# Patient Record
Sex: FEMALE | Race: WHITE | NOT HISPANIC OR LATINO | Employment: OTHER | URBAN - METROPOLITAN AREA
[De-identification: names, ages, dates, MRNs, and addresses within clinical notes are randomized per-mention and may not be internally consistent; named-entity substitution may affect disease eponyms.]

---

## 2018-06-19 LAB
BACTERIA UR QL AUTO: ABNORMAL
BILIRUB UR QL STRIP: NEGATIVE
CLARITY UR: ABNORMAL
COLOR UR: ABNORMAL
GLUCOSE UR STRIP-MCNC: NEGATIVE MG/DL
HGB UR QL STRIP.AUTO: ABNORMAL
KETONES UR STRIP-MCNC: ABNORMAL MG/DL
LEUKOCYTE ESTERASE UR QL STRIP: ABNORMAL
MUCUS THREADS (HISTORICAL): ABNORMAL /HPF
NITRITE UR QL STRIP: NEGATIVE
NON-SQ EPI CELLS URNS QL MICRO: ABNORMAL /HPF
OTHER CELLS (HISTORICAL): ABNORMAL
PH UR STRIP.AUTO: 7 [PH] (ref 4.5–8)
PROT UR STRIP-MCNC: ABNORMAL MG/DL
RBC #/AREA URNS AUTO: > 100 /HPF
SP GR UR STRIP.AUTO: 1.01 (ref 1–1.03)
UROBILINOGEN UR QL STRIP.AUTO: 1 EU/DL (ref 0.2–8)
WBC #/AREA URNS AUTO: ABNORMAL /HPF

## 2018-06-26 ENCOUNTER — LAB REQUISITION (OUTPATIENT)
Dept: LAB | Facility: HOSPITAL | Age: 83
End: 2018-06-26
Payer: MEDICARE

## 2018-06-26 DIAGNOSIS — N39.0 URINARY TRACT INFECTION: ICD-10-CM

## 2018-06-26 DIAGNOSIS — R31.9 HEMATURIA: ICD-10-CM

## 2018-06-26 LAB
BACTERIA UR QL AUTO: ABNORMAL /HPF
BILIRUB UR QL STRIP: ABNORMAL
CLARITY UR: ABNORMAL
COLOR UR: ABNORMAL
GLUCOSE UR STRIP-MCNC: NEGATIVE MG/DL
HGB UR QL STRIP.AUTO: ABNORMAL
KETONES UR STRIP-MCNC: NEGATIVE MG/DL
LEUKOCYTE ESTERASE UR QL STRIP: NEGATIVE
NITRITE UR QL STRIP: NEGATIVE
NON-SQ EPI CELLS URNS QL MICRO: ABNORMAL /HPF
PH UR STRIP.AUTO: 7 [PH] (ref 5–8)
PROT UR STRIP-MCNC: ABNORMAL MG/DL
RBC #/AREA URNS AUTO: ABNORMAL /HPF
SP GR UR STRIP.AUTO: 1.01 (ref 1–1.03)
UROBILINOGEN UR QL STRIP.AUTO: 0.2 E.U./DL
WBC #/AREA URNS AUTO: ABNORMAL /HPF

## 2018-06-26 PROCEDURE — 81001 URINALYSIS AUTO W/SCOPE: CPT | Performed by: FAMILY MEDICINE

## 2018-06-26 PROCEDURE — 87086 URINE CULTURE/COLONY COUNT: CPT | Performed by: FAMILY MEDICINE

## 2018-06-26 PROCEDURE — 81003 URINALYSIS AUTO W/O SCOPE: CPT | Performed by: FAMILY MEDICINE

## 2018-06-27 LAB — BACTERIA UR CULT: NORMAL

## 2018-07-02 ENCOUNTER — HOSPITAL ENCOUNTER (INPATIENT)
Facility: HOSPITAL | Age: 83
LOS: 4 days | Discharge: HOME | DRG: 983 | End: 2018-07-06
Attending: INTERNAL MEDICINE | Admitting: INTERNAL MEDICINE
Payer: MEDICARE

## 2018-07-02 ENCOUNTER — APPOINTMENT (EMERGENCY)
Dept: CT IMAGING | Facility: HOSPITAL | Age: 83
DRG: 983 | End: 2018-07-02
Payer: MEDICARE

## 2018-07-02 DIAGNOSIS — R31.9 HEMATURIA: ICD-10-CM

## 2018-07-02 DIAGNOSIS — E87.6 HYPOKALEMIA: Primary | ICD-10-CM

## 2018-07-02 DIAGNOSIS — R00.1 BRADYCARDIA: ICD-10-CM

## 2018-07-02 DIAGNOSIS — E87.1 HYPONATREMIA: ICD-10-CM

## 2018-07-02 PROBLEM — R42 DIZZINESS: Status: ACTIVE | Noted: 2018-07-02

## 2018-07-02 PROBLEM — E11.9 DIABETES MELLITUS TYPE 2, NONINSULIN DEPENDENT (HCC): Status: ACTIVE | Noted: 2017-12-12

## 2018-07-02 PROBLEM — N63.20 LEFT BREAST MASS: Status: ACTIVE | Noted: 2017-12-10

## 2018-07-02 PROBLEM — R31.0 GROSS HEMATURIA: Status: ACTIVE | Noted: 2018-07-02

## 2018-07-02 PROBLEM — F32.A DEPRESSION: Status: ACTIVE | Noted: 2017-02-06

## 2018-07-02 LAB
ALBUMIN SERPL BCP-MCNC: 4.2 G/DL (ref 3.5–5.7)
ALP SERPL-CCNC: 65 U/L (ref 55–165)
ALT SERPL W P-5'-P-CCNC: 16 U/L (ref 7–52)
ANION GAP SERPL CALCULATED.3IONS-SCNC: 5 MMOL/L (ref 4–13)
ANION GAP SERPL CALCULATED.3IONS-SCNC: 9 MMOL/L (ref 4–13)
APTT PPP: 22 SECONDS (ref 24–36)
AST SERPL W P-5'-P-CCNC: 21 U/L (ref 13–39)
BACTERIA UR QL AUTO: ABNORMAL /HPF
BACTERIA UR QL AUTO: ABNORMAL /HPF
BASOPHILS # BLD AUTO: 0 THOUSANDS/ΜL (ref 0–0.1)
BASOPHILS NFR BLD AUTO: 0 % (ref 0–2)
BILIRUB SERPL-MCNC: 0.6 MG/DL (ref 0.2–1)
BILIRUB UR QL STRIP: NEGATIVE
BILIRUB UR QL STRIP: NEGATIVE
BUN SERPL-MCNC: 10 MG/DL (ref 7–25)
BUN SERPL-MCNC: 13 MG/DL (ref 7–25)
CALCIUM SERPL-MCNC: 10 MG/DL (ref 8.6–10.5)
CALCIUM SERPL-MCNC: 11.1 MG/DL (ref 8.6–10.5)
CHLORIDE SERPL-SCNC: 80 MMOL/L (ref 98–107)
CHLORIDE SERPL-SCNC: 86 MMOL/L (ref 98–107)
CLARITY UR: ABNORMAL
CLARITY UR: ABNORMAL
CO2 SERPL-SCNC: 28 MMOL/L (ref 21–31)
CO2 SERPL-SCNC: 29 MMOL/L (ref 21–31)
COLOR UR: ABNORMAL
COLOR UR: ABNORMAL
CREAT SERPL-MCNC: 0.62 MG/DL (ref 0.6–1.2)
CREAT SERPL-MCNC: 0.71 MG/DL (ref 0.6–1.2)
EOSINOPHIL # BLD AUTO: 0 THOUSAND/ΜL (ref 0–0.61)
EOSINOPHIL NFR BLD AUTO: 0 % (ref 0–5)
ERYTHROCYTE [DISTWIDTH] IN BLOOD BY AUTOMATED COUNT: 12.7 % (ref 11.5–14.5)
GFR SERPL CREATININE-BSD FRML MDRD: 81 ML/MIN/1.73SQ M
GFR SERPL CREATININE-BSD FRML MDRD: 85 ML/MIN/1.73SQ M
GLUCOSE SERPL-MCNC: 136 MG/DL (ref 65–140)
GLUCOSE SERPL-MCNC: 137 MG/DL (ref 65–99)
GLUCOSE SERPL-MCNC: 195 MG/DL (ref 65–99)
GLUCOSE UR STRIP-MCNC: NEGATIVE MG/DL
GLUCOSE UR STRIP-MCNC: NEGATIVE MG/DL
HCT VFR BLD AUTO: 34.1 % (ref 34.8–46.1)
HGB BLD-MCNC: 12.1 G/DL (ref 12–16)
HGB UR QL STRIP.AUTO: ABNORMAL
HGB UR QL STRIP.AUTO: ABNORMAL
KETONES UR STRIP-MCNC: NEGATIVE MG/DL
KETONES UR STRIP-MCNC: NEGATIVE MG/DL
LEUKOCYTE ESTERASE UR QL STRIP: NEGATIVE
LEUKOCYTE ESTERASE UR QL STRIP: NEGATIVE
LYMPHOCYTES # BLD AUTO: 0.8 THOUSANDS/ΜL (ref 0.6–4.47)
LYMPHOCYTES NFR BLD AUTO: 8 % (ref 21–51)
MCH RBC QN AUTO: 30.6 PG (ref 26–34)
MCHC RBC AUTO-ENTMCNC: 35.3 G/DL (ref 31–37)
MCV RBC AUTO: 87 FL (ref 81–99)
MONOCYTES # BLD AUTO: 0.7 THOUSAND/ΜL (ref 0.17–1.22)
MONOCYTES NFR BLD AUTO: 8 % (ref 2–12)
MUCOUS THREADS UR QL AUTO: ABNORMAL
NEUTROPHILS # BLD AUTO: 8.2 THOUSANDS/ΜL (ref 1.4–6.5)
NEUTS SEG NFR BLD AUTO: 84 % (ref 42–75)
NITRITE UR QL STRIP: NEGATIVE
NITRITE UR QL STRIP: POSITIVE
NON-SQ EPI CELLS URNS QL MICRO: ABNORMAL /HPF
NON-SQ EPI CELLS URNS QL MICRO: ABNORMAL /HPF
NRBC BLD AUTO-RTO: 0 /100 WBCS
PH UR STRIP.AUTO: 6.5 [PH] (ref 5–8)
PH UR STRIP.AUTO: 6.5 [PH] (ref 5–8)
PLATELET # BLD AUTO: 292 THOUSANDS/UL (ref 149–390)
PMV BLD AUTO: 6.8 FL (ref 8.6–11.7)
POTASSIUM SERPL-SCNC: 2.6 MMOL/L (ref 3.5–5.5)
POTASSIUM SERPL-SCNC: 3.7 MMOL/L (ref 3.5–5.5)
PROT SERPL-MCNC: 7 G/DL (ref 6.4–8.9)
PROT UR STRIP-MCNC: ABNORMAL MG/DL
PROT UR STRIP-MCNC: ABNORMAL MG/DL
RBC # BLD AUTO: 3.94 MILLION/UL (ref 3.9–5.2)
RBC #/AREA URNS AUTO: ABNORMAL /HPF
RBC #/AREA URNS AUTO: ABNORMAL /HPF
SODIUM SERPL-SCNC: 118 MMOL/L (ref 134–143)
SODIUM SERPL-SCNC: 119 MMOL/L (ref 134–143)
SP GR UR STRIP.AUTO: 1.01 (ref 1–1.03)
SP GR UR STRIP.AUTO: 1.01 (ref 1–1.03)
TROPONIN I SERPL-MCNC: <0.03 NG/ML
UROBILINOGEN UR QL STRIP.AUTO: 0.2 E.U./DL
UROBILINOGEN UR QL STRIP.AUTO: 1 E.U./DL
WBC # BLD AUTO: 9.7 THOUSAND/UL (ref 4.8–10.8)
WBC #/AREA URNS AUTO: ABNORMAL /HPF
WBC #/AREA URNS AUTO: ABNORMAL /HPF

## 2018-07-02 PROCEDURE — 70450 CT HEAD/BRAIN W/O DYE: CPT

## 2018-07-02 PROCEDURE — 93005 ELECTROCARDIOGRAM TRACING: CPT

## 2018-07-02 PROCEDURE — 80053 COMPREHEN METABOLIC PANEL: CPT | Performed by: INTERNAL MEDICINE

## 2018-07-02 PROCEDURE — 82948 REAGENT STRIP/BLOOD GLUCOSE: CPT

## 2018-07-02 PROCEDURE — 80048 BASIC METABOLIC PNL TOTAL CA: CPT | Performed by: INTERNAL MEDICINE

## 2018-07-02 PROCEDURE — 96365 THER/PROPH/DIAG IV INF INIT: CPT

## 2018-07-02 PROCEDURE — 74176 CT ABD & PELVIS W/O CONTRAST: CPT

## 2018-07-02 PROCEDURE — 99223 1ST HOSP IP/OBS HIGH 75: CPT | Performed by: INTERNAL MEDICINE

## 2018-07-02 PROCEDURE — 85730 THROMBOPLASTIN TIME PARTIAL: CPT | Performed by: INTERNAL MEDICINE

## 2018-07-02 PROCEDURE — 81001 URINALYSIS AUTO W/SCOPE: CPT | Performed by: INTERNAL MEDICINE

## 2018-07-02 PROCEDURE — 99285 EMERGENCY DEPT VISIT HI MDM: CPT

## 2018-07-02 PROCEDURE — 84484 ASSAY OF TROPONIN QUANT: CPT | Performed by: INTERNAL MEDICINE

## 2018-07-02 PROCEDURE — 96366 THER/PROPH/DIAG IV INF ADDON: CPT

## 2018-07-02 PROCEDURE — 36415 COLL VENOUS BLD VENIPUNCTURE: CPT | Performed by: INTERNAL MEDICINE

## 2018-07-02 PROCEDURE — 81001 URINALYSIS AUTO W/SCOPE: CPT | Performed by: UROLOGY

## 2018-07-02 PROCEDURE — 81003 URINALYSIS AUTO W/O SCOPE: CPT | Performed by: INTERNAL MEDICINE

## 2018-07-02 PROCEDURE — 85025 COMPLETE CBC W/AUTO DIFF WBC: CPT | Performed by: INTERNAL MEDICINE

## 2018-07-02 RX ORDER — TRAMADOL HYDROCHLORIDE 50 MG/1
50 TABLET ORAL EVERY 6 HOURS PRN
Status: DISCONTINUED | OUTPATIENT
Start: 2018-07-02 | End: 2018-07-06 | Stop reason: HOSPADM

## 2018-07-02 RX ORDER — POTASSIUM CHLORIDE 20 MEQ/1
40 TABLET, EXTENDED RELEASE ORAL ONCE
Status: COMPLETED | OUTPATIENT
Start: 2018-07-02 | End: 2018-07-02

## 2018-07-02 RX ORDER — ACETAMINOPHEN 325 MG/1
650 TABLET ORAL EVERY 6 HOURS PRN
Status: DISCONTINUED | OUTPATIENT
Start: 2018-07-02 | End: 2018-07-06 | Stop reason: HOSPADM

## 2018-07-02 RX ORDER — SODIUM CHLORIDE 9 MG/ML
75 INJECTION, SOLUTION INTRAVENOUS ONCE
Status: COMPLETED | OUTPATIENT
Start: 2018-07-02 | End: 2018-07-03

## 2018-07-02 RX ORDER — ANASTROZOLE 1 MG/1
1 TABLET ORAL
COMMUNITY
Start: 2018-04-09

## 2018-07-02 RX ORDER — POTASSIUM CHLORIDE 14.9 MG/ML
40 INJECTION INTRAVENOUS
Status: COMPLETED | OUTPATIENT
Start: 2018-07-02 | End: 2018-07-02

## 2018-07-02 RX ORDER — TRAMADOL HYDROCHLORIDE 50 MG/1
50 TABLET ORAL EVERY 6 HOURS
COMMUNITY
Start: 2018-02-15 | End: 2019-02-15

## 2018-07-02 RX ORDER — SODIUM CHLORIDE 9 MG/ML
250 INJECTION, SOLUTION INTRAVENOUS CONTINUOUS
Status: DISCONTINUED | OUTPATIENT
Start: 2018-07-02 | End: 2018-07-02

## 2018-07-02 RX ORDER — ATORVASTATIN CALCIUM 40 MG/1
1 TABLET, FILM COATED ORAL DAILY
COMMUNITY
Start: 2018-01-26 | End: 2019-04-02 | Stop reason: SDUPTHER

## 2018-07-02 RX ORDER — PANTOPRAZOLE SODIUM 20 MG/1
20 TABLET, DELAYED RELEASE ORAL
Status: DISCONTINUED | OUTPATIENT
Start: 2018-07-03 | End: 2018-07-06 | Stop reason: HOSPADM

## 2018-07-02 RX ORDER — ATORVASTATIN CALCIUM 40 MG/1
40 TABLET, FILM COATED ORAL DAILY
Status: DISCONTINUED | OUTPATIENT
Start: 2018-07-03 | End: 2018-07-06 | Stop reason: HOSPADM

## 2018-07-02 RX ORDER — TRIAMTERENE AND HYDROCHLOROTHIAZIDE 37.5; 25 MG/1; MG/1
1 TABLET ORAL DAILY
COMMUNITY
Start: 2018-04-16 | End: 2018-07-06 | Stop reason: HOSPADM

## 2018-07-02 RX ORDER — OMEPRAZOLE 20 MG/1
1 CAPSULE, DELAYED RELEASE ORAL DAILY
COMMUNITY
Start: 2018-01-26 | End: 2019-04-02 | Stop reason: CLARIF

## 2018-07-02 RX ORDER — ANASTROZOLE 1 MG/1
1 TABLET ORAL DAILY
Status: DISCONTINUED | OUTPATIENT
Start: 2018-07-03 | End: 2018-07-06 | Stop reason: HOSPADM

## 2018-07-02 RX ADMIN — POTASSIUM CHLORIDE 20 MEQ: 200 INJECTION, SOLUTION INTRAVENOUS at 20:33

## 2018-07-02 RX ADMIN — POTASSIUM CHLORIDE 40 MEQ: 200 INJECTION, SOLUTION INTRAVENOUS at 16:03

## 2018-07-02 RX ADMIN — POTASSIUM CHLORIDE 40 MEQ: 1500 TABLET, EXTENDED RELEASE ORAL at 15:57

## 2018-07-02 RX ADMIN — SODIUM CHLORIDE 75 ML/HR: 9 INJECTION, SOLUTION INTRAVENOUS at 20:55

## 2018-07-02 RX ADMIN — SODIUM CHLORIDE 250 ML/HR: 0.9 INJECTION, SOLUTION INTRAVENOUS at 16:02

## 2018-07-02 NOTE — ASSESSMENT & PLAN NOTE
-unclear etiology  -UA appreciated  -urology consult  -h/h stable  -ct abd/pelvis with cysts noted on kidney

## 2018-07-02 NOTE — NURSING NOTE
icu admit to bed #1 dx sepsis  Pt awake, alert, and oriented x3  Placed in bed and on icu monitors  Oriented to room and unit  callbell at side  Family present  sr up

## 2018-07-02 NOTE — ED PROVIDER NOTES
History  Chief Complaint   Patient presents with    Dizziness    Loss of Appetite    Blood in Urine       [de-identified]years old female was past medical history of diabetes, hypertension, and coronary artery disease was brought to the emergency room was called by her daughter was intermittent weakness and generalized fatigue started 1 month ago associated with hematuria  She denies any chest pain shortness of breath nausea vomiting fever or chills  History provided by:  Patient   used: No    Dizziness   Quality:  Lightheadedness  Severity:  Moderate  Onset quality:  Gradual  Duration:  1 month  Relieved by:  Nothing  Worsened by:  Nothing  Associated symptoms: weakness    Associated symptoms: no chest pain, no diarrhea, no headaches, no nausea, no shortness of breath and no vomiting    Blood in Urine   This is a recurrent problem  The current episode started more than 1 month ago  The problem has been gradually worsening since onset  She is experiencing no pain  Obstructive symptoms do not include dribbling or straining  Pertinent negatives include no abdominal pain, chills, dysuria, fever, nausea or vomiting  Fatigue   Associated symptoms: dizziness    Associated symptoms: no abdominal pain, no chest pain, no cough, no diarrhea, no dysuria, no fever, no headaches, no myalgias, no nausea, no shortness of breath and no vomiting        Prior to Admission Medications   Prescriptions Last Dose Informant Patient Reported? Taking?    DOCOSAHEXAENOIC ACID PO   Yes Yes   Sig: Take 2 capsules by mouth daily   anastrozole (ARIMIDEX) 1 mg tablet 2018  Yes Yes   Sig: Take 1 mg by mouth daily   aspirin (ASPIRIN LOW DOSE) 81 MG tablet 2018  Yes Yes   Sig: Take 81 mg by mouth daily at bedtime   atorvastatin (LIPITOR) 40 mg tablet 2018  Yes Yes   Sig: Take 1 tablet by mouth daily   glucose blood (FREESTYLE LITE) test strip   Yes Yes   Si strip daily   metFORMIN (GLUCOPHAGE) 500 mg tablet 7/2/2018  Yes Yes   Sig: Take 500 mg by mouth daily   omeprazole (PriLOSEC) 20 mg delayed release capsule 7/2/2018  Yes Yes   Sig: Take 1 capsule by mouth daily   sertraline (ZOLOFT) 50 mg tablet 7/2/2018  Yes Yes   Sig: Take 1 tablet by mouth daily   traMADol (ULTRAM) 50 mg tablet 7/2/2018  Yes Yes   Sig: Take 50 mg by mouth every 6 (six) hours   triamterene-hydrochlorothiazide (MAXZIDE-25) 37 5-25 mg per tablet 7/2/2018  Yes Yes   Sig: Take 1 tablet by mouth daily      Facility-Administered Medications: None       Past Medical History:   Diagnosis Date    Cardiac disease     Diabetes mellitus ([facility] [address] )     Hyperlipidemia     Hypertension        Past Surgical History:   Procedure Laterality Date    APPENDECTOMY      BREAST LUMPECTOMY Bilateral     HYSTERECTOMY         History reviewed  No pertinent family history  I have reviewed and agree with the history as documented  Social History   Substance Use Topics    Smoking status: Former Smoker    Smokeless tobacco: Never Used    Alcohol use No        Review of Systems   Constitutional: Positive for activity change, appetite change and fatigue  Negative for chills and fever  HENT: Negative  Negative for rhinorrhea and sore throat  Eyes: Negative  Negative for visual disturbance  Respiratory: Negative  Negative for cough and shortness of breath  Cardiovascular: Negative  Negative for chest pain and leg swelling  Gastrointestinal: Negative for abdominal pain, diarrhea, nausea and vomiting  Genitourinary: Positive for hematuria  Negative for dysuria  Musculoskeletal: Negative for back pain and myalgias  Skin: Negative for rash  Neurological: Positive for dizziness, weakness and light-headedness  Negative for headaches  Unsteady gait weakness   Psychiatric/Behavioral: Positive for confusion  Physical Exam  Physical Exam   Constitutional: She is oriented to person, place, and time  She appears well-developed and well-nourished  HENT:   Nose: Nose normal    Mouth/Throat: Oropharynx is clear and moist  No oropharyngeal exudate  Eyes: Conjunctivae and EOM are normal  Pupils are equal, round, and reactive to light  No scleral icterus  Neck: Normal range of motion  Neck supple  No JVD present  No tracheal deviation present  Cardiovascular: Normal rate, regular rhythm and normal heart sounds  No murmur heard  Pulmonary/Chest: Effort normal and breath sounds normal  No respiratory distress  She has no wheezes  She has no rales  Abdominal: Soft  Bowel sounds are normal  There is no tenderness  There is no guarding  Musculoskeletal: Normal range of motion  She exhibits no edema or tenderness  Neurological: She is alert and oriented to person, place, and time  No cranial nerve deficit or sensory deficit  She exhibits normal muscle tone  5/5 motor, nl sens   Skin: Skin is warm and dry  Psychiatric: She has a normal mood and affect  Her behavior is normal    Nursing note and vitals reviewed        Vital Signs  ED Triage Vitals [07/02/18 1356]   Temperature Pulse Respirations Blood Pressure SpO2   97 8 °F (36 6 °C) 64 16 137/63 96 %      Temp Source Heart Rate Source Patient Position - Orthostatic VS BP Location FiO2 (%)   Temporal Monitor Sitting Left arm --      Pain Score       --           Vitals:    07/02/18 1356 07/02/18 1600 07/02/18 1630   BP: 137/63 139/65 145/67   Pulse: 64 73 62   Patient Position - Orthostatic VS: Sitting         Visual Acuity  Visual Acuity      Most Recent Value   L Pupil Size (mm)  3   R Pupil Size (mm)  3          ED Medications  Medications   potassium chloride 20 mEq IVPB (premix) (40 mEq Intravenous New Bag 7/2/18 1603)   sodium chloride 0 9 % infusion (250 mL/hr Intravenous New Bag 7/2/18 1602)   potassium chloride (K-DUR,KLOR-CON) CR tablet 40 mEq (40 mEq Oral Given 7/2/18 1557)       Diagnostic Studies  Results Reviewed     Procedure Component Value Units Date/Time    BMP Q8 hours X 3 (Hyponatremia monitoring) [[id number]]     Lab Status:  No result Specimen:  Blood     Troponin I [[id number]]  (Normal) Collected:  07/02/18 1446    Lab Status:  Final result Specimen:  Blood from Arm, Left Updated:  07/02/18 1527     Troponin I <0 03 ng/mL     Comprehensive metabolic panel [[id number]]  (Abnormal) Collected:  07/02/18 1446    Lab Status:  Final result Specimen:  Blood from Arm, Left Updated:  07/02/18 1526     Sodium 118 (L) mmol/L      Potassium 2 6 (LL) mmol/L      Chloride 80 (L) mmol/L      CO2 29 mmol/L      Anion Gap 9 mmol/L      BUN 13 mg/dL      Creatinine 0 71 mg/dL      Glucose 195 (H) mg/dL      Calcium 11 1 (H) mg/dL      AST 21 U/L      ALT 16 U/L      Alkaline Phosphatase 65 U/L      Total Protein 7 0 g/dL      Albumin 4 2 g/dL      Total Bilirubin 0 60 mg/dL      eGFR 81 ml/min/1 73sq m     Narrative:         National Kidney Disease Education Program recommendations are as follows:  GFR calculation is accurate only with a steady state creatinine  Chronic Kidney disease less than 60 ml/min/1 73 sq  meters  Kidney failure less than 15 ml/min/1 73 sq  meters      APTT [[id number]]  (Abnormal) Collected:  07/02/18 1446    Lab Status:  Final result Specimen:  Blood from Arm, Left Updated:  07/02/18 1513     PTT 22 (L) seconds     Urine Microscopic [[id number]]  (Abnormal) Collected:  07/02/18 1443    Lab Status:  Final result Specimen:  Urine from Urine, Clean Catch Updated:  07/02/18 1506     RBC, UA Innumerable (A) /hpf      WBC, UA 2-4 (A) /hpf      Epithelial Cells Occasional /hpf      Bacteria, UA Occasional /hpf     CBC and differential [[id number]]  (Abnormal) Collected:  07/02/18 1446    Lab Status:  Final result Specimen:  Blood from Arm, Left Updated:  07/02/18 1503     WBC 9 70 Thousand/uL      RBC 3 94 Million/uL      Hemoglobin 12 1 g/dL      Hematocrit 34 1 (L) %      MCV 87 fL      MCH 30 6 pg      MCHC 35 3 g/dL      RDW 12 7 %      MPV 6 8 (L) fL      Platelets 968 Thousands/uL nRBC 0 /100 WBCs      Neutrophils Relative 84 (H) %      Lymphocytes Relative 8 (L) %      Monocytes Relative 8 %      Eosinophils Relative 0 %      Basophils Relative 0 %      Neutrophils Absolute 8 20 (H) Thousands/µL      Lymphocytes Absolute 0 80 Thousands/µL      Monocytes Absolute 0 70 Thousand/µL      Eosinophils Absolute 0 00 Thousand/µL      Basophils Absolute 0 00 Thousands/µL     UA w Reflex to Microscopic w Reflex to Culture [[id number]]  (Abnormal) Collected:  07/02/18 1443    Lab Status:  Final result Specimen:  Urine from Urine, Clean Catch Updated:  07/02/18 1500     Color, UA Red (A)     Clarity, UA Cloudy (A)     Specific Gravity, UA 1 015     pH, UA 6 5     Leukocytes, UA Negative     Nitrite, UA Negative     Protein, UA 2+ (A) mg/dl      Glucose, UA Negative mg/dl      Ketones, UA Negative mg/dl      Urobilinogen, UA 0 2 E U /dl      Bilirubin, UA Negative     Blood, UA 3+ (A)            CT head without contrast   Final Result by Stormy Duncan (07/02 1623)   No acute intracranial abnormality  Signed by Stormy Duncan MD      CT abdomen pelvis wo contrast   Final Result by Naeem Vidal (07/02 1622)   No urinary tract calculi  There are cystic areas in both kidneys  Largest in lower pole of the right kidney measures 7 0 cm  It may have a   punctate calcification in the wall  Recommend follow-up ultrasound in 6   months  Mild to moderate left-sided colonic diverticulosis without diverticulitis  Signed by LELO Amador  CT head without contrast   Final Result by Stormy Duncan (07/02 1623)   No acute intracranial abnormality  Signed by Stormy Duncan MD      CT abdomen pelvis wo contrast   Final Result by Naeem Vidal (07/02 1622)   No urinary tract calculi  There are cystic areas in both kidneys  Largest in lower pole of the right kidney measures 7 0 cm  It may have a   punctate calcification in the wall    Recommend follow-up ultrasound in 6   months  Mild to moderate left-sided colonic diverticulosis without diverticulitis  Signed by LELO Esparza  Procedures  Procedures       Phone Contacts  ED Phone Contact    ED Course                               MDM  Number of Diagnoses or Management Options  Diagnosis management comments: Patient presents to the emergency room was generalized weakness nausea vomiting diarrhea abdominal discomfort intermittent confusion and unsteady gait, patient is hemodynamically stable and her EKG showed the normal sinus rhythm and right bundle branch block  Potassium 2 3 sodium 118 patient was placed on normal saline 250 cc/hour she was given 40 mEq of potassium intravenously and 40 mEq orally we will admit her to monitored bed will discuss with the hospitalist     Agnes Render Time    Disposition  Final diagnoses:   Hypokalemia   Hyponatremia   Hematuria     Time reflects when diagnosis was documented in both MDM as applicable and the Disposition within this note     Time User Action Codes Description Comment    7/2/2018  4:46 PM Layo Romo Add [E87 6] Hypokalemia     7/2/2018  4:49 PM Layo Demetrius Add [E87 1] Hyponatremia     7/2/2018  4:50 PM Layo Romo Add [R31 9] Hematuria       ED Disposition     ED Disposition Condition Comment    Admit  Case was discussed with DR Petr Mcbride and the patient's admission status was agreed to be Admission Status: inpatient status to the service of Dr Petr Mcbride   Follow-up Information    None         Patient's Medications   Discharge Prescriptions    No medications on file     No discharge procedures on file      ED Provider  Electronically Signed by           Marissa Otoole MD  07/02/18 9555       Marissa Otoole MD  07/02/18 1136

## 2018-07-02 NOTE — ED NOTES
Cardiac monitor in place B/P, Pulse ox  All alarms on  EKG completed       Yvonna Needs  07/02/18 2138

## 2018-07-02 NOTE — H&P
H&P- Cecily Joe 1935, [de-identified] y o  female MRN: [id number]    Unit/Bed#: ICU 01 Encounter: [id number]    Primary Care Provider: Leigha Powers DO   Date and time admitted to hospital: 7/2/2018  2:12 PM        * Hyponatremia   Assessment & Plan    -possibly due to dehydration  -cont IVF  -send urine lytes  -nephrology consult        Hypokalemia   Assessment & Plan    -replaced in ER  -monitor and add Potassium as needed  -check Mg level  -tele monitor        Gross hematuria   Assessment & Plan    -unclear etiology  -UA appreciated  -urology consult  -h/h stable  -ct abd/pelvis with cysts noted on kidney        Dizziness   Assessment & Plan    -unclear etiology, possibly related to dehydration vs medication side effect  -ct head negative for acute findings  -PT eval        Diabetes mellitus type 2, noninsulin dependent (HCC)   Assessment & Plan    -metformin held  -diabetic diet  -FS ac/hs  -insulin SS            Depression   Assessment & Plan    -stable  -cont zoloft        Essential hypertension   Assessment & Plan    -maxzide held  -monitor BP and treat accordingly        Left breast mass   Assessment & Plan    -s/p lumpectomy  -cont anastrazole        Hyperlipidemia   Assessment & Plan    -cont statin            VTE Prophylaxis: none, hematuria  / sequential compression device   Code Status: full  POLST: There is no POLST form on file for this patient (pre-hospital)  Discussion with family: yes    Anticipated Length of Stay:  Patient will be admitted on an Inpatient basis with an anticipated length of stay of  Greater than 2 midnights  Justification for Hospital Stay: hyponatremia    Total Time for Visit, including Counseling / Coordination of Care: 1 hour  Greater than 50% of this total time spent on direct patient counseling and coordination of care  Chief Complaint:   Dizziness    History of Present Illness:    Ceicly Joe is a [de-identified] y o  female who presents with dizziness    Pt has been being treated for uti/hematuria with PO abx by her PCP for the last 2 weeks with no significant improvement  She has decreased appetite and weakness/fatigue  Over last 2 days she has been dizzy and light headed  No headache or change in vision  +intermittent diarrhea  decreased PO intake  Review of Systems:    Review of Systems   Constitutional: Positive for appetite change and fatigue  Respiratory: Negative for cough and shortness of breath  Cardiovascular: Negative for chest pain and palpitations  Gastrointestinal: Positive for diarrhea and nausea  Negative for blood in stool and vomiting  Genitourinary: Positive for frequency and hematuria  Negative for difficulty urinating and urgency  Neurological: Positive for dizziness, weakness and light-headedness  Psychiatric/Behavioral: Positive for confusion  All other systems reviewed and are negative  Past Medical and Surgical History:     Past Medical History:   Diagnosis Date    Cardiac disease     Diabetes mellitus ([facility] [address] )     Hyperlipidemia     Hypertension        Past Surgical History:   Procedure Laterality Date    APPENDECTOMY      BREAST LUMPECTOMY Bilateral     HYSTERECTOMY         Meds/Allergies:    Prior to Admission medications    Medication Sig Start Date End Date Taking?  Authorizing Provider   anastrozole (ARIMIDEX) 1 mg tablet Take 1 mg by mouth daily 4/9/18 4/9/19 Yes Historical Provider, MD   aspirin (ASPIRIN LOW DOSE) 81 MG tablet Take 81 mg by mouth daily at bedtime 6/6/12  Yes Historical Provider, MD   atorvastatin (LIPITOR) 40 mg tablet Take 1 tablet by mouth daily 1/26/18  Yes Historical Provider, MD   DOCOSAHEXAENOIC ACID PO Take 2 capsules by mouth daily 6/24/14  Yes Historical Provider, MD   glucose blood (FREESTYLE LITE) test strip 1 strip daily 2/28/18  Yes Historical Provider, MD   metFORMIN (GLUCOPHAGE) 500 mg tablet Take 500 mg by mouth daily 4/26/18  Yes Historical Provider, MD   omeprazole ([address]) 20 mg delayed release capsule Take 1 capsule by mouth daily 1/26/18  Yes Historical Provider, MD   sertraline (ZOLOFT) 50 mg tablet Take 1 tablet by mouth daily 1/26/18  Yes Historical Provider, MD   traMADol (ULTRAM) 50 mg tablet Take 50 mg by mouth every 6 (six) hours 2/15/18 2/15/19 Yes Historical Provider, MD   triamterene-hydrochlorothiazide (MAXZIDE-25) 37 5-25 mg per tablet Take 1 tablet by mouth daily 4/16/18  Yes Historical Provider, MD     I have reviewed home medications with patient personally  Allergies: Allergies   Allergen Reactions    Celecoxib Swelling     celebrex- swelling of throat    Loratadine Swelling     Jaw swelled,couldn't swallow; face swelling    Aspirin GI Intolerance     Other reaction(s): nausea; okay with Ecotrin    Lisinopril Swelling     denies       Social History:     Marital Status:    Occupation: none  Patient Pre-hospital Living Situation: lives with son  Patient Pre-hospital Level of Mobility: no assisted device  Patient Pre-hospital Diet Restrictions: none  Substance Use History:   History   Alcohol Use No     History   Smoking Status    Former Smoker   Smokeless Tobacco    Never Used     History   Drug Use No       Family History:    History reviewed  No pertinent family history  Physical Exam:     Vitals:   Blood Pressure: 147/68 (07/02/18 1800)  Pulse: 68 (07/02/18 1841)  Temperature: 99 1 °F (37 3 °C) (07/02/18 1841)  Temp Source: Tympanic (07/02/18 1841)  Respirations: 21 (07/02/18 1841)  Height: 5' 3" (160 cm) (07/02/18 1816)  Weight - Scale: 84 4 kg (186 lb 1 6 oz) (07/02/18 1816)  SpO2: 95 % (07/02/18 1841)    Physical Exam   Constitutional: She appears well-developed and well-nourished  HENT:   Head: Normocephalic and atraumatic  Eyes: Conjunctivae and EOM are normal    Neck: Normal range of motion  Neck supple  Cardiovascular: Normal rate and regular rhythm  Pulmonary/Chest: Effort normal and breath sounds normal    Abdominal: Soft   She exhibits no distension  There is no tenderness  Musculoskeletal: Normal range of motion    -trace edema   Neurological: She is alert  Follows simple commands   Skin: Skin is warm and dry  Psychiatric: She has a normal mood and affect  Additional Data:     Lab Results: I have personally reviewed pertinent reports  Results from last 7 days  Lab Units 07/02/18  1446   WBC Thousand/uL 9 70   HEMOGLOBIN g/dL 12 1   HEMATOCRIT % 34 1*   PLATELETS Thousands/uL 292   NEUTROS PCT % 84*   LYMPHS PCT % 8*   MONOS PCT % 8   EOS PCT % 0       Results from last 7 days  Lab Units 07/02/18  1446   SODIUM mmol/L 118*   POTASSIUM mmol/L 2 6*   CHLORIDE mmol/L 80*   CO2 mmol/L 29   BUN mg/dL 13   CREATININE mg/dL 0 71   CALCIUM mg/dL 11 1*   TOTAL PROTEIN g/dL 7 0   BILIRUBIN TOTAL mg/dL 0 60   ALK PHOS U/L 65   ALT U/L 16   AST U/L 21   GLUCOSE RANDOM mg/dL 195*                   Imaging: I have personally reviewed pertinent reports  CT head without contrast   Final Result by Zainab Coughlin (07/02 1623)   No acute intracranial abnormality  Signed by Zainab Coughlin MD      CT abdomen pelvis wo contrast   Final Result by Daya Delarosa (07/02 1622)   No urinary tract calculi  There are cystic areas in both kidneys  Largest in lower pole of the right kidney measures 7 0 cm  It may have a   punctate calcification in the wall  Recommend follow-up ultrasound in 6   months  Mild to moderate left-sided colonic diverticulosis without diverticulitis  Signed by LELO Mcgarry           EKG, Pathology, and Other Studies Reviewed on Admission:   · EKG: not reviewed    Allscripts / Epic Records Reviewed: Yes     ** Please Note: This note has been constructed using a voice recognition system   **

## 2018-07-02 NOTE — ASSESSMENT & PLAN NOTE
-unclear etiology, possibly related to dehydration vs medication side effect  -ct head negative for acute findings  -PT eval

## 2018-07-03 LAB
ANION GAP SERPL CALCULATED.3IONS-SCNC: 5 MMOL/L (ref 4–13)
ATRIAL RATE: 65 BPM
BASOPHILS # BLD AUTO: 0 THOUSANDS/ΜL (ref 0–0.1)
BASOPHILS NFR BLD AUTO: 1 % (ref 0–2)
BUN SERPL-MCNC: 9 MG/DL (ref 7–25)
CALCIUM SERPL-MCNC: 10 MG/DL (ref 8.6–10.5)
CHLORIDE SERPL-SCNC: 88 MMOL/L (ref 98–107)
CO2 SERPL-SCNC: 27 MMOL/L (ref 21–31)
CREAT SERPL-MCNC: 0.59 MG/DL (ref 0.6–1.2)
CREAT UR-MCNC: 32.5 MG/DL
EOSINOPHIL # BLD AUTO: 0.1 THOUSAND/ΜL (ref 0–0.61)
EOSINOPHIL NFR BLD AUTO: 1 % (ref 0–5)
ERYTHROCYTE [DISTWIDTH] IN BLOOD BY AUTOMATED COUNT: 12.6 % (ref 11.5–14.5)
GFR SERPL CREATININE-BSD FRML MDRD: 87 ML/MIN/1.73SQ M
GLUCOSE SERPL-MCNC: 107 MG/DL (ref 65–140)
GLUCOSE SERPL-MCNC: 110 MG/DL (ref 65–140)
GLUCOSE SERPL-MCNC: 110 MG/DL (ref 65–99)
GLUCOSE SERPL-MCNC: 112 MG/DL (ref 65–140)
GLUCOSE SERPL-MCNC: 135 MG/DL (ref 65–140)
GLUCOSE SERPL-MCNC: 159 MG/DL (ref 65–140)
HCT VFR BLD AUTO: 30.6 % (ref 34.8–46.1)
HGB BLD-MCNC: 11 G/DL (ref 12–16)
IRON SATN MFR SERPL: 15 %
IRON SERPL-MCNC: 38 UG/DL (ref 50–170)
LYMPHOCYTES # BLD AUTO: 0.9 THOUSANDS/ΜL (ref 0.6–4.47)
LYMPHOCYTES NFR BLD AUTO: 11 % (ref 21–51)
MAGNESIUM SERPL-MCNC: 1.4 MG/DL (ref 1.9–2.7)
MCH RBC QN AUTO: 31 PG (ref 26–34)
MCHC RBC AUTO-ENTMCNC: 36 G/DL (ref 31–37)
MCV RBC AUTO: 86 FL (ref 81–99)
MICROALBUMIN UR-MCNC: 193 MG/L (ref 0–20)
MICROALBUMIN/CREAT 24H UR: 594 MG/G CREATININE (ref 0–30)
MONOCYTES # BLD AUTO: 0.7 THOUSAND/ΜL (ref 0.17–1.22)
MONOCYTES NFR BLD AUTO: 9 % (ref 2–12)
NEUTROPHILS # BLD AUTO: 5.8 THOUSANDS/ΜL (ref 1.4–6.5)
NEUTS SEG NFR BLD AUTO: 78 % (ref 42–75)
NRBC BLD AUTO-RTO: 0 /100 WBCS
OSMOLALITY UR: 178 MMOL/KG
P AXIS: 60 DEGREES
PLATELET # BLD AUTO: 176 THOUSANDS/UL (ref 149–390)
PMV BLD AUTO: 7.4 FL (ref 8.6–11.7)
POTASSIUM SERPL-SCNC: 3.3 MMOL/L (ref 3.5–5.5)
PR INTERVAL: 174 MS
PROT UR-MCNC: 148 MG/DL
PROT/CREAT UR: 4.55 MG/G{CREAT} (ref 0–0.1)
QRS AXIS: 65 DEGREES
QRSD INTERVAL: 160 MS
QT INTERVAL: 438 MS
QTC INTERVAL: 455 MS
RBC # BLD AUTO: 3.56 MILLION/UL (ref 3.9–5.2)
SODIUM 24H UR-SCNC: 21 MOL/L
SODIUM SERPL-SCNC: 120 MMOL/L (ref 134–143)
T WAVE AXIS: 51 DEGREES
TIBC SERPL-MCNC: 251 UG/DL (ref 250–450)
TSH SERPL DL<=0.05 MIU/L-ACNC: 1.16 UIU/ML (ref 0.45–5.33)
UUN 24H UR-MCNC: 219 MG/DL
VENTRICULAR RATE: 65 BPM
WBC # BLD AUTO: 7.5 THOUSAND/UL (ref 4.8–10.8)

## 2018-07-03 PROCEDURE — 97162 PT EVAL MOD COMPLEX 30 MIN: CPT

## 2018-07-03 PROCEDURE — 83935 ASSAY OF URINE OSMOLALITY: CPT | Performed by: INTERNAL MEDICINE

## 2018-07-03 PROCEDURE — G8979 MOBILITY GOAL STATUS: HCPCS

## 2018-07-03 PROCEDURE — 82948 REAGENT STRIP/BLOOD GLUCOSE: CPT

## 2018-07-03 PROCEDURE — 85025 COMPLETE CBC W/AUTO DIFF WBC: CPT | Performed by: INTERNAL MEDICINE

## 2018-07-03 PROCEDURE — 80048 BASIC METABOLIC PNL TOTAL CA: CPT | Performed by: INTERNAL MEDICINE

## 2018-07-03 PROCEDURE — 82043 UR ALBUMIN QUANTITATIVE: CPT | Performed by: INTERNAL MEDICINE

## 2018-07-03 PROCEDURE — 82570 ASSAY OF URINE CREATININE: CPT | Performed by: INTERNAL MEDICINE

## 2018-07-03 PROCEDURE — 84540 ASSAY OF URINE/UREA-N: CPT | Performed by: INTERNAL MEDICINE

## 2018-07-03 PROCEDURE — 84300 ASSAY OF URINE SODIUM: CPT | Performed by: INTERNAL MEDICINE

## 2018-07-03 PROCEDURE — 83550 IRON BINDING TEST: CPT | Performed by: INTERNAL MEDICINE

## 2018-07-03 PROCEDURE — 99233 SBSQ HOSP IP/OBS HIGH 50: CPT | Performed by: INTERNAL MEDICINE

## 2018-07-03 PROCEDURE — 84443 ASSAY THYROID STIM HORMONE: CPT | Performed by: INTERNAL MEDICINE

## 2018-07-03 PROCEDURE — 83540 ASSAY OF IRON: CPT | Performed by: INTERNAL MEDICINE

## 2018-07-03 PROCEDURE — G8978 MOBILITY CURRENT STATUS: HCPCS

## 2018-07-03 PROCEDURE — 84156 ASSAY OF PROTEIN URINE: CPT | Performed by: INTERNAL MEDICINE

## 2018-07-03 PROCEDURE — 83735 ASSAY OF MAGNESIUM: CPT | Performed by: INTERNAL MEDICINE

## 2018-07-03 RX ORDER — POTASSIUM CHLORIDE 14.9 MG/ML
20 INJECTION INTRAVENOUS
Status: COMPLETED | OUTPATIENT
Start: 2018-07-03 | End: 2018-07-03

## 2018-07-03 RX ORDER — SODIUM CHLORIDE 9 MG/ML
75 INJECTION, SOLUTION INTRAVENOUS ONCE
Status: COMPLETED | OUTPATIENT
Start: 2018-07-03 | End: 2018-07-03

## 2018-07-03 RX ORDER — POTASSIUM CHLORIDE 20 MEQ/1
40 TABLET, EXTENDED RELEASE ORAL ONCE
Status: COMPLETED | OUTPATIENT
Start: 2018-07-03 | End: 2018-07-03

## 2018-07-03 RX ORDER — MAGNESIUM SULFATE HEPTAHYDRATE 40 MG/ML
2 INJECTION, SOLUTION INTRAVENOUS ONCE
Status: COMPLETED | OUTPATIENT
Start: 2018-07-03 | End: 2018-07-03

## 2018-07-03 RX ADMIN — SERTRALINE HYDROCHLORIDE 50 MG: 50 TABLET ORAL at 08:04

## 2018-07-03 RX ADMIN — ANASTROZOLE 1 MG: 1 TABLET, COATED ORAL at 08:04

## 2018-07-03 RX ADMIN — PANTOPRAZOLE SODIUM 20 MG: 20 TABLET, DELAYED RELEASE ORAL at 05:31

## 2018-07-03 RX ADMIN — MAGNESIUM SULFATE HEPTAHYDRATE 2 G: 40 INJECTION, SOLUTION INTRAVENOUS at 12:38

## 2018-07-03 RX ADMIN — POTASSIUM CHLORIDE 20 MEQ: 200 INJECTION, SOLUTION INTRAVENOUS at 08:26

## 2018-07-03 RX ADMIN — POTASSIUM CHLORIDE 20 MEQ: 200 INJECTION, SOLUTION INTRAVENOUS at 10:30

## 2018-07-03 RX ADMIN — POTASSIUM CHLORIDE 40 MEQ: 1500 TABLET, EXTENDED RELEASE ORAL at 08:25

## 2018-07-03 RX ADMIN — ATORVASTATIN CALCIUM 40 MG: 40 TABLET, FILM COATED ORAL at 08:03

## 2018-07-03 RX ADMIN — SODIUM CHLORIDE 75 ML/HR: 9 INJECTION, SOLUTION INTRAVENOUS at 06:52

## 2018-07-03 NOTE — NURSING NOTE
Camacho irrigated with 60cc urine appearance  after irrigation light peach colored will continue to monitor

## 2018-07-03 NOTE — NURSING NOTE
Hospitalist aware of bradycardia with rate of 37 with blood pressure 121 over 57  Patient asymptotic during episode  No new orders, will continue to monitor

## 2018-07-03 NOTE — CONSULTS
Consultation - Urology   Yanira Bennett [de-identified] y o  female MRN: [id number]  Unit/Bed#: ICU 01 Encounter: [id number]      Assessment/Plan      Assessment:  Gross hematuria  Possible urinary tract infection  Urinary retention  Chronic urinary incontinence  Plan:  Continue Camacho catheter  Irrigate prn  Await urine culture results  History of Present Illness   Attending: Cherise Preston MD  Reason for Consult / Principal Problem:  Gross hematuria  Urinary retention  HPI: Yanira Bennett is a [de-identified]y o  year old female who presents with chronic urinary incontinence  She was found to have gross hematuria  Camacho catheter was placed yesterday for approximately 700 cc  The urine has since been kathie color with blood tinged  She was treated for urinary tract infection with oral antibiotics as an outpatient about 2 weeks ago  She denies any recent change in urinary symptoms  She denies recent dysuria or hematuria  CT scan shows bilateral renal cysts with no urinary tract calculi  There may be a punctate calcification in the wall of the lower pole right renal cyst and follow-up ultrasound is recommended in 6 months      Consults    Review of Systems    Historical Information   Past Medical History:   Diagnosis Date    Cardiac disease     Diabetes mellitus ([address] )     Hyperlipidemia     Hypertension      Past Surgical History:   Procedure Laterality Date    APPENDECTOMY      BREAST LUMPECTOMY Bilateral     HYSTERECTOMY       Social History   History   Alcohol Use No     History   Drug Use No     History   Smoking Status    Former Smoker   Smokeless Tobacco    Never Used     Family History: non-contributory    Meds/Allergies   all current active meds have been reviewed  Allergies   Allergen Reactions    Celecoxib Swelling     celebrex- swelling of throat    Loratadine Swelling     Jaw swelled,couldn't swallow; face swelling    Aspirin GI Intolerance     Other reaction(s): nausea; okay with Ecotrin    Lisinopril Swelling     denies       Objective   Vitals: Blood pressure 135/63, pulse (!) 54, temperature 98 °F (36 7 °C), temperature source Tympanic, resp  rate 16, height 5' 3" (1 6 m), weight 85 3 kg (188 lb), SpO2 90 %, not currently breastfeeding  I/O last 24 hours: In: 1722 9 [I V :1522 9; IV Piggyback:200]  Out: 850 [Urine:850]    Invasive Devices     Peripheral Intravenous Line            Peripheral IV 07/02/18 Left Antecubital less than 1 day          Drain            Urethral Catheter 16 Fr  less than 1 day                Physical Exam abdomen is benign  Urine is kathie with some blood tinge  Lab Results:   CBC:   Lab Results   Component Value Date    WBC 7 50 07/03/2018    HGB 11 0 (L) 07/03/2018    HCT 30 6 (L) 07/03/2018    MCV 86 07/03/2018     07/03/2018    MCH 31 0 07/03/2018    MCHC 36 0 07/03/2018    RDW 12 6 07/03/2018    MPV 7 4 (L) 07/03/2018    NRBC 0 07/03/2018     Imaging Studies: I have personally reviewed pertinent reports  EKG, Pathology, and Other Studies: I have personally reviewed pertinent reports  VTE Prophylaxis: Sequential compression device (Venodyne)     Code Status: Level 1 - Full Code  Advance Directive and Living Will:      Power of :    POLST:      Counseling / Coordination of Care  Total floor / unit time spent today 30 minutes  Greater than 50% of total time was spent with the patient and / or family counseling and / or coordination of care   A description of the counseling / coordination of care:  Discussed with the hospitalist

## 2018-07-03 NOTE — SOCIAL WORK
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

## 2018-07-03 NOTE — PROGRESS NOTES
[facility] Internal Medicine Progress Note  Patient: Remington Hanley [de-identified] y o  female   MRN: [id number]  PCP: Kwasi Haider DO  Unit/Bed#: ICU 01 Encounter: [id number]  Date Of Visit: 07/03/18    Assessment:    Principal Problem:    Hyponatremia  Active Problems:    Diabetes mellitus type 2, noninsulin dependent ([address] )    Essential hypertension    Hyperlipidemia    Left breast mass    Depression    Hypokalemia    Gross hematuria    Dizziness      Plan:  #1hyponatremia  Acute on chronic hyponatremia  Improving, currently asymptomatic  Comprehensive medication review performed  2   Anemia  Patient is currently asymptomatic  Reports minimal hematuria  We will continue to monitor for now no need for transfusion  Urology is following for hematuria evaluation  3 DM Type II:    Begin no concentrated carbohydrate diet  Cover with Aspart SSI as needed   Will order HgbA1C to assess status of recent glycemic control  Well initiate home medications once med rec is completed and confirmed by pharmacy  #4History of HTN:   We will continue patient home medications  Although initially his blood pressure was higher in emergency department, it later stabilized  Well also initiate IV hydralazine and IV metoprolol for SBP greater than 160 mmHg  We will advise patient to follow-up with next PCP in regards to further better management of  ongoing HTN  Patient does report some noncompliance was dietary regimen, extensive consultation was given about appropriate last child nutritional modifications, including stress reduction to achieve optimal blood pressure control  5 depression  Mood and affect appears to be appropriate  We will continue current regimen  6   Deconditioning  PT/OT eval and treatment reviewed discussed with family  7   Hypokalemia  Replace by mouth and IV        VTE Pharmacologic Prophylaxis:   Pharmacologic: Heparin  Mechanical VTE Prophylaxis in Place: Yes    Patient Centered Rounds: I have performed bedside rounds with nursing staff today  Discussions with Specialists or Other Care Team Provider: yes    Education and Discussions with Family / Patient: no    Time Spent for Care: 45 minutes  More than 50% of total time spent on counseling and coordination of care as described above  Current Length of Stay: 1 day(s)    Current Patient Status: Inpatient   Certification Statement: The patient will continue to require additional inpatient hospital stay due to ongoing hematuria    Discharge Plan / Estimated Discharge Date: to be determined    Code Status: Level 1 - Full Code      Subjective:   No complaints    Objective:     Vitals:   Temp (24hrs), Av 5 °F (36 9 °C), Min:98 °F (36 7 °C), Max:99 1 °F (37 3 °C)    HR:  [37-73] 58  Resp:  [11-25] 18  BP: (119-157)/(57-71) 132/61  SpO2:  [89 %-97 %] 92 %  Body mass index is 33 3 kg/m²  Input and Output Summary (last 24 hours): Intake/Output Summary (Last 24 hours) at 18 1517  Last data filed at 18 1342   Gross per 24 hour   Intake           2627 5 ml   Output             1590 ml   Net           1037 5 ml       Physical Exam:     Physical Exam  GENERAL APPEARANCE: WD/WN in NAD pleasant  SKIN: no rash  HEENT: NC/AT, PERRLA (B), moist MM, no epistaxis  NECK: Supple, no JVD    LUNGS: CTA (B) mildly prolonged expiratory phase,   no use of accessory muscles    HEART:          S1S 2, RRR  , PMI is not displaced  ABDOMEN: Soft, nontender, nondistended, +BS  Rectal exam:  EXTREMITIES: no edema   PERIPHERAL VASCULAR: palpable pulses   NEURO:  AAO x 3, CN 2-12: non focal  MUSCLE STRENGHT: 5/5 (B), SENSATION: nonfocal  DTR: ++, CEREBELLAR: non focal  Additional Data:     Labs:      Results from last 7 days  Lab Units 18  0514   WBC Thousand/uL 7 50   HEMOGLOBIN g/dL 11 0*   HEMATOCRIT % 30 6*   PLATELETS Thousands/uL 176   NEUTROS PCT % 78*   LYMPHS PCT % 11*   MONOS PCT % 9   EOS PCT % 1       Results from last 7 days  Lab Units 07/03/18  0514  07/02/18  1446   SODIUM mmol/L 120*  < > 118*   POTASSIUM mmol/L 3 3*  < > 2 6*   CHLORIDE mmol/L 88*  < > 80*   CO2 mmol/L 27  < > 29   BUN mg/dL 9  < > 13   CREATININE mg/dL 0 59*  < > 0 71   CALCIUM mg/dL 10 0  < > 11 1*   TOTAL PROTEIN g/dL  --   --  7 0   BILIRUBIN TOTAL mg/dL  --   --  0 60   ALK PHOS U/L  --   --  65   ALT U/L  --   --  16   AST U/L  --   --  21   GLUCOSE RANDOM mg/dL 110*  < > 195*   < > = values in this interval not displayed  * I Have Reviewed All Lab Data Listed Above  * Additional Pertinent Lab Tests Reviewed: [address] Admission Reviewed    Imaging:    Imaging Reports Reviewed Today Include: yes  Imaging Personally Reviewed by Myself Includes:  yes    Recent Cultures (last 7 days):           Last 24 Hours Medication List:     Current Facility-Administered Medications:  acetaminophen 650 mg Oral Q6H PRN Nathanael Benavidez MD   anastrozole 1 mg Oral Daily Nathanael Benavidez MD   atorvastatin 40 mg Oral Daily Nathanael Benavidez MD   insulin lispro 1-5 Units Subcutaneous TID Dinorah Campuzano MD   pantoprazole 20 mg Oral Early Morning Nathanael Benavidez MD   sertraline 50 mg Oral Daily Nathanael Benavidez MD   traMADol 50 mg Oral Q6H PRN Nathanael Benavidez MD        Today, Patient Was Seen By: Nicole Swain MD    ** Please Note: This note has been constructed using a voice recognition system   **

## 2018-07-03 NOTE — SOCIAL WORK
CM met with pt at bedside  Pt lives with son and reports she is independent with ADL's  Pt reports she continues to drive and does not need assisstance with ADL's at this time  Pt denies the need for any HHC or other services at this time  Pt reports her son will transport her home upon discharge  CM to follow  CM reviewed d/c planning process including the following: identifying help at home, patient preference for d/c planning needs, availability of treatment team to discuss questions or concerns patient and/or family may have regarding understanding medications and recognizing signs and symptoms once discharged  CM also encouraged patient to follow up with all recommended appointments after discharge  Patient advised of importance for patient and family to participate in managing patients medical well being

## 2018-07-03 NOTE — PHYSICAL THERAPY NOTE
Physical Therapy Evaluation     Patient's Name: Miladys Maurer    Admitting Diagnosis  Hypokalemia [E87 6]  Hyponatremia [E87 1]  Lightheaded [R42]  Hematuria [R31 9]    Problem List  Patient Active Problem List   Diagnosis    Diabetes mellitus type 2, noninsulin dependent ([facility] [address] )    Essential hypertension    Hyperlipidemia    Left breast mass    Depression    Hyponatremia    Hypokalemia    Gross hematuria    Dizziness       Past Medical History  Past Medical History:   Diagnosis Date    Cardiac disease     Diabetes mellitus ([facility] [address] )     Hyperlipidemia     Hypertension        Past Surgical History  Past Surgical History:   Procedure Laterality Date    APPENDECTOMY      BREAST LUMPECTOMY Bilateral     HYSTERECTOMY        07/03/18 1114   Note Type   Note type Eval/Treat   Pain Assessment   Pain Assessment No/denies pain   Pain Score No Pain   Home Living   Type of [address] [place] [address] Two level;Stairs to enter with rails  ([address])   Bathroom Shower/Tub Walk-in shower   Bathroom Toilet Standard   Bathroom Accessibility Accessible   Home Equipment Wheelchair-manual;Cane   Prior Function   Level of [place] Independent with ADLs and functional mobility   Lives With Son   [place] Sanchez Help From Friend(s)  (if needed)   ADL Assistance Independent   IADLs Independent   Falls in the last 6 months 1 to 4   Vocational Retired   Restrictions/Precautions   [facility] Bearing Precautions Per Order No   Cognition   Overall Cognitive Status WFL   Arousal/Participation Alert   Orientation Level Oriented X4   Memory Within functional limits   Following Commands Follows multistep commands without difficulty   RUE Assessment   RUE Assessment WFL   LUE Assessment   LUE Assessment WFL   RLE Assessment   RLE Assessment WFL   LLE Assessment   LLE Assessment WFL   Bed Mobility   Supine to Sit 4  Minimal assistance   Additional items Assist x 1   Transfers   Sit to Stand 4  Minimal assistance   Additional items Assist x 1 Stand to Sit 4  Minimal assistance   Additional items Assist x 1   Stand pivot 4  Minimal assistance   Additional items Assist x 1   Ambulation/Elevation   Gait pattern Forward Flexion; Wide RADHA   Gait Assistance 4  Minimal assist   Additional items Assist x 1   Assistive Device Rolling walker   Distance 5 feet   Stair Management Assistance Not tested   Balance   Static Sitting Normal   Dynamic Sitting Good   Static Standing Good   Dynamic Standing Fair +   Ambulatory Fair +   Endurance Deficit   Endurance Deficit Yes   Endurance Deficit Description increase fatigue upon session conclusion   Activity Tolerance   Activity Tolerance Patient limited by fatigue   Assessment   Prognosis Excellent   Problem List Decreased strength;Decreased endurance; Impaired balance;Decreased mobility   Assessment Pt is [de-identified] y o  female seen for PT evaluation s/p admit to [employer] [facility] on 7/2/2018 w/ Hyponatremia  PT consulted to assess pt's functional mobility and d/c needs  Order placed for PT eval and tx, w/ up as tolerated order  Comorbidities affecting pt's physical performance at time of assessment include:dizziness upon admisssion, DM,HTN, CA, cardiac disease  pt was requiring A for mobility and lives w/ son in two level house  Personal factors affecting pt at time of IE include: stairs to enter home  Please find objective findings from PT assessment regarding body systems outlined above with impairments and limitations including weakness, impaired balance, decreased endurance, gait deviations, decreased activity tolerance and decreased functional mobility tolerance  The following objective measures performed on IE also reveal limitations: Barthel Index: 70/100  Pt's clinical presentation is currently evolving seen in pt's presentation of dorothea in [facility], complex PMH, and current HPI   Pt to benefit from continued PT tx to address deficits as defined above and maximize level of functional independent mobility and consistency  From PT/mobility standpoint, recommendation at time of d/c would be Home PT vs  STR pending progress in order to facilitate return to PLOF  Goals   Patient Goals return home    STG Expiration Date 07/08/18   Short Term Goal #1 Pt will: Perform bed mobility tasks to Supervision to prepare for transfers  Perform transfers to Supervision to decrease risk for falls and improve activity tolerance  Perform ambulation with RW for 100 feet, supervision of 1 To increase Indep in prior living environment  Increase patient safety awareness 100% of tx time with occasional cues to maintain progress following education  LTG Expiration Date 07/13/18   Long Term Goal #1 Pt will: Perform bed mobility tasks to modified I to resume functional level prior to admission  Perform transfers to modified I to decrease risk for falls and improve activity tolerance  Perform ambulation without AD for 150 feet, stand by assist of 1  To increase Indep in prior living environment  Increase compliance of functional training maintain 100% carryover and fading cues throughout session without LOB  Plan   Treatment/Interventions ADL retraining;Functional transfer training;LE strengthening/ROM; Therapeutic exercise; Endurance training;Bed mobility;Gait training;Spoke to nursing   PT Frequency 5x/wk   Recommendation   Recommendation (short term skilled PT vs  home PT)   PT - OK to Discharge No   Barthel Index   Feeding 10   Bathing 5   Grooming Score 5   Dressing Score 5   Bladder Score 10   Bowels Score 10   Toilet Use Score 5   Transfers (Bed/Chair) Score 10   Mobility (Level Surface) Score 10   Stairs Score 0   Barthel Index Score 70     Humberto Guzman, PT

## 2018-07-03 NOTE — NURSING NOTE
Patient out of bed to chair  Noted urine appearance red, irrigated 60cc per order, after irrigation urine appearance light peach colored

## 2018-07-03 NOTE — CONSULTS
Consultation - Nephrology   Nieves Nixon [de-identified] y o  female MRN: [id number]  Unit/Bed#: ICU 01 Encounter: [id number]      A/P:  1  This patient was on Hyponatremia triamterene hydrochlorothiazide  Thiazide diuretics impair free water clearance  She also was on an SSRI which can cause an SIADH type picture by affecting osmole receptor function  She was eating little and drinking water  This suggests decreased solute intake which I will check with no albumin  Will check urine electrolytes and osmolality  , and ultrasound of the kidneys was unremarkable  She should not have thiazide diuretics on discharge  Would continue IV fluids as she appeared volume depleted with an elevated urine specific gravity  2   Gross hematuria  Dr Dale Bullock has seen the patient and she probably will need a cystoscopy  3  Type 2 diabetes mellitus:  Continue current treatment for hypokalemia and hypomagnesemia both need to be corrected to improve her electrolytes status for hyperlipidemia continue statin therapy   5  Dizziness: due to volume depletion:         Thank you for allowing us to participate in the care of your patient  Please feel free to contact us regarding the care of this patient, or any other questions/concerns that may be applicable  Patient Active Problem List   Diagnosis    Diabetes mellitus type 2, noninsulin dependent ([facility] [address] )    Essential hypertension    Hyperlipidemia    Left breast mass    Depression    Hyponatremia    Hypokalemia    Gross hematuria    Dizziness       History of Present Illness   Physician Requesting Consult: Vazquez Zelaya MD  Reason for Consult / Principal Problem: hyponatremia and hypokalemia  Hx and PE limited by:   HPI: Nieves Nixon is a [de-identified]y o  year old female who presents with increasing dizziness and weakness over the past month    She noted that she was having gross hematuria which was totally painless last month and her daughter's brothers the emergency room she says the last several days she has been feeling weak and lightheaded  She says she has a decreased appetite however she was drinking water  She continues to take the medications as before  She denied any other symptoms other than gross hematuria  On presentation she was found to have a serum sodium of 118 and a low potassium  History obtained from chart review and the patient    Review of Systems - Negative except as mentioned above in HPI, more specifics as mentioned below  Review of Systems - General ROS: positive for  - fatigue  Ophthalmic ROS: positive for - decreased vision  ENT ROS: negative  Hematological and Lymphatic ROS: negative  Endocrine ROS: negative  Respiratory ROS: no cough, shortness of breath, or wheezing  Cardiovascular ROS: no chest pain or dyspnea on exertion  Gastrointestinal ROS: no abdominal pain, change in bowel habits, or black or bloody stools  Genito-Urinary ROS: positive for - hematuria  Musculoskeletal ROS: negative  Neurological ROS: no TIA or stroke symptoms  Dermatological ROS: negative    Historical Information   Past Medical History:   Diagnosis Date    Cardiac disease     Diabetes mellitus ([facility] [address] )     Hyperlipidemia     Hypertension      Past Surgical History:   Procedure Laterality Date    APPENDECTOMY      BREAST LUMPECTOMY Bilateral     HYSTERECTOMY       Social History   History   Alcohol Use No     History   Drug Use No     History   Smoking Status    Former Smoker   Smokeless Tobacco    Never Used     History reviewed  No pertinent family history  Father  of Alzheimer's and mother  of heart disease    Meds/Allergies   all current active meds have been reviewed, current meds: Current Facility-Administered Medications   Medication Dose Route Frequency    acetaminophen (TYLENOL) tablet 650 mg  650 mg Oral Q6H PRN    anastrozole (ARIMIDEX) tablet 1 mg  1 mg Oral Daily    atorvastatin (LIPITOR) tablet 40 mg  40 mg Oral Daily    insulin lispro (HumaLOG) 100 units/mL subcutaneous injection 1-5 Units  1-5 Units Subcutaneous TID AC    pantoprazole (PROTONIX) EC tablet 20 mg  20 mg Oral Early Morning    potassium chloride 20 mEq IVPB (premix)  20 mEq Intravenous Q2H    sertraline (ZOLOFT) tablet 50 mg  50 mg Oral Daily    traMADol (ULTRAM) tablet 50 mg  50 mg Oral Q6H PRN    and PTA meds:  Prescriptions Prior to Admission   Medication    anastrozole (ARIMIDEX) 1 mg tablet    aspirin (ASPIRIN LOW DOSE) 81 MG tablet    atorvastatin (LIPITOR) 40 mg tablet    DOCOSAHEXAENOIC ACID PO    glucose blood (FREESTYLE LITE) test strip    metFORMIN (GLUCOPHAGE) 500 mg tablet    omeprazole (PriLOSEC) 20 mg delayed release capsule    sertraline (ZOLOFT) 50 mg tablet    traMADol (ULTRAM) 50 mg tablet    triamterene-hydrochlorothiazide (MAXZIDE-25) 37 5-25 mg per tablet         Allergies   Allergen Reactions    Celecoxib Swelling     celebrex- swelling of throat    Loratadine Swelling     Jaw swelled,couldn't swallow; face swelling    Aspirin GI Intolerance     Other reaction(s): nausea; okay with Ecotrin    Lisinopril Swelling     denies       Objective     Intake/Output Summary (Last 24 hours) at 07/03/18 0948  Last data filed at 07/03/18 8413   Gross per 24 hour   Intake          1942 92 ml   Output              850 ml   Net          1092 92 ml       Invasive Devices:   Urethral Catheter 16 Fr  (Active)   Output (mL) 150 mL 7/3/2018  6:00 AM       Physical Exam      I/O last 3 completed shifts:   In: 1722 9 [I V :1522 9; IV Piggyback:200]  Out: 850 [Urine:850]    Vitals:    07/03/18 0900   BP: 137/63   Pulse: (!) 53   Resp: 17   Temp:    SpO2: 92%       Gen: in NAD, Alert/Awake  HEENT: no sclerous icterus, MMM, neck supple  CV: +S1/S2, RRR  Lungs: CTA bilaterally  Abd: +BS, soft NT/ND  Ext: all four extremities are warm  Skin: no rashes noted  Neuro: CN II-XII intact Camacho draining blood tinged pink urine    Current Weight: Weight - Scale: 85 3 kg (188 lb)  First Weight: Weight - Scale: 81 6 kg (180 lb)    Lab Results:  I have personally reviewed pertinent labs      CBC: Lab Results   Component Value Date    WBC 7 50 07/03/2018    HGB 11 0 (L) 07/03/2018    HCT 30 6 (L) 07/03/2018    MCV 86 07/03/2018     07/03/2018    MCH 31 0 07/03/2018    MCHC 36 0 07/03/2018    RDW 12 6 07/03/2018    MPV 7 4 (L) 07/03/2018    NRBC 0 07/03/2018     CMP: Lab Results   Component Value Date     (L) 07/03/2018    K 3 3 (L) 07/03/2018    CL 88 (L) 07/03/2018    CO2 27 07/03/2018    ANIONGAP 5 07/03/2018    BUN 9 07/03/2018    CREATININE 0 59 (L) 07/03/2018    GLUCOSE 110 (H) 07/03/2018    CALCIUM 10 0 07/03/2018    AST 21 07/02/2018    ALT 16 07/02/2018    ALKPHOS 65 07/02/2018    PROT 7 0 07/02/2018    BILITOT 0 60 07/02/2018    EGFR 87 07/03/2018     Phosphorus: No results found for: PHOS  Magnesium:   Lab Results   Component Value Date    MG 1 4 (L) 07/03/2018     Urinalysis: Lab Results   Component Value Date    COLORU Red (A) 07/02/2018    CLARITYU Turbid (A) 07/02/2018    SPECGRAV 1 015 07/02/2018    PHUR 6 5 07/02/2018    LEUKOCYTESUR Negative 07/02/2018    NITRITE Positive (A) 07/02/2018    PROTEINUA 2+ (A) 07/02/2018    GLUCOSEU Negative 07/02/2018    KETONESU Negative 07/02/2018    BILIRUBINUR Negative 07/02/2018    BLOODU 3+ (A) 07/02/2018     Ionized Calcium: No results found for: CAION  Coagulation: No results found for: PT, INR, APTT  Troponin:   Lab Results   Component Value Date    TROPONINI <0 03 07/02/2018     ABG: No results found for: PHART, NZQ5WDP, PO2ART, KAJ7MQY, V7JKSXFG, BEART, SOURCE      Results from last 7 days  Lab Units 07/03/18  0514 07/02/18  2208 07/02/18  1446   SODIUM mmol/L 120* 119* 118*   POTASSIUM mmol/L 3 3* 3 7 2 6*   CHLORIDE mmol/L 88* 86* 80*   CO2 mmol/L 27 28 29   BUN mg/dL 9 10 13   CREATININE mg/dL 0 59* 0 62 0 71   CALCIUM mg/dL 10 0 10 0 11 1*   TOTAL PROTEIN g/dL  --   --  7 0   BILIRUBIN TOTAL mg/dL  --   --  0 60   ALK PHOS U/L --   --  65   ALT U/L  --   --  16   AST U/L  --   --  21   GLUCOSE RANDOM mg/dL 110* 137* 195*       Radiology review:  Procedure: Ct Abdomen Pelvis Wo Contrast    Result Date: 7/2/2018  Narrative: INDICATION:  Hematuria  ORDERING PROVIDER:  Gerard Cobos  TECHNIQUE:  CT of the abdomen and pelvis was performed without intravenous contrast   RADIATION AMOUNT:  598 80 mGy-cm  COMPARISON:  None Available  FINDINGS: Abdomen: The lung bases are clear  The liver, spleen, pancreas, adrenal glands, and kidneys are suboptimally evaluated on these unenhanced images, but demonstrate no acute pathology  There is a cystic area posteriorly in the left kidney  There is rotational anomaly of the left kidney  On the right, there is a cortical calcification adjacent to a lower pole cystic area  Cystic area in the lower pole measures 7 0 cm  There are no urinary tract calculi  There is no free air or lymph node enlargement  Abdominal aorta is not aneurysmal  There is mild atheromatous plaque in the distal descending thoracic aorta  There is significant vascular calcification in the splenic artery  There is moderate infrarenal arterial vascular plaque Pelvis: There is no bowel wall thickening or obstruction  There is no free fluid  Lymph nodes are not enlarged  Urinary bladder is unremarkable  There is mild to moderate left-sided colonic diverticulosis without diverticulitis  There has been a hysterectomy  Skeleton:  There are no acute fractures  No suspicious bony lesions  There is multilevel moderate degenerative changes in the lumbar spine  There is mild rotoscoliosis  Impression: No urinary tract calculi  There are cystic areas in both kidneys  Largest in lower pole of the right kidney measures 7 0 cm  It may have a punctate calcification in the wall  Recommend follow-up ultrasound in 6 months  Mild to moderate left-sided colonic diverticulosis without diverticulitis   Signed by Tiffanie Ritter M D     Procedure: Brianl Galdamez Head Without Contrast    Result Date: 7/2/2018  Narrative: INDICATION:  Ataxia  Slowly progressive  ORDERING PROVIDER:  Jimmey Bumpers  TECHNIQUE:  CT of the brain was performed without contrast   Automated mA/kV exposure control was utilized and patient examination was performed in strict accordance with principles of ALARA  RADIATION AMOUNT:  913 6 mGy-cm  COMPARISON:  None Available  FINDINGS: There is no acute intracranial hemorrhage, midline shift, mass effect, or extra-axial fluid collection  Gray-white differentiation is preserved  Brain volume and ventricular system are within normal limits for age  Mild patchy areas of low-attenuation are seen in the subcortical and deep periventricular white matter suggesting areas of chronic microvascular ischemia  The skull base and calvarium are intact  The visualized paranasal sinuses are unremarkable  The visualized orbits, globes, and mastoid air cells are unremarkable  Impression: No acute intracranial abnormality  Signed by Carrie Stevens MD        EKG, Pathology, and Other Studies: reviewed      Counseling / Coordination of Care  Total floor / unit time spent today 35 minutes  Greater than 50% of total time was spent with the patient and / or family counseling and / or coordination of care   A description of the counseling / coordination of care: follows    Destiny Suh MD

## 2018-07-03 NOTE — NURSING NOTE
Dr Sonnie Gottron aware that urine intermittently clears with irrigation  No new orders, continue with irrigations as needed

## 2018-07-03 NOTE — CASE MANAGEMENT
Initial Clinical Review    Admission: Date/Time/Statement: 7/2/18 @ 1740 INPATIENT    Orders Placed This Encounter   Procedures    Inpatient Admission     Standing Status:   Standing     Number of Occurrences:   1     Order Specific Question:   Admitting Physician     Answer:   Reny Wood [[id number]]     Order Specific Question:   Level of Care     Answer:   Level 1 Stepdown [13]     Order Specific Question:   Estimated length of stay     Answer:   More than 2 Midnights     Order Specific Question:   Certification     Answer:   I certify that inpatient services are medically necessary for this patient for a duration of greater than two midnights  See H&P and MD Progress Notes for additional information about the patient's course of treatment  ED: Date/Time/Mode of Arrival:   ED Arrival Information     Expected Arrival Acuity Means of Arrival Escorted By Service Admission Type    - 7/2/2018 13:48 Urgent Walk-In Self General Medicine Urgent    Arrival Complaint    lightheaded          Chief Complaint:   Chief Complaint   Patient presents with    Dizziness    Loss of Appetite    Blood in Urine       History of Illness:     Yaneli Schaefer is a [de-identified] y o  female who presents with dizziness  Pt has been being treated for uti/hematuria with PO abx by her PCP for the last 2 weeks with no significant improvement  She has decreased appetite and weakness/fatigue  Over last 2 days she has been dizzy and light headed  No headache or change in vision  +intermittent diarrhea  decreased PO intake            ED Vital Signs:   ED Triage Vitals   Temperature Pulse Respirations Blood Pressure SpO2   07/02/18 1356 07/02/18 1356 07/02/18 1356 07/02/18 1356 07/02/18 1356   97 8 °F (36 6 °C) 64 16 137/63 96 %      Temp Source Heart Rate Source Patient Position - Orthostatic VS BP Location FiO2 (%)   07/02/18 1356 07/02/18 1356 07/02/18 1356 07/02/18 1356 --   Temporal Monitor Sitting Left arm       Pain Score       07/02/18 1800       No Pain        Wt Readings from Last 1 Encounters:   07/03/18 85 3 kg (188 lb)       Vital Signs (abnormal):     Date/Time  Temp  Pulse  Resp  BP  MAP (mmHg)  SpO2  O2 Device    07/03/18 1100  98 6 °F (37 °C)   53  14  127/61  88  94 %  None (Room air)    07/03/18 1034  --   37  18  121/57  82  91 %  --    07/03/18 1000  --   53  20  129/60  87  94 %  --    07/03/18 0900  --   53  17  137/63  90  92 %  --    07/03/18 0700  --   54  16  135/63  90  90 %  None (Room air)    07/03/18 0600  98 °F (36 7 °C)   54  14  143/65  93  90 %  None (Room air)      Abnormal Labs/Diagnostic Test Results:       EKG: NSR, RBBB    07/02/18 1446     Sodium 134 - 143 mmol/L 118     Potassium 3 5 - 5 5 mmol/L 2 6     Chloride 98 - 107 mmol/L 80     CO2 21 - 31 mmol/L 29    Anion Gap 4 - 13 mmol/L 9    BUN 7 - 25 mg/dL 13    Creatinine 0 60 - 1 20 mg/dL 0 71    Glucose 65 - 99 mg/dL 195     Calcium 8 6 - 10 5 mg/dL 11 1          7/02/18 2207     Color, UA Yellow, Straw Red     Clarity, UA Hazy, Clear Turbid     Specific Gravity, UA >1 005-<1 030 1 015    pH, UA 5 0-<8 0 6 5    Leukocytes, UA Negative Negative    Nitrite, UA Negative Positive     Protein, UA Negative, Interference- unable to analyze mg/dl 2+     Glucose, UA Negative mg/dl Negative    Ketones, UA Negative mg/dl Negative    Urobilinogen, UA 0 2, 1 0 E U /dl E U /dl 1 0    Bilirubin, UA Negative Negative    Blood, UA Negative 3+     Resulting Agency   [facility]       07/02/18 1443    RBC, UA None Seen, 0-5 /hpf Innumerable     WBC, UA None Seen, 0-5, 5-55, 5-65 /hpf 2-4     Epithelial Cells None Seen, Occasional /hpf Occasional    Bacteria, UA None Seen, Occasional /hpf Occasional        ED Treatment:   Medication Administration from 07/02/2018 1348 to 07/02/2018 1837       Date/Time Order Dose Route Action     07/02/2018 1603 potassium chloride 20 mEq IVPB (premix) 40 mEq Intravenous New Bag     07/02/2018 1557 potassium chloride (K-DUR,KLOR-CON) CR tablet 40 mEq 40 mEq Oral Given     07/02/2018 1602 sodium chloride 0 9 % infusion 250 mL/hr Intravenous New Bag        Past Medical/Surgical History: Active Ambulatory Problems     Diagnosis Date Noted    No Active Ambulatory Problems     Resolved Ambulatory Problems     Diagnosis Date Noted    No Resolved Ambulatory Problems     Past Medical History:   Diagnosis Date    Cardiac disease     Diabetes mellitus ([facility] [address]     Hyperlipidemia     Hypertension        Admitting Diagnosis: Hypokalemia [E87 6]  Hyponatremia [E87 1]  Lightheaded [R42]  Hematuria [R31 9]    Age/Sex: [de-identified] y o  female    Assessment/Plan:     Hyponatremia   Assessment & Plan     -possibly due to dehydration  -cont IVF  -send urine lytes  -nephrology consult          Hypokalemia   Assessment & Plan     -replaced in ER  -monitor and add Potassium as needed  -check Mg level  -tele monitor          Gross hematuria   Assessment & Plan     -unclear etiology  -UA appreciated  -urology consult  -h/h stable  -ct abd/pelvis with cysts noted on kidney          Dizziness   Assessment & Plan     -unclear etiology, possibly related to dehydration vs medication side effect  -ct head negative for acute findings  -PT eval          Diabetes mellitus type 2, noninsulin dependent (HCC)   Assessment & Plan     -metformin held  -diabetic diet  -FS ac/hs  -insulin SS                Depression   Assessment & Plan     -stable  -cont zoloft          Essential hypertension   Assessment & Plan     -maxzide held  -monitor BP and treat accordingly          Left breast mass   Assessment & Plan     -s/p lumpectomy  -cont anastrazole          Hyperlipidemia   Assessment & Plan     -cont statin                VTE Prophylaxis: none, hematuria  / sequential compression device      Anticipated Length of Stay:  Patient will be admitted on an Inpatient basis with an anticipated length of stay of  Greater than 2 midnights     Justification for Hospital Stay: hyponatremia      Admission Orders:    Urology and Nephrology consults  Telemetry monitoring  Up with assistance/sequential compression device  PT eval and treat  Urinary catheter      Scheduled Meds:   Current Facility-Administered Medications:  acetaminophen 650 mg Oral Q6H PRN Tess Mirza MD    anastrozole 1 mg Oral Daily Tess Mirza MD    atorvastatin 40 mg Oral Daily Tess Mirza MD    insulin lispro 1-5 Units Subcutaneous TID Jyoti Parker MD    magnesium sulfate 2 g Intravenous Once Leif Fam MD    pantoprazole 20 mg Oral Early Morning Tess Mirza MD    potassium chloride 20 mEq Intravenous Q2H Marciano Dominguez MD Last Rate: 20 mEq (07/03/18 1030)   sertraline 50 mg Oral Daily Tess Mirza MD    traMADol 50 mg Oral Q6H PRN Tess Mirza MD      Continuous Infusions:    PRN Meds:   acetaminophen    traMADol

## 2018-07-03 NOTE — PLAN OF CARE
Problem: PHYSICAL THERAPY ADULT  Goal: Performs mobility at highest level of function for planned discharge setting  See evaluation for individualized goals  Treatment/Interventions: ADL retraining, Functional transfer training, LE strengthening/ROM, Therapeutic exercise, Endurance training, Bed mobility, Gait training, Spoke to nursing  Chantelle Lal, PT            See flowsheet documentation for full assessment, interventions and recommendations  Outcome: Progressing  Prognosis: Excellent  Problem List: Decreased strength, Decreased endurance, Impaired balance, Decreased mobility  Assessment: Pt is [de-identified] y o  female seen for PT evaluation [facility]/ Hypon[facility]  PT consulted to assess pt's functional mobility and d/c needs  Order placed for PT eval and tx, w/ up as tolerated order  Comorbidities affecting pt's physical performance at time of assessment include:dizziness upon admisssion, DM,HTN, CA, cardiac disease  pt was requiring A for mobility and lives w/ son in two level house  Personal factors affecting pt at time of IE include: stairs to enter home  Please find objective findings from PT assessment regarding body systems outlined above with impairments and limitations including weakness, impaired balance, decreased endurance, gait deviations, decreased activity tolerance and decreased functional mobility tolerance  The following objective measures performed on IE also reveal limitations: Barthel Index: 70/100  Pt's clinical presentation is currently evolving seen in pt's presentation of delcine in funciton, complex PMH, and current HPI  Pt to benefit from continued PT tx to address deficits as defined above and maximize level of functional independent mobility and consistency  From PT/mobility standpoint, recommendation at time of d/c would be Home PT vs  STR pending progress in order to facilitate return to PLOF          Recommendation:  (short term skilled PT vs  home PT)     PT - OK to Discharge: No    See flowsheet documentation for full assessment

## 2018-07-03 NOTE — PHYSICIAN ADVISOR
Current patient class: Inpatient  The patient is currently on Hospital Day: 2 at [address]      The patient was admitted to the hospital at [phone] on 7/2/18 for the following diagnosis:  Hypokalemia [E87 6]  Hyponatremia [E87 1]  Lightheaded [R42]  Hematuria [R31 9]       There is documentation in the medical record of an expected length of stay of at least 2 midnights  The patient is therefore expected to satisfy the 2 midnight benchmark and given the 2 midnight presumption is appropriate for INPATIENT ADMISSION  Given this expectation of a satisfying stay, CMS instructs us that the patient is most often appropriate for inpatient admission under part A provided medical necessity is documented in the chart  After review of the relevant documentation, labs, vital signs and test results, the patient is appropriate for INPATIENT ADMISSION  Admission to the hospital as an inpatient is a complex decision making process which requires the practitioner to consider the patients presenting complaint, history and physical examination and all relevant testing  With this in mind, in this case, the patient was deemed appropriate for INPATIENT ADMISSION  After review of the documentation and testing available at the time of the admission I concur with this clinical determination of medical necessity  Rationale is as follows: The patient is a [de-identified] yrs old Female who presented to the ED at 7/2/2018  2:12 PM with a chief complaint of Dizziness; Loss of Appetite; and Blood in Urine  The patient is a [de-identified] y/o female who was noted to have moderate to severe hyponatremia and hypokalemia  She has urology and nephrology consult  She is on IVF and electrolyte repletion  Both levels are improving but still too low for discharge  She has expected LOS >2 midnights for continued treatment and close monitoring  Inpatient LOC is appropriate      The patients vitals on arrival were ED Triage Vitals   Temperature Pulse Respirations Blood Pressure SpO2   07/02/18 1356 07/02/18 1356 07/02/18 1356 07/02/18 1356 07/02/18 1356   97 8 °F (36 6 °C) 64 16 137/63 96 %      Temp Source Heart Rate Source Patient Position - Orthostatic VS BP Location FiO2 (%)   07/02/18 1356 07/02/18 1356 07/02/18 1356 07/02/18 1356 --   Temporal Monitor Sitting Left arm       Pain Score       07/02/18 1800       No Pain           Past Medical History:   Diagnosis Date    Cardiac disease     Diabetes mellitus ([facility] [address] )     Hyperlipidemia     Hypertension      Past Surgical History:   Procedure Laterality Date    APPENDECTOMY      BREAST LUMPECTOMY Bilateral     HYSTERECTOMY             Consults have been placed to:   IP CONSULT TO NEPHROLOGY  IP CONSULT TO UROLOGY    Vitals:    07/03/18 1210 07/03/18 1300 07/03/18 1404 07/03/18 1500   BP: 120/57 130/60 119/59 132/61   Pulse: (!) 54 (!) 54 58 58   Resp: (!) 25 19 20 18   Temp:       TempSrc:       SpO2:  93% 90% 92%   Weight:       Height:           Most recent labs:    Recent Labs      07/02/18   1446   07/03/18   0514   WBC  9 70   --   7 50   HGB  12 1   --   11 0*   HCT  34 1*   --   30 6*   PLT  292   --   176   K  2 6*   < >  3 3*   NA  118*   < >  120*   CALCIUM  11 1*   < >  10 0   BUN  13   < >  9   CREATININE  0 71   < >  0 59*   TROPONINI  <0 03   --    --    AST  21   --    --    ALT  16   --    --    ALKPHOS  65   --    --    BILITOT  0 60   --    --     < > = values in this interval not displayed  Scheduled Meds:  Current Facility-Administered Medications:  acetaminophen 650 mg Oral Q6H PRN Hussain Main MD   anastrozole 1 mg Oral Daily Hussain Main MD   atorvastatin 40 mg Oral Daily Hussain Main MD   insulin lispro 1-5 Units Subcutaneous TID Claudean Grant, MD   pantoprazole 20 mg Oral Early Morning Hussain Main MD   sertraline 50 mg Oral Daily Hussain Main MD   traMADol 50 mg Oral Q6H PRN Hussain Main MD     Continuous Infusions:   PRN Meds:   acetaminophen    traMADol    Surgical procedures (if appropriate):

## 2018-07-03 NOTE — NURSING NOTE
Urine appearance is red, 60cc bladder irrigation performed per MD order urine appearance after irrigation is pink  Will continue irrigation as needed

## 2018-07-04 ENCOUNTER — ANESTHESIA EVENT (INPATIENT)
Dept: PERIOP | Facility: HOSPITAL | Age: 83
DRG: 983 | End: 2018-07-04
Payer: MEDICARE

## 2018-07-04 PROBLEM — R00.1 BRADYCARDIA: Status: ACTIVE | Noted: 2018-07-04

## 2018-07-04 LAB
ANION GAP SERPL CALCULATED.3IONS-SCNC: 7 MMOL/L (ref 4–13)
BUN SERPL-MCNC: 11 MG/DL (ref 7–25)
CALCIUM SERPL-MCNC: 10.7 MG/DL (ref 8.6–10.5)
CHLORIDE SERPL-SCNC: 99 MMOL/L (ref 98–107)
CO2 SERPL-SCNC: 28 MMOL/L (ref 21–31)
CREAT SERPL-MCNC: 0.68 MG/DL (ref 0.6–1.2)
GFR SERPL CREATININE-BSD FRML MDRD: 83 ML/MIN/1.73SQ M
GLUCOSE SERPL-MCNC: 134 MG/DL (ref 65–140)
GLUCOSE SERPL-MCNC: 139 MG/DL (ref 65–99)
GLUCOSE SERPL-MCNC: 144 MG/DL (ref 65–140)
GLUCOSE SERPL-MCNC: 195 MG/DL (ref 65–140)
GLUCOSE SERPL-MCNC: 207 MG/DL (ref 65–140)
MAGNESIUM SERPL-MCNC: 2.2 MG/DL (ref 1.9–2.7)
POTASSIUM SERPL-SCNC: 3.7 MMOL/L (ref 3.5–5.5)
SODIUM SERPL-SCNC: 134 MMOL/L (ref 134–143)

## 2018-07-04 PROCEDURE — 99233 SBSQ HOSP IP/OBS HIGH 50: CPT | Performed by: INTERNAL MEDICINE

## 2018-07-04 PROCEDURE — 80048 BASIC METABOLIC PNL TOTAL CA: CPT | Performed by: INTERNAL MEDICINE

## 2018-07-04 PROCEDURE — 0T9B70Z DRAINAGE OF BLADDER WITH DRAINAGE DEVICE, VIA NATURAL OR ARTIFICIAL OPENING: ICD-10-PCS | Performed by: INTERNAL MEDICINE

## 2018-07-04 PROCEDURE — 83735 ASSAY OF MAGNESIUM: CPT | Performed by: INTERNAL MEDICINE

## 2018-07-04 PROCEDURE — 99222 1ST HOSP IP/OBS MODERATE 55: CPT | Performed by: INTERNAL MEDICINE

## 2018-07-04 PROCEDURE — 82948 REAGENT STRIP/BLOOD GLUCOSE: CPT

## 2018-07-04 RX ADMIN — ATORVASTATIN CALCIUM 40 MG: 40 TABLET, FILM COATED ORAL at 08:16

## 2018-07-04 RX ADMIN — SERTRALINE HYDROCHLORIDE 50 MG: 50 TABLET ORAL at 08:16

## 2018-07-04 RX ADMIN — PANTOPRAZOLE SODIUM 20 MG: 20 TABLET, DELAYED RELEASE ORAL at 06:03

## 2018-07-04 RX ADMIN — INSULIN LISPRO 1 UNITS: 100 INJECTION, SOLUTION INTRAVENOUS; SUBCUTANEOUS at 17:09

## 2018-07-04 RX ADMIN — ANASTROZOLE 1 MG: 1 TABLET, COATED ORAL at 08:16

## 2018-07-04 NOTE — NURSING NOTE
At 1109 patient became bradycardic in the 's hospitalitis aware  Discharge changed for tomorrow 7/5/2018 and cardiologist consult placed, Dr Guillermo Dueñas present on unit and aware of consult

## 2018-07-04 NOTE — PROGRESS NOTES
Progress Note - Nephrology   Ariana Yung [de-identified] y o  female MRN: [id number]  Unit/Bed#: ICU 01 Encounter: [id number]    A/P:  1  Hyponatremia: resolved with IVF and cessation of thiazide diuretic  2  Hematuria: Stable  3 Bradycardia with pauses: will have a PPMO  4  Diabetes mellitus : continue coverage  5  Hyperlipidemia: continue statin use  6  Hypercalcemia: will recheck        Follow up reason for today's visit: Hyponatremia    Hyponatremia    Patient Active Problem List   Diagnosis    Diabetes mellitus type 2, noninsulin dependent ([address] )    Essential hypertension    Hyperlipidemia    Left breast mass    Depression    Hyponatremia    Hypokalemia    Gross hematuria    Dizziness    Bradycardia         Subjective:   No headaches dizziness chest pain shortness of breath abdominal pain nausea vomiting diarrhea she is feeling much better she will have a pacemaker tomorrow  Due to a very low heart rate    Objective:     Vitals: Blood pressure 125/58, pulse 60, temperature (!) 96 9 °F (36 1 °C), resp  rate 16, height 5' 3" (1 6 m), weight 82 1 kg (181 lb 1 6 oz), SpO2 94 %, not currently breastfeeding  ,Body mass index is 32 08 kg/m²  Weight (last 2 days)     Date/Time   Weight    07/04/18 0800  82 1 (181 1)    07/03/18 0841  85 3 (188)    07/03/18 0600  85 3 (188)    07/02/18 1816  84 4 (186 1)    07/02/18 1356  81 6 (180)                Intake/Output Summary (Last 24 hours) at 07/04/18 1347  Last data filed at 07/04/18 1251   Gross per 24 hour   Intake          1133 75 ml   Output             3615 ml   Net         -2481 25 ml     I/O last 3 completed shifts: In: 3671 3 [P O :750; I V :2470 4; IV Piggyback:450 8]  Out: 3430 [Urine:3430]         Physical Exam: /58   Pulse 60   Temp (!) 96 9 °F (36 1 °C)   Resp 16   Ht 5' 3" (1 6 m)   Wt 82 1 kg (181 lb 1 6 oz)   SpO2 94%   Breastfeeding?  No   BMI 32 08 kg/m²     General Appearance:    Alert, cooperative, no distress, appears stated age Head:    Normocephalic, without obvious abnormality, atraumatic   Eyes:    Conjunctiva/corneas clear   Ears:    Normal external ears   Nose:   Nares normal, septum midline, mucosa normal, no drainage    or sinus tenderness   Throat:   Lips, mucosa, and tongue normal; teeth and gums normal   Neck:   Supple, symmetrical, trachea midline, no adenopathy;        thyroid:  No enlargement/tenderness/nodules; no carotid    bruit or JVD   Back:     Symmetric, no curvature, ROM normal, no CVA tenderness   Lungs:     Clear to auscultation bilaterally, respirations unlabored   Chest wall:    No tenderness or deformity   Heart:    Regular rate and rhythm, S1 and S2 normal, no murmur, rub   or gallop   Abdomen:     Soft, non-tender, bowel sounds active   Extremities:   Extremities normal, atraumatic, no cyanosis or edema   Skin:   Skin color, texture, turgor normal, no rashes or lesions   Lymph nodes:   Cervical normal   Neurologic:   CNII-XII intact            Lab, Imaging and other studies: I have personally reviewed pertinent labs  CBC: No results found for: WBC, HGB, HCT, MCV, PLT, ADJUSTEDWBC, MCH, MCHC, RDW, MPV, NRBC  CMP: Lab Results   Component Value Date     07/04/2018    K 3 7 07/04/2018    CL 99 07/04/2018    CO2 28 07/04/2018    ANIONGAP 7 07/04/2018    BUN 11 07/04/2018    CREATININE 0 68 07/04/2018    GLUCOSE 139 (H) 07/04/2018    CALCIUM 10 7 (H) 07/04/2018    EGFR 83 07/04/2018           Results from last 7 days  Lab Units 07/04/18  0511 07/03/18  0514 07/02/18  2208 07/02/18  1446   SODIUM mmol/L 134 120* 119* 118*   POTASSIUM mmol/L 3 7 3 3* 3 7 2 6*   CHLORIDE mmol/L 99 88* 86* 80*   CO2 mmol/L 28 27 28 29   BUN mg/dL 11 9 10 13   CREATININE mg/dL 0 68 0 59* 0 62 0 71   CALCIUM mg/dL 10 7* 10 0 10 0 11 1*   TOTAL PROTEIN g/dL  --   --   --  7 0   BILIRUBIN TOTAL mg/dL  --   --   --  0 60   ALK PHOS U/L  --   --   --  65   ALT U/L  --   --   --  16   AST U/L  --   --   --  21   GLUCOSE RANDOM mg/dL 139* 110* 137* 195*         Phosphorus: No results found for: PHOS  Magnesium:   Lab Results   Component Value Date    MG 2 2 07/04/2018     Urinalysis: No results found for: Starlette Lowing, SPECGRAV, PHUR, LEUKOCYTESUR, NITRITE, PROTEINUA, GLUCOSEU, KETONESU, BILIRUBINUR, BLOODU  Ionized Calcium: No results found for: CAION  Coagulation: No results found for: PT, INR, APTT  Troponin: No results found for: TROPONINI  ABG: No results found for: PHART, LSB0MQI, PO2ART, OBK3XMX, E1EZEQFL, BEART, SOURCE  Radiology review:     IMAGING  Procedure: Ct Abdomen Pelvis Wo Contrast    Result Date: 7/2/2018  Narrative: INDICATION:  Hematuria  ORDERING PROVIDER:  Dana Styles  TECHNIQUE:  CT of the abdomen and pelvis was performed without intravenous contrast   RADIATION AMOUNT:  598 80 mGy-cm  COMPARISON:  None Available  FINDINGS: Abdomen: The lung bases are clear  The liver, spleen, pancreas, adrenal glands, and kidneys are suboptimally evaluated on these unenhanced images, but demonstrate no acute pathology  There is a cystic area posteriorly in the left kidney  There is rotational anomaly of the left kidney  On the right, there is a cortical calcification adjacent to a lower pole cystic area  Cystic area in the lower pole measures 7 0 cm  There are no urinary tract calculi  There is no free air or lymph node enlargement  Abdominal aorta is not aneurysmal  There is mild atheromatous plaque in the distal descending thoracic aorta  There is significant vascular calcification in the splenic artery  There is moderate infrarenal arterial vascular plaque Pelvis: There is no bowel wall thickening or obstruction  There is no free fluid  Lymph nodes are not enlarged  Urinary bladder is unremarkable  There is mild to moderate left-sided colonic diverticulosis without diverticulitis  There has been a hysterectomy  Skeleton:  There are no acute fractures  No suspicious bony lesions    There is multilevel moderate degenerative changes in the lumbar spine  There is mild rotoscoliosis  Impression: No urinary tract calculi  There are cystic areas in both kidneys  Largest in lower pole of the right kidney measures 7 0 cm  It may have a punctate calcification in the wall  Recommend follow-up ultrasound in 6 months  Mild to moderate left-sided colonic diverticulosis without diverticulitis  Signed by LELO Chao  Procedure: Ct Head Without Contrast    Result Date: 7/2/2018  Narrative: INDICATION:  Ataxia  Slowly progressive  ORDERING PROVIDER:  Gavin Pandya  TECHNIQUE:  CT of the brain was performed without contrast   Automated mA/kV exposure control was utilized and patient examination was performed in strict accordance with principles of ALARA  RADIATION AMOUNT:  913 6 mGy-cm  COMPARISON:  None Available  FINDINGS: There is no acute intracranial hemorrhage, midline shift, mass effect, or extra-axial fluid collection  Gray-white differentiation is preserved  Brain volume and ventricular system are within normal limits for age  Mild patchy areas of low-attenuation are seen in the subcortical and deep periventricular white matter suggesting areas of chronic microvascular ischemia  The skull base and calvarium are intact  The visualized paranasal sinuses are unremarkable  The visualized orbits, globes, and mastoid air cells are unremarkable  Impression: No acute intracranial abnormality    Signed by Kinga Caputo MD      Current Facility-Administered Medications   Medication Dose Route Frequency    acetaminophen (TYLENOL) tablet 650 mg  650 mg Oral Q6H PRN    anastrozole (ARIMIDEX) tablet 1 mg  1 mg Oral Daily    atorvastatin (LIPITOR) tablet 40 mg  40 mg Oral Daily    insulin lispro (HumaLOG) 100 units/mL subcutaneous injection 1-5 Units  1-5 Units Subcutaneous TID AC    pantoprazole (PROTONIX) EC tablet 20 mg  20 mg Oral Early Morning    sertraline (ZOLOFT) tablet 50 mg  50 mg Oral Daily    traMADol (ULTRAM) tablet 50 mg  50 mg Oral Q6H PRN     Medications Discontinued During This Encounter   Medication Reason    sodium chloride 0 9 % infusion        Amarilys Wills MD

## 2018-07-04 NOTE — ASSESSMENT & PLAN NOTE
She has significant pauses and bradycardia without clear provocation  Multiple times this morning her heart rate fell into the 20s  She relates to a spell of unexplained syncope in March which may have been related  On this basis I have recommended that we proceed with a pacemaker implantation  I have also spoken to her daughter Norm Angst about this  All are considering and leaning towards proceeding  The procedure and its risks were explained at length to both as well as the alternatives

## 2018-07-04 NOTE — NURSING NOTE
Patient became bradycardic with rate 21, patient easily aroused, patient stated, "she nodded off " Patient denies dizziness and chest pain  Heart rate return to 60's  Blood pressure stable 113 over 57   Hospitalitis aware no new orders, will continue to monitor

## 2018-07-04 NOTE — PROGRESS NOTES
[address] [facility] Internal Medicine Progress Note  Patient: Ariana Yung [de-identified] y o  female   MRN: [id number]  PCP: Blanche Heredia DO  Unit/Bed#: ICU 01 Encounter: [id number]  Date Of Visit: 07/04/18    Assessment:    Principal Problem:    Hyponatremia  Active Problems:    Diabetes mellitus type 2, noninsulin dependent ([address] )    Essential hypertension    Hyperlipidemia    Left breast mass    Depression    Hypokalemia    Gross hematuria    Dizziness      Plan:  #1hyponatremia  Currently resolved  Acute on chronic hyponatremia  Improving, currently asymptomatic  Comprehensive medication review performed  2   Anemia  Patient is currently asymptomatic  Reports minimal hematuria  We will continue to monitor for now no need for transfusion  Urology is following for hematuria evaluation  3 DM Type II:    Begin no concentrated carbohydrate diet  Cover with Aspart SSI as needed   Will order HgbA1C to assess status of recent glycemic control  Well initiate home medications once med rec is completed and confirmed by pharmacy  #4History of HTN:   We will continue patient home medications  Although initially his blood pressure was higher in emergency department, it later stabilized  Well also initiate IV hydralazine and IV metoprolol for SBP greater than 160 mmHg  We will advise patient to follow-up with next PCP in regards to further better management of  ongoing HTN  Patient does report some noncompliance was dietary regimen, extensive consultation was given about appropriate last child nutritional modifications, including stress reduction to achieve optimal blood pressure control  5 depression  Mood and affect appears to be appropriate  We will continue current regimen  6   Deconditioning  PT/OT eval and treatment reviewed discussed with family  7   Hypokalemia  Replace by mouth and IV      #8 bradycardia  Asymptomatic bradycardia  We will obtain cardiology eval prior to discharge    VTE Pharmacologic Prophylaxis:   Pharmacologic: Heparin  Mechanical VTE Prophylaxis in Place: Yes    Patient Centered Rounds: I have performed bedside rounds with nursing staff today  Discussions with Specialists or Other Care Team Provider: yes    Education and Discussions with Family / Patient: no    Time Spent for Care: 45 minutes  More than 50% of total time spent on counseling and coordination of care as described above  Current Length of Stay: 2 day(s)    Current Patient Status: Inpatient   Certification Statement: The patient will continue to require additional inpatient hospital stay due to ongoing hematuria    Discharge Plan / Estimated Discharge Date: to be determined    Code Status: Level 1 - Full Code      Subjective:   No complaints    Objective:     Vitals:   Temp (24hrs), Av 7 °F (36 5 °C), Min:97 4 °F (36 3 °C), Max:98 1 °F (36 7 °C)    HR:  [54-87] 87  Resp:  [13-25] 13  BP: (114-157)/(56-75) 125/75  SpO2:  [90 %-94 %] 93 %  Body mass index is 32 08 kg/m²  Input and Output Summary (last 24 hours): Intake/Output Summary (Last 24 hours) at 18 1115  Last data filed at 18 1923   Gross per 24 hour   Intake          1358 33 ml   Output             3755 ml   Net         -2396 67 ml       Physical Exam:     Physical Exam  GENERAL APPEARANCE: WD/WN in NAD pleasant  SKIN: no rash  HEENT: NC/AT, PERRLA (B), moist MM, no epistaxis  NECK: Supple, no JVD    LUNGS: CTA (B) mildly prolonged expiratory phase,   no use of accessory muscles    HEART:          S1S 2, RRR  , PMI is not displaced  ABDOMEN: Soft, nontender, nondistended, +BS  Rectal exam:  EXTREMITIES: no edema   PERIPHERAL VASCULAR: palpable pulses   NEURO:  AAO x 3, CN 2-12: non focal  MUSCLE STRENGHT: 5/5 (B), SENSATION: nonfocal  DTR: ++, CEREBELLAR: non focal  Additional Data:     Labs:      Results from last 7 days  Lab Units 18  0514   WBC Thousand/uL 7 50   HEMOGLOBIN g/dL 11 0*   HEMATOCRIT % 30 6*   PLATELETS Thousands/uL 176   NEUTROS PCT % 78*   LYMPHS PCT % 11*   MONOS PCT % 9   EOS PCT % 1       Results from last 7 days  Lab Units 07/04/18  0511  07/02/18  1446   SODIUM mmol/L 134  < > 118*   POTASSIUM mmol/L 3 7  < > 2 6*   CHLORIDE mmol/L 99  < > 80*   CO2 mmol/L 28  < > 29   BUN mg/dL 11  < > 13   CREATININE mg/dL 0 68  < > 0 71   CALCIUM mg/dL 10 7*  < > 11 1*   TOTAL PROTEIN g/dL  --   --  7 0   BILIRUBIN TOTAL mg/dL  --   --  0 60   ALK PHOS U/L  --   --  65   ALT U/L  --   --  16   AST U/L  --   --  21   GLUCOSE RANDOM mg/dL 139*  < > 195*   < > = values in this interval not displayed  * I Have Reviewed All Lab Data Listed Above  * Additional Pertinent Lab Tests Reviewed: [address] [facility] Admission Reviewed    Imaging:    Imaging Reports Reviewed Today Include: yes  Imaging Personally Reviewed by Myself Includes:  yes    Recent Cultures (last 7 days):           Last 24 Hours Medication List:     Current Facility-Administered Medications:  acetaminophen 650 mg Oral Q6H PRN Daina Tomlin MD   anastrozole 1 mg Oral Daily Daina Tomlin MD   atorvastatin 40 mg Oral Daily Daina Tomlin MD   insulin lispro 1-5 Units Subcutaneous TID Deni Weber MD   pantoprazole 20 mg Oral Early Morning Daina Tomlin MD   sertraline 50 mg Oral Daily Daina Tomlin MD   traMADol 50 mg Oral Q6H PRN Daina Tomlin MD        Today, Patient Was Seen By: Rosi Rodriguez MD    ** Please Note: This note has been constructed using a voice recognition system   **

## 2018-07-04 NOTE — ASSESSMENT & PLAN NOTE
Prior right breast lumpectomy with chemo and radiation  By her description left breast mass was removed and no radiation or chemotherapy was required  She is taking hormone treatment  She sees Dr Joel Glover at [facility] THE [facility]

## 2018-07-04 NOTE — DISCHARGE INSTRUCTIONS
Acute Hematuria   WHAT YOU SHOULD KNOW:   Hematuria is blood in your urine from an injury or medical condition  Acute means the problem starts suddenly, worsens quickly, and lasts a short time  Your urine may be bright red to dark brown  Some common causes of hematuria are bladder infection, kidney stone, trauma to the kidneys or bladder, and some medications  Sometimes blood clots can block the urethra so that you cannot empty your bladder  AFTER YOU LEAVE:   Medicines:  Ask about these or other medicines you may need to treat the cause of your acute hematuria:  · Antibiotics: This medicine is given to fight or prevent an infection caused by bacteria  Always take your antibiotics exactly as ordered by your healthcare provider  Do not stop taking your medicine unless directed by your healthcare provider  Never save antibiotics or take leftover antibiotics that were given to you for another illness  · Take your medicine as directed  Call your healthcare provider if you think your medicine is not helping or if you have side effects  Tell him if you are allergic to any medicine  Keep a list of the medicines, vitamins, and herbs you take  Include the amounts, and when and why you take them  Bring the list or the pill bottles to follow-up visits  Carry your medicine list with you in case of an emergency  Increase the amount of fluid you drink:  Drink clear fluids to help flush the blood from your urinary tract  Follow instructions about how much fluid to drink  Follow up with your healthcare provider as directed: Your healthcare provider will tell you how often to come in for follow-up visits  He may refer you to a specialist, such as a urologist or nephrologist  The specialists may do tests or procedures to find more serious problems with your urinary system  Write down your questions so you remember to ask them during your visits     Contact your healthcare provider if:   · You have a fever that gets worse or does not go away with treatment  · You cannot keep liquids or medicines down  · Your urine gets darker, even after you drink extra liquids  · You have questions or concerns about your condition, treatment, or care  Seek care immediately or call 911 if:   · You have blood in your urine after a new injury, such as a fall  · You are urinating very small amounts or not at all  · You feel like you cannot empty your bladder  · You have severe back or side pain that does not go away with treatment  © 2014 [address] is for End User's use only and may not be sold, redistributed or otherwise used for commercial purposes  All illustrations and images included in CareNotes® are the copyrighted property of A D A M , Inc  or Robert Warner  The above information is an  only  It is not intended as medical advice for individual conditions or treatments  Talk to your doctor, nurse or pharmacist before following any medical regimen to see if it is safe and effective for you  Chronic Hypertension, Ambulatory Care   GENERAL INFORMATION:   Chronic hypertension  is a long-term condition in which your blood pressure (BP) is higher than normal  Your BP is the force of your blood moving against the walls of your arteries  Hypertension is a BP of 140/90 or higher  Common symptoms include the following:   · Headache     · Blurred vision    · Chest pain     · Dizziness or weakness     · Trouble breathing     · Nosebleeds  Seek immediate care for the following symptoms:   · Severe headache or vision loss    · Weakness in an arm or leg    · Confusion or difficulty speaking    · Discomfort in your chest that feels like squeezing, pressure, fullness, or pain    · Suddenly feeling lightheaded or trouble breathing    · Pain or discomfort in your back, neck, jaw, stomach, or arm  Treatment for chronic hypertension  may include medicine to lower your BP   You may also need to make lifestyle changes  Take your medicine exactly as directed  Manage chronic hypertension:   · Take your BP at home  Sit and rest for 5 minutes before you take your BP  Extend your arm and support it on a flat surface  Your arm should be at the same level as your heart  Follow the directions that came with your BP monitor  If possible, take at least 2 BP readings each time  Take your BP at least twice a day at the same times each day, such as morning and evening  Keep a log of your BP readings and bring it to your follow-up visits  · Eat less sodium (salt)  Do not add sodium to your food  Limit foods that are high in sodium, such as canned foods, potato chips, and cold cuts  Your healthcare provider may suggest that you follow the [address] [place] [address] Street  The plan is low in sodium, unhealthy fats, and total fat  It is high in potassium, calcium, and fiber  · Exercise regularly  Exercise at least 30 minutes per day, on most days of the week  This will help decrease your BP  Ask your healthcare provider about the best exercise plan for you  · Limit alcohol  Women should limit alcohol to 1 drink a day  Men should limit alcohol to 2 drinks a day  A drink of alcohol is 12 ounces of beer, 5 ounces of wine, or 1½ ounces of liquor  · Do not smoke  If you smoke, it is never too late to quit  Smoking can increase your BP  Smoking also worsens other health conditions you may have that can increase your risk for hypertension  Ask your healthcare provider for information if you need help quitting  Follow up with your healthcare provider as directed: You will need to return to have your BP checked and to have other lab tests done  Write down your questions so you remember to ask them during your visits  CARE AGREEMENT:   You have the right to help plan your care  Learn about your health condition and how it may be treated   Discuss treatment options with your caregivers to decide what care you want to receive  You always have the right to refuse treatment  The above information is an  only  It is not intended as medical advice for individual conditions or treatments  Talk to your doctor, nurse or pharmacist before following any medical regimen to see if it is safe and effective for you  © 2014 [address] is for End User's use only and may not be sold, redistributed or otherwise used for commercial purposes  All illustrations and images included in CareNotes® are the copyrighted property of [employer]  or Robert Warner  Hyponatremia   WHAT YOU NEED TO KNOW:   Hyponatremia occurs when the amount of sodium (salt) in your blood is lower than normal  Sodium is an electrolyte (mineral) that helps your muscles, heart, and digestive system work properly  It helps control blood pressure and fluid balance  DISCHARGE INSTRUCTIONS:   Follow up with your healthcare provider as directed: You may need to return for more tests  Write down your questions so you remember to ask them during your visits  Nutrition:  You may need to increase your intake of sodium  Foods that are high in sodium include milk, packaged snacks such as pretzels, or processed meats (schultz, sausage, and ham)  Ask your dietitian to help you create a meal plan that is right for you  Liquids: Follow your healthcare provider's advice if you need to limit the amount of liquid you drink  Ask how much liquid to drink each day and which liquids are best for you  You may be asked to drink liquids that have water, sugar, and salt, such as juices, milk, or sports drinks  These liquids help your body hold in fluid and prevent dehydration  Contact your healthcare provider if:   · You have muscle cramps or twitching  · You feel very weak or tired  · You have nausea or are vomiting  · You have questions or concerns about your condition or care    Seek care immediately or call 911 if: · You have a seizure  · You have an irregular heartbeat  · You have trouble breathing  · You cannot move your arms and legs  · You are confused or cannot think clearly  © 2017 [address] Information is for End User's use only and may not be sold, redistributed or otherwise used for commercial purposes  All illustrations and images included in CareNotes® are the copyrighted property of A D A M , Inc  or Robert Warner  The above information is an  only  It is not intended as medical advice for individual conditions or treatments  Talk to your doctor, nurse or pharmacist before following any medical regimen to see if it is safe and effective for you  Hypokalemia   WHAT YOU NEED TO KNOW:   Hypokalemia is a low level of potassium in your blood  Potassium helps control how your muscles, heart, and digestive system work  Hypokalemia occurs when your body loses too much potassium or does not absorb enough from food  DISCHARGE INSTRUCTIONS:   Seek care immediately if:   · You cannot move your arm or leg  · You have a fast or irregular heartbeat  · You are too tired or weak to stand up  Contact your healthcare provider if:   · You are vomiting, or you have diarrhea  · You have numbness or tingling in your arms or legs  · Your symptoms do not go away or they get worse  · You have questions or concerns about your condition or care  Medicines:   · Potassium  will be given to bring your potassium levels back to normal     · Take your medicine as directed  Contact your healthcare provider if you think your medicine is not helping or if you have side effects  Tell him of her if you are allergic to any medicine  Keep a list of the medicines, vitamins, and herbs you take  Include the amounts, and when and why you take them  Bring the list or the pill bottles to follow-up visits  Carry your medicine list with you in case of an emergency    Eat foods that are high in potassium:  Foods that are high in potassium include bananas, oranges, tomatoes, potatoes, and avocado  Beckham beans, turkey, salmon, lean beef, yogurt, and milk are also high in potassium  Ask your healthcare provider or dietitian for more information about foods that are high in potassium  Follow up with your healthcare provider as directed:  Write down your questions so you remember to ask them during your visits  © 2017 [address] [employer] Information is for End User's use only and may not be sold, redistributed or otherwise used for commercial purposes  All illustrations and images included in CareNotes® are the copyrighted property of A D A M , Inc  or Robert Warner  The above information is an  only  It is not intended as medical advice for individual conditions or treatments  Talk to your doctor, nurse or pharmacist before following any medical regimen to see if it is safe and effective for you

## 2018-07-04 NOTE — CONSULTS
Consult- Yaneli [place] 1935, [de-identified] y o  female MRN: [id number]    Unit/Bed#: ICU 01 Encounter: [id number]    Primary Care Provider: Fadia Hood DO   Date and time admitted to hospital: 7/2/2018  2:12 PM      Inpatient consult to Cardiology  Consult performed by: Jackelin Yu ordered by: Marko Nam          Bradycardia   Assessment & Plan    She has significant pauses and bradycardia without clear provocation  Multiple times this morning her heart rate fell into the 20s  She relates to a spell of unexplained syncope in March which may have been related  On this basis I have recommended that we proceed with a pacemaker implantation  I have also spoken to her daughter Gavino Angst about this  All are considering and leaning towards proceeding  The procedure and its risks were explained at length to both as well as the alternatives  Left breast mass   Assessment & Plan    Prior right breast lumpectomy with chemo and radiation  By her description left breast mass was removed and no radiation or chemotherapy was required  She is taking hormone treatment  She sees Dr Baldev Lopez at [address]  Essential hypertension   Assessment & Plan    Currently adequately controlled  Not on sinus node or AV node suppressing agents  HPI: Yaneli Schaefer is a [de-identified]y o  year old female who presented with hematuria  This has cleared up since she has been here  They were at least it has improved  There was also transient hyponatremia  This has also resolved  Over the past 24 hours numerous episodes Of profound bradycardia have been present with heart rates in the 20s  This has occurred while she is awake and seated  She was not walking and did not have symptoms  It is notable that in March she had a syncopal episode in her kitchen bruising her face  No particular etiology was elucidated  She is reasonably active and volunteers  As well she drives    There is no chest or chest pressure  she describes having echocardiogram in March [facility] at which time she was told her heart was strong  I cannot track down this report  EKG:  Sinus Rhythm with a right bundle branch block pattern  MOST  RECENT CARDIAC IMAGING:   Echo as described above  Review of Systems: a 12 point review of systems was conducted and is negative except for as mentioned in the HPI or as below  Historical Information   Past Medical History:   Diagnosis Date    Cardiac disease     Diabetes mellitus ([address] )     Hyperlipidemia     Hypertension      Past Surgical History:   Procedure Laterality Date    APPENDECTOMY      BREAST LUMPECTOMY Bilateral     HYSTERECTOMY       History   Alcohol Use No     History   Drug Use No     History   Smoking Status    Former Smoker   Smokeless Tobacco    Never Used       Family History:  No longer relevant    Meds/Allergies   all current active meds have been reviewed  Prescriptions Prior to Admission   Medication    anastrozole (ARIMIDEX) 1 mg tablet    aspirin (ASPIRIN LOW DOSE) 81 MG tablet    atorvastatin (LIPITOR) 40 mg tablet    DOCOSAHEXAENOIC ACID PO    glucose blood (FREESTYLE LITE) test strip    metFORMIN (GLUCOPHAGE) 500 mg tablet    omeprazole (PriLOSEC) 20 mg delayed release capsule    sertraline (ZOLOFT) 50 mg tablet    traMADol (ULTRAM) 50 mg tablet    triamterene-hydrochlorothiazide (MAXZIDE-25) 37 5-25 mg per tablet       Allergies   Allergen Reactions    Celecoxib Swelling     celebrex- swelling of throat    Loratadine Swelling     Jaw swelled,couldn't swallow; face swelling    Aspirin GI Intolerance     Other reaction(s): nausea; okay with Ecotrin    Lisinopril Swelling     denies       Objective   Vitals: Blood pressure 125/75, pulse 87, temperature 97 5 °F (36 4 °C), temperature source Tympanic, resp   rate 13, height 5' 3" (1 6 m), weight 82 1 kg (181 lb 1 6 oz), SpO2 93 %, not currently breastfeeding , Body mass index is 32 08 kg/m² , Orthostatic Blood Pressures      Most Recent Value   Blood Pressure  125/75 filed at 07/04/2018 0905   Patient Position - Orthostatic VS  Lying filed at 07/04/2018 8878          Systolic (86YMK), CHN:905 , Min:114 , XTY:272     Diastolic (33FDX), EYS:23, Min:56, Max:75              Physical Exam:    General:  Normal appearance in no distress  Eyes:  Anicteric  Oral mucosa:  Moist   Neck:  No JV D  Carotid upstrokes are brisk without bruits  No masses  Chest:  Clear to auscultation and percussion  Cardiac:  Normal PMI  Normal S1 and S2  No murmur gallop or rub  Abdomen:  Soft and nontender  No palpable organomegaly or aortic enlargement  Extremities:  No peripheral edema  Musculoskeletal:  Symmetric  Able to walk  Vascular:  Femoral pulses are brisk without bruits  Popliteal and pedal pulses are intact bilaterally  Neuro:  Grossly symmetric  Psych:  Alert and oriented x3  Lab Results:     Troponins:   Results from last 7 days  Lab Units 07/02/18  1446   TROPONIN I ng/mL <0 03     BNP:       CBC :   Results from last 7 days  Lab Units 07/03/18  0514 07/02/18  1446   WBC Thousand/uL 7 50 9 70   HEMOGLOBIN g/dL 11 0* 12 1   HEMATOCRIT % 30 6* 34 1*   MCV fL 86 87   PLATELETS Thousands/uL 176 292     TSH:     CMP:   Results from last 7 days  Lab Units 07/04/18  0511 07/03/18  0514  07/02/18  1446   SODIUM mmol/L 134 120*  < > 118*   POTASSIUM mmol/L 3 7 3 3*  < > 2 6*   CHLORIDE mmol/L 99 88*  < > 80*   CO2 mmol/L 28 27  < > 29   BUN mg/dL 11 9  < > 13   CREATININE mg/dL 0 68 0 59*  < > 0 71   GLUCOSE RANDOM mg/dL 139* 110*  < > 195*   AST U/L  --   --   --  21   ALT U/L  --   --   --  16   TOTAL PROTEIN g/dL  --   --   --  7 0   BILIRUBIN TOTAL mg/dL  --   --   --  0 60   EGFR ml/min/1 73sq m 83 87  < > 81   < > = values in this interval not displayed    Lipid Profile:     Coags:

## 2018-07-04 NOTE — PLAN OF CARE
Absence or prevention of progression during hospitalization Adequate for Discharge    Patient remains free of symptoms of infection, white blood cells count within normal range vitals stable  Absence of fever/infection during neutropenic period Adequate for Discharge    Patient had no fever last temp 97 5  Verbalizes/displays adequate comfort level or baseline comfort level Adequate for Discharge    Patient denies pain  Patient will remain free of falls Adequate for Discharge    Patient remained free for falls  Gait steady with rolling walker  Skin integrity is maintained or improved Adequate for Discharge    No signs of skin breakdown

## 2018-07-04 NOTE — NURSING NOTE
Patient voided 150ml clear yellow urine bladder scanned for 328ml post void, Dr London Citizen notified order obtain to reinsert catheter for retention

## 2018-07-04 NOTE — PROGRESS NOTES
Progress Note - Priya Mario [de-identified] y o  female MRN: [id number]    Unit/Bed#: ICU 01 Encounter: [id number]      Assessment:  Camacho catheter was removed today and she is voiding without difficulty  Urine remains clear yellow  Plan:  Check bladder scan for postvoid residual    Subjective: The patient is voiding well without difficulty and with clear urine  Objective:     Vitals: Blood pressure 144/66, pulse 60, temperature 99 1 °F (37 3 °C), resp  rate 18, height 5' 3" (1 6 m), weight 82 1 kg (181 lb 1 6 oz), SpO2 93 %, not currently breastfeeding  ,Body mass index is 32 08 kg/m²  Intake/Output Summary (Last 24 hours) at 07/04/18 1722  Last data filed at 07/04/18 1721   Gross per 24 hour   Intake             1245 ml   Output             3665 ml   Net            -2420 ml       Physical Exam:Abdomen: normal findings: soft, non-tender  benign abdomen is soft and nontender  Urine is clear yellow    Invasive Devices     Peripheral Intravenous Line            Peripheral IV 07/02/18 Left Antecubital 2 days                Lab, Imaging and other studies: I have personally reviewed pertinent reports      Urine culture is negative  VTE Pharmacologic Prophylaxis: Sequential compression device (Venodyne)   VTE Mechanical Prophylaxis: sequential compression device

## 2018-07-05 ENCOUNTER — ANESTHESIA (INPATIENT)
Dept: PERIOP | Facility: HOSPITAL | Age: 83
DRG: 983 | End: 2018-07-05
Payer: MEDICARE

## 2018-07-05 ENCOUNTER — APPOINTMENT (INPATIENT)
Dept: RADIOLOGY | Facility: HOSPITAL | Age: 83
DRG: 983 | End: 2018-07-05
Payer: MEDICARE

## 2018-07-05 PROBLEM — I49.5 SICK SINUS SYNDROME (HCC): Chronic | Status: ACTIVE | Noted: 2018-07-05

## 2018-07-05 LAB
ALBUMIN SERPL BCP-MCNC: 3.4 G/DL (ref 3.5–5.7)
ALP SERPL-CCNC: 56 U/L (ref 55–165)
ALT SERPL W P-5'-P-CCNC: 12 U/L (ref 7–52)
ANION GAP SERPL CALCULATED.3IONS-SCNC: 6 MMOL/L (ref 4–13)
AST SERPL W P-5'-P-CCNC: 14 U/L (ref 13–39)
ATRIAL RATE: 60 BPM
ATRIAL RATE: 60 BPM
BASOPHILS # BLD AUTO: 0 THOUSANDS/ΜL (ref 0–0.1)
BASOPHILS NFR BLD AUTO: 1 % (ref 0–2)
BILIRUB SERPL-MCNC: 0.4 MG/DL (ref 0.2–1)
BUN SERPL-MCNC: 14 MG/DL (ref 7–25)
CALCIUM SERPL-MCNC: 10.4 MG/DL (ref 8.6–10.5)
CHLORIDE SERPL-SCNC: 100 MMOL/L (ref 98–107)
CO2 SERPL-SCNC: 30 MMOL/L (ref 21–31)
CREAT SERPL-MCNC: 0.64 MG/DL (ref 0.6–1.2)
EOSINOPHIL # BLD AUTO: 0.1 THOUSAND/ΜL (ref 0–0.61)
EOSINOPHIL NFR BLD AUTO: 2 % (ref 0–5)
ERYTHROCYTE [DISTWIDTH] IN BLOOD BY AUTOMATED COUNT: 13.5 % (ref 11.5–14.5)
GFR SERPL CREATININE-BSD FRML MDRD: 85 ML/MIN/1.73SQ M
GLUCOSE SERPL-MCNC: 112 MG/DL (ref 65–140)
GLUCOSE SERPL-MCNC: 121 MG/DL (ref 65–140)
GLUCOSE SERPL-MCNC: 125 MG/DL (ref 65–99)
GLUCOSE SERPL-MCNC: 131 MG/DL (ref 65–140)
GLUCOSE SERPL-MCNC: 156 MG/DL (ref 65–140)
HCT VFR BLD AUTO: 34.2 % (ref 34.8–46.1)
HGB BLD-MCNC: 11.8 G/DL (ref 12–16)
INR PPP: 0.97 (ref 0.9–1.5)
LYMPHOCYTES # BLD AUTO: 1.2 THOUSANDS/ΜL (ref 0.6–4.47)
LYMPHOCYTES NFR BLD AUTO: 17 % (ref 21–51)
MAGNESIUM SERPL-MCNC: 2 MG/DL (ref 1.9–2.7)
MCH RBC QN AUTO: 30.6 PG (ref 26–34)
MCHC RBC AUTO-ENTMCNC: 34.4 G/DL (ref 31–37)
MCV RBC AUTO: 89 FL (ref 81–99)
MONOCYTES # BLD AUTO: 0.7 THOUSAND/ΜL (ref 0.17–1.22)
MONOCYTES NFR BLD AUTO: 9 % (ref 2–12)
NEUTROPHILS # BLD AUTO: 5.3 THOUSANDS/ΜL (ref 1.4–6.5)
NEUTS SEG NFR BLD AUTO: 72 % (ref 42–75)
NRBC BLD AUTO-RTO: 0 /100 WBCS
P AXIS: 63 DEGREES
P AXIS: 75 DEGREES
PHOSPHATE SERPL-MCNC: 2.6 MG/DL (ref 3–5.5)
PLATELET # BLD AUTO: 241 THOUSANDS/UL (ref 149–390)
PMV BLD AUTO: 6.8 FL (ref 8.6–11.7)
POTASSIUM SERPL-SCNC: 3.3 MMOL/L (ref 3.5–5.5)
PR INTERVAL: 190 MS
PR INTERVAL: 192 MS
PROT SERPL-MCNC: 6 G/DL (ref 6.4–8.9)
PROTHROMBIN TIME: 11.3 SECONDS (ref 10.1–12.9)
QRS AXIS: -1 DEGREES
QRS AXIS: 0 DEGREES
QRSD INTERVAL: 136 MS
QRSD INTERVAL: 136 MS
QT INTERVAL: 414 MS
QT INTERVAL: 416 MS
QTC INTERVAL: 414 MS
QTC INTERVAL: 416 MS
RBC # BLD AUTO: 3.84 MILLION/UL (ref 3.9–5.2)
SODIUM SERPL-SCNC: 136 MMOL/L (ref 134–143)
T WAVE AXIS: 29 DEGREES
T WAVE AXIS: 32 DEGREES
VENTRICULAR RATE: 60 BPM
VENTRICULAR RATE: 60 BPM
WBC # BLD AUTO: 7.4 THOUSAND/UL (ref 4.8–10.8)

## 2018-07-05 PROCEDURE — 82948 REAGENT STRIP/BLOOD GLUCOSE: CPT

## 2018-07-05 PROCEDURE — C1898 LEAD, PMKR, OTHER THAN TRANS: HCPCS | Performed by: INTERNAL MEDICINE

## 2018-07-05 PROCEDURE — 80053 COMPREHEN METABOLIC PANEL: CPT | Performed by: PHYSICIAN ASSISTANT

## 2018-07-05 PROCEDURE — 76000 FLUOROSCOPY <1 HR PHYS/QHP: CPT

## 2018-07-05 PROCEDURE — 85025 COMPLETE CBC W/AUTO DIFF WBC: CPT | Performed by: PHYSICIAN ASSISTANT

## 2018-07-05 PROCEDURE — 84100 ASSAY OF PHOSPHORUS: CPT | Performed by: PHYSICIAN ASSISTANT

## 2018-07-05 PROCEDURE — 85610 PROTHROMBIN TIME: CPT | Performed by: PHYSICIAN ASSISTANT

## 2018-07-05 PROCEDURE — 83735 ASSAY OF MAGNESIUM: CPT | Performed by: PHYSICIAN ASSISTANT

## 2018-07-05 PROCEDURE — 02H63KZ INSERTION OF DEFIBRILLATOR LEAD INTO RIGHT ATRIUM, PERCUTANEOUS APPROACH: ICD-10-PCS | Performed by: INTERNAL MEDICINE

## 2018-07-05 PROCEDURE — C1785 PMKR, DUAL, RATE-RESP: HCPCS | Performed by: INTERNAL MEDICINE

## 2018-07-05 PROCEDURE — 97530 THERAPEUTIC ACTIVITIES: CPT

## 2018-07-05 PROCEDURE — 71045 X-RAY EXAM CHEST 1 VIEW: CPT

## 2018-07-05 PROCEDURE — 93005 ELECTROCARDIOGRAM TRACING: CPT

## 2018-07-05 PROCEDURE — 02HK3KZ INSERTION OF DEFIBRILLATOR LEAD INTO RIGHT VENTRICLE, PERCUTANEOUS APPROACH: ICD-10-PCS | Performed by: INTERNAL MEDICINE

## 2018-07-05 PROCEDURE — 99233 SBSQ HOSP IP/OBS HIGH 50: CPT | Performed by: INTERNAL MEDICINE

## 2018-07-05 PROCEDURE — 33208 INSRT HEART PM ATRIAL & VENT: CPT | Performed by: INTERNAL MEDICINE

## 2018-07-05 PROCEDURE — 0JH606Z INSERTION OF PACEMAKER, DUAL CHAMBER INTO CHEST SUBCUTANEOUS TISSUE AND FASCIA, OPEN APPROACH: ICD-10-PCS | Performed by: INTERNAL MEDICINE

## 2018-07-05 DEVICE — IMPLANTABLE DEVICE: Type: IMPLANTABLE DEVICE | Site: CHEST | Status: FUNCTIONAL

## 2018-07-05 DEVICE — IMPLANTABLE DEVICE: Type: IMPLANTABLE DEVICE | Site: SUBCLAVIAN | Status: FUNCTIONAL

## 2018-07-05 DEVICE — LEAD PACER TENDRIL STS 52CM 7FR BIPOLAR ACT FIX RTRAC CLIP: Type: IMPLANTABLE DEVICE | Site: SUBCLAVIAN | Status: FUNCTIONAL

## 2018-07-05 RX ORDER — SODIUM CHLORIDE, SODIUM LACTATE, POTASSIUM CHLORIDE, CALCIUM CHLORIDE 600; 310; 30; 20 MG/100ML; MG/100ML; MG/100ML; MG/100ML
100 INJECTION, SOLUTION INTRAVENOUS CONTINUOUS
Status: DISCONTINUED | OUTPATIENT
Start: 2018-07-05 | End: 2018-07-06 | Stop reason: HOSPADM

## 2018-07-05 RX ORDER — LIDOCAINE HYDROCHLORIDE 10 MG/ML
INJECTION, SOLUTION INFILTRATION; PERINEURAL AS NEEDED
Status: DISCONTINUED | OUTPATIENT
Start: 2018-07-05 | End: 2018-07-05 | Stop reason: HOSPADM

## 2018-07-05 RX ORDER — SODIUM CHLORIDE, SODIUM LACTATE, POTASSIUM CHLORIDE, CALCIUM CHLORIDE 600; 310; 30; 20 MG/100ML; MG/100ML; MG/100ML; MG/100ML
INJECTION, SOLUTION INTRAVENOUS CONTINUOUS PRN
Status: DISCONTINUED | OUTPATIENT
Start: 2018-07-05 | End: 2018-07-05 | Stop reason: SURG

## 2018-07-05 RX ORDER — PROPOFOL 10 MG/ML
INJECTION, EMULSION INTRAVENOUS AS NEEDED
Status: DISCONTINUED | OUTPATIENT
Start: 2018-07-05 | End: 2018-07-05 | Stop reason: SURG

## 2018-07-05 RX ORDER — FENTANYL CITRATE 50 UG/ML
INJECTION, SOLUTION INTRAMUSCULAR; INTRAVENOUS AS NEEDED
Status: DISCONTINUED | OUTPATIENT
Start: 2018-07-05 | End: 2018-07-05 | Stop reason: SURG

## 2018-07-05 RX ORDER — CIPROFLOXACIN 2 MG/ML
400 INJECTION, SOLUTION INTRAVENOUS EVERY 12 HOURS
Status: DISCONTINUED | OUTPATIENT
Start: 2018-07-05 | End: 2018-07-06 | Stop reason: HOSPADM

## 2018-07-05 RX ORDER — SODIUM CHLORIDE 9 MG/ML
INJECTION, SOLUTION INTRAVENOUS AS NEEDED
Status: DISCONTINUED | OUTPATIENT
Start: 2018-07-05 | End: 2018-07-05 | Stop reason: HOSPADM

## 2018-07-05 RX ORDER — HYDROMORPHONE HCL 110MG/55ML
0.2 PATIENT CONTROLLED ANALGESIA SYRINGE INTRAVENOUS
Status: DISCONTINUED | OUTPATIENT
Start: 2018-07-05 | End: 2018-07-05 | Stop reason: HOSPADM

## 2018-07-05 RX ADMIN — CEFAZOLIN SODIUM 1000 MG: 1 SOLUTION INTRAVENOUS at 16:47

## 2018-07-05 RX ADMIN — CEFAZOLIN SODIUM 1000 MG: 1 SOLUTION INTRAVENOUS at 08:30

## 2018-07-05 RX ADMIN — FENTANYL CITRATE 50 MCG: 50 INJECTION INTRAMUSCULAR; INTRAVENOUS at 08:39

## 2018-07-05 RX ADMIN — SODIUM CHLORIDE, SODIUM LACTATE, POTASSIUM CHLORIDE, AND CALCIUM CHLORIDE: .6; .31; .03; .02 INJECTION, SOLUTION INTRAVENOUS at 08:08

## 2018-07-05 RX ADMIN — CIPROFLOXACIN 400 MG: 2 INJECTION, SOLUTION INTRAVENOUS at 17:39

## 2018-07-05 RX ADMIN — PROPOFOL 40 MG: 10 INJECTION, EMULSION INTRAVENOUS at 08:40

## 2018-07-05 RX ADMIN — SERTRALINE HYDROCHLORIDE 50 MG: 50 TABLET ORAL at 13:13

## 2018-07-05 RX ADMIN — LIDOCAINE HYDROCHLORIDE 100 MG: 20 INJECTION, SOLUTION INTRAVENOUS at 08:40

## 2018-07-05 RX ADMIN — CEFAZOLIN SODIUM 1000 MG: 1 SOLUTION INTRAVENOUS at 08:38

## 2018-07-05 RX ADMIN — SODIUM CHLORIDE, POTASSIUM CHLORIDE, SODIUM LACTATE AND CALCIUM CHLORIDE 100 ML/HR: 600; 310; 30; 20 INJECTION, SOLUTION INTRAVENOUS at 11:58

## 2018-07-05 RX ADMIN — FENTANYL CITRATE 25 MCG: 50 INJECTION INTRAMUSCULAR; INTRAVENOUS at 09:00

## 2018-07-05 RX ADMIN — ANASTROZOLE 1 MG: 1 TABLET, COATED ORAL at 13:10

## 2018-07-05 RX ADMIN — PROPOFOL 20 MG: 10 INJECTION, EMULSION INTRAVENOUS at 08:55

## 2018-07-05 NOTE — PHYSICAL THERAPY NOTE
PHYSICAL THERAPY NOTE  Patient Name: Cindi Cordova  ROBDQ'Z Date: 7/5/2018     Admitting Diagnosis  Hypokalemia [E87 6]  Hyponatremia [E87 1]  Lightheaded [R42]  Hematuria [R31 9]    Problem List  Patient Active Problem List   Diagnosis    Diabetes mellitus type 2, noninsulin dependent ([facility] )    Essential hypertension    Hyperlipidemia    Left breast mass    Depression    Hyponatremia    Hypokalemia    Gross hematuria    Dizziness    Bradycardia    Sick sinus syndrome [facility])       Past Medical History  Past Medical History:   Diagnosis Date    Cardiac disease     Diabetes mellitus ([place] [address] )     Hyperlipidemia     Hypertension        Past Surgical History  Past Surgical History:   Procedure Laterality Date    APPENDECTOMY      BREAST LUMPECTOMY Bilateral     HYSTERECTOMY          07/05/18 1338   Pain Assessment   Pain Assessment No/denies pain   Pain Score No Pain   Restrictions/Precautions   Other Precautions Cardiac/sternal   Cognition   Overall Cognitive Status WFL   Arousal/Participation Alert   Attention Within functional limits   Orientation Level Oriented X4   Memory Within functional limits   Following Commands Follows multistep commands without difficulty   Subjective   Subjective "Im feeling ok"   Bed Mobility   Rolling R 4  Minimal assistance   Additional items Assist x 1   Endurance Deficit   Endurance Deficit Yes   Endurance Deficit Description s/p pacer implantation    Assessment   Prognosis Excellent   Problem List Decreased strength;Decreased endurance; Impaired balance;Decreased mobility   Goals   STG Expiration Date 07/08/18   LTG Expiration Date 07/13/18   Plan   Treatment/Interventions ADL retraining;Functional transfer training;LE strengthening/ROM; Therapeutic exercise; Endurance training;Bed mobility;Gait training   Progress Slow progress, medical status limitations   PT Frequency 5x/wk Recommendation   Recommendation (STR vs home PT)   PT - OK to Discharge No     Jaylene Koyanagi, PT   Assessment: Mami's treatment session was limited to bed mobility tasks today secondary to bedrest order s/p pacer  She actively participated in tasks without increase pain  Following d/c of cardiac precautions,sShe will continue to benefit from skilled inpatient interventions to assess her ability to safely perform WB tasks, increase strength, increase endurance, address balance deficits, and progressively mobilize toward PLOF    Jaylene Koyanagi, PT

## 2018-07-05 NOTE — PLAN OF CARE
Problem: PHYSICAL THERAPY ADULT  Goal: Performs mobility at highest level of function for planned discharge setting  See evaluation for individualized goals  Treatment/Interventions: ADL retraining, Functional transfer training, LE strengthening/ROM, Therapeutic exercise, Endurance training, Bed mobility, Gait training, Spoke to nursing  [place] Abt, PT            See flowsheet documentation for full assessment, interventions and recommendations  Outcome: Progressing  Prognosis: Excellent  Problem List: Decreased strength, Decreased endurance, Impaired balance, Decreased mobility  Assessment: Pt is [de-identified] y o  female seen for PT evaluation s/p admit to [facility] on 7/2/2018 w/ Hyponatremia  PT consulted to assess pt's functional mobility and d/c needs  Order placed for PT eval and tx, w/ up as tolerated order  Comorbidities affecting pt's physical performance at time of assessment include:dizziness upon admisssion, DM,HTN, CA, cardiac disease  pt was requiring A for mobility and lives w/ son in two level house  Personal factors affecting pt at time of IE include: stairs to enter home  Please find objective findings from PT assessment regarding body systems outlined above with impairments and limitations including weakness, impaired balance, decreased endurance, gait deviations, decreased activity tolerance and decreased functional mobility tolerance  The following objective measures performed on IE also reveal limitations: Barthel Index: 70/100  Pt's clinical presentation is currently evolving seen in pt's presentation of delcine in funciton, complex PMH, and current HPI  Pt to benefit from continued PT tx to address deficits as defined above and maximize level of functional independent mobility and consistency  From PT/mobility standpoint, recommendation at time of d/c would be Home PT vs  STR pending progress in order to facilitate return to PLOF          Recommendation:  (STR vs home PT)     PT - OK to Discharge: No    See flowsheet documentation for full assessment

## 2018-07-05 NOTE — ANESTHESIA PREPROCEDURE EVALUATION
Review of Systems/Medical History  Patient summary reviewed  Chart reviewed  No history of anesthetic complications     Cardiovascular  Exercise tolerance (METS): <4,  Hyperlipidemia, Hypertension controlled, Dysrhythmias ,   Comment: Pauses to heart rate in the 30's currently resolved ,  Pulmonary       GI/Hepatic         Comment: Hematuria, improved hyponatremia, improved hypokalemia     Endo/Other  Diabetes well 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Oral agent,      GYN    Breast cancer axillary node dissection, right lumpectomy and left lumpectomy  Comment: Right axillary dsisection     Hematology  Anemia acute blood loss anemia,     Musculoskeletal       Neurology      Comment: Recent syncope in March Psychology   Depression ,              Physical Exam    Airway    Mallampati score: III  TM Distance: <3 FB  Neck ROM: full     Dental   lower dentures and upper dentures,     Cardiovascular  Rhythm: regular, Rate: normal,     Pulmonary  Breath sounds clear to auscultation,     Other Findings        Anesthesia Plan  ASA Score- 3 Emergent    Anesthesia Type- IV sedation with anesthesia with ASA Monitors  Additional Monitors:   Airway Plan:         Plan Factors-    Induction- intravenous  Postoperative Plan-     Informed Consent- Anesthetic plan and risks discussed with patient

## 2018-07-05 NOTE — PLAN OF CARE
DISCHARGE PLANNING     Discharge to home or other facility with appropriate resources Progressing        INFECTION - ADULT     Absence or prevention of progression during hospitalization Progressing     Absence of fever/infection during neutropenic period Progressing        Knowledge Deficit     Patient/family/caregiver demonstrates understanding of disease process, treatment plan, medications, and discharge instructions Progressing        Nutrition/Hydration-ADULT     Nutrient/Hydration intake appropriate for improving, restoring or maintaining nutritional needs Progressing        PAIN - ADULT     Verbalizes/displays adequate comfort level or baseline comfort level Progressing    Pt denies any pain at this time    Potential for Falls     Patient will remain free of falls Progressing    Pt is on bedrest until tonight at 10pm    Prexisting or High Potential for Compromised Skin Integrity     Skin integrity is maintained or improved Progressing        SAFETY ADULT     Maintain or return to baseline ADL function Progressing     Maintain or return mobility status to optimal level Progressing     Patient will remain free of falls Progressing

## 2018-07-05 NOTE — ANESTHESIA POSTPROCEDURE EVALUATION
Post-Op Assessment Note      CV Status:  Stable    Mental Status:  Alert    Hydration Status:  Stable    PONV Controlled:  None    Airway Patency:  Patent    Post Op Vitals Reviewed: Yes          Staff: Anesthesiologist           BP (P) 148/67 (07/05/18 0940)    Temp (P) 98 4 °F (36 9 °C) (07/05/18 0940)    Pulse  60   Resp   14   SpO2 (P) 96 % (07/05/18 0940)

## 2018-07-05 NOTE — OP NOTE
OPERATIVE REPORT  PATIENT NAME: Yaneli Schaefer    :  1935  MRN: [id number]  Pt Location: [address] [place] [address] OR ROOM 02    SURGERY DATE: 2018    Surgeon(s) and Role:     * Raymond Cha MD - Primary    Preop Diagnosis:  * No pre-op diagnosis entered *    Post-Op Diagnosis Codes:     * Sick sinus syndrome [I49 5]    Procedure(s) (LRB):  INSERTION PACEMAKER (Left)    Specimen(s):  * No specimens in log *    Estimated Blood Loss:   Minimal    Drains:  Urethral Catheter Straight-tip (Active)   Amt returned on insertion(mL) 250 mL 2018  6:29 PM   Site Assessment Clean;Skin intact 2018  1:01 AM   Collection Container Standard drainage bag 2018  1:01 AM   Securement Method Securing device (Describe) 2018  1:01 AM   Output (mL) 900 mL 2018  5:01 AM   Number of days: 1       Anesthesia Type:   * No anesthesia type entered *    Operative Indications:  * No pre-op diagnosis entered *  Symptomatic Sick sinus syndrome    Operative Findings:  Same    Complications:   None    Procedure and Technique:    Indication:  Symptomatic sick sinus syndrome  Site:  Left subclavian  Device:  Brand:St  [employer]'s  Model #:  Assurity MRI [id number]   Serial #:  [id number]    Atrial lead:  Brand:St  Jhon[employer]'s  Model #:  2088 TC/46 cm active fixation  Serial #:  [id number]  Capture threshold:  0 5 Volts  P wave:  2 0 mVolts  Impedence:  410 Ohms  Fluoroscopic position:Right Atrial Appendage    Ventricular lead:  Brand:Medtronic  Model #:   2088 TC 52 cm active fixation  Serial #:  [id number]  Capture threshold:0 5Volts  R wave:9 5mVolts  Impedence:1125 Ohms  Fluoroscopic position:RV apex  Anesthesia:  Local with sedation  Estimated blood loss:  Less than 10 cc  Complications:  None    Procedure: The patient was prepped and draped in the usual sterile manner after consent was obtained and the patient was appropriately identified  1/2 inch incision was made from the midclavicular line to the deltopectoral groove    Cutdown was made with sharp and blunt dissection to the prepectoral fascia  A pocket was formed bluntly and a 4 x 4 was placed into the pocket for hemostasis  The subclavian vein was entered on a single pass and a guidewire was placed through the needle  This was confirmed to the superior vena cava by fluoroscopy  A second stick and wire were then placed  Sheath was placed over the first wire and the wire was removed  The ventricular lead was passed through the sheath which was peeled away  Using fluoroscopic guidance, the lead was manipulated into the right ventricular apex and when appropriate position was found lead was secured to the tissue entry site with 0 silk around the stay collar  10 volts did not cause diaphragmatic stimulation  Sheath was placed over the second wire and the wire was removed  The atrial lead was passed through the sheath which was peeled away  Using fluoroscopic guidance, the lead was manipulated into the right atrium and when appropriate position was found lead was secured to the tissue entry site with 0 silk around the stay collar  10 volts did not cause diaphragmatic stimulation  The 4 x 4 was removed from the pocket and the pocket was copiously irrigated with sterile saline  There was good hemostasis  The device was brought to the table attached to the lead and secured to the underlying fascia with 0 silk through the header block  2-0 Vicryl was used to close the pocket deeply in an interrupted manner  Two 0 Vicryl was used for running closure of the subcutaneous fashion fat  4-O Vicryl was then used for interrupted subcuticular closure of the skin  Dermabond was placed on the wound and the the patient was transferred to the recovery room in good condition             I was present for the entire procedure    Patient Disposition:  PACU     SIGNATURE: Shahida Wiggins MD  DATE: July 5, 2018  TIME: 9:48 AM

## 2018-07-05 NOTE — NURSING NOTE
Pt received from pacu at 1115 s/p pacer    Report from the rn    Pt oriented to the unit and the callbell    All questions answered and the pt verbalized understanding, left arm in sling and the pt is on bedrest until 2200  Left ant chest wall site cdi and the pt denies any pain    Paced rhythm on the monitor     callbell in reach of the pt

## 2018-07-05 NOTE — PROGRESS NOTES
[facility] Internal Medicine Progress Note  Patient: Laura Kidd [de-identified] y o  female   MRN: [id number]  PCP: Radha Chow DO  Unit/Bed#: -01 Encounter: [id number]  Date Of Visit: 07/05/18    Assessment:    Principal Problem:    Sick sinus syndrome ([facility] )  Active Problems:    Diabetes mellitus type 2, noninsulin dependent ([facility] )    Essential hypertension    Hyperlipidemia    Left breast mass    Depression    Hyponatremia    Hypokalemia    Gross hematuria    Dizziness    Bradycardia      Plan:    #1hyponatremia  Currently resolved  Acute on chronic hyponatremia  Improving, currently asymptomatic  Comprehensive medication review performed  Tachybradycardia syndrome  Status post pacemaker insertion  We will monitor overnight  If clinical course allows point to discharge and it        2  Anemia  Patient is currently asymptomatic  Reports minimal hematuria  We will continue to monitor for now no need for transfusion  Urology is following for hematuria evaluation  3 DM Type II:    Begin no concentrated carbohydrate diet  Cover with Aspart SSI as needed   Will order HgbA1C to assess status of recent glycemic control  Well initiate home medications once med rec is completed and confirmed by pharmacy  #4History of HTN:   We will continue patient home medications  Although initially his blood pressure was higher in emergency department, it later stabilized  Well also initiate IV hydralazine and IV metoprolol for SBP greater than 160 mmHg  We will advise patient to follow-up with next PCP in regards to further better management of  ongoing HTN  Patient does report some noncompliance was dietary regimen, extensive consultation was given about appropriate last child nutritional modifications, including stress reduction to achieve optimal blood pressure control  5 depression  Mood and affect appears to be appropriate  We will continue current regimen        6   Deconditioning  PT/OT eval and treatment reviewed discussed with family  7   Hypokalemia  Replace by mouth and IV  #8 bradycardia  Asymptomatic bradycardia  We will obtain cardiology eval prior to discharge    VTE Pharmacologic Prophylaxis:   Pharmacologic: Heparin  Mechanical VTE Prophylaxis in Place: Yes    Patient Centered Rounds: I have performed bedside rounds with nursing staff today  Discussions with Specialists or Other Care Team Provider: yes    Education and Discussions with Family / Patient: no    Time Spent for Care: 45 minutes  More than 50% of total time spent on counseling and coordination of care as described above  Current Length of Stay: 3 day(s)    Current Patient Status: Inpatient   Certification Statement: The patient will continue to require additional inpatient hospital stay due to ongoing hematuria    Discharge Plan / Estimated Discharge Date: to be determined    Code Status: Level 1 - Full Code      Subjective:   No complaints    Objective:     Vitals:   Temp (24hrs), Av 6 °F (36 4 °C), Min:97 °F (36 1 °C), Max:98 4 °F (36 9 °C)    HR:  [53-78] 65  Resp:  [13-26] 18  BP: (122-155)/(56-70) 128/62  SpO2:  [90 %-96 %] 90 %  Body mass index is 31 74 kg/m²  Input and Output Summary (last 24 hours): Intake/Output Summary (Last 24 hours) at 18 1621  Last data filed at 18 1239   Gross per 24 hour   Intake             1160 ml   Output             2200 ml   Net            -1040 ml       Physical Exam:     Physical Exam  GENERAL APPEARANCE: WD/WN in NAD pleasant  SKIN: no rash, pacemaker insertion site clean bandage  Without any discharge  HEENT: NC/AT, PERRLA (B), moist MM, no epistaxis  NECK: Supple, no JVD    LUNGS: CTA (B) mildly prolonged expiratory phase,   no use of accessory muscles    HEART:          S1S 2, RRR  , PMI is not displaced  ABDOMEN: Soft, nontender, nondistended, +BS  Rectal exam:  EXTREMITIES: no edema   PERIPHERAL VASCULAR: palpable pulses   NEURO:  AAO x 3, CN 2-12: non focal  MUSCLE STRENGHT: 5/5 (B), SENSATION: nonfocal  DTR: ++, CEREBELLAR: non focal  Additional Data:     Labs:      Results from last 7 days  Lab Units 07/05/18  0538   WBC Thousand/uL 7 40   HEMOGLOBIN g/dL 11 8*   HEMATOCRIT % 34 2*   PLATELETS Thousands/uL 241   NEUTROS PCT % 72   LYMPHS PCT % 17*   MONOS PCT % 9   EOS PCT % 2       Results from last 7 days  Lab Units 07/05/18  0538   SODIUM mmol/L 136   POTASSIUM mmol/L 3 3*   CHLORIDE mmol/L 100   CO2 mmol/L 30   BUN mg/dL 14   CREATININE mg/dL 0 64   CALCIUM mg/dL 10 4   TOTAL PROTEIN g/dL 6 0*   BILIRUBIN TOTAL mg/dL 0 40   ALK PHOS U/L 56   ALT U/L 12   AST U/L 14   GLUCOSE RANDOM mg/dL 125*       Results from last 7 days  Lab Units 07/05/18  0538   INR  0 97       * I Have Reviewed All Lab Data Listed Above  * Additional Pertinent Lab Tests Reviewed: [facility] Admission Reviewed    Imaging:    Imaging Reports Reviewed Today Include: yes  Imaging Personally Reviewed by Myself Includes:  yes    Recent Cultures (last 7 days):           Last 24 Hours Medication List:     Current Facility-Administered Medications:  acetaminophen 650 mg Oral Q6H PRN Dionte Montesinos MD    anastrozole 1 mg Oral Daily Dionte Montesinos MD    atorvastatin 40 mg Oral Daily Dionte Montesinos MD    cefazolin 1,000 mg Intravenous Once Andrade Bolus, MD    insulin lispro 1-5 Units Subcutaneous TID Alka Bateman MD    lactated ringers 100 mL/hr Intravenous Continuous Hans Aguayo MD Last Rate: 100 mL/hr (07/05/18 1158)   pantoprazole 20 mg Oral Early Morning Dionte Montesinos MD    sertraline 50 mg Oral Daily Dionte Montesinos MD    traMADol 50 mg Oral Q6H PRN Dionte Montesinos MD         Today, Patient Was Seen By: Christiana Westbrook MD    ** Please Note: This note has been constructed using a voice recognition system   **

## 2018-07-05 NOTE — PROGRESS NOTES
Progress Note - Ann Ba [de-identified] y o  female MRN: [id number]    Unit/Bed#: -01 Encounter: [id number]      Assessment:  Urinary retention    Plan:  Continue Camacho catheter    Subjective: Tolerating Camacho catheter well  Camacho replaced yesterday for elevated postvoid residual    Objective:     Vitals: Blood pressure 128/62, pulse 65, temperature 98 °F (36 7 °C), temperature source Tympanic, resp  rate 18, height 5' 3" (1 6 m), weight 81 3 kg (179 lb 3 oz), SpO2 90 %, not currently breastfeeding  ,Body mass index is 31 74 kg/m²  Intake/Output Summary (Last 24 hours) at 07/05/18 1835  Last data filed at 07/05/18 1239   Gross per 24 hour   Intake             1040 ml   Output             1800 ml   Net             -760 ml     Camacho catheter intact draining clear yellow urine      Invasive Devices     Peripheral Intravenous Line            Peripheral IV 07/05/18 Left Arm less than 1 day          Drain            Urethral Catheter Straight-tip less than 1 day                Lab, Imaging and other studies: I have personally reviewed pertinent reports      VTE Pharmacologic Prophylaxis: Sequential compression device (Venodyne)   VTE Mechanical Prophylaxis: sequential compression device

## 2018-07-06 VITALS
RESPIRATION RATE: 18 BRPM | DIASTOLIC BLOOD PRESSURE: 62 MMHG | BODY MASS INDEX: 32.3 KG/M2 | HEART RATE: 67 BPM | HEIGHT: 63 IN | TEMPERATURE: 98.1 F | OXYGEN SATURATION: 96 % | WEIGHT: 182.31 LBS | SYSTOLIC BLOOD PRESSURE: 122 MMHG

## 2018-07-06 LAB
ATRIAL RATE: 60 BPM
GLUCOSE SERPL-MCNC: 134 MG/DL (ref 65–140)
GLUCOSE SERPL-MCNC: 152 MG/DL (ref 65–140)
GLUCOSE SERPL-MCNC: 196 MG/DL (ref 65–140)
P AXIS: -13 DEGREES
PR INTERVAL: 184 MS
QRS AXIS: 46 DEGREES
QRSD INTERVAL: 144 MS
QT INTERVAL: 412 MS
QTC INTERVAL: 412 MS
T WAVE AXIS: 13 DEGREES
VENTRICULAR RATE: 60 BPM

## 2018-07-06 PROCEDURE — 82948 REAGENT STRIP/BLOOD GLUCOSE: CPT

## 2018-07-06 PROCEDURE — 97116 GAIT TRAINING THERAPY: CPT

## 2018-07-06 PROCEDURE — 99024 POSTOP FOLLOW-UP VISIT: CPT | Performed by: INTERNAL MEDICINE

## 2018-07-06 PROCEDURE — 99239 HOSP IP/OBS DSCHRG MGMT >30: CPT | Performed by: INTERNAL MEDICINE

## 2018-07-06 RX ADMIN — CIPROFLOXACIN 400 MG: 2 INJECTION, SOLUTION INTRAVENOUS at 05:28

## 2018-07-06 RX ADMIN — SERTRALINE HYDROCHLORIDE 50 MG: 50 TABLET ORAL at 08:11

## 2018-07-06 RX ADMIN — ANASTROZOLE 1 MG: 1 TABLET, COATED ORAL at 08:11

## 2018-07-06 RX ADMIN — ATORVASTATIN CALCIUM 40 MG: 40 TABLET, FILM COATED ORAL at 08:11

## 2018-07-06 RX ADMIN — PANTOPRAZOLE SODIUM 20 MG: 20 TABLET, DELAYED RELEASE ORAL at 05:29

## 2018-07-06 RX ADMIN — INSULIN LISPRO 1 UNITS: 100 INJECTION, SOLUTION INTRAVENOUS; SUBCUTANEOUS at 11:38

## 2018-07-06 NOTE — NURSING NOTE
Patient discharged to home  IV removed with catheter tip intact, DSD and pressure applied  All belongings returned to patient upon discharge  Patient and family verbalized understanding of discharge instructions

## 2018-07-06 NOTE — PHYSICAL THERAPY NOTE
07/06/18 1116   Pain Assessment   Pain Assessment 0-10   Pain Score No Pain   Restrictions/Precautions   Weight Bearing Precautions Per Order No   Cognition   Overall Cognitive Status WFL   Arousal/Participation Alert   Attention Within functional limits   Orientation Level Oriented X4   Memory Within functional limits   Following Commands Follows multistep commands without difficulty   Subjective   Subjective "I am feeling good"   Transfers   Sit to Stand 5  Supervision   Additional items Assist x 1   Stand to Sit 5  Supervision   Additional items Assist x 1   Ambulation/Elevation   Gait pattern Forward Flexion; Wide RADHA; Short stride   Gait Assistance 5  Supervision   Additional items Assist x 1   Assistive Device None   Distance 120 feet   Balance   Static Sitting Normal   Dynamic Sitting Good   Static Standing Good   Dynamic Standing Fair +   Ambulatory Fair +   Endurance Deficit   Endurance Deficit Yes   Endurance Deficit Description Fatigue post bedrest   Activity Tolerance   Activity Tolerance Patient limited by fatigue   Assessment   Prognosis Excellent   Problem List Decreased strength;Decreased range of motion;Decreased endurance;Decreased mobility   Assessment Pt seated at EOB upon PT treatment and agreeable to ambulate  Pt ambulates with a wide RADHA and short stride length  Verbal cues given for proper step length and foot clearence      Goals   Patient Goals Return home   Plan   Treatment/Interventions Functional transfer training;Gait training   Progress Progressing toward goals   PT Frequency 5x/wk   Recommendation   Recommendation Home PT     Shashi Burks PTA

## 2018-07-06 NOTE — PROGRESS NOTES
Patient voided 350ml clear yellow urine post keenan removal  Bladder scanned for a result of >261 ml  Dr Mariano Turner made aware  Continue to monitor until next void, then call Dr Mariano Turner with results

## 2018-07-06 NOTE — PLAN OF CARE
Problem: DISCHARGE PLANNING - CARE MANAGEMENT  Goal: Discharge to post-acute care or home with appropriate resources  INTERVENTIONS:  - Conduct assessment to determine patient/family and health care team treatment goals, and need for post-acute services based on payer coverage, community resources, and patient preferences, and barriers to discharge  - Address psychosocial, clinical, and financial barriers to discharge as identified in assessment in conjunction with the patient/family and health care team  - patient will return home with family upon discharge  She declined the need for any discharge services     - Arrange appropriate level of post-acute services according to patient's   needs and preference and payer coverage in collaboration with the physician and health care team  - Communicate with and update the patient/family, physician, and health care team regarding progress on the discharge plan  - Arrange appropriate transportation to post-acute venues  Outcome: Completed Date Met: 07/06/18

## 2018-07-06 NOTE — PLAN OF CARE
DISCHARGE PLANNING     Discharge to home or other facility with appropriate resources Completed        INFECTION - ADULT     Absence or prevention of progression during hospitalization Completed     Absence of fever/infection during neutropenic period Completed        Knowledge Deficit     Patient/family/caregiver demonstrates understanding of disease process, treatment plan, medications, and discharge instructions Completed        Nutrition/Hydration-ADULT     Nutrient/Hydration intake appropriate for improving, restoring or maintaining nutritional needs Completed        PAIN - ADULT     Verbalizes/displays adequate comfort level or baseline comfort level Completed        Potential for Falls     Patient will remain free of falls Completed        Prexisting or High Potential for Compromised Skin Integrity     Skin integrity is maintained or improved Completed        SAFETY ADULT     Maintain or return to baseline ADL function Completed     Maintain or return mobility status to optimal level Completed     Patient will remain free of falls Completed

## 2018-07-06 NOTE — SOCIAL WORK
Patient for discharge home today  CM offerred HHC x 3 during ptatients admisison  She declined each time  Pt continues to drive and does not meet homebound criteria needed for HHC  Patient keenan removed and was able to void  She does not need HHC for same at this time  Pt will return home with family, her grandson will transport

## 2018-07-06 NOTE — ASSESSMENT & PLAN NOTE
Resolved  Status post pacemaker  Pacemaker looks to be working properly with frequent atrial pacing  Wound looks great  F/u arranged

## 2018-07-06 NOTE — PROGRESS NOTES
Progress Note - Nephrology   Yanira Bennett [de-identified] y o  female MRN: [id number]  Unit/Bed#: -01 Encounter: [id number]    A/P:  1  Hyponatremia: resolved with IVF and cessation of thiazide diuretic  2  Hematuria: Stable  3 Bradycardia with pauses: will have a PPMI  7/6 Had a PPMI yesterday and is doing well  Will sign off as her electrolyte issues have resolved  4  Diabetes mellitus : continue coverage  5  Hyperlipidemia: continue statin use  6  Hypercalcemia: will recheck        Follow up reason for today's visit: Hyponatremia    Sick sinus syndrome [facility])    Patient Active Problem List   Diagnosis    Diabetes mellitus type 2, noninsulin dependent ([facility] [address] )    Essential hypertension    Hyperlipidemia    Left breast mass    Depression    Hyponatremia    Hypokalemia    Gross hematuria    Dizziness    Bradycardia    Sick sinus syndrome (HCC)         Subjective:   No headaches dizziness chest pain shortness of breath abdominal pain nausea vomiting diarrhea she is feeling much better she will have a pacemaker tomorrow  Due to a very low heart rate    Objective:     Vitals: Blood pressure 112/58, pulse 60, temperature (!) 97 1 °F (36 2 °C), temperature source Tympanic, resp  rate 18, height 5' 3" (1 6 m), weight 82 7 kg (182 lb 5 oz), SpO2 92 %, not currently breastfeeding  ,Body mass index is 32 3 kg/m²  Weight (last 2 days)     Date/Time   Weight    07/06/18 0552  82 7 (182 31)    07/05/18 0600  81 3 (179 19)    07/04/18 0800  82 1 (181 1)                Intake/Output Summary (Last 24 hours) at 07/06/18 0928  Last data filed at 07/06/18 0547   Gross per 24 hour   Intake          1960 01 ml   Output             2800 ml   Net          -839 99 ml     I/O last 3 completed shifts: In: 1960 [P O :240;  I V :1461 7; IV Piggyback:258 3]  Out: 4000 [Urine:4000]         Physical Exam: /58 (BP Location: Left arm)   Pulse 60   Temp (!) 97 1 °F (36 2 °C) (Tympanic)   Resp 18   Ht 5' 3" (1 6 m)   Wt 82 7 kg (182 lb 5 oz)   SpO2 92%   Breastfeeding? No   BMI 32 30 kg/m²     General Appearance:    Alert, cooperative, no distress, appears stated age   Head:    Normocephalic, without obvious abnormality, atraumatic   Eyes:    Conjunctiva/corneas clear   Ears:    Normal external ears   Nose:   Nares normal, septum midline, mucosa normal, no drainage    or sinus tenderness   Throat:   Lips, mucosa, and tongue normal; teeth and gums normal   Neck:   Supple, symmetrical, trachea midline, no adenopathy;        thyroid:  No enlargement/tenderness/nodules; no carotid    bruit or JVD   Back:     Symmetric, no curvature, ROM normal, no CVA tenderness   Lungs:     Clear to auscultation bilaterally, respirations unlabored   Chest wall:    No tenderness or deformity   Heart:    Regular rate and rhythm, S1 and S2 normal, no murmur, rub   or gallop   Abdomen:     Soft, non-tender, bowel sounds active   Extremities:   Extremities normal, atraumatic, no cyanosis or edema   Skin:   Skin color, texture, turgor normal, no rashes or lesions   Lymph nodes:   Cervical normal   Neurologic:   CNII-XII intact            Lab, Imaging and other studies: I have personally reviewed pertinent labs  CBC: No results found for: WBC, HGB, HCT, MCV, PLT, ADJUSTEDWBC, MCH, MCHC, RDW, MPV, NRBC  CMP: No results found for: NA, K, CL, CO2, ANIONGAP, BUN, CREATININE, GLUCOSE, CALCIUM, AST, ALT, ALKPHOS, PROT, ALBUMIN, BILITOT, EGFR        Results from last 7 days  Lab Units 07/05/18  0538 07/04/18  0511 07/03/18  0514  07/02/18  1446   SODIUM mmol/L 136 134 120*  < > 118*   POTASSIUM mmol/L 3 3* 3 7 3 3*  < > 2 6*   CHLORIDE mmol/L 100 99 88*  < > 80*   CO2 mmol/L 30 28 27  < > 29   BUN mg/dL 14 11 9  < > 13   CREATININE mg/dL 0 64 0 68 0 59*  < > 0 71   CALCIUM mg/dL 10 4 10 7* 10 0  < > 11 1*   TOTAL PROTEIN g/dL 6 0*  --   --   --  7 0   BILIRUBIN TOTAL mg/dL 0 40  --   --   --  0 60   ALK PHOS U/L 56  --   --   --  65   ALT U/L 12  --   --   --  16   AST U/L 14  --   --   --  21   GLUCOSE RANDOM mg/dL 125* 139* 110*  < > 195*   < > = values in this interval not displayed  Phosphorus: No results found for: PHOS  Magnesium:   No results found for: MG  Urinalysis: No results found for: Pat Ly, SPECGRAV, PHUR, LEUKOCYTESUR, NITRITE, PROTEINUA, GLUCOSEU, KETONESU, BILIRUBINUR, BLOODU  Ionized Calcium: No results found for: CAION  Coagulation: No results found for: PT, INR, APTT  Troponin: No results found for: TROPONINI  ABG: No results found for: PHART, VMB2KGL, PO2ART, SBI3XVN, H7MDYEPX, BEART, SOURCE  Radiology review:     IMAGING  Procedure: Ct Abdomen Pelvis Wo Contrast    Result Date: 7/2/2018  Narrative: INDICATION:  Hematuria  ORDERING PROVIDER:  Ruthann Noel  TECHNIQUE:  CT of the abdomen and pelvis was performed without intravenous contrast   RADIATION AMOUNT:  598 80 mGy-cm  COMPARISON:  None Available  FINDINGS: Abdomen: The lung bases are clear  The liver, spleen, pancreas, adrenal glands, and kidneys are suboptimally evaluated on these unenhanced images, but demonstrate no acute pathology  There is a cystic area posteriorly in the left kidney  There is rotational anomaly of the left kidney  On the right, there is a cortical calcification adjacent to a lower pole cystic area  Cystic area in the lower pole measures 7 0 cm  There are no urinary tract calculi  There is no free air or lymph node enlargement  Abdominal aorta is not aneurysmal  There is mild atheromatous plaque in the distal descending thoracic aorta  There is significant vascular calcification in the splenic artery  There is moderate infrarenal arterial vascular plaque Pelvis: There is no bowel wall thickening or obstruction  There is no free fluid  Lymph nodes are not enlarged  Urinary bladder is unremarkable  There is mild to moderate left-sided colonic diverticulosis without diverticulitis  There has been a hysterectomy  Skeleton:  There are no acute fractures    No suspicious bony lesions  There is multilevel moderate degenerative changes in the lumbar spine  There is mild rotoscoliosis  Impression: No urinary tract calculi  There are cystic areas in both kidneys  Largest in lower pole of the right kidney measures 7 0 cm  It may have a punctate calcification in the wall  Recommend follow-up ultrasound in 6 months  Mild to moderate left-sided colonic diverticulosis without diverticulitis  Signed by LELO Esparza  Procedure: Ct Head Without Contrast    Result Date: 7/2/2018  Narrative: INDICATION:  Ataxia  Slowly progressive  ORDERING PROVIDER:  Jenny Del Cid  TECHNIQUE:  CT of the brain was performed without contrast   Automated mA/kV exposure control was utilized and patient examination was performed in strict accordance with principles of ALARA  RADIATION AMOUNT:  913 6 mGy-cm  COMPARISON:  None Available  FINDINGS: There is no acute intracranial hemorrhage, midline shift, mass effect, or extra-axial fluid collection  Gray-white differentiation is preserved  Brain volume and ventricular system are within normal limits for age  Mild patchy areas of low-attenuation are seen in the subcortical and deep periventricular white matter suggesting areas of chronic microvascular ischemia  The skull base and calvarium are intact  The visualized paranasal sinuses are unremarkable  The visualized orbits, globes, and mastoid air cells are unremarkable  Impression: No acute intracranial abnormality    Signed by Noris Wolff MD      Current Facility-Administered Medications   Medication Dose Route Frequency    acetaminophen (TYLENOL) tablet 650 mg  650 mg Oral Q6H PRN    anastrozole (ARIMIDEX) tablet 1 mg  1 mg Oral Daily    atorvastatin (LIPITOR) tablet 40 mg  40 mg Oral Daily    ciprofloxacin (CIPRO) IVPB (premix) 400 mg  400 mg Intravenous Q12H    insulin lispro (HumaLOG) 100 units/mL subcutaneous injection 1-5 Units  1-5 Units Subcutaneous TID AC    lactated ringers infusion  100 mL/hr Intravenous Continuous    pantoprazole (PROTONIX) EC tablet 20 mg  20 mg Oral Early Morning    sertraline (ZOLOFT) tablet 50 mg  50 mg Oral Daily    traMADol (ULTRAM) tablet 50 mg  50 mg Oral Q6H PRN     Medications Discontinued During This Encounter   Medication Reason    sodium chloride 0 9 % infusion     ceFAZolin (ANCEF) IVPB (premix) 4,091 mg Duplicate order    lidocaine (XYLOCAINE) 1 % injection Patient Discharge    sodium chloride 0 9 % infusion Patient Discharge    HYDROmorphone (DILAUDID) injection 0 2 mg Patient Transfer       Wanda Kwan MD

## 2018-07-06 NOTE — PROGRESS NOTES
Patient to be discharged to home today  RN spoke with Dr Carlos Lopez  Camacho catheter removed at 1015, perform voiding trial and bladder scans  Hospitalist aware  Will continue to monitor

## 2018-07-06 NOTE — PROGRESS NOTES
Progress Note - Nieves Nixon 1935, [de-identified] y o  female MRN: [id number]    Unit/Bed#: -[place] Encounter: [id number]    Primary Care Provider: John January DO   Date and time admitted to hospital: 7/2/2018  2:12 PM        Essential hypertension   Assessment & Plan    Currently adequately controlled  * Sick sinus syndrome [facility])   Assessment & Plan    Resolved  Status post pacemaker  Pacemaker looks to be working properly with frequent atrial pacing  Wound looks great  F/u arranged  Subjective:   Patient seen and examined  No significant events overnight  Telemetry/ECG/Cardiac testing:  Sinus rhythm alternating with atrial paced rhythm  Intact AV conduction  Vitals: Blood pressure 112/58, pulse 60, temperature (!) 97 1 °F (36 2 °C), temperature source Tympanic, resp  rate 18, height 5' 3" (1 6 m), weight 82 7 kg (182 lb 5 oz), SpO2 92 %, not currently breastfeeding , Orthostatic Blood Pressures      Most Recent Value   Blood Pressure  112/58 filed at 07/06/2018 0720   Patient Position - Orthostatic VS  Lying filed at 07/06/2018 0720      , Weight (last 2 days)     Date/Time   Weight    07/06/18 0552  82 7 (182 31)    07/05/18 0600  81 3 (179 19)    07/04/18 0800  82 1 (181 1)              Physical Exam:    Pacer wound left subclavian region looks great  General:  Normal appearance in no distress  Eyes:  Anicteric  Oral mucosa:  Moist   Neck:  No JV D  Carotid upstrokes are brisk without bruits  No masses  Chest:  Clear to auscultation and percussion  Cardiac:  Normal PMI  Normal S1 and S2  No murmur gallop or rub  Abdomen:  Soft and nontender  No palpable organomegaly or aortic enlargement  Extremities:  No peripheral edema  Neuro:  Grossly symmetric  Psych:  Alert and oriented x3        Medications:      Current Facility-Administered Medications:     acetaminophen (TYLENOL) tablet 650 mg, 650 mg, Oral, Q6H PRN, Vazquez Zelaya MD    anastrozole (ARIMIDEX) tablet 1 mg, 1 mg, Oral, Daily, Nathanael Benavidez MD, 1 mg at 07/06/18 0811    atorvastatin (LIPITOR) tablet 40 mg, 40 mg, Oral, Daily, Nathanael Benavidez MD, 40 mg at 07/06/18 0811    ciprofloxacin (CIPRO) IVPB (premix) 400 mg, 400 mg, Intravenous, Q12H, Nicole Swain MD, Last Rate: 200 mL/hr at 07/06/18 0528, 400 mg at 07/06/18 0528    insulin lispro (HumaLOG) 100 units/mL subcutaneous injection 1-5 Units, 1-5 Units, Subcutaneous, TID AC, Nathanael Benavidez MD, 1 Units at 07/04/18 1709    lactated ringers infusion, 100 mL/hr, Intravenous, Continuous, Bernardo Jenkins MD, Stopped at 07/05/18 1835    pantoprazole (PROTONIX) EC tablet 20 mg, 20 mg, Oral, Early Morning, Nathanael Benavidez MD, 20 mg at 07/06/18 0529    sertraline (ZOLOFT) tablet 50 mg, 50 mg, Oral, Daily, Nathanael Benavidez MD, 50 mg at 07/06/18 0811    traMADol (ULTRAM) tablet 50 mg, 50 mg, Oral, Q6H PRN, Nathanael Benavidez MD     Labs & Results:    Troponins:   Results from last 7 days  Lab Units 07/02/18  1446   TROPONIN I ng/mL <0 03       CBC with diff:   Results from last 7 days  Lab Units 07/05/18  0538 07/03/18  0514   WBC Thousand/uL 7 40 7 50   HEMOGLOBIN g/dL 11 8* 11 0*   HEMATOCRIT % 34 2* 30 6*   MCV fL 89 86   PLATELETS Thousands/uL 241 176     TSH:     CMP:   Results from last 7 days  Lab Units 07/05/18  0538 07/04/18  0511  07/02/18  1446   SODIUM mmol/L 136 134  < > 118*   POTASSIUM mmol/L 3 3* 3 7  < > 2 6*   CHLORIDE mmol/L 100 99  < > 80*   CO2 mmol/L 30 28  < > 29   BUN mg/dL 14 11  < > 13   CREATININE mg/dL 0 64 0 68  < > 0 71   GLUCOSE RANDOM mg/dL 125* 139*  < > 195*   AST U/L 14  --   --  21   ALT U/L 12  --   --  16   TOTAL PROTEIN g/dL 6 0*  --   --  7 0   BILIRUBIN TOTAL mg/dL 0 40  --   --  0 60   EGFR ml/min/1 73sq m 85 83  < > 81   < > = values in this interval not displayed    Lipid Profile:     Coags:   Results from last 7 days  Lab Units 07/05/18  0538   INR  0 97

## 2018-07-06 NOTE — PLAN OF CARE
Problem: PHYSICAL THERAPY ADULT  Goal: Performs mobility at highest level of function for planned discharge setting  See evaluation for individualized goals  Treatment/Interventions: ADL retraining, Functional transfer training, LE strengthening/ROM, Therapeutic exercise, Endurance training, Bed mobility, Gait training, Spoke to nursing  Jeremie Souza PT            See flowsheet documentation for full assessment, interventions and recommendations  Outcome: Progressing  Prognosis: Excellent  Problem List: Decreased strength, Decreased range of motion, Decreased endurance, Decreased mobility  Assessment: Pt seated at EOB upon PT treatment and agreeable to ambulate  Pt ambulates with a wide RADHA and short stride length  Verbal cues given for proper step length and foot clearence  Recommendation: Home PT     PT - OK to Discharge: No    See flowsheet documentation for full assessment     Alyce Carpenter, PTA

## 2018-07-12 ENCOUNTER — OFFICE VISIT (OUTPATIENT)
Dept: CARDIOLOGY CLINIC | Facility: CLINIC | Age: 83
End: 2018-07-12

## 2018-07-12 DIAGNOSIS — I49.5 SICK SINUS SYNDROME (HCC): Chronic | ICD-10-CM

## 2018-07-12 DIAGNOSIS — R00.1 BRADYCARDIA: ICD-10-CM

## 2018-07-12 PROBLEM — Z95.0 CARDIAC PACEMAKER IN SITU: Status: ACTIVE | Noted: 2018-07-12

## 2018-07-12 PROCEDURE — 99024 POSTOP FOLLOW-UP VISIT: CPT | Performed by: INTERNAL MEDICINE

## 2018-07-12 NOTE — PROGRESS NOTES
The patient was seen in the office today for a post PPM Implant wound check appointmnet  She is doing well without any complaints  She is normotensive normocardic and afebrile  The site is healing well without erythema, edema or ecchymosis   There is no tenderness or discharge  She is advised to call if any S/S of infection  She is to follow as scheduled

## 2018-08-02 NOTE — DISCHARGE SUMMARY
Discharge Summary - Nani Marlow [de-identified] y o  female MRN: [id number]    Unit/Bed#: -01 Encounter: [id number]    Admission Date:   Admission Orders     Ordered        07/02/18 1742  Inpatient Admission  Once               Admitting Diagnosis: Hypokalemia [E87 6]  Hyponatremia [E87 1]  Lightheaded [R42]  Hematuria [R31 9]        Procedures Performed: No orders of the defined types were placed in this encounter  Summary of Hospital Course:    Nani Marlow is a [de-identified] y o  female who presents with dizziness  Pt has been being treated for uti/hematuria with PO abx by her PCP for the last 2 weeks with no significant improvement  She has decreased appetite and weakness/fatigue  Over last 2 days she has been dizzy and light headed  No headache or change in vision  +intermittent diarrhea  decreased PO intake  She was subsequently admitted due to hyponatremia dehydration  Hyponatremia and dehydration quickly corrected with normal saline and subsequently encouraged adequate by mouth intake  Due to dehydration patient did have kidney injury which has improved throughout hospital stay  She had brief of which was thought to be due to Camacho catheter which was initially placed due to urinary retention  During her brief hospitalization  Patient was found to have heart rates as high as 130s and severe bradycardia of 30s  Cardiology was consulted patient was 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 sinus syndrome and successful pacemaker was inserted by cardiology  Due to her urinary retention and acute kidney injury Patient was evaluated and seen by urology and nephrology  Prior to discharge probably catheter has been removed and patient was able to void without any issues  Currently she denies any changes to urinary or bowel habits and able to tolerate by mouth    I recommended patient follow-up with PCP and urology within 1-2 weeks time to return to ED if chest pain terms of breast fever chills or any other concerns  Patient expressed her understanding discharge vital signs stable discharge exam is as following:    GENERAL APPEARANCE: WD/WN in NAD pleasant  SKIN: no rash  HEENT: NC/AT, PERRLA (B), moist MM, no epistaxis  NECK: Supple, no JVD    LUNGS: CTA (B) mildly prolonged expiratory phase,   no use of accessory muscles  HEART:          S1S 2, RRR  , PMI is not displaced  ABDOMEN: Soft, nontender, nondistended, +BS  Rectal exam:  EXTREMITIES: no edema   PERIPHERAL VASCULAR: palpable pulses   NEURO:  AAO x 3, CN 2-12: non focal  MUSCLE STRENGHT: 5/5 (B), SENSATION: nonfocal  DTR: ++, CEREBELLAR: non focal    GENERAL APPEARANCE: WD/WN in NAD pleasant  SKIN: no rash  HEENT: NC/AT, PERRLA (B), moist MM, no epistaxis  NECK: Supple, no JVD    LUNGS: CTA (B) mildly prolonged expiratory phase,   no use of accessory muscles  HEART:          S1S 2, RRR  , PMI is not displaced  ABDOMEN: Soft, nontender, nondistended, +BS  Rectal exam:  EXTREMITIES: no edema   PERIPHERAL VASCULAR: palpable pulses   NEURO:  AAO x 3, CN 2-12: non focal  MUSCLE STRENGHT: 5/5 (B), SENSATION: nonfocal  DTR: ++, CEREBELLAR: non focal    Significant Findings, Care, Treatment and Services Provided: as per description    Complications: no known complications    Discharge Diagnosis:   Principal Problem:    Hyponatremia    Sick sinus syndrome  Active Problems:    Diabetes mellitus type 2, noninsulin dependent ([address] )    Essential hypertension    Hyperlipidemia    Left breast mass    Depression    Hypokalemia    Gross hematuria    Dizziness      Resolved Problems  Date Reviewed: 7/4/2018    None          Condition at Discharge: good         Discharge instructions/Information to patient and family:   See after visit summary for information provided to patient and family  Provisions for Follow-Up Care:  See after visit summary for information related to follow-up care and any pertinent home health orders        PCP: Tiarra Claudio DO [place]    Disposition: Home    Planned Readmission: No    Discharge Statement   I spent 45 minutes discharging the patient  This time was spent on the day of discharge  I had direct contact with the patient on the day of discharge  Additional documentation is required if more than 30 minutes were spent on discharge  Discharge Medications:  See after visit summary for reconciled discharge medications provided to patient and family

## 2018-08-13 ENCOUNTER — IN-CLINIC DEVICE VISIT (OUTPATIENT)
Dept: CARDIOLOGY CLINIC | Facility: CLINIC | Age: 83
End: 2018-08-13
Payer: MEDICARE

## 2018-08-13 DIAGNOSIS — I49.5 SICK SINUS SYNDROME (HCC): Primary | ICD-10-CM

## 2018-08-13 DIAGNOSIS — Z95.0 PRESENCE OF PERMANENT CARDIAC PACEMAKER: ICD-10-CM

## 2018-08-13 PROCEDURE — 93288 INTERROG EVL PM/LDLS PM IP: CPT | Performed by: INTERNAL MEDICINE

## 2018-08-13 NOTE — PROGRESS NOTES
device check      Current Outpatient Prescriptions:     anastrozole (ARIMIDEX) 1 mg tablet, Take 1 mg by mouth daily, Disp: , Rfl:     aspirin (ASPIRIN LOW DOSE) 81 MG tablet, Take 81 mg by mouth daily at bedtime, Disp: , Rfl:     atorvastatin (LIPITOR) 40 mg tablet, Take 1 tablet by mouth daily, Disp: , Rfl:     DOCOSAHEXAENOIC ACID PO, Take 2 capsules by mouth daily, Disp: , Rfl:     glucose blood (FREESTYLE LITE) test strip, 1 strip daily, Disp: , Rfl:     metFORMIN (GLUCOPHAGE) 500 mg tablet, Take 500 mg by mouth daily, Disp: , Rfl:     omeprazole (PriLOSEC) 20 mg delayed release capsule, Take 1 capsule by mouth daily, Disp: , Rfl:     sertraline (ZOLOFT) 50 mg tablet, Take 1 tablet by mouth daily, Disp: , Rfl:     traMADol (ULTRAM) 50 mg tablet, Take 50 mg by mouth every 6 (six) hours, Disp: , Rfl:

## 2018-08-20 ENCOUNTER — OFFICE VISIT (OUTPATIENT)
Dept: CARDIOLOGY CLINIC | Facility: CLINIC | Age: 83
End: 2018-08-20
Payer: MEDICARE

## 2018-08-20 VITALS
HEART RATE: 62 BPM | HEIGHT: 63 IN | SYSTOLIC BLOOD PRESSURE: 130 MMHG | DIASTOLIC BLOOD PRESSURE: 68 MMHG | WEIGHT: 192 LBS | BODY MASS INDEX: 34.02 KG/M2

## 2018-08-20 DIAGNOSIS — E78.2 MIXED HYPERLIPIDEMIA: Primary | ICD-10-CM

## 2018-08-20 PROCEDURE — 99213 OFFICE O/P EST LOW 20 MIN: CPT | Performed by: INTERNAL MEDICINE

## 2018-08-20 RX ORDER — TRIAMTERENE AND HYDROCHLOROTHIAZIDE 37.5; 25 MG/1; MG/1
TABLET ORAL
COMMUNITY
Start: 2018-07-26 | End: 2019-04-02 | Stop reason: SDUPTHER

## 2018-08-20 NOTE — PROGRESS NOTES
Patient ID: Yojana Newell is a [de-identified] y o  female  Plan:      Sick sinus syndrome ([address] )  S/p pacer  Working well  Hyperlipidemia  ON statin tx  Follow up Plan:  One year EKG and follow-up visit  Interval device checks  HPI:   The patient is seen in follow-up today regarding profound bradycardia  In July I placed a pacemaker dual-chamber [employer]  She has not had any chest pain, syncope, or near-syncope since then  Ambulation is slow  Other cardiac testing:  By report she had normal left ventricular function by echo earlier this year at [facility]  Past Surgical History:   Procedure Laterality Date    APPENDECTOMY      BREAST LUMPECTOMY Bilateral     CARDIAC PACEMAKER PLACEMENT Left 7/5/2018    [facility]    HYSTERECTOMY       CMP:   Lab Results   Component Value Date     07/05/2018    K 3 3 (L) 07/05/2018     07/05/2018    CO2 30 07/05/2018    BUN 14 07/05/2018    CREATININE 0 64 07/05/2018    GLUCOSE 125 (H) 07/05/2018    EGFR 85 07/05/2018       Lipid Profile: No results found for: CHOL, TRIG, HDL, LDL      Review of Systems   10  point ROS  was otherwise non pertinent or negative except as per HPI or as below  Gait:  Slow and unsteady  Objective:     /68   Pulse 62   Ht 5' 3" (1 6 m)   Wt 87 1 kg (192 lb)   BMI 34 01 kg/m²     PHYSICAL EXAM:    General:  Normal appearance in no distress  Eyes:  Anicteric  Oral mucosa:  Moist   Neck:  No JVD  Carotid upstrokes are brisk without bruits  No masses  Chest:  Clear to auscultation and percussion  Well-healed pacemaker in the left subclavian region  Cardiac:  Normal PMI  Normal S1 and S2  No murmur gallop or rub  Abdomen:  Soft and nontender  No palpable organomegaly or aortic enlargement  Extremities:  No peripheral edema  Musculoskeletal:  Symmetric  Vascular:  Femoral pulses are brisk without bruits  Popliteal pulses are intact bilaterally  Pedal pulses are intact    Neuro:  Grossly symmetric  Psych:  Alert and oriented x3  Current Outpatient Prescriptions:     anastrozole (ARIMIDEX) 1 mg tablet, Take 1 mg by mouth daily, Disp: , Rfl:     aspirin (ASPIRIN LOW DOSE) 81 MG tablet, Take 81 mg by mouth daily at bedtime, Disp: , Rfl:     atorvastatin (LIPITOR) 40 mg tablet, Take 1 tablet by mouth daily, Disp: , Rfl:     DOCOSAHEXAENOIC ACID PO, Take 2 capsules by mouth daily, Disp: , Rfl:     glucose blood (FREESTYLE LITE) test strip, 1 strip daily, Disp: , Rfl:     metFORMIN (GLUCOPHAGE) 500 mg tablet, Take 500 mg by mouth daily, Disp: , Rfl:     omeprazole (PriLOSEC) 20 mg delayed release capsule, Take 1 capsule by mouth daily, Disp: , Rfl:     sertraline (ZOLOFT) 50 mg tablet, TAKE ONE TABLET BY MOUTH ONE TIME DAILY , Disp: , Rfl:     traMADol (ULTRAM) 50 mg tablet, Take 50 mg by mouth every 6 (six) hours, Disp: , Rfl:     triamterene-hydrochlorothiazide (MAXZIDE-25) 37 5-25 mg per tablet, TAKE ONE TABLET BY MOUTH ONE TIME DAILY , Disp: , Rfl:   Allergies   Allergen Reactions    Celecoxib Swelling     celebrex- swelling of throat    Loratadine Swelling     Jaw swelled,couldn't swallow; face swelling    Aspirin GI Intolerance     Other reaction(s): nausea; okay with Ecotrin    Lisinopril Swelling     denies     Past Medical History:   Diagnosis Date    Cardiac disease     Diabetes mellitus ([facility] [address] )     History of echocardiogram 02/14/2018    EF 0 55 (55%), trace mitral regurgitation      Hyperlipidemia     Hypertension            History   Smoking Status    Former Smoker   Smokeless Tobacco    Never Used

## 2018-09-18 ENCOUNTER — APPOINTMENT (OUTPATIENT)
Dept: LAB | Facility: CLINIC | Age: 83
End: 2018-09-18
Payer: MEDICARE

## 2018-09-18 ENCOUNTER — TRANSCRIBE ORDERS (OUTPATIENT)
Dept: ADMINISTRATIVE | Facility: HOSPITAL | Age: 83
End: 2018-09-18

## 2018-09-18 DIAGNOSIS — H35.30 SENILE MACULAR RETINAL DEGENERATION: Primary | ICD-10-CM

## 2018-09-18 DIAGNOSIS — H35.30 SENILE MACULAR RETINAL DEGENERATION: ICD-10-CM

## 2018-09-18 DIAGNOSIS — M15.9 GENERALIZED OSTEOARTHROSIS, INVOLVING MULTIPLE SITES: ICD-10-CM

## 2018-09-18 DIAGNOSIS — Z95.0 CARDIAC PACEMAKER IN SITU: ICD-10-CM

## 2018-09-18 DIAGNOSIS — E11.9 DIABETES MELLITUS WITHOUT COMPLICATION (HCC): ICD-10-CM

## 2018-09-18 LAB
ALBUMIN SERPL BCP-MCNC: 4.1 G/DL (ref 3.5–5)
ALP SERPL-CCNC: 81 U/L (ref 46–116)
ALT SERPL W P-5'-P-CCNC: 29 U/L (ref 12–78)
ANION GAP SERPL CALCULATED.3IONS-SCNC: 9 MMOL/L (ref 4–13)
AST SERPL W P-5'-P-CCNC: 31 U/L (ref 5–45)
BASOPHILS # BLD AUTO: 0.01 THOUSANDS/ΜL (ref 0–0.1)
BASOPHILS NFR BLD AUTO: 0 % (ref 0–1)
BILIRUB SERPL-MCNC: 0.58 MG/DL (ref 0.2–1)
BUN SERPL-MCNC: 10 MG/DL (ref 5–25)
CALCIUM ALBUM COR SERPL-MCNC: 10.9 MG/DL (ref 8.3–10.1)
CALCIUM SERPL-MCNC: 11 MG/DL (ref 8.3–10.1)
CHLORIDE SERPL-SCNC: 98 MMOL/L (ref 100–108)
CHOLEST SERPL-MCNC: 156 MG/DL (ref 50–200)
CO2 SERPL-SCNC: 31 MMOL/L (ref 21–32)
CREAT SERPL-MCNC: 0.82 MG/DL (ref 0.6–1.3)
EOSINOPHIL # BLD AUTO: 0.1 THOUSAND/ΜL (ref 0–0.61)
EOSINOPHIL NFR BLD AUTO: 2 % (ref 0–6)
ERYTHROCYTE [DISTWIDTH] IN BLOOD BY AUTOMATED COUNT: 12.5 % (ref 11.6–15.1)
EST. AVERAGE GLUCOSE BLD GHB EST-MCNC: 151 MG/DL
GFR SERPL CREATININE-BSD FRML MDRD: 68 ML/MIN/1.73SQ M
GLUCOSE P FAST SERPL-MCNC: 113 MG/DL (ref 65–99)
HBA1C MFR BLD: 6.9 % (ref 4.2–6.3)
HCT VFR BLD AUTO: 41.3 % (ref 34.8–46.1)
HDLC SERPL-MCNC: 51 MG/DL (ref 40–60)
HGB BLD-MCNC: 13.4 G/DL (ref 11.5–15.4)
IMM GRANULOCYTES # BLD AUTO: 0.02 THOUSAND/UL (ref 0–0.2)
IMM GRANULOCYTES NFR BLD AUTO: 0 % (ref 0–2)
LDLC SERPL CALC-MCNC: 71 MG/DL (ref 0–100)
LYMPHOCYTES # BLD AUTO: 0.85 THOUSANDS/ΜL (ref 0.6–4.47)
LYMPHOCYTES NFR BLD AUTO: 15 % (ref 14–44)
MCH RBC QN AUTO: 29.8 PG (ref 26.8–34.3)
MCHC RBC AUTO-ENTMCNC: 32.4 G/DL (ref 31.4–37.4)
MCV RBC AUTO: 92 FL (ref 82–98)
MONOCYTES # BLD AUTO: 0.47 THOUSAND/ΜL (ref 0.17–1.22)
MONOCYTES NFR BLD AUTO: 8 % (ref 4–12)
NEUTROPHILS # BLD AUTO: 4.17 THOUSANDS/ΜL (ref 1.85–7.62)
NEUTS SEG NFR BLD AUTO: 75 % (ref 43–75)
NONHDLC SERPL-MCNC: 105 MG/DL
NRBC BLD AUTO-RTO: 0 /100 WBCS
PLATELET # BLD AUTO: 264 THOUSANDS/UL (ref 149–390)
PMV BLD AUTO: 9.3 FL (ref 8.9–12.7)
POTASSIUM SERPL-SCNC: 3.2 MMOL/L (ref 3.5–5.3)
PROT SERPL-MCNC: 7.8 G/DL (ref 6.4–8.2)
RBC # BLD AUTO: 4.5 MILLION/UL (ref 3.81–5.12)
SODIUM SERPL-SCNC: 138 MMOL/L (ref 136–145)
TRIGL SERPL-MCNC: 168 MG/DL
TSH SERPL DL<=0.05 MIU/L-ACNC: 1.64 UIU/ML (ref 0.36–3.74)
WBC # BLD AUTO: 5.62 THOUSAND/UL (ref 4.31–10.16)

## 2018-09-18 PROCEDURE — 36415 COLL VENOUS BLD VENIPUNCTURE: CPT

## 2018-09-18 PROCEDURE — 80061 LIPID PANEL: CPT

## 2018-09-18 PROCEDURE — 84443 ASSAY THYROID STIM HORMONE: CPT

## 2018-09-18 PROCEDURE — 85025 COMPLETE CBC W/AUTO DIFF WBC: CPT

## 2018-09-18 PROCEDURE — 83036 HEMOGLOBIN GLYCOSYLATED A1C: CPT

## 2018-09-18 PROCEDURE — 80053 COMPREHEN METABOLIC PANEL: CPT

## 2018-11-20 ENCOUNTER — REMOTE DEVICE CLINIC VISIT (OUTPATIENT)
Dept: CARDIOLOGY CLINIC | Facility: CLINIC | Age: 83
End: 2018-11-20
Payer: MEDICARE

## 2018-11-20 DIAGNOSIS — Z45.010 ENCOUNTER FOR CHECKING AND TESTING OF CARDIAC PACEMAKER PULSE GENERATOR (BATTERY): ICD-10-CM

## 2018-11-20 DIAGNOSIS — I49.5 SICK SINUS SYNDROME (HCC): Primary | ICD-10-CM

## 2018-11-20 PROCEDURE — 93296 REM INTERROG EVL PM/IDS: CPT | Performed by: INTERNAL MEDICINE

## 2018-11-20 PROCEDURE — 93294 REM INTERROG EVL PM/LDLS PM: CPT | Performed by: INTERNAL MEDICINE

## 2018-11-20 NOTE — PROGRESS NOTES
merlin remote pacer 1 HVR, PMT has occurred  Normal battery function      Current Outpatient Prescriptions:     anastrozole (ARIMIDEX) 1 mg tablet, Take 1 mg by mouth daily, Disp: , Rfl:     aspirin (ASPIRIN LOW DOSE) 81 MG tablet, Take 81 mg by mouth daily at bedtime, Disp: , Rfl:     atorvastatin (LIPITOR) 40 mg tablet, Take 1 tablet by mouth daily, Disp: , Rfl:     DOCOSAHEXAENOIC ACID PO, Take 2 capsules by mouth daily, Disp: , Rfl:     glucose blood (FREESTYLE LITE) test strip, 1 strip daily, Disp: , Rfl:     metFORMIN (GLUCOPHAGE) 500 mg tablet, Take 500 mg by mouth daily, Disp: , Rfl:     omeprazole (PriLOSEC) 20 mg delayed release capsule, Take 1 capsule by mouth daily, Disp: , Rfl:     sertraline (ZOLOFT) 50 mg tablet, TAKE ONE TABLET BY MOUTH ONE TIME DAILY , Disp: , Rfl:     traMADol (ULTRAM) 50 mg tablet, Take 50 mg by mouth every 6 (six) hours, Disp: , Rfl:     triamterene-hydrochlorothiazide (MAXZIDE-25) 37 5-25 mg per tablet, TAKE ONE TABLET BY MOUTH ONE TIME DAILY , Disp: , Rfl:

## 2018-12-10 ENCOUNTER — OFFICE VISIT (OUTPATIENT)
Dept: FAMILY MEDICINE CLINIC | Facility: CLINIC | Age: 83
End: 2018-12-10
Payer: MEDICARE

## 2018-12-10 VITALS
DIASTOLIC BLOOD PRESSURE: 72 MMHG | HEART RATE: 72 BPM | SYSTOLIC BLOOD PRESSURE: 130 MMHG | WEIGHT: 174 LBS | HEIGHT: 63 IN | TEMPERATURE: 97.5 F | BODY MASS INDEX: 30.83 KG/M2

## 2018-12-10 DIAGNOSIS — E11.65 UNCONTROLLED TYPE 2 DIABETES MELLITUS WITH HYPERGLYCEMIA (HCC): Primary | ICD-10-CM

## 2018-12-10 DIAGNOSIS — E78.2 MIXED HYPERLIPIDEMIA: ICD-10-CM

## 2018-12-10 DIAGNOSIS — I10 ESSENTIAL HYPERTENSION: ICD-10-CM

## 2018-12-10 DIAGNOSIS — K21.9 GASTROESOPHAGEAL REFLUX DISEASE WITHOUT ESOPHAGITIS: ICD-10-CM

## 2018-12-10 PROBLEM — Z90.710 S/P HYSTERECTOMY: Status: ACTIVE | Noted: 2018-09-26

## 2018-12-10 PROBLEM — C50.911 BREAST CARCINOMA, FEMALE, RIGHT (HCC): Status: ACTIVE | Noted: 2018-09-26

## 2018-12-10 PROBLEM — Z85.3 PERSONAL HISTORY OF BREAST CANCER: Status: ACTIVE | Noted: 2018-01-22

## 2018-12-10 PROBLEM — R93.1: Status: ACTIVE | Noted: 2018-09-26

## 2018-12-10 PROBLEM — Z17.0 MALIGNANT NEOPLASM OF UPPER-INNER QUADRANT OF LEFT BREAST IN FEMALE, ESTROGEN RECEPTOR POSITIVE (HCC): Status: ACTIVE | Noted: 2018-01-22

## 2018-12-10 PROBLEM — C50.212 MALIGNANT NEOPLASM OF UPPER-INNER QUADRANT OF LEFT BREAST IN FEMALE, ESTROGEN RECEPTOR POSITIVE (HCC): Status: ACTIVE | Noted: 2018-01-22

## 2018-12-10 PROCEDURE — 99214 OFFICE O/P EST MOD 30 MIN: CPT | Performed by: FAMILY MEDICINE

## 2018-12-10 RX ORDER — TRAMADOL HYDROCHLORIDE 50 MG/1
50 TABLET ORAL EVERY 6 HOURS PRN
COMMUNITY
Start: 2018-02-15 | End: 2019-02-15

## 2018-12-10 NOTE — PATIENT INSTRUCTIONS
Diabetes in the Older Adult   AMBULATORY CARE:   What you need to know if you are an older adult with diabetes:  Older adults with diabetes are at risk for heart disease, stroke, kidney disease, blindness, and nerve damage  You may also be at risk for any of the following:  · Poor nutrition or low blood sugar levels    · Confusion or problems with memory, attention, or learning new things    · Trouble controlling urination or frequent urinary tract infections    · Trouble with coordination or balance    · Falls and injuries    · Pain    · Depression    · Open sores on your legs or feet  The ABCs of diabetes: The ABCs stand for certain things you can do to manage or prevent problems caused by diabetes:  · A  stands for A1c test   This test shows the average amount of sugar in your blood over the past 2 to 3 months  High levels of sugar in your blood can cause damage to your heart, blood vessels, kidneys, feet, and eyes  Most older adults with diabetes should have an A1c level less than 7 5  Ask your healthcare provider if this A1c goal is right for you  Your provider can help you make changes if your A1c is too high  · B  stands for blood pressure   High blood pressure can increase your risk for a heart attack, stroke, or kidney disease  Most older adults with diabetes should have a systolic blood pressure (first number) of 140  Your diastolic blood pressure (second number) should be below 90  Ask your healthcare provider if these blood pressure goals are right for you  · C  stands for cholesterol   High levels of cholesterol can block your arteries and cause a heart attack or stroke  Ask your healthcare provider what your cholesterol levels should be  · S  stands for stop smoking   Nicotine and other chemicals in cigarettes and cigars can cause lung damage and make it more difficult to manage your diabetes    Call 911 if you have any of the following:   · You have any of the following signs of a stroke: ¨ Numbness or drooping on one side of your face     ¨ Weakness in an arm or leg    ¨ Confusion or difficulty speaking    ¨ Dizziness, a severe headache, or vision loss    · You have any of the following signs of a heart attack:      ¨ Squeezing, pressure, or pain in your chest that lasts longer than 5 minutes or returns    ¨ Discomfort or pain in your back, neck, jaw, stomach, or arm     ¨ Trouble breathing    ¨ Nausea or vomiting    ¨ Lightheadedness or a sudden cold sweat, especially with chest pain or trouble breathing  Seek care immediately if:   · You have severe abdominal pain, or the pain spreads to your back  You may also be vomiting  · You have trouble staying awake or focusing  · You are shaking or sweating  · You have blurred or double vision  · Your breath has a fruity, sweet smell  · Your breathing is deep and labored, or rapid and shallow  · Your heartbeat is fast and weak  · You fall and get hurt  Contact your healthcare provider if:   · You are vomiting or have diarrhea  · You have an upset stomach and cannot eat the foods on your meal plan  · You feel weak or more tired than usual      · You feel dizzy, have headaches, or are easily irritated  · Your skin is red, warm, dry, or swollen  · You have a wound that does not heal      · You have numbness in your arms or legs  · You have trouble coping with your illness, or you feel anxious or depressed  · You have problems with your memory  · You have changes in your vision  · You have questions or concerns about your condition or care  Treatment for diabetes  includes keeping your blood sugar at a normal level  You must eat the right foods, and exercise regularly  You may need medicine if you cannot control your blood sugar level with nutrition and exercise  You may also need medicine to lower your blood pressure or cholesterol, or medicine to prevent blood clots     Manage the ABCs and prevent problems caused by diabetes:   · Check your blood sugar levels as directed  Your healthcare provider will tell you when and how often to check during the day  Your healthcare provider will also tell you what your blood sugar levels should be before and after a meal  You may need to check for ketones in your urine or blood if your level is higher than directed  Write down your results and show them to your healthcare provider  Your provider may use the results to make changes to your medicine, food, or exercise schedules  Ask your healthcare provider for more information about how to treat a high or low blood sugar level  · Follow your meal plan as directed  A dietitian will help you make a meal plan to keep your blood sugar level steady and make sure you get enough nutrition  Do not skip meals  Your blood sugar level may drop too low if you have taken diabetes medicine and do not eat  Ask your healthcare provider about programs in your community that can deliver the meals to your home  · Try to be active for 30 to 60 minutes most days of the week  Exercise can help keep your blood sugar level steady, decrease your risk of heart disease, and help you lose weight  It can also help improve your balance and decrease your risk for falls  Work with your healthcare provider to create an exercise plan  Ask a family member or friend to exercise with you  Start slow and exercise for 5 to 10 minutes at a time  Examples of activities include walking or swimming  Include muscle strengthening activities 2 to 3 days each week  Include balance training 2 to 3 times each week  Activities that help increase balance include yoga and chana chi      · Maintain a healthy weight  Ask your healthcare provider how much you should weigh  A healthy weight can help you control your diabetes and prevent heart disease  Ask your provider to help you create a weight loss plan if you are overweight   Together you can set manageable weight loss goals  · Do not smoke  Ask your healthcare provider for information if you currently smoke and need help to quit  Do not use e-cigarettes or smokeless tobacco in place of cigarettes or to help you quit  They still contain nicotine  · Manage stress  Stress may increase your blood sugar level  Deep breathing, muscle relaxation, and music may help you relax  Ask your healthcare provider for more information about these practices  Other ways to manage your diabetes:   · Check your feet every day for sores  Look at your whole foot, including the bottom, and between and under your toes  Check for wounds, corns, and calluses  Use a mirror to see the bottom of your feet  The skin on your feet may be shiny, tight, dry, or darker than normal  Your feet may also be cold and pale  Feel your feet by running your hands along the tops, bottoms, sides, and between your toes  Redness, swelling, and warmth are signs of blood flow problems that can lead to a foot ulcer  Do not try to remove corns or calluses yourself  · Wear medical alert identification  Wear medical alert jewelry or carry a card that says you have diabetes  Ask your healthcare provider where to get these items  · Ask about vaccines  You have a higher risk for serious illness if you get the flu, pneumonia, or hepatitis  Ask your healthcare provider if you should get a flu, pneumonia, shingles, or hepatitis B vaccine, and when to get the vaccine  · Keep all appointments  You may need to return to have your A1c checked every 3 months  You will need to return at least once each year to have your feet checked  You will need an eye exam once a year to check for retinopathy  You will also need urine tests every year to check for kidney problems  You may need tests to monitor for heart disease  Write down your questions so you remember to ask them during your visits  · Get help from family and friends    You may need help checking your blood sugar level, giving insulin injections, or preparing your meals  Ask your family and friends to help you with these tasks  Talk to your healthcare provider if you do not have someone at home to help you  A healthcare provider can come to your home to help you with these tasks  Follow up with your healthcare provider as directed: You may need to return to have your A1c checked every 3 months  You will need to return at least once each year to have your feet checked  You will need an eye exam once a year to check for retinopathy  You will also need urine tests every year to check for kidney problems  You may need tests to monitor for heart disease  Write down your questions so you remember to ask them during your visits  © 2017 [facility] Information is for End User's use only and may not be sold, redistributed or otherwise used for commercial purposes  All illustrations and images included in CareNotes® are the copyrighted property of [employer]  or [employer]  The above information is an  only  It is not intended as medical advice for individual conditions or treatments  Talk to your doctor, nurse or pharmacist before following any medical regimen to see if it is safe and effective for you

## 2018-12-10 NOTE — PROGRESS NOTES
Assessment/Plan:       Problem List Items Addressed This Visit     Un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diabetes mellitus with hyperglycemia ([facility] [address] ) - Primary     Lab Results   Component Value Date    HGBA1C 6 9 (H) 09/18/2018       No results for input(s): POCGLU in the last 72 hours  Blood Sugar Average: Last 72 hrs:   patient presents for diabetic checkup review of medications and labs  Should have a diabetic foot exam and follow up with Podiatry  Essential hypertension     Blood pressure stable at this time recheck after initial presentation is now at 130/72         Hyperlipidemia     Last labs done were stable cholesterol 156 A1c 6 9 and blood sugar fasting was 113  She will continue on her current medications and watch her diet continue to keep weight off exercise daily         GERD (gastroesophageal reflux disease)     Patient is stable on current medications no changes at this time                 Subjective:      Patient ID: Otilio Hamilton is a 82 y o  female  Patient presents today for checkup review of lab work medication refills as directed she will follow up with her podiatrist for diabetic foot care  Forms were filled out for referral for diabetic foot care in shoes for podiatry evaluation  Patient notes that she stopped driving in the dark at this time because of macular degeneration and difficulty with night vision  Otherwise she feels generally weak but her strength is good her heart is strong she is going to Podiatry for follow-up  The following portions of the patient's history were reviewed and updated as appropriate: allergies, current medications, past family history, past medical history, past social history, past surgical history and problem list     Review of Systems   Constitutional: Negative for chills, fatigue and fever  HENT: Positive for postnasal drip  Negative for congestion, nosebleeds, rhinorrhea, sinus pressure and sore throat      Eyes: Negative for discharge and redness  Respiratory: Positive for cough  Negative for shortness of breath  Cardiovascular: Negative for chest pain, palpitations and leg swelling  Gastrointestinal: Negative for abdominal pain, blood in stool and nausea  Endocrine: Negative for cold intolerance, heat intolerance and polyuria  Genitourinary: Negative for dysuria and frequency  Musculoskeletal: Negative for arthralgias, back pain and myalgias  Skin: Negative for rash  Neurological: Negative for dizziness, weakness and headaches  Hematological: Negative for adenopathy  Psychiatric/Behavioral: Negative for behavioral problems and sleep disturbance  The patient is not nervous/anxious  Objective:      /72   Pulse 72   Temp 97 5 °F (36 4 °C) (Tympanic)   Ht 5' 3" (1 6 m)   Wt 78 9 kg (174 lb)   BMI 30 82 kg/m²        Physical Exam   Constitutional: She is oriented to person, place, and time  She appears well-developed and well-nourished  No distress  HENT:   Head: Normocephalic and atraumatic  Right Ear: External ear normal    Left Ear: External ear normal    Nose: Nose normal    Mouth/Throat: Oropharynx is clear and moist  No oropharyngeal exudate  Eyes: Pupils are equal, round, and reactive to light  Conjunctivae and EOM are normal  Right eye exhibits no discharge  Left eye exhibits no discharge  No scleral icterus  Neck: Normal range of motion  No JVD present  No thyromegaly present  Cardiovascular: Normal rate, regular rhythm and normal heart sounds  Pulses are no weak pulses  No murmur heard  Pulses:       Dorsalis pedis pulses are 2+ on the right side, and 2+ on the left side  Posterior tibial pulses are 2+ on the right side, and 2+ on the left side  Pulmonary/Chest: Effort normal  She has no wheezes  She has no rales  She exhibits no tenderness  Abdominal: Soft  Bowel sounds are normal  She exhibits no distension and no mass  There is no tenderness     Musculoskeletal: Normal range of motion  She exhibits no edema, tenderness or deformity  Feet:   Right Foot:   Skin Integrity: Negative for ulcer, skin breakdown, erythema, warmth, callus or dry skin  Left Foot:   Skin Integrity: Negative for ulcer, skin breakdown, erythema, warmth, callus or dry skin  Lymphadenopathy:     She has no cervical adenopathy  Neurological: She is alert and oriented to person, place, and time  She has normal reflexes  No cranial nerve deficit  Coordination normal    Skin: Skin is warm and dry  No rash noted  Psychiatric: She has a normal mood and affect  Her behavior is normal  Judgment and thought content normal    Nursing note and vitals reviewed  Patient's shoes and socks removed  Right Foot/Ankle   Right Foot Inspection  Skin Exam: skin normal and skin intact no dry skin, no warmth, no callus, no erythema, no maceration, no abnormal color, no pre-ulcer, no ulcer and no callus                          Toe Exam: ROM and strength within normal limits  Sensory   Vibration: intact  Proprioception: intact   Monofilament testing: intact  Vascular  Capillary refills: < 3 seconds  The right DP pulse is 2+  The right PT pulse is 2+  Right Toe  - Comprehensive Exam  Ecchymosis: none  Arch: normal  Hammertoes: absent  Swelling: none   Tenderness: none         Left Foot/Ankle  Left Foot Inspection  Skin Exam: skin normal and skin intactno dry skin, no warmth, no erythema, no maceration, normal color, no pre-ulcer, no ulcer and no callus                         Toe Exam: ROM and strength within normal limits                   Sensory   Vibration: intact  Proprioception: intact  Monofilament: intact  Vascular  Capillary refills: < 3 seconds  The left DP pulse is 2+  The left PT pulse is 2+  Left Toe  - Comprehensive Exam  Ecchymosis: none  Arch: normal  Hammertoes: absent  Swelling: none   Tenderness: none       Assign Risk Category:  No deformity present; No loss of protective sensation;  No weak pulses       Risk: 0    Data:    Laboratory Results: I have personally reviewed the pertinent laboratory results/reports   Radiology/Other Diagnostic Testing Results: I have personally reviewed pertinent reports         Lab Results   Component Value Date    WBC 5 62 09/18/2018    HGB 13 4 09/18/2018    HCT 41 3 09/18/2018    MCV 92 09/18/2018     09/18/2018     Lab Results   Component Value Date    K 3 2 (L) 09/18/2018    CL 98 (L) 09/18/2018    CO2 31 09/18/2018    BUN 10 09/18/2018    CREATININE 0 82 09/18/2018    GLUF 113 (H) 09/18/2018    CALCIUM 11 0 (H) 09/18/2018    CORRECTEDCA 10 9 (H) 09/18/2018    AST 31 09/18/2018    ALT 29 09/18/2018    ALKPHOS 81 09/18/2018    EGFR 68 09/18/2018     Lab Results   Component Value Date    CHOLESTEROL 156 09/18/2018     Lab Results   Component Value Date    HDL 51 09/18/2018     Lab Results   Component Value Date    LDLCALC 71 09/18/2018     Lab Results   Component Value Date    TRIG 168 (H) 09/18/2018     No results found for: [place], [place]  Lab Results   Component Value Date    PGL8NLCBEFXD 1 640 09/18/2018     Lab Results   Component Value Date    HGBA1C 6 9 (H) 09/18/2018     No results found for: PSA    Misti Countess, DO     [facility]

## 2018-12-10 NOTE — ASSESSMENT & PLAN NOTE
Last labs done were stable cholesterol 156 A1c 6 9 and blood sugar fasting was 113   She will continue on her current medications and watch her diet continue to keep weight off exercise daily

## 2018-12-10 NOTE — ASSESSMENT & PLAN NOTE
Lab Results   Component Value Date    HGBA1C 6 9 (H) 09/18/2018       No results for input(s): POCGLU in the last 72 hours  Blood Sugar Average: Last 72 hrs:   patient presents for diabetic checkup review of medications and labs  Should have a diabetic foot exam and follow up with Podiatry

## 2019-01-10 ENCOUNTER — TELEPHONE (OUTPATIENT)
Dept: FAMILY MEDICINE CLINIC | Facility: CLINIC | Age: 84
End: 2019-01-10

## 2019-01-10 DIAGNOSIS — N39.0 LOWER URINARY TRACT INFECTION: Primary | ICD-10-CM

## 2019-01-10 RX ORDER — SULFAMETHOXAZOLE AND TRIMETHOPRIM 800; 160 MG/1; MG/1
1 TABLET ORAL EVERY 12 HOURS SCHEDULED
Qty: 14 TABLET | Refills: 0 | Status: SHIPPED | OUTPATIENT
Start: 2019-01-10 | End: 2019-01-17

## 2019-01-10 NOTE — TELEPHONE ENCOUNTER
Pt called  Has had blood in urine for 3 days  No other complaints  Would like a script called into pharmacy  Please advise

## 2019-01-18 DIAGNOSIS — E11.65 UNCONTROLLED TYPE 2 DIABETES MELLITUS WITH HYPERGLYCEMIA (HCC): Primary | ICD-10-CM

## 2019-01-21 DIAGNOSIS — E11.65 UNCONTROLLED TYPE 2 DIABETES MELLITUS WITH HYPERGLYCEMIA (HCC): ICD-10-CM

## 2019-02-21 ENCOUNTER — REMOTE DEVICE CLINIC VISIT (OUTPATIENT)
Dept: CARDIOLOGY CLINIC | Facility: CLINIC | Age: 84
End: 2019-02-21
Payer: MEDICARE

## 2019-02-21 DIAGNOSIS — Z45.010 ENCOUNTER FOR CHECKING AND TESTING OF CARDIAC PACEMAKER PULSE GENERATOR (BATTERY): ICD-10-CM

## 2019-02-21 DIAGNOSIS — I49.5 SICK SINUS SYNDROME (HCC): Primary | ICD-10-CM

## 2019-02-21 PROCEDURE — 93294 REM INTERROG EVL PM/LDLS PM: CPT | Performed by: INTERNAL MEDICINE

## 2019-02-21 PROCEDURE — 93296 REM INTERROG EVL PM/IDS: CPT | Performed by: INTERNAL MEDICINE

## 2019-02-21 NOTE — PROGRESS NOTES
merlin remote pacer check no alerts  Normal battery function      Current Outpatient Medications:     anastrozole (ARIMIDEX) 1 mg tablet, Take 1 mg by mouth daily, Disp: , Rfl:     aspirin (ASPIRIN LOW DOSE) 81 MG tablet, Take 81 mg by mouth daily at bedtime, Disp: , Rfl:     atorvastatin (LIPITOR) 40 mg tablet, Take 1 tablet by mouth daily, Disp: , Rfl:     DOCOSAHEXAENOIC ACID PO, Take 2 capsules by mouth daily, Disp: , Rfl:     glucose blood (FREESTYLE LITE) test strip, 1 strip daily, Disp: , Rfl:     metFORMIN (GLUCOPHAGE) 500 mg tablet, Take 1 tablet (500 mg total) by mouth daily, Disp: 90 tablet, Rfl: 1    omeprazole (PriLOSEC) 20 mg delayed release capsule, Take 1 capsule by mouth daily, Disp: , Rfl:     sertraline (ZOLOFT) 50 mg tablet, TAKE ONE TABLET BY MOUTH ONE TIME DAILY , Disp: , Rfl:     triamterene-hydrochlorothiazide (MAXZIDE-25) 37 5-25 mg per tablet, TAKE ONE TABLET BY MOUTH ONE TIME DAILY , Disp: , Rfl:

## 2019-03-05 ENCOUNTER — APPOINTMENT (OUTPATIENT)
Dept: LAB | Facility: CLINIC | Age: 84
End: 2019-03-05
Payer: MEDICARE

## 2019-03-05 DIAGNOSIS — K21.9 GASTROESOPHAGEAL REFLUX DISEASE WITHOUT ESOPHAGITIS: ICD-10-CM

## 2019-03-05 DIAGNOSIS — E78.2 MIXED HYPERLIPIDEMIA: ICD-10-CM

## 2019-03-05 DIAGNOSIS — E11.65 UNCONTROLLED TYPE 2 DIABETES MELLITUS WITH HYPERGLYCEMIA (HCC): ICD-10-CM

## 2019-03-05 DIAGNOSIS — I10 ESSENTIAL HYPERTENSION: ICD-10-CM

## 2019-03-05 LAB
ALBUMIN SERPL BCP-MCNC: 4.2 G/DL (ref 3.5–5)
ALP SERPL-CCNC: 100 U/L (ref 46–116)
ALT SERPL W P-5'-P-CCNC: 27 U/L (ref 12–78)
ANION GAP SERPL CALCULATED.3IONS-SCNC: 10 MMOL/L (ref 4–13)
AST SERPL W P-5'-P-CCNC: 26 U/L (ref 5–45)
BASOPHILS # BLD AUTO: 0.02 THOUSANDS/ΜL (ref 0–0.1)
BASOPHILS NFR BLD AUTO: 0 % (ref 0–1)
BILIRUB SERPL-MCNC: 0.55 MG/DL (ref 0.2–1)
BUN SERPL-MCNC: 12 MG/DL (ref 5–25)
CALCIUM ALBUM COR SERPL-MCNC: 11.1 MG/DL (ref 8.3–10.1)
CALCIUM SERPL-MCNC: 11.3 MG/DL (ref 8.3–10.1)
CHLORIDE SERPL-SCNC: 97 MMOL/L (ref 100–108)
CHOLEST SERPL-MCNC: 164 MG/DL (ref 50–200)
CO2 SERPL-SCNC: 31 MMOL/L (ref 21–32)
CREAT SERPL-MCNC: 0.81 MG/DL (ref 0.6–1.3)
EOSINOPHIL # BLD AUTO: 0.07 THOUSAND/ΜL (ref 0–0.61)
EOSINOPHIL NFR BLD AUTO: 1 % (ref 0–6)
ERYTHROCYTE [DISTWIDTH] IN BLOOD BY AUTOMATED COUNT: 12.6 % (ref 11.6–15.1)
EST. AVERAGE GLUCOSE BLD GHB EST-MCNC: 146 MG/DL
GFR SERPL CREATININE-BSD FRML MDRD: 68 ML/MIN/1.73SQ M
GLUCOSE P FAST SERPL-MCNC: 99 MG/DL (ref 65–99)
HBA1C MFR BLD: 6.7 % (ref 4.2–6.3)
HCT VFR BLD AUTO: 40.3 % (ref 34.8–46.1)
HDLC SERPL-MCNC: 58 MG/DL (ref 40–60)
HGB BLD-MCNC: 12.8 G/DL (ref 11.5–15.4)
IMM GRANULOCYTES # BLD AUTO: 0.04 THOUSAND/UL (ref 0–0.2)
IMM GRANULOCYTES NFR BLD AUTO: 0 % (ref 0–2)
LDLC SERPL CALC-MCNC: 76 MG/DL (ref 0–100)
LYMPHOCYTES # BLD AUTO: 1.02 THOUSANDS/ΜL (ref 0.6–4.47)
LYMPHOCYTES NFR BLD AUTO: 9 % (ref 14–44)
MCH RBC QN AUTO: 29.7 PG (ref 26.8–34.3)
MCHC RBC AUTO-ENTMCNC: 31.8 G/DL (ref 31.4–37.4)
MCV RBC AUTO: 94 FL (ref 82–98)
MONOCYTES # BLD AUTO: 0.87 THOUSAND/ΜL (ref 0.17–1.22)
MONOCYTES NFR BLD AUTO: 8 % (ref 4–12)
NEUTROPHILS # BLD AUTO: 9.18 THOUSANDS/ΜL (ref 1.85–7.62)
NEUTS SEG NFR BLD AUTO: 82 % (ref 43–75)
NONHDLC SERPL-MCNC: 106 MG/DL
NRBC BLD AUTO-RTO: 0 /100 WBCS
PLATELET # BLD AUTO: 266 THOUSANDS/UL (ref 149–390)
PMV BLD AUTO: 9.6 FL (ref 8.9–12.7)
POTASSIUM SERPL-SCNC: 3.2 MMOL/L (ref 3.5–5.3)
PROT SERPL-MCNC: 7.7 G/DL (ref 6.4–8.2)
RBC # BLD AUTO: 4.31 MILLION/UL (ref 3.81–5.12)
SODIUM SERPL-SCNC: 138 MMOL/L (ref 136–145)
TRIGL SERPL-MCNC: 148 MG/DL
TSH SERPL DL<=0.05 MIU/L-ACNC: 1.58 UIU/ML (ref 0.36–3.74)
WBC # BLD AUTO: 11.2 THOUSAND/UL (ref 4.31–10.16)

## 2019-03-05 PROCEDURE — 84443 ASSAY THYROID STIM HORMONE: CPT

## 2019-03-05 PROCEDURE — 83036 HEMOGLOBIN GLYCOSYLATED A1C: CPT

## 2019-03-05 PROCEDURE — 80053 COMPREHEN METABOLIC PANEL: CPT

## 2019-03-05 PROCEDURE — 85025 COMPLETE CBC W/AUTO DIFF WBC: CPT

## 2019-03-05 PROCEDURE — 80061 LIPID PANEL: CPT

## 2019-03-05 PROCEDURE — 36415 COLL VENOUS BLD VENIPUNCTURE: CPT

## 2019-03-11 ENCOUNTER — OFFICE VISIT (OUTPATIENT)
Dept: FAMILY MEDICINE CLINIC | Facility: CLINIC | Age: 84
End: 2019-03-11
Payer: MEDICARE

## 2019-03-11 ENCOUNTER — TELEPHONE (OUTPATIENT)
Dept: FAMILY MEDICINE CLINIC | Facility: CLINIC | Age: 84
End: 2019-03-11

## 2019-03-11 VITALS
DIASTOLIC BLOOD PRESSURE: 78 MMHG | HEIGHT: 63 IN | BODY MASS INDEX: 30.12 KG/M2 | TEMPERATURE: 98.1 F | OXYGEN SATURATION: 97 % | WEIGHT: 170 LBS | SYSTOLIC BLOOD PRESSURE: 130 MMHG | HEART RATE: 84 BPM

## 2019-03-11 DIAGNOSIS — Z95.0 CARDIAC PACEMAKER IN SITU: ICD-10-CM

## 2019-03-11 DIAGNOSIS — I49.5 SICK SINUS SYNDROME (HCC): Chronic | ICD-10-CM

## 2019-03-11 DIAGNOSIS — Z17.0 MALIGNANT NEOPLASM OF UPPER-INNER QUADRANT OF LEFT BREAST IN FEMALE, ESTROGEN RECEPTOR POSITIVE (HCC): ICD-10-CM

## 2019-03-11 DIAGNOSIS — K21.9 GASTROESOPHAGEAL REFLUX DISEASE WITHOUT ESOPHAGITIS: ICD-10-CM

## 2019-03-11 DIAGNOSIS — I10 ESSENTIAL HYPERTENSION: ICD-10-CM

## 2019-03-11 DIAGNOSIS — E11.65 UNCONTROLLED TYPE 2 DIABETES MELLITUS WITH HYPERGLYCEMIA (HCC): Primary | ICD-10-CM

## 2019-03-11 DIAGNOSIS — C50.212 MALIGNANT NEOPLASM OF UPPER-INNER QUADRANT OF LEFT BREAST IN FEMALE, ESTROGEN RECEPTOR POSITIVE (HCC): ICD-10-CM

## 2019-03-11 PROCEDURE — 99214 OFFICE O/P EST MOD 30 MIN: CPT | Performed by: FAMILY MEDICINE

## 2019-03-11 NOTE — ASSESSMENT & PLAN NOTE
Lab Results   Component Value Date    HGBA1C 6 7 (H) 03/05/2019       No results for input(s): POCGLU in the last 72 hours  Blood Sugar Average: Last 72 hrs:   patient presents today for general checkup and review of lab work her A1c is 6 7 at this time which is showing pretty good control for her diabetes CS to watch her diet continue with exercise as spring approaches get outside more and walk more  Continue with her medication no change at this time and follow up with A1c test at her next office visit in 4 months  It is recommended at this time that she replace her diabetic shoes annually due to calluses and toe deformities which create cracks and potential for infection

## 2019-03-11 NOTE — ASSESSMENT & PLAN NOTE
Essential hypertension controlled on current medication blood pressure is stable at 1 30/70 a continue meds as directed no change

## 2019-03-11 NOTE — ASSESSMENT & PLAN NOTE
Sick sinus syndrome stable at this time no palpitations no dizziness or shortness of breath follow-up with Cardiology as scheduled recheck here in 4 months

## 2019-03-11 NOTE — ASSESSMENT & PLAN NOTE
No GERD symptoms of late she has been doing well on the omeprazole continue with the low acid diet do not eat late at night  Patient may stop this medication as long as she feels stable and has no dyspepsia or indigestion if this were to resume she is must restart the medication    I explained to her the benefit of the proton pump inhibitor

## 2019-03-11 NOTE — PATIENT INSTRUCTIONS
Diabetes in the Older Adult   AMBULATORY CARE:   What you need to know if you are an older adult with diabetes:  Older adults with diabetes are at risk for heart disease, stroke, kidney disease, blindness, and nerve damage  You may also be at risk for any of the following:  · Poor nutrition or low blood sugar levels    · Confusion or problems with memory, attention, or learning new things    · Trouble controlling urination or frequent urinary tract infections    · Trouble with coordination or balance    · Falls and injuries    · Pain    · Depression    · Open sores on your legs or feet  The ABCs of diabetes: The ABCs stand for certain things you can do to manage or prevent problems caused by diabetes:  · A  stands for A1c test   This test shows the average amount of sugar in your blood over the past 2 to 3 months  High levels of sugar in your blood can cause damage to your heart, blood vessels, kidneys, feet, and eyes  Most older adults with diabetes should have an A1c level less than 7 5  Ask your healthcare provider if this A1c goal is right for you  Your provider can help you make changes if your A1c is too high  · B  stands for blood pressure   High blood pressure can increase your risk for a heart attack, stroke, or kidney disease  Most older adults with diabetes should have a systolic blood pressure (first number) of 140  Your diastolic blood pressure (second number) should be below 90  Ask your healthcare provider if these blood pressure goals are right for you  · C  stands for cholesterol   High levels of cholesterol can block your arteries and cause a heart attack or stroke  Ask your healthcare provider what your cholesterol levels should be  · S  stands for stop smoking   Nicotine and other chemicals in cigarettes and cigars can cause lung damage and make it more difficult to manage your diabetes    Call 911 if you have any of the following:   · You have any of the following signs of a stroke: ¨ Numbness or drooping on one side of your face     ¨ Weakness in an arm or leg    ¨ Confusion or difficulty speaking    ¨ Dizziness, a severe headache, or vision loss    · You have any of the following signs of a heart attack:      ¨ Squeezing, pressure, or pain in your chest that lasts longer than 5 minutes or returns    ¨ Discomfort or pain in your back, neck, jaw, stomach, or arm     ¨ Trouble breathing    ¨ Nausea or vomiting    ¨ Lightheadedness or a sudden cold sweat, especially with chest pain or trouble breathing  Seek care immediately if:   · You have severe abdominal pain, or the pain spreads to your back  You may also be vomiting  · You have trouble staying awake or focusing  · You are shaking or sweating  · You have blurred or double vision  · Your breath has a fruity, sweet smell  · Your breathing is deep and labored, or rapid and shallow  · Your heartbeat is fast and weak  · You fall and get hurt  Contact your healthcare provider if:   · You are vomiting or have diarrhea  · You have an upset stomach and cannot eat the foods on your meal plan  · You feel weak or more tired than usual      · You feel dizzy, have headaches, or are easily irritated  · Your skin is red, warm, dry, or swollen  · You have a wound that does not heal      · You have numbness in your arms or legs  · You have trouble coping with your illness, or you feel anxious or depressed  · You have problems with your memory  · You have changes in your vision  · You have questions or concerns about your condition or care  Treatment for diabetes  includes keeping your blood sugar at a normal level  You must eat the right foods, and exercise regularly  You may need medicine if you cannot control your blood sugar level with nutrition and exercise  You may also need medicine to lower your blood pressure or cholesterol, or medicine to prevent blood clots     Manage the ABCs and prevent problems caused by diabetes:   · Check your blood sugar levels as directed  Your healthcare provider will tell you when and how often to check during the day  Your healthcare provider will also tell you what your blood sugar levels should be before and after a meal  You may need to check for ketones in your urine or blood if your level is higher than directed  Write down your results and show them to your healthcare provider  Your provider may use the results to make changes to your medicine, food, or exercise schedules  Ask your healthcare provider for more information about how to treat a high or low blood sugar level  · Follow your meal plan as directed  A dietitian will help you make a meal plan to keep your blood sugar level steady and make sure you get enough nutrition  Do not skip meals  Your blood sugar level may drop too low if you have taken diabetes medicine and do not eat  Ask your healthcare provider about programs in your community that can deliver the meals to your home  · Try to be active for 30 to 60 minutes most days of the week  Exercise can help keep your blood sugar level steady, decrease your risk of heart disease, and help you lose weight  It can also help improve your balance and decrease your risk for falls  Work with your healthcare provider to create an exercise plan  Ask a family member or friend to exercise with you  Start slow and exercise for 5 to 10 minutes at a time  Examples of activities include walking or swimming  Include muscle strengthening activities 2 to 3 days each week  Include balance training 2 to 3 times each week  Activities that help increase balance include yoga and chana chi      · Maintain a healthy weight  Ask your healthcare provider how much you should weigh  A healthy weight can help you control your diabetes and prevent heart disease  Ask your provider to help you create a weight loss plan if you are overweight   Together you can set manageable weight loss goals  · Do not smoke  Ask your healthcare provider for information if you currently smoke and need help to quit  Do not use e-cigarettes or smokeless tobacco in place of cigarettes or to help you quit  They still contain nicotine  · Manage stress  Stress may increase your blood sugar level  Deep breathing, muscle relaxation, and music may help you relax  Ask your healthcare provider for more information about these practices  Other ways to manage your diabetes:   · Check your feet every day for sores  Look at your whole foot, including the bottom, and between and under your toes  Check for wounds, corns, and calluses  Use a mirror to see the bottom of your feet  The skin on your feet may be shiny, tight, dry, or darker than normal  Your feet may also be cold and pale  Feel your feet by running your hands along the tops, bottoms, sides, and between your toes  Redness, swelling, and warmth are signs of blood flow problems that can lead to a foot ulcer  Do not try to remove corns or calluses yourself  · Wear medical alert identification  Wear medical alert jewelry or carry a card that says you have diabetes  Ask your healthcare provider where to get these items  · Ask about vaccines  You have a higher risk for serious illness if you get the flu, pneumonia, or hepatitis  Ask your healthcare provider if you should get a flu, pneumonia, shingles, or hepatitis B vaccine, and when to get the vaccine  · Keep all appointments  You may need to return to have your A1c checked every 3 months  You will need to return at least once each year to have your feet checked  You will need an eye exam once a year to check for retinopathy  You will also need urine tests every year to check for kidney problems  You may need tests to monitor for heart disease  Write down your questions so you remember to ask them during your visits  · Get help from family and friends    You may need help checking your blood sugar level, giving insulin injections, or preparing your meals  Ask your family and friends to help you with these tasks  Talk to your healthcare provider if you do not have someone at home to help you  A healthcare provider can come to your home to help you with these tasks  Follow up with your healthcare provider as directed: You may need to return to have your A1c checked every 3 months  You will need to return at least once each year to have your feet checked  You will need an eye exam once a year to check for retinopathy  You will also need urine tests every year to check for kidney problems  You may need tests to monitor for heart disease  Write down your questions so you remember to ask them during your visits  © 2017 [facility] Information is for End User's use only and may not be sold, redistributed or otherwise used for commercial purposes  All illustrations and images included in CareNotes® are the copyrighted property of [employer]  or [employer]  The above information is an  only  It is not intended as medical advice for individual conditions or treatments  Talk to your doctor, nurse or pharmacist before following any medical regimen to see if it is safe and effective for you

## 2019-03-11 NOTE — PROGRESS NOTES
Assessment/Plan:       Problem List Items Addressed This Visit        Digestive    GERD (gastroesophageal reflux disease)     No GERD symptoms of late she has been doing well on the omeprazole continue with the low acid diet do not eat late at night  Patient may stop this medication as long as she feels stable and has no dyspepsia or indigestion if this were to resume she is must restart the medication  I explained to her the benefit of the proton pump inhibitor         Relevant Orders    CBC and differential       Endocrine    Un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diabetes mellitus with hyperglycemia ([facility] [address] ) - Primary     Lab Results   Component Value Date    HGBA1C 6 7 (H) 03/05/2019       No results for input(s): POCGLU in the last 72 hours  Blood Sugar Average: Last 72 hrs:   patient presents today for general checkup and review of lab work her A1c is 6 7 at this time which is showing pretty good control for her diabetes CS to watch her diet continue with exercise as spring approaches get outside more and walk more  Continue with her medication no change at this time and follow up with A1c test at her next office visit in 4 months  It is recommended at this time that she replace her diabetic shoes annually due to calluses and toe deformities which create cracks and potential for infection           Relevant Orders    CBC and differential    Comprehensive metabolic panel    Hemoglobin A1C    Lipid panel    TSH, 3rd generation with Free T4 reflex    Microalbumin / creatinine urine ratio       Cardiovascular and Mediastinum    Sick sinus syndrome (HCC) (Chronic)     Sick sinus syndrome stable at this time no palpitations no dizziness or shortness of breath follow-up with Cardiology as scheduled recheck here in 4 months         Essential hypertension     Essential hypertension controlled on current medication blood pressure is stable at 1 30/70 a continue meds as directed no change            Other    Cardiac pacemaker in situ Pacemaker working well follow up with cardiology  Malignant neoplasm of upper-inner quadrant of left breast in female, estrogen receptor positive (HCC)            Subjective:      Patient ID: Caprice Morales is a 82 y o  female  Patient presents today for medication review she would like to stop summer medications if she could she is feeling fine overall  Every informed her that the medications are keeping her well at this time and are not harming her if she would continue them  The following portions of the patient's history were reviewed and updated as appropriate: allergies, current medications, past family history, past medical history, past social history, past surgical history and problem list     Review of Systems   Constitutional: Negative for chills, fatigue and fever  HENT: Negative for congestion, nosebleeds, rhinorrhea, sinus pressure and sore throat  Eyes: Negative for discharge and redness  Respiratory: Negative for cough and shortness of breath  Cardiovascular: Negative for chest pain, palpitations and leg swelling  Gastrointestinal: Positive for constipation  Negative for abdominal pain, blood in stool and nausea  Endocrine: Negative for cold intolerance, heat intolerance and polyuria  Genitourinary: Negative for dysuria and frequency  Musculoskeletal: Positive for arthralgias  Negative for back pain and myalgias  Skin: Negative for rash  Neurological: Negative for dizziness, weakness and headaches  Hematological: Negative for adenopathy  Psychiatric/Behavioral: Negative for behavioral problems and sleep disturbance  The patient is not nervous/anxious  Objective:      /78 (BP Location: Left arm, Patient Position: Sitting)   Pulse 84   Temp 98 1 °F (36 7 °C) (Tympanic)   Ht 5' 3" (1 6 m)   Wt 77 1 kg (170 lb)   SpO2 97%   BMI 30 11 kg/m²        Physical Exam   Constitutional: She is oriented to person, place, and time   She appears well-developed and well-nourished  No distress  HENT:   Head: Normocephalic and atraumatic  Right Ear: External ear normal    Left Ear: External ear normal    Nose: Nose normal    Mouth/Throat: Oropharynx is clear and moist  No oropharyngeal exudate  Eyes: Pupils are equal, round, and reactive to light  Conjunctivae and EOM are normal  Right eye exhibits no discharge  Left eye exhibits no discharge  No scleral icterus  Neck: Normal range of motion  No JVD present  No thyromegaly present  Cardiovascular: Normal rate, regular rhythm and normal heart sounds  No murmur heard  Pulmonary/Chest: Effort normal  She has no wheezes  She has no rales  She exhibits no tenderness  Abdominal: Soft  Bowel sounds are normal  She exhibits no distension and no mass  There is no tenderness  Musculoskeletal: Normal range of motion  She exhibits no edema, tenderness or deformity  No edema muscular pain over the left gastroc negative Homans positive tenderness in both knees at the joint line space  Mild low back pain at lumbar 4 and 5   Lymphadenopathy:     She has no cervical adenopathy  Neurological: She is alert and oriented to person, place, and time  She has normal reflexes  She displays normal reflexes  No cranial nerve deficit  Coordination normal    Skin: Skin is warm and dry  No rash noted  Psychiatric: She has a normal mood and affect  Her behavior is normal  Judgment and thought content normal    Nursing note and vitals reviewed  Data:    Laboratory Results: I have personally reviewed the pertinent laboratory results/reports   Radiology/Other Diagnostic Testing Results: I have personally reviewed pertinent reports         Lab Results   Component Value Date    WBC 11 20 (H) 03/05/2019    HGB 12 8 03/05/2019    HCT 40 3 03/05/2019    MCV 94 03/05/2019     03/05/2019     Lab Results   Component Value Date    K 3 2 (L) 03/05/2019    CL 97 (L) 03/05/2019    CO2 31 03/05/2019    BUN 12 03/05/2019 CREATININE 0 81 03/05/2019    GLUF 99 03/05/2019    CALCIUM 11 3 (H) 03/05/2019    CORRECTEDCA 11 1 (H) 03/05/2019    AST 26 03/05/2019    ALT 27 03/05/2019    ALKPHOS 100 03/05/2019    EGFR 68 03/05/2019     Lab Results   Component Value Date    CHOLESTEROL 164 03/05/2019    CHOLESTEROL 156 09/18/2018     Lab Results   Component Value Date    HDL 58 03/05/2019    HDL 51 09/18/2018     Lab Results   Component Value Date    LDLCALC 76 03/05/2019    LDLCALC 71 09/18/2018     Lab Results   Component Value Date    TRIG 148 03/05/2019    TRIG 168 (H) 09/18/2018     No results found for: [place], [place]  Lab Results   Component Value Date    DZM4AEBLAYNL 1 580 03/05/2019     Lab Results   Component Value Date    HGBA1C 6 7 (H) 03/05/2019     No results found for: BRAYDEN Gunderson DO

## 2019-04-02 DIAGNOSIS — E11.65 UNCONTROLLED TYPE 2 DIABETES MELLITUS WITH HYPERGLYCEMIA (HCC): ICD-10-CM

## 2019-04-02 RX ORDER — ATORVASTATIN CALCIUM 40 MG/1
40 TABLET, FILM COATED ORAL DAILY
Qty: 90 TABLET | Refills: 1 | Status: SHIPPED | OUTPATIENT
Start: 2019-04-02 | End: 2019-09-18 | Stop reason: SDUPTHER

## 2019-04-02 RX ORDER — TRIAMTERENE AND HYDROCHLOROTHIAZIDE 37.5; 25 MG/1; MG/1
1 TABLET ORAL DAILY
Qty: 90 TABLET | Refills: 1 | Status: SHIPPED | OUTPATIENT
Start: 2019-04-02 | End: 2019-10-10 | Stop reason: SDUPTHER

## 2019-05-02 ENCOUNTER — OFFICE VISIT (OUTPATIENT)
Dept: URGENT CARE | Facility: CLINIC | Age: 84
End: 2019-05-02
Payer: MEDICARE

## 2019-05-02 ENCOUNTER — APPOINTMENT (OUTPATIENT)
Dept: RADIOLOGY | Facility: CLINIC | Age: 84
End: 2019-05-02
Payer: MEDICARE

## 2019-05-02 VITALS
SYSTOLIC BLOOD PRESSURE: 140 MMHG | OXYGEN SATURATION: 98 % | RESPIRATION RATE: 18 BRPM | WEIGHT: 170 LBS | HEART RATE: 69 BPM | HEIGHT: 63 IN | BODY MASS INDEX: 30.12 KG/M2 | DIASTOLIC BLOOD PRESSURE: 72 MMHG | TEMPERATURE: 98.1 F

## 2019-05-02 DIAGNOSIS — M54.50 ACUTE BILATERAL LOW BACK PAIN WITHOUT SCIATICA: ICD-10-CM

## 2019-05-02 DIAGNOSIS — M54.50 ACUTE BILATERAL LOW BACK PAIN WITHOUT SCIATICA: Primary | ICD-10-CM

## 2019-05-02 PROCEDURE — G0463 HOSPITAL OUTPT CLINIC VISIT: HCPCS | Performed by: PHYSICIAN ASSISTANT

## 2019-05-02 PROCEDURE — 99213 OFFICE O/P EST LOW 20 MIN: CPT | Performed by: PHYSICIAN ASSISTANT

## 2019-05-02 PROCEDURE — 72220 X-RAY EXAM SACRUM TAILBONE: CPT

## 2019-05-02 RX ORDER — PREDNISONE 10 MG/1
20 TABLET ORAL DAILY
Qty: 10 TABLET | Refills: 0 | Status: SHIPPED | OUTPATIENT
Start: 2019-05-02 | End: 2019-05-07

## 2019-05-06 ENCOUNTER — OFFICE VISIT (OUTPATIENT)
Dept: URGENT CARE | Facility: CLINIC | Age: 84
End: 2019-05-06
Payer: MEDICARE

## 2019-05-06 VITALS
HEART RATE: 75 BPM | BODY MASS INDEX: 30.12 KG/M2 | SYSTOLIC BLOOD PRESSURE: 126 MMHG | DIASTOLIC BLOOD PRESSURE: 68 MMHG | OXYGEN SATURATION: 97 % | RESPIRATION RATE: 16 BRPM | WEIGHT: 170 LBS | TEMPERATURE: 97.4 F | HEIGHT: 63 IN

## 2019-05-06 DIAGNOSIS — M54.50 ACUTE RIGHT-SIDED LOW BACK PAIN WITHOUT SCIATICA: Primary | ICD-10-CM

## 2019-05-06 PROCEDURE — 99213 OFFICE O/P EST LOW 20 MIN: CPT | Performed by: PHYSICIAN ASSISTANT

## 2019-05-06 PROCEDURE — G0463 HOSPITAL OUTPT CLINIC VISIT: HCPCS | Performed by: PHYSICIAN ASSISTANT

## 2019-05-06 RX ORDER — LIDOCAINE 50 MG/G
1 PATCH TOPICAL DAILY
Qty: 15 PATCH | Refills: 0 | Status: SHIPPED | OUTPATIENT
Start: 2019-05-06 | End: 2019-06-10

## 2019-05-13 ENCOUNTER — EVALUATION (OUTPATIENT)
Dept: PHYSICAL THERAPY | Age: 84
End: 2019-05-13
Payer: MEDICARE

## 2019-05-13 DIAGNOSIS — M54.50 ACUTE LOW BACK PAIN WITHOUT SCIATICA, UNSPECIFIED BACK PAIN LATERALITY: Primary | ICD-10-CM

## 2019-05-13 PROCEDURE — 97162 PT EVAL MOD COMPLEX 30 MIN: CPT | Performed by: PHYSICAL THERAPIST

## 2019-05-13 PROCEDURE — 97110 THERAPEUTIC EXERCISES: CPT | Performed by: PHYSICAL THERAPIST

## 2019-05-13 PROCEDURE — 97140 MANUAL THERAPY 1/> REGIONS: CPT | Performed by: PHYSICAL THERAPIST

## 2019-05-17 ENCOUNTER — OFFICE VISIT (OUTPATIENT)
Dept: PHYSICAL THERAPY | Age: 84
End: 2019-05-17
Payer: MEDICARE

## 2019-05-17 DIAGNOSIS — M54.50 ACUTE LOW BACK PAIN WITHOUT SCIATICA, UNSPECIFIED BACK PAIN LATERALITY: Primary | ICD-10-CM

## 2019-05-17 PROCEDURE — 97140 MANUAL THERAPY 1/> REGIONS: CPT | Performed by: PHYSICAL THERAPIST

## 2019-05-17 PROCEDURE — 97110 THERAPEUTIC EXERCISES: CPT | Performed by: PHYSICAL THERAPIST

## 2019-05-22 ENCOUNTER — OFFICE VISIT (OUTPATIENT)
Dept: PHYSICAL THERAPY | Age: 84
End: 2019-05-22
Payer: MEDICARE

## 2019-05-22 DIAGNOSIS — M54.50 ACUTE LOW BACK PAIN WITHOUT SCIATICA, UNSPECIFIED BACK PAIN LATERALITY: Primary | ICD-10-CM

## 2019-05-22 PROCEDURE — 97110 THERAPEUTIC EXERCISES: CPT | Performed by: PHYSICAL THERAPIST

## 2019-05-22 PROCEDURE — 97140 MANUAL THERAPY 1/> REGIONS: CPT | Performed by: PHYSICAL THERAPIST

## 2019-05-24 ENCOUNTER — OFFICE VISIT (OUTPATIENT)
Dept: PHYSICAL THERAPY | Age: 84
End: 2019-05-24
Payer: MEDICARE

## 2019-05-24 DIAGNOSIS — M54.50 ACUTE LOW BACK PAIN WITHOUT SCIATICA, UNSPECIFIED BACK PAIN LATERALITY: Primary | ICD-10-CM

## 2019-05-24 PROCEDURE — 97140 MANUAL THERAPY 1/> REGIONS: CPT | Performed by: PHYSICAL THERAPIST

## 2019-05-24 PROCEDURE — 97110 THERAPEUTIC EXERCISES: CPT | Performed by: PHYSICAL THERAPIST

## 2019-06-04 ENCOUNTER — REMOTE DEVICE CLINIC VISIT (OUTPATIENT)
Dept: CARDIOLOGY CLINIC | Facility: CLINIC | Age: 84
End: 2019-06-04
Payer: MEDICARE

## 2019-06-04 DIAGNOSIS — Z45.010 ENCOUNTER FOR CHECKING AND TESTING OF CARDIAC PACEMAKER PULSE GENERATOR (BATTERY): ICD-10-CM

## 2019-06-04 DIAGNOSIS — I49.5 SICK SINUS SYNDROME (HCC): Primary | ICD-10-CM

## 2019-06-04 PROCEDURE — 93296 REM INTERROG EVL PM/IDS: CPT | Performed by: INTERNAL MEDICINE

## 2019-06-04 PROCEDURE — 93294 REM INTERROG EVL PM/LDLS PM: CPT | Performed by: INTERNAL MEDICINE

## 2019-06-05 ENCOUNTER — APPOINTMENT (OUTPATIENT)
Dept: LAB | Facility: CLINIC | Age: 84
End: 2019-06-05
Payer: MEDICARE

## 2019-06-05 DIAGNOSIS — E11.65 UNCONTROLLED TYPE 2 DIABETES MELLITUS WITH HYPERGLYCEMIA (HCC): ICD-10-CM

## 2019-06-05 DIAGNOSIS — K21.9 GASTROESOPHAGEAL REFLUX DISEASE WITHOUT ESOPHAGITIS: ICD-10-CM

## 2019-06-05 LAB
ALBUMIN SERPL BCP-MCNC: 4 G/DL (ref 3.5–5)
ALP SERPL-CCNC: 120 U/L (ref 46–116)
ALT SERPL W P-5'-P-CCNC: 33 U/L (ref 12–78)
ANION GAP SERPL CALCULATED.3IONS-SCNC: 4 MMOL/L (ref 4–13)
AST SERPL W P-5'-P-CCNC: 30 U/L (ref 5–45)
BASOPHILS # BLD AUTO: 0.02 THOUSANDS/ΜL (ref 0–0.1)
BASOPHILS NFR BLD AUTO: 0 % (ref 0–1)
BILIRUB SERPL-MCNC: 0.44 MG/DL (ref 0.2–1)
BUN SERPL-MCNC: 7 MG/DL (ref 5–25)
CALCIUM ALBUM COR SERPL-MCNC: 10.4 MG/DL (ref 8.3–10.1)
CALCIUM SERPL-MCNC: 10.4 MG/DL (ref 8.3–10.1)
CHLORIDE SERPL-SCNC: 100 MMOL/L (ref 100–108)
CHOLEST SERPL-MCNC: 134 MG/DL (ref 50–200)
CO2 SERPL-SCNC: 31 MMOL/L (ref 21–32)
CREAT SERPL-MCNC: 0.71 MG/DL (ref 0.6–1.3)
CREAT UR-MCNC: 56.1 MG/DL
EOSINOPHIL # BLD AUTO: 0.14 THOUSAND/ΜL (ref 0–0.61)
EOSINOPHIL NFR BLD AUTO: 3 % (ref 0–6)
ERYTHROCYTE [DISTWIDTH] IN BLOOD BY AUTOMATED COUNT: 13.2 % (ref 11.6–15.1)
EST. AVERAGE GLUCOSE BLD GHB EST-MCNC: 157 MG/DL
GFR SERPL CREATININE-BSD FRML MDRD: 80 ML/MIN/1.73SQ M
GLUCOSE P FAST SERPL-MCNC: 104 MG/DL (ref 65–99)
HBA1C MFR BLD: 7.1 % (ref 4.2–6.3)
HCT VFR BLD AUTO: 40.1 % (ref 34.8–46.1)
HDLC SERPL-MCNC: 45 MG/DL (ref 40–60)
HGB BLD-MCNC: 12.9 G/DL (ref 11.5–15.4)
IMM GRANULOCYTES # BLD AUTO: 0.03 THOUSAND/UL (ref 0–0.2)
IMM GRANULOCYTES NFR BLD AUTO: 1 % (ref 0–2)
LDLC SERPL CALC-MCNC: 58 MG/DL (ref 0–100)
LYMPHOCYTES # BLD AUTO: 1.06 THOUSANDS/ΜL (ref 0.6–4.47)
LYMPHOCYTES NFR BLD AUTO: 21 % (ref 14–44)
MCH RBC QN AUTO: 30.3 PG (ref 26.8–34.3)
MCHC RBC AUTO-ENTMCNC: 32.2 G/DL (ref 31.4–37.4)
MCV RBC AUTO: 94 FL (ref 82–98)
MICROALBUMIN UR-MCNC: 75.6 MG/L (ref 0–20)
MICROALBUMIN/CREAT 24H UR: 135 MG/G CREATININE (ref 0–30)
MONOCYTES # BLD AUTO: 0.44 THOUSAND/ΜL (ref 0.17–1.22)
MONOCYTES NFR BLD AUTO: 9 % (ref 4–12)
NEUTROPHILS # BLD AUTO: 3.34 THOUSANDS/ΜL (ref 1.85–7.62)
NEUTS SEG NFR BLD AUTO: 66 % (ref 43–75)
NONHDLC SERPL-MCNC: 89 MG/DL
NRBC BLD AUTO-RTO: 0 /100 WBCS
PLATELET # BLD AUTO: 255 THOUSANDS/UL (ref 149–390)
PMV BLD AUTO: 9.4 FL (ref 8.9–12.7)
POTASSIUM SERPL-SCNC: 3.1 MMOL/L (ref 3.5–5.3)
PROT SERPL-MCNC: 7.6 G/DL (ref 6.4–8.2)
RBC # BLD AUTO: 4.26 MILLION/UL (ref 3.81–5.12)
SODIUM SERPL-SCNC: 135 MMOL/L (ref 136–145)
TRIGL SERPL-MCNC: 156 MG/DL
TSH SERPL DL<=0.05 MIU/L-ACNC: 1.69 UIU/ML (ref 0.36–3.74)
WBC # BLD AUTO: 5.03 THOUSAND/UL (ref 4.31–10.16)

## 2019-06-05 PROCEDURE — 36415 COLL VENOUS BLD VENIPUNCTURE: CPT

## 2019-06-05 PROCEDURE — 83036 HEMOGLOBIN GLYCOSYLATED A1C: CPT

## 2019-06-05 PROCEDURE — 80061 LIPID PANEL: CPT

## 2019-06-05 PROCEDURE — 80053 COMPREHEN METABOLIC PANEL: CPT

## 2019-06-05 PROCEDURE — 85025 COMPLETE CBC W/AUTO DIFF WBC: CPT

## 2019-06-05 PROCEDURE — 82043 UR ALBUMIN QUANTITATIVE: CPT | Performed by: FAMILY MEDICINE

## 2019-06-05 PROCEDURE — 84443 ASSAY THYROID STIM HORMONE: CPT

## 2019-06-05 PROCEDURE — 82570 ASSAY OF URINE CREATININE: CPT | Performed by: FAMILY MEDICINE

## 2019-06-10 ENCOUNTER — OFFICE VISIT (OUTPATIENT)
Dept: FAMILY MEDICINE CLINIC | Facility: CLINIC | Age: 84
End: 2019-06-10
Payer: MEDICARE

## 2019-06-10 VITALS
SYSTOLIC BLOOD PRESSURE: 122 MMHG | DIASTOLIC BLOOD PRESSURE: 76 MMHG | HEART RATE: 63 BPM | TEMPERATURE: 97.9 F | WEIGHT: 167.4 LBS | BODY MASS INDEX: 29.66 KG/M2 | HEIGHT: 63 IN | OXYGEN SATURATION: 97 %

## 2019-06-10 DIAGNOSIS — E11.65 UNCONTROLLED TYPE 2 DIABETES MELLITUS WITH HYPERGLYCEMIA (HCC): ICD-10-CM

## 2019-06-10 DIAGNOSIS — E78.2 MIXED HYPERLIPIDEMIA: ICD-10-CM

## 2019-06-10 DIAGNOSIS — R41.3 MEMORY LOSS: ICD-10-CM

## 2019-06-10 DIAGNOSIS — K21.9 GASTROESOPHAGEAL REFLUX DISEASE WITHOUT ESOPHAGITIS: Primary | ICD-10-CM

## 2019-06-10 DIAGNOSIS — I10 ESSENTIAL HYPERTENSION: ICD-10-CM

## 2019-06-10 DIAGNOSIS — I49.5 SICK SINUS SYNDROME (HCC): Chronic | ICD-10-CM

## 2019-06-10 PROCEDURE — 99214 OFFICE O/P EST MOD 30 MIN: CPT | Performed by: FAMILY MEDICINE

## 2019-06-10 RX ORDER — ASPIRIN 81 MG/1
81 TABLET ORAL DAILY
COMMUNITY
End: 2019-08-15

## 2019-06-10 RX ORDER — POTASSIUM CHLORIDE 750 MG/1
10 TABLET, EXTENDED RELEASE ORAL DAILY
Qty: 90 TABLET | Refills: 3 | Status: SHIPPED | OUTPATIENT
Start: 2019-06-10 | End: 2020-05-29

## 2019-08-08 ENCOUNTER — HOSPITAL ENCOUNTER (EMERGENCY)
Facility: HOSPITAL | Age: 84
Discharge: HOME | End: 2019-08-08
Attending: EMERGENCY MEDICINE
Payer: MEDICARE

## 2019-08-08 ENCOUNTER — OFFICE VISIT (OUTPATIENT)
Dept: URGENT CARE | Facility: CLINIC | Age: 84
End: 2019-08-08
Payer: MEDICARE

## 2019-08-08 ENCOUNTER — APPOINTMENT (EMERGENCY)
Dept: CT IMAGING | Facility: HOSPITAL | Age: 84
End: 2019-08-08
Payer: MEDICARE

## 2019-08-08 ENCOUNTER — APPOINTMENT (EMERGENCY)
Dept: RADIOLOGY | Facility: HOSPITAL | Age: 84
End: 2019-08-08
Payer: MEDICARE

## 2019-08-08 VITALS
BODY MASS INDEX: 29.59 KG/M2 | TEMPERATURE: 98.7 F | SYSTOLIC BLOOD PRESSURE: 154 MMHG | OXYGEN SATURATION: 96 % | HEART RATE: 68 BPM | DIASTOLIC BLOOD PRESSURE: 71 MMHG | WEIGHT: 167 LBS | RESPIRATION RATE: 18 BRPM | HEIGHT: 63 IN

## 2019-08-08 VITALS
WEIGHT: 167 LBS | TEMPERATURE: 98.3 F | SYSTOLIC BLOOD PRESSURE: 141 MMHG | BODY MASS INDEX: 29.59 KG/M2 | HEIGHT: 63 IN | RESPIRATION RATE: 18 BRPM | HEART RATE: 65 BPM | OXYGEN SATURATION: 98 % | DIASTOLIC BLOOD PRESSURE: 65 MMHG

## 2019-08-08 DIAGNOSIS — R55 PRE-SYNCOPE: Primary | ICD-10-CM

## 2019-08-08 DIAGNOSIS — R55 NEAR SYNCOPE: Primary | ICD-10-CM

## 2019-08-08 DIAGNOSIS — N39.0 UTI (URINARY TRACT INFECTION): ICD-10-CM

## 2019-08-08 LAB
ALBUMIN SERPL BCP-MCNC: 4.1 G/DL (ref 3.5–5.7)
ALP SERPL-CCNC: 94 U/L (ref 55–165)
ALT SERPL W P-5'-P-CCNC: 24 U/L (ref 7–52)
ANION GAP SERPL CALCULATED.3IONS-SCNC: 6 MMOL/L (ref 4–13)
APTT PPP: 32 SECONDS (ref 23–37)
AST SERPL W P-5'-P-CCNC: 36 U/L (ref 13–39)
ATRIAL RATE: 65 BPM
BACTERIA UR QL AUTO: ABNORMAL /HPF
BASOPHILS # BLD AUTO: 0.1 THOUSANDS/ΜL (ref 0–0.1)
BASOPHILS NFR BLD AUTO: 1 % (ref 0–2)
BILIRUB SERPL-MCNC: 0.4 MG/DL (ref 0.2–1)
BILIRUB UR QL STRIP: NEGATIVE
BUN SERPL-MCNC: 10 MG/DL (ref 7–25)
CALCIUM SERPL-MCNC: 11.4 MG/DL (ref 8.6–10.5)
CHLORIDE SERPL-SCNC: 97 MMOL/L (ref 98–107)
CLARITY UR: ABNORMAL
CO2 SERPL-SCNC: 33 MMOL/L (ref 21–31)
COLOR UR: YELLOW
CREAT SERPL-MCNC: 0.83 MG/DL (ref 0.6–1.2)
EOSINOPHIL # BLD AUTO: 0.1 THOUSAND/ΜL (ref 0–0.61)
EOSINOPHIL NFR BLD AUTO: 2 % (ref 0–5)
ERYTHROCYTE [DISTWIDTH] IN BLOOD BY AUTOMATED COUNT: 12.8 % (ref 11.5–14.5)
GFR SERPL CREATININE-BSD FRML MDRD: 66 ML/MIN/1.73SQ M
GLUCOSE SERPL-MCNC: 170 MG/DL (ref 65–99)
GLUCOSE SERPL-MCNC: 185 MG/DL (ref 65–140)
GLUCOSE SERPL-MCNC: 216 MG/DL (ref 65–140)
GLUCOSE UR STRIP-MCNC: NEGATIVE MG/DL
HCT VFR BLD AUTO: 37.8 % (ref 42–47)
HGB BLD-MCNC: 12.8 G/DL (ref 12–16)
HGB UR QL STRIP.AUTO: ABNORMAL
INR PPP: 0.95 (ref 0.9–1.5)
KETONES UR STRIP-MCNC: NEGATIVE MG/DL
LEUKOCYTE ESTERASE UR QL STRIP: ABNORMAL
LYMPHOCYTES # BLD AUTO: 1.3 THOUSANDS/ΜL (ref 0.6–4.47)
LYMPHOCYTES NFR BLD AUTO: 21 % (ref 21–51)
MCH RBC QN AUTO: 30.9 PG (ref 26–34)
MCHC RBC AUTO-ENTMCNC: 33.9 G/DL (ref 31–37)
MCV RBC AUTO: 91 FL (ref 81–99)
MONOCYTES # BLD AUTO: 0.5 THOUSAND/ΜL (ref 0.17–1.22)
MONOCYTES NFR BLD AUTO: 9 % (ref 2–12)
NEUTROPHILS # BLD AUTO: 4.1 THOUSANDS/ΜL (ref 1.4–6.5)
NEUTS SEG NFR BLD AUTO: 67 % (ref 42–75)
NITRITE UR QL STRIP: POSITIVE
NON-SQ EPI CELLS URNS QL MICRO: ABNORMAL /HPF
P AXIS: 46 DEGREES
PH UR STRIP.AUTO: 7 [PH]
PLATELET # BLD AUTO: 214 THOUSANDS/UL (ref 149–390)
PMV BLD AUTO: 6.7 FL (ref 8.6–11.7)
POTASSIUM SERPL-SCNC: 3.8 MMOL/L (ref 3.5–5.5)
PR INTERVAL: 170 MS
PROT SERPL-MCNC: 7.3 G/DL (ref 6.4–8.9)
PROT UR STRIP-MCNC: NEGATIVE MG/DL
PROTHROMBIN TIME: 11 SECONDS (ref 10.2–13)
QRS AXIS: 51 DEGREES
QRSD INTERVAL: 140 MS
QT INTERVAL: 420 MS
QTC INTERVAL: 436 MS
RBC # BLD AUTO: 4.14 MILLION/UL (ref 3.9–5.2)
RBC #/AREA URNS AUTO: ABNORMAL /HPF
SL AMB POCT GLUCOSE BLD: 216
SODIUM SERPL-SCNC: 136 MMOL/L (ref 134–143)
SP GR UR STRIP.AUTO: 1.01 (ref 1–1.03)
T WAVE AXIS: 28 DEGREES
TROPONIN I SERPL-MCNC: <0.03 NG/ML
UROBILINOGEN UR QL STRIP.AUTO: 0.2 E.U./DL
VENTRICULAR RATE: 65 BPM
WBC # BLD AUTO: 6.2 THOUSAND/UL (ref 4.8–10.8)
WBC #/AREA URNS AUTO: ABNORMAL /HPF

## 2019-08-08 PROCEDURE — 87186 SC STD MICRODIL/AGAR DIL: CPT | Performed by: EMERGENCY MEDICINE

## 2019-08-08 PROCEDURE — 70450 CT HEAD/BRAIN W/O DYE: CPT

## 2019-08-08 PROCEDURE — 87077 CULTURE AEROBIC IDENTIFY: CPT | Performed by: EMERGENCY MEDICINE

## 2019-08-08 PROCEDURE — 85610 PROTHROMBIN TIME: CPT | Performed by: EMERGENCY MEDICINE

## 2019-08-08 PROCEDURE — 93005 ELECTROCARDIOGRAM TRACING: CPT

## 2019-08-08 PROCEDURE — 85730 THROMBOPLASTIN TIME PARTIAL: CPT | Performed by: EMERGENCY MEDICINE

## 2019-08-08 PROCEDURE — 99213 OFFICE O/P EST LOW 20 MIN: CPT | Performed by: PHYSICIAN ASSISTANT

## 2019-08-08 PROCEDURE — 71046 X-RAY EXAM CHEST 2 VIEWS: CPT

## 2019-08-08 PROCEDURE — 93010 ELECTROCARDIOGRAM REPORT: CPT | Performed by: INTERNAL MEDICINE

## 2019-08-08 PROCEDURE — 99284 EMERGENCY DEPT VISIT MOD MDM: CPT

## 2019-08-08 PROCEDURE — 96360 HYDRATION IV INFUSION INIT: CPT

## 2019-08-08 PROCEDURE — 80053 COMPREHEN METABOLIC PANEL: CPT | Performed by: EMERGENCY MEDICINE

## 2019-08-08 PROCEDURE — 87086 URINE CULTURE/COLONY COUNT: CPT | Performed by: EMERGENCY MEDICINE

## 2019-08-08 PROCEDURE — 36415 COLL VENOUS BLD VENIPUNCTURE: CPT | Performed by: EMERGENCY MEDICINE

## 2019-08-08 PROCEDURE — 84484 ASSAY OF TROPONIN QUANT: CPT | Performed by: EMERGENCY MEDICINE

## 2019-08-08 PROCEDURE — G0463 HOSPITAL OUTPT CLINIC VISIT: HCPCS | Performed by: PHYSICIAN ASSISTANT

## 2019-08-08 PROCEDURE — 82948 REAGENT STRIP/BLOOD GLUCOSE: CPT

## 2019-08-08 PROCEDURE — 85025 COMPLETE CBC W/AUTO DIFF WBC: CPT | Performed by: EMERGENCY MEDICINE

## 2019-08-08 PROCEDURE — 82948 REAGENT STRIP/BLOOD GLUCOSE: CPT | Performed by: PHYSICIAN ASSISTANT

## 2019-08-08 PROCEDURE — 81001 URINALYSIS AUTO W/SCOPE: CPT | Performed by: EMERGENCY MEDICINE

## 2019-08-08 RX ORDER — CEPHALEXIN 250 MG/1
750 CAPSULE ORAL EVERY 12 HOURS SCHEDULED
Qty: 42 CAPSULE | Refills: 0 | Status: SHIPPED | OUTPATIENT
Start: 2019-08-08 | End: 2019-08-15

## 2019-08-08 RX ORDER — SODIUM CHLORIDE 9 MG/ML
125 INJECTION, SOLUTION INTRAVENOUS CONTINUOUS
Status: DISCONTINUED | OUTPATIENT
Start: 2019-08-08 | End: 2019-08-08 | Stop reason: HOSPADM

## 2019-08-08 RX ADMIN — CEPHALEXIN 750 MG: 500 CAPSULE ORAL at 19:50

## 2019-08-08 RX ADMIN — SODIUM CHLORIDE 125 ML/HR: 0.9 INJECTION, SOLUTION INTRAVENOUS at 18:58

## 2019-08-08 NOTE — PROGRESS NOTES
[facility]        NAME: Danielle Fairbanks is a 82 y o  female  : 1935    MRN: [id number]  DATE: 2019  TIME: 2:47 PM    Assessment and Plan   Pre-syncope [R55]  1  Pre-syncope  POCT ECG    Transfer to other facility    POCT blood glucose     Patient and daughter advised to proceed to ER for further evaluation  Patient denied EMS, patient would like to proceed via personal vehicle  Blood Sugar 216   ECG here NSR with RBBB     Patient Instructions     Follow up with PCP in 3-5 days  Proceed to  ER if symptoms worsen  Chief Complaint     Chief Complaint   Patient presents with    Loss of Consciousness     PT  complains of fainting about 45 mins ago after a lunch meeting         History of Present Illness       31-year-old female with past medical history of diabetes type 2 and pacemaker presents for evaluation of presyncopal episode  Patient states she was at a lunch meeting with her friends at [facility] when she was told that she had a distortion of her face and change in speech that lasted less than a minute  Patient states she went to stand up and felt as if she was going to pass out and sat back down  She states there is no loss of consciousness  Patient denies extremity weakness  Denies change in gait  Denies visual changes or disturbances  Denies chest pain or palpitations  Denies urinary symptoms  Review of Systems   Review of Systems   Constitutional: Negative for chills, fatigue and fever  HENT: Negative for congestion, ear pain, sinus pain, sore throat and trouble swallowing  Eyes: Negative for pain, discharge and redness  Respiratory: Negative for cough, chest tightness, shortness of breath and wheezing  Cardiovascular: Negative for chest pain, palpitations and leg swelling  Gastrointestinal: Negative for abdominal pain, diarrhea, nausea and vomiting  Musculoskeletal: Negative for arthralgias, joint swelling and myalgias  Skin: Negative for rash  Neurological: Positive for speech difficulty  Negative for dizziness, tremors, seizures, syncope, facial asymmetry, weakness, light-headedness, numbness and headaches  Current Medications       Current Outpatient Medications:     atorvastatin (LIPITOR) 40 mg tablet, Take 1 tablet (40 mg total) by mouth daily, Disp: 90 tablet, Rfl: 1    DOCOSAHEXAENOIC ACID PO, Take 2 capsules by mouth daily, Disp: , Rfl:     glucose blood (FREESTYLE LITE) test strip, 1 strip daily, Disp: , Rfl:     metFORMIN (GLUCOPHAGE) 500 mg tablet, Take 1 tablet (500 mg total) by mouth daily, Disp: 90 tablet, Rfl: 1    polyethylene glycol-propylene glycol (SYSTANE) 0 4-0 3 %, Apply 4 drops to eye 2 (two) times a day, Disp: , Rfl:     potassium chloride (K-DUR,KLOR-CON) 10 mEq tablet, Take 1 tablet (10 mEq total) by mouth daily, Disp: 90 tablet, Rfl: 3    sertraline (ZOLOFT) 50 mg tablet, Take 1 tablet (50 mg total) by mouth daily, Disp: 90 tablet, Rfl: 2    triamterene-hydrochlorothiazide (MAXZIDE-25) 37 5-25 mg per tablet, Take 1 tablet by mouth daily, Disp: 90 tablet, Rfl: 1    anastrozole (ARIMIDEX) 1 mg tablet, Take 1 mg by mouth daily, Disp: , Rfl:     aspirin (ECOTRIN LOW STRENGTH) 81 mg EC tablet, Take 81 mg by mouth daily, Disp: , Rfl:     Current Allergies     Allergies as of 08/08/2019 - Reviewed 08/08/2019   Allergen Reaction Noted    Celecoxib Swelling 06/23/2015    Loratadine Swelling 06/23/2015    Aspirin GI Intolerance 06/23/2015    Lisinopril Swelling 06/23/2015            The following portions of the patient's history were reviewed and updated as appropriate: allergies, current medications, past family history, past medical history, past social history, past surgical history and problem list      Past Medical History:   Diagnosis Date    Cardiac disease     Diabetes mellitus ([address] )     History of echocardiogram 02/14/2018    EF 0 55 (55%), trace mitral regurgitation      Hyperlipidemia     Hypertension        Past Surgical History:   Procedure Laterality Date    APPENDECTOMY      BREAST LUMPECTOMY Bilateral     CARDIAC PACEMAKER PLACEMENT Left 7/5/2018    [facility]    HYSTERECTOMY         Family History   Problem Relation Age of Onset    Alzheimer's disease Father          Medications have been verified  Objective   /71 (BP Location: Right arm, Patient Position: Sitting, Cuff Size: Standard)   Pulse 68   Temp 98 7 °F (37 1 °C) (Probe)   Resp 18   Ht 5' 3" (1 6 m)   Wt 75 8 kg (167 lb)   SpO2 96%   BMI 29 58 kg/m²        Physical Exam     Physical Exam   Constitutional: She is oriented to person, place, and time  She appears well-developed and well-nourished  No distress  HENT:   Head: Normocephalic  Right Ear: External ear normal    Left Ear: External ear normal    Mouth/Throat: Oropharynx is clear and moist    Eyes: Pupils are equal, round, and reactive to light  Conjunctivae and EOM are normal    Neck: Normal range of motion  Neck supple  Cardiovascular: Normal rate, regular rhythm and normal heart sounds  No murmur heard  Pulmonary/Chest: Effort normal and breath sounds normal  No respiratory distress  She has no wheezes  Abdominal: Soft  Bowel sounds are normal  There is no tenderness  Musculoskeletal: Normal range of motion  Lymphadenopathy:     She has no cervical adenopathy  Neurological: She is alert and oriented to person, place, and time  She has normal reflexes  Skin: Skin is warm and dry  Psychiatric: She has a normal mood and affect  Nursing note and vitals reviewed        Negative Rhomberg  CN 2-12 intact  Sensation grossly intact bilaterally

## 2019-08-08 NOTE — DISCHARGE INSTRUCTIONS
STAY WELL HYDRATED WITH WATER  CONTINUE YOUR USUAL MEDICINES; TAKE THE ANTIBIOTIC AS PRESCRIBED  SEE YOUR FAMILY DOCTOR NEXT WEEK

## 2019-08-09 NOTE — ED PROVIDER NOTES
History  Chief Complaint   Patient presents with    Dizziness     was at a senior meeting and bystanders told her she had a glazed look in her eyes; pt states that she does not remember what happened; they told her she had slurred speech     PATIENT PRESENTED VIA EMS BECAUSE OF LIGHTHEADEDNESS  SHE WAS AT A SENIOR CENTER AND FELT PAIN/LIGHTHEADED AND IN ADDITION SHE REPORTS THAT FRIENDS AT [address] SET HER WORDS WERE SLURRED BRIEFLY  PATIENT NOTES THAT SHE WAS NOT HAVING SPINNING DIZZINESS SYMPTOMS  SHE DENIES FEVER CHILLS OR HEADACHE  SHE DENIES FALL OR INJURY  Prior to Admission Medications   Prescriptions Last Dose Informant Patient Reported? Taking? DOCOSAHEXAENOIC ACID PO   Yes No   Sig: Take 2 capsules by mouth daily   anastrozole (ARIMIDEX) 1 mg tablet   Yes No   Sig: Take 1 mg by mouth daily   aspirin (ECOTRIN LOW STRENGTH) 81 mg EC tablet   Yes No   Sig: Take 81 mg by mouth daily   atorvastatin (LIPITOR) 40 mg tablet   No No   Sig: Take 1 tablet (40 mg total) by mouth daily   glucose blood (FREESTYLE LITE) test strip   Yes No   Si strip daily   metFORMIN (GLUCOPHAGE) 500 mg tablet   No No   Sig: Take 1 tablet (500 mg total) by mouth daily   polyethylene glycol-propylene glycol (SYSTANE) 0 4-0 3 %   Yes No   Sig: Apply 4 drops to eye 2 (two) times a day   potassium chloride (K-DUR,KLOR-CON) 10 mEq tablet   No No   Sig: Take 1 tablet (10 mEq total) by mouth daily   sertraline (ZOLOFT) 50 mg tablet   No No   Sig: Take 1 tablet (50 mg total) by mouth daily   triamterene-hydrochlorothiazide (MAXZIDE-25) 37 5-25 mg per tablet   No No   Sig: Take 1 tablet by mouth daily      Facility-Administered Medications: None       Past Medical History:   Diagnosis Date    Cardiac disease     Diabetes mellitus ([facility] [address] )     History of echocardiogram 2018    EF 0 55 (55%), trace mitral regurgitation      Hyperlipidemia     Hypertension        Past Surgical History:   Procedure Laterality Date  APPENDECTOMY      BREAST LUMPECTOMY Bilateral     CARDIAC PACEMAKER PLACEMENT Left 7/5/2018    [facility]    HYSTERECTOMY         Family History   Problem Relation Age of Onset    Alzheimer's disease Father      I have reviewed and agree with the history as documented  Social History     Tobacco Use    Smoking status: Never Smoker    Smokeless tobacco: Never Used   Substance Use Topics    Alcohol use: No    Drug use: No        Review of Systems   Constitutional: Negative for chills and fever  Eyes: Negative  Respiratory: Negative for shortness of breath  Cardiovascular: Negative for chest pain and palpitations  Musculoskeletal:        NO NEW CHANGES   Skin: Negative for rash and wound  Neurological: Positive for dizziness and light-headedness  Negative for seizures and weakness  NEAR-SYNCOPE PATIENT       Physical Exam  Physical Exam   Constitutional: She appears well-developed and well-nourished  PLEASANT ELDERLY LADY WHO IS WELL NOURISHED AND WELL DEVELOPED, NOT FRAIL, WHO IS ARTICULATE  SHE IS NONTOXIC  HENT:   Head: Normocephalic and atraumatic  Mouth/Throat: Oropharynx is clear and moist  No oropharyngeal exudate  Eyes: Conjunctivae and EOM are normal    Neck: Neck supple  No tracheal deviation present  AGE-APPROPRIATE RANGE OF MOTION  NO CAROTID BRUITS  Cardiovascular: Normal rate, regular rhythm and normal heart sounds  NO ECTOPY OR MURMUR  PACEMAKER IN PLACE RIGHT UPPER CHEST WALL   Pulmonary/Chest: Effort normal and breath sounds normal  No respiratory distress  Abdominal: Soft  Bowel sounds are normal  She exhibits no distension  There is no tenderness  There is no guarding  Musculoskeletal: Normal range of motion  Lymphadenopathy:     She has no cervical adenopathy  Skin: Capillary refill takes less than 2 seconds  No rash noted  No erythema  MULTIPLE SEBORRHEIC KERATOSES, NO ACUTE RASHES   Vitals reviewed        Vital Signs  ED Triage Vitals [08/08/19 1520]   Temperature Pulse Respirations Blood Pressure SpO2   98 3 °F (36 8 °C) 65 18 143/67 96 %      Temp Source Heart Rate Source Patient Position - Orthostatic VS BP Location FiO2 (%)   Temporal Monitor Sitting Left arm --      Pain Score       No Pain           Vitals:    08/08/19 1928 08/08/19 1929 08/08/19 1930 08/08/19 1945   BP: 164/74 141/65     Pulse: 68 70 72 65   Patient Position - Orthostatic VS: Sitting - Orthostatic VS Standing - Orthostatic VS           Visual Acuity      ED Medications  Medications   sodium chloride 0 9 % infusion (0 mL/hr Intravenous Stopped 8/8/19 2004)   cephalexin (KEFLEX) capsule 750 mg (750 mg Oral Given 8/8/19 1950)       Diagnostic Studies  Results Reviewed     Procedure Component Value Units Date/Time    Urine Microscopic [[id number]]  (Abnormal) Collected:  08/08/19 1904    Lab Status:  Final result Specimen:  Urine, Clean Catch Updated:  08/08/19 1924     RBC, UA 2-4 /hpf      WBC, UA 30-50 /hpf      Epithelial Cells Occasional /hpf      Bacteria, UA Moderate /hpf     Urine culture [[id number]] Collected:  08/08/19 1904    Lab Status:   In process Specimen:  Urine, Clean Catch Updated:  08/08/19 1924    Troponin I [[id number]]  (Normal) Collected:  08/08/19 1847    Lab Status:  Final result Specimen:  Blood from Arm, Left Updated:  08/08/19 1914     Troponin I <0 03 ng/mL     UA w Reflex to Microscopic w Reflex to Culture [[id number]]  (Abnormal) Collected:  08/08/19 1904    Lab Status:  Final result Specimen:  Urine, Clean Catch Updated:  08/08/19 1912     Color, UA Yellow     Clarity, UA Cloudy     Specific [place], UA 1 010     pH, UA 7 0     Leukocytes, UA 3+     Nitrite, UA Positive     Protein, UA Negative mg/dl      Glucose, UA Negative mg/dl      Ketones, UA Negative mg/dl      Urobilinogen, UA 0 2 E U /dl      Bilirubin, UA Negative     Blood, UA 3+    Fingerstick Glucose (POCT) [[id number]]  (Abnormal) Collected:  08/08/19 1653    Lab Status:  Final result Updated:  08/08/19 1814     POC Glucose 185 mg/dl     Comprehensive metabolic panel [[id number]]  (Abnormal) Collected:  08/08/19 1700    Lab Status:  Final result Specimen:  Blood from Arm, Left Updated:  08/08/19 1722     Sodium 136 mmol/L      Potassium 3 8 mmol/L      Chloride 97 mmol/L      CO2 33 mmol/L      ANION GAP 6 mmol/L      BUN 10 mg/dL      Creatinine 0 83 mg/dL      Glucose 170 mg/dL      Calcium 11 4 mg/dL      AST 36 U/L      ALT 24 U/L      Alkaline Phosphatase 94 U/L      Total Protein 7 3 g/dL      Albumin 4 1 g/dL      Total Bilirubin 0 40 mg/dL      eGFR 66 ml/min/1 73sq m     Narrative:       [place] guidelines for Chronic Kidney Disease (CKD):     Stage 1 with normal or high GFR (GFR > 90 mL/min/1 73 square meters)    Stage 2 Mild CKD (GFR = 60-89 mL/min/1 73 square meters)    Stage 3A Moderate CKD (GFR = 45-59 mL/min/1 73 square meters)    Stage 3B Moderate CKD (GFR = 30-44 mL/min/1 73 square meters)    Stage 4 Severe CKD (GFR = 15-29 mL/min/1 73 square meters)    Stage 5 End Stage CKD (GFR <15 mL/min/1 73 square meters)  Note: GFR calculation is accurate only with a steady state creatinine    Protime-INR [[id number]]  (Normal) Collected:  08/08/19 1700    Lab Status:  Final result Specimen:  Blood from Arm, Left Updated:  08/08/19 1722     Protime 11 0 seconds      INR 0 95    APTT [[id number]]  (Normal) Collected:  08/08/19 1700    Lab Status:  Final result Specimen:  Blood from Arm, Left Updated:  08/08/19 1722     PTT 32 seconds     CBC and differential [[id number]]  (Abnormal) Collected:  08/08/19 1700    Lab Status:  Final result Specimen:  Blood from Arm, Left Updated:  08/08/19 1705     WBC 6 20 Thousand/uL      RBC 4 14 Million/uL      Hemoglobin 12 8 g/dL      Hematocrit 37 8 %      MCV 91 fL      MCH 30 9 pg      MCHC 33 9 g/dL      RDW 12 8 %      MPV 6 7 fL      Platelets 529 Thousands/uL      Neutrophils Relative 67 %      Lymphocytes Relative 21 % Monocytes Relative 9 %      Eosinophils Relative 2 %      Basophils Relative 1 %      Neutrophils Absolute 4 10 Thousands/µL      Lymphocytes Absolute 1 30 Thousands/µL      Monocytes Absolute 0 50 Thousand/µL      Eosinophils Absolute 0 10 Thousand/µL      Basophils Absolute 0 10 Thousands/µL                  CT head without contrast   Final Result by Derian Montelongo MD (08/08 1748)      No acute intracranial abnormality  Stable atrophy and chronic microangiopathic ischemic changes                  Workstation performed: [id number]         XR chest 2 views   Final Result by Derian Montelongo MD (08/08 1945)      Minimal left basilar atelectasis            Workstation performed: [id number]                    Procedures  Procedures       ED Course     EXAM/EVAL  PT OBSERVED; NL MONITOR, EKG, LABS UNREMARKABLE -- NO FURTHER SYMPTOMS  + UTI  AMBULATES SAFELY, OK FOR D/C                          MDM    Disposition  Final diagnoses:   Near syncope   UTI (urinary tract infection)     Time reflects when diagnosis was documented in both MDM as applicable and the Disposition within this note     Time User Action Codes Description Comment    8/8/2019  7:39 PM Dorotha  Add [R55] Near syncope     8/8/2019  7:39 PM Dorotha  Add [N39 0] UTI (urinary tract infection)       ED Disposition     ED Disposition Condition Date/Time Comment    Discharge Stable Thu Aug 8, 2019  7:38  [facility],[address] discharge to home/self care  Follow-up Information     Follow up With Specialties Details Why Ursula May, DO Family Medicine Call in 1 day  [address]  [address]  [address] [address]  [phone]            Discharge Medication List as of 8/8/2019  7:41 PM      START taking these medications    Details   cephalexin (KEFLEX) 250 mg capsule Take 3 capsules (750 mg total) by mouth every 12 (twelve) hours for 7 days, Starting Thu 8/8/2019, Until Thu 8/15/2019, Print         CONTINUE these medications which have NOT CHANGED    Details   anastrozole (ARIMIDEX) 1 mg tablet Take 1 mg by mouth daily, Starting Mon 4/9/2018, Historical Med      aspirin (ECOTRIN LOW STRENGTH) 81 mg EC tablet Take 81 mg by mouth daily, Historical Med      atorvastatin (LIPITOR) 40 mg tablet Take 1 tablet (40 mg total) by mouth daily, Starting Tue 4/2/2019, Normal      DOCOSAHEXAENOIC ACID PO Take 2 capsules by mouth daily, Starting Tue 6/24/2014, Historical Med      glucose blood (FREESTYLE LITE) test strip 1 strip daily, Starting Wed 2/28/2018, Historical Med      metFORMIN (GLUCOPHAGE) 500 mg tablet Take 1 tablet (500 mg total) by mouth daily, Starting Tue 4/2/2019, Normal      polyethylene glycol-propylene glycol (SYSTANE) 0 4-0 3 % Apply 4 drops to eye 2 (two) times a day, Starting Tue 6/24/2014, Historical Med      potassium chloride (K-DUR,KLOR-CON) 10 mEq tablet Take 1 tablet (10 mEq total) by mouth daily, Starting Mon 6/10/2019, Normal      sertraline (ZOLOFT) 50 mg tablet Take 1 tablet (50 mg total) by mouth daily, Starting Mon 6/10/2019, Normal      triamterene-hydrochlorothiazide (MAXZIDE-25) 37 5-25 mg per tablet Take 1 tablet by mouth daily, Starting Tue 4/2/2019, Normal           No discharge procedures on file      ED Provider  Electronically Signed by           Edwina Blanton DO  08/08/19 2010

## 2019-08-10 LAB — BACTERIA UR CULT: ABNORMAL

## 2019-08-15 ENCOUNTER — OFFICE VISIT (OUTPATIENT)
Dept: FAMILY MEDICINE CLINIC | Facility: CLINIC | Age: 84
End: 2019-08-15
Payer: MEDICARE

## 2019-08-15 VITALS
SYSTOLIC BLOOD PRESSURE: 120 MMHG | TEMPERATURE: 98.2 F | OXYGEN SATURATION: 96 % | BODY MASS INDEX: 29.34 KG/M2 | DIASTOLIC BLOOD PRESSURE: 70 MMHG | WEIGHT: 165.6 LBS | HEART RATE: 81 BPM | HEIGHT: 63 IN

## 2019-08-15 DIAGNOSIS — R31.0 GROSS HEMATURIA: ICD-10-CM

## 2019-08-15 DIAGNOSIS — R42 DIZZINESS: ICD-10-CM

## 2019-08-15 DIAGNOSIS — I10 ESSENTIAL HYPERTENSION: ICD-10-CM

## 2019-08-15 DIAGNOSIS — E11.65 UNCONTROLLED TYPE 2 DIABETES MELLITUS WITH HYPERGLYCEMIA (HCC): Primary | ICD-10-CM

## 2019-08-15 LAB
SL AMB  POCT GLUCOSE, UA: NORMAL
SL AMB LEUKOCYTE ESTERASE,UA: NORMAL
SL AMB POCT BILIRUBIN,UA: NORMAL
SL AMB POCT BLOOD,UA: NORMAL
SL AMB POCT CLARITY,UA: CLEAR
SL AMB POCT COLOR,UA: YELLOW
SL AMB POCT KETONES,UA: NORMAL
SL AMB POCT NITRITE,UA: NORMAL
SL AMB POCT PH,UA: 7.5
SL AMB POCT SPECIFIC GRAVITY,UA: 1
SL AMB POCT URINE PROTEIN: NORMAL
SL AMB POCT UROBILINOGEN: NORMAL

## 2019-08-15 PROCEDURE — 99214 OFFICE O/P EST MOD 30 MIN: CPT | Performed by: FAMILY MEDICINE

## 2019-08-15 PROCEDURE — 81002 URINALYSIS NONAUTO W/O SCOPE: CPT | Performed by: FAMILY MEDICINE

## 2019-08-15 NOTE — ASSESSMENT & PLAN NOTE
Recent episode of dizziness with near syncope patient seen in the emergency room and had a urinary tract infection treated at this time with Keflex following up here today without further symptomatology if symptoms worsen she is to contact me or go back to the emergency department  The patient has sick sinus syndrome and pacemaker and multiple cardiac issues and advanced age so her symptoms need to be addressed when this occurs and she is aware of this    She must remain hydrated on a regular basis and avoid excessive heat on hot summer days

## 2019-08-15 NOTE — PATIENT INSTRUCTIONS
Hypertension   AMBULATORY CARE:   Hypertension  is high blood pressure (BP)  Your BP is the force of your blood moving against the walls of your arteries  Normal BP is less than 120/80  Prehypertension is between 120/80 and 139/89  Hypertension is 140/90 or higher  Hypertension causes your BP to get so high that your heart has to work much harder than normal  This can damage your heart  You can control hypertension with a healthy lifestyle or medicines  A controlled blood pressure helps protect your organs, such as your heart, lungs, brain, and kidneys  Common symptoms include the following:   · Headache     · Blurred vision     · Chest pain     · Dizziness or weakness     · Trouble breathing    · Nosebleeds  Call 911 for any of the following:   · You have discomfort in your chest that feels like squeezing, pressure, fullness, or pain  · You become confused or have difficulty speaking  · You suddenly feel lightheaded or have trouble breathing  · You have pain or discomfort in your back, neck, jaw, stomach, or arm  Seek care immediately if:   · You have a severe headache or vision loss  · You have weakness in an arm or leg  Contact your healthcare provider if:   · You feel faint, dizzy, confused, or drowsy  · You have been taking your BP medicine and your BP is still higher than your healthcare provider says it should be  · You have questions or concerns about your condition or care  Treatment for hypertension  may include medicine to lower your BP and lower your cholesterol level  A low cholesterol level helps prevent heart disease and makes it easier to control your blood pressure  You may also need to make lifestyle changes  Take your medicine exactly as directed  Manage hypertension:  Talk with your healthcare provider about these and other ways to manage hypertension:  · Check your BP at home  Sit and rest for 5 minutes before you take your BP   Extend your arm and support it on a flat surface  Your arm should be at the same level as your heart  Follow the directions that came with your BP monitor  If possible, take at least 2 BP readings each time  Take your BP at least twice a day at the same times each day, such as morning and evening  Keep a record of your BP readings and bring it to your follow-up visits  Ask your healthcare provider what your BP should be  · Limit sodium (salt) as directed  Too much sodium can affect your fluid balance  Check labels to find low-sodium or no-salt-added foods  Some low-sodium foods use potassium salts for flavor  Too much potassium can also cause health problems  Your healthcare provider will tell you how much sodium and potassium are safe for you to have in a day  He or she may recommend that you limit sodium to 2,300 mg a day  · Follow the meal plan recommended by your healthcare provider  A dietitian or your provider can give you more information on low-sodium plans or the DASH (Dietary Approaches to Stop Hypertension) eating plan  The DASH plan is low in sodium, unhealthy fats, and total fat  It is high in potassium, calcium, and fiber  · Exercise to maintain a healthy weight  Exercise at least 30 minutes per day, on most days of the week  This will help decrease your blood pressure  Ask your healthcare provider about the best exercise plan for you  · Decrease stress  This may help lower your BP  Learn ways to relax, such as deep breathing or listening to music  · Limit alcohol  Women should limit alcohol to 1 drink a day  Men should limit alcohol to 2 drinks a day  A drink of alcohol is 12 ounces of beer, 5 ounces of wine, or 1½ ounces of liquor  · Do not smoke  Nicotine and other chemicals in cigarettes and cigars can increase your BP and also cause lung damage  Ask your healthcare provider for information if you currently smoke and need help to quit  E-cigarettes or smokeless tobacco still contain nicotine  Talk to your healthcare provider before you use these products  · Manage any other health conditions you have  Health conditions such as diabetes can increase your risk for hypertension  Follow your healthcare provider's instructions and take all your medicines as directed  Follow up with your healthcare provider as directed: You will need to return to have your BP checked and to have other lab tests done  Write down your questions so you remember to ask them during your visits  © 2017 [employer] Information is for End User's use only and may not be sold, redistributed or otherwise used for commercial purposes  All illustrations and images included in CareNotes® are the copyrighted property of [employer]  or [employer]  The above information is an  only  It is not intended as medical advice for individual conditions or treatments  Talk to your doctor, nurse or pharmacist before following any medical regimen to see if it is safe and effective for you

## 2019-08-15 NOTE — ASSESSMENT & PLAN NOTE
Gross hematuria by history not seen at this time and recent urinary tract infection treated through the emergency room with Keflex and she is doing better at this point without further symptomatology no dysuria urgency or pain and no blood in the urine    Patient will finish off her medication and continue to stay hydrated with plenty of water and if she feels worse in any way she will contact me

## 2019-08-15 NOTE — ASSESSMENT & PLAN NOTE
Patient's overall blood pressure is stable at this time she is watching her weight she is watching diet and excessive eating if she is not moving she does not want to eat too much and she has been doing well with a gradual weight loss she does not have significant arthritis at this point and is proactive about keeping herself healthy now as she was much every year in the past after her   and now she is doing well she will continue with her current medication for the blood pressure and follow up with me at her next visit

## 2019-08-15 NOTE — PROGRESS NOTES
Assessment/Plan:       Problem List Items Addressed This Visit        Endocrine    Un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diabetes mellitus with hyperglycemia ([facility] [address] ) - Primary     Lab Results   Component Value Date    HGBA1C 7 1 (H) 2019       No results for input(s): POCGLU in the last 72 hours  Blood Sugar Average: Last 72 hrs:   diabetes with last A1c at 7 1 she will follow up with her regular appointment and continue her medications she recently is fighting off a urinary tract infection and I mentioned that the diabetes can make any infection worsen the body and she has to control her blood sugars accurately at this time            Cardiovascular and Mediastinum    Essential hypertension     Patient's overall blood pressure is stable at this time she is watching her weight she is watching diet and excessive eating if she is not moving she does not want to eat too much and she has been doing well with a gradual weight loss she does not have significant arthritis at this point and is proactive about keeping herself healthy now as she was much every year in the past after her   and now she is doing well she will continue with her current medication for the blood pressure and follow up with me at her next visit            Genitourinary    Gross hematuria     Gross hematuria by history not seen at this time and recent urinary tract infection treated through the emergency room with Keflex and she is doing better at this point without further symptomatology no dysuria urgency or pain and no blood in the urine    Patient will finish off her medication and continue to stay hydrated with plenty of water and if she feels worse in any way she will contact me            Other    Dizziness     Recent episode of dizziness with near syncope patient seen in the emergency room and had a urinary tract infection treated at this time with Keflex following up here today without further symptomatology if symptoms worsen she is to contact me or go back to the emergency department  The patient has sick sinus syndrome and pacemaker and multiple cardiac issues and advanced age so her symptoms need to be addressed when this occurs and she is aware of this  She must remain hydrated on a regular basis and avoid excessive heat on hot summer days                 Subjective:      Patient ID: Aristides Millan is a 82 y o  female  Patient is here today in follow-up evaluation for urinary tract infection and near-syncope she went to the emergency room and was given Keflex and at this time is doing much better      The following portions of the patient's history were reviewed and updated as appropriate: allergies, current medications, past family history, past medical history, past social history, past surgical history and problem list     Review of Systems   Constitutional: Negative for chills, fatigue and fever  HENT: Negative for congestion, nosebleeds, rhinorrhea, sinus pressure and sore throat  Eyes: Negative for discharge and redness  Respiratory: Negative for cough and shortness of breath  Cardiovascular: Negative for chest pain, palpitations and leg swelling  Gastrointestinal: Negative for abdominal pain, blood in stool and nausea  Endocrine: Negative for cold intolerance, heat intolerance and polyuria  Genitourinary: Negative for dysuria and frequency  Musculoskeletal: Negative for arthralgias, back pain and myalgias  Skin: Negative for rash  Neurological: Negative for dizziness, weakness and headaches  Hematological: Negative for adenopathy  Psychiatric/Behavioral: Negative for behavioral problems and sleep disturbance  The patient is not nervous/anxious            Objective:      /70 (BP Location: Left arm, Patient Position: Sitting)   Pulse 81   Temp 98 2 °F (36 8 °C) (Tympanic)   Ht 5' 3" (1 6 m)   Wt 75 1 kg (165 lb 9 6 oz)   SpO2 96%   BMI 29 33 kg/m²        Physical Exam   Constitutional: She is oriented to person, place, and time  She appears well-developed and well-nourished  No distress  HENT:   Head: Normocephalic and atraumatic  Right Ear: External ear normal    Left Ear: External ear normal    Nose: Nose normal    Mouth/Throat: Oropharynx is clear and moist  No oropharyngeal exudate  Eyes: Pupils are equal, round, and reactive to light  Conjunctivae and EOM are normal  Right eye exhibits no discharge  Left eye exhibits no discharge  No scleral icterus  Neck: Normal range of motion  No JVD present  No thyromegaly present  Cardiovascular: Normal rate, regular rhythm and normal heart sounds  No murmur heard  Pulmonary/Chest: Effort normal  She has no wheezes  She has no rales  She exhibits no tenderness  Abdominal: Soft  Bowel sounds are normal  She exhibits no distension and no mass  There is no tenderness  Musculoskeletal: Normal range of motion  She exhibits no edema, tenderness or deformity  Lymphadenopathy:     She has no cervical adenopathy  Neurological: She is alert and oriented to person, place, and time  She has normal reflexes  She displays normal reflexes  No cranial nerve deficit  Coordination normal    Skin: Skin is warm and dry  No rash noted  Psychiatric: She has a normal mood and affect  Her behavior is normal  Judgment and thought content normal    Nursing note and vitals reviewed  Data:    Laboratory Results: I have personally reviewed the pertinent laboratory results/reports   Radiology/Other Diagnostic Testing Results: I have personally reviewed pertinent reports         Lab Results   Component Value Date    WBC 6 20 08/08/2019    HGB 12 8 08/08/2019    HCT 37 8 (L) 08/08/2019    MCV 91 08/08/2019     08/08/2019     Lab Results   Component Value Date    K 3 8 08/08/2019    CL 97 (L) 08/08/2019    CO2 33 (H) 08/08/2019    BUN 10 08/08/2019    CREATININE 0 83 08/08/2019    GLUF 104 (H) 06/05/2019    CALCIUM 11 4 (H) 08/08/2019    CORRECTEDCA 10 4 (H) 06/05/2019    AST 36 08/08/2019    ALT 24 08/08/2019    ALKPHOS 94 08/08/2019    EGFR 66 08/08/2019     Lab Results   Component Value Date    CHOLESTEROL 134 06/05/2019    CHOLESTEROL 164 03/05/2019    CHOLESTEROL 156 09/18/2018     Lab Results   Component Value Date    HDL 45 06/05/2019    HDL 58 03/05/2019    HDL 51 09/18/2018     Lab Results   Component Value Date    LDLCALC 58 06/05/2019    LDLCALC 76 03/05/2019    LDLCALC 71 09/18/2018     Lab Results   Component Value Date    TRIG 156 (H) 06/05/2019    TRIG 148 03/05/2019    TRIG 168 (H) 09/18/2018     No results found for: [place], [place]  Lab Results   Component Value Date    ESM2OSOACTHW 1 690 06/05/2019     Lab Results   Component Value Date    HGBA1C 7 1 (H) 06/05/2019     No results found for: BRAYDEN Rojo, DO

## 2019-08-15 NOTE — ASSESSMENT & PLAN NOTE
Lab Results   Component Value Date    HGBA1C 7 1 (H) 06/05/2019       No results for input(s): POCGLU in the last 72 hours      Blood Sugar Average: Last 72 hrs:   diabetes with last A1c at 7 1 she will follow up with her regular appointment and continue her medications she recently is fighting off a urinary tract infection and I mentioned that the diabetes can make any infection worsen the body and she has to control her blood sugars accurately at this time

## 2019-09-18 DIAGNOSIS — E11.65 UNCONTROLLED TYPE 2 DIABETES MELLITUS WITH HYPERGLYCEMIA (HCC): ICD-10-CM

## 2019-09-18 RX ORDER — ATORVASTATIN CALCIUM 40 MG/1
TABLET, FILM COATED ORAL
Qty: 90 TABLET | Refills: 0 | Status: SHIPPED | OUTPATIENT
Start: 2019-09-18 | End: 2020-01-06 | Stop reason: SDUPTHER

## 2019-10-01 ENCOUNTER — APPOINTMENT (OUTPATIENT)
Dept: LAB | Facility: CLINIC | Age: 84
End: 2019-10-01
Payer: MEDICARE

## 2019-10-01 ENCOUNTER — TRANSCRIBE ORDERS (OUTPATIENT)
Dept: ADMINISTRATIVE | Facility: HOSPITAL | Age: 84
End: 2019-10-01

## 2019-10-01 DIAGNOSIS — Z17.0 MALIGNANT NEOPLASM OF UPPER-INNER QUADRANT OF LEFT BREAST IN FEMALE, ESTROGEN RECEPTOR POSITIVE (HCC): Primary | ICD-10-CM

## 2019-10-01 DIAGNOSIS — C50.212 MALIGNANT NEOPLASM OF UPPER-INNER QUADRANT OF LEFT BREAST IN FEMALE, ESTROGEN RECEPTOR POSITIVE (HCC): Primary | ICD-10-CM

## 2019-10-01 DIAGNOSIS — Z17.0 MALIGNANT NEOPLASM OF UPPER-INNER QUADRANT OF LEFT BREAST IN FEMALE, ESTROGEN RECEPTOR POSITIVE (HCC): ICD-10-CM

## 2019-10-01 DIAGNOSIS — C50.212 MALIGNANT NEOPLASM OF UPPER-INNER QUADRANT OF LEFT BREAST IN FEMALE, ESTROGEN RECEPTOR POSITIVE (HCC): ICD-10-CM

## 2019-10-01 LAB
ALBUMIN SERPL BCP-MCNC: 4 G/DL (ref 3.5–5)
ALP SERPL-CCNC: 97 U/L (ref 46–116)
ALT SERPL W P-5'-P-CCNC: 36 U/L (ref 12–78)
ANION GAP SERPL CALCULATED.3IONS-SCNC: 4 MMOL/L (ref 4–13)
AST SERPL W P-5'-P-CCNC: 35 U/L (ref 5–45)
BASOPHILS # BLD AUTO: 0.02 THOUSANDS/ΜL (ref 0–0.1)
BASOPHILS NFR BLD AUTO: 0 % (ref 0–1)
BILIRUB SERPL-MCNC: 0.54 MG/DL (ref 0.2–1)
BUN SERPL-MCNC: 8 MG/DL (ref 5–25)
CALCIUM ALBUM COR SERPL-MCNC: 11.7 MG/DL (ref 8.3–10.1)
CALCIUM SERPL-MCNC: 11.7 MG/DL (ref 8.3–10.1)
CHLORIDE SERPL-SCNC: 102 MMOL/L (ref 100–108)
CO2 SERPL-SCNC: 31 MMOL/L (ref 21–32)
CREAT SERPL-MCNC: 0.79 MG/DL (ref 0.6–1.3)
EOSINOPHIL # BLD AUTO: 0.07 THOUSAND/ΜL (ref 0–0.61)
EOSINOPHIL NFR BLD AUTO: 1 % (ref 0–6)
ERYTHROCYTE [DISTWIDTH] IN BLOOD BY AUTOMATED COUNT: 12.2 % (ref 11.6–15.1)
GFR SERPL CREATININE-BSD FRML MDRD: 70 ML/MIN/1.73SQ M
GLUCOSE SERPL-MCNC: 101 MG/DL (ref 65–140)
HCT VFR BLD AUTO: 40.6 % (ref 34.8–46.1)
HGB BLD-MCNC: 13 G/DL (ref 11.5–15.4)
IMM GRANULOCYTES # BLD AUTO: 0.03 THOUSAND/UL (ref 0–0.2)
IMM GRANULOCYTES NFR BLD AUTO: 1 % (ref 0–2)
LYMPHOCYTES # BLD AUTO: 1.12 THOUSANDS/ΜL (ref 0.6–4.47)
LYMPHOCYTES NFR BLD AUTO: 18 % (ref 14–44)
MCH RBC QN AUTO: 30.4 PG (ref 26.8–34.3)
MCHC RBC AUTO-ENTMCNC: 32 G/DL (ref 31.4–37.4)
MCV RBC AUTO: 95 FL (ref 82–98)
MONOCYTES # BLD AUTO: 0.46 THOUSAND/ΜL (ref 0.17–1.22)
MONOCYTES NFR BLD AUTO: 7 % (ref 4–12)
NEUTROPHILS # BLD AUTO: 4.67 THOUSANDS/ΜL (ref 1.85–7.62)
NEUTS SEG NFR BLD AUTO: 73 % (ref 43–75)
NRBC BLD AUTO-RTO: 0 /100 WBCS
PLATELET # BLD AUTO: 226 THOUSANDS/UL (ref 149–390)
PMV BLD AUTO: 9.9 FL (ref 8.9–12.7)
POTASSIUM SERPL-SCNC: 3.3 MMOL/L (ref 3.5–5.3)
PROT SERPL-MCNC: 7.5 G/DL (ref 6.4–8.2)
RBC # BLD AUTO: 4.27 MILLION/UL (ref 3.81–5.12)
SODIUM SERPL-SCNC: 137 MMOL/L (ref 136–145)
WBC # BLD AUTO: 6.37 THOUSAND/UL (ref 4.31–10.16)

## 2019-10-01 PROCEDURE — 85025 COMPLETE CBC W/AUTO DIFF WBC: CPT

## 2019-10-01 PROCEDURE — 86300 IMMUNOASSAY TUMOR CA 15-3: CPT

## 2019-10-01 PROCEDURE — 80053 COMPREHEN METABOLIC PANEL: CPT

## 2019-10-01 PROCEDURE — 36415 COLL VENOUS BLD VENIPUNCTURE: CPT

## 2019-10-02 LAB — CANCER AG15-3 SERPL-ACNC: 12.1 U/ML (ref 0–25)

## 2019-10-07 ENCOUNTER — TELEPHONE (OUTPATIENT)
Dept: FAMILY MEDICINE CLINIC | Facility: CLINIC | Age: 84
End: 2019-10-07

## 2019-10-07 NOTE — TELEPHONE ENCOUNTER
Daughter states that her mother will not tell you all of the problems that she is having     1  Her memory is not what it use to be, she had an appt with Dr Allen Padron and didn't remember him or where his office was and she has been there several times  2  Had a small mole on her right arm and it is now the size of a dime, and has several more on her back

## 2019-10-07 NOTE — TELEPHONE ENCOUNTER
Note this for patient's chart for when patient has appointment upcoming for medical assistant to review at time of visit

## 2019-10-10 DIAGNOSIS — E11.65 UNCONTROLLED TYPE 2 DIABETES MELLITUS WITH HYPERGLYCEMIA (HCC): ICD-10-CM

## 2019-10-10 RX ORDER — TRIAMTERENE AND HYDROCHLOROTHIAZIDE 37.5; 25 MG/1; MG/1
TABLET ORAL
Qty: 90 TABLET | Refills: 0 | Status: SHIPPED | OUTPATIENT
Start: 2019-10-10 | End: 2020-02-10 | Stop reason: SDUPTHER

## 2019-10-14 ENCOUNTER — IN-CLINIC DEVICE VISIT (OUTPATIENT)
Dept: CARDIOLOGY CLINIC | Facility: CLINIC | Age: 84
End: 2019-10-14
Payer: MEDICARE

## 2019-10-14 DIAGNOSIS — I49.5 SICK SINUS SYNDROME (HCC): Primary | ICD-10-CM

## 2019-10-14 DIAGNOSIS — Z45.010 ENCOUNTER FOR CHECKING AND TESTING OF CARDIAC PACEMAKER PULSE GENERATOR (BATTERY): ICD-10-CM

## 2019-10-14 PROCEDURE — 93280 PM DEVICE PROGR EVAL DUAL: CPT | Performed by: INTERNAL MEDICINE

## 2019-10-14 NOTE — PROGRESS NOTES
device check pacer  2 HVR-SVT 53 seconds  1 mode switch 10 seconds long  Normal battery function      Current Outpatient Medications:     anastrozole (ARIMIDEX) 1 mg tablet, Take 1 mg by mouth daily, Disp: , Rfl:     atorvastatin (LIPITOR) 40 mg tablet, TAKE ONE TABLET BY MOUTH ONE TIME DAILY , Disp: 90 tablet, Rfl: 0    DOCOSAHEXAENOIC ACID PO, Take 2 capsules by mouth daily, Disp: , Rfl:     glucose blood (FREESTYLE LITE) test strip, 1 strip daily, Disp: , Rfl:     metFORMIN (GLUCOPHAGE) 500 mg tablet, TAKE ONE TABLET BY MOUTH ONE TIME DAILY , Disp: 90 tablet, Rfl: 0    polyethylene glycol-propylene glycol (SYSTANE) 0 4-0 3 %, Apply 4 drops to eye 2 (two) times a day, Disp: , Rfl:     potassium chloride (K-DUR,KLOR-CON) 10 mEq tablet, Take 1 tablet (10 mEq total) by mouth daily, Disp: 90 tablet, Rfl: 3    sertraline (ZOLOFT) 50 mg tablet, Take 1 tablet (50 mg total) by mouth daily, Disp: 90 tablet, Rfl: 2    triamterene-hydrochlorothiazide (MAXZIDE-25) 37 5-25 mg per tablet, TAKE ONE TABLET BY MOUTH ONE TIME DAILY , Disp: 90 tablet, Rfl: 0

## 2019-10-18 ENCOUNTER — OFFICE VISIT (OUTPATIENT)
Dept: FAMILY MEDICINE CLINIC | Facility: CLINIC | Age: 84
End: 2019-10-18
Payer: MEDICARE

## 2019-10-18 VITALS
HEIGHT: 63 IN | DIASTOLIC BLOOD PRESSURE: 70 MMHG | OXYGEN SATURATION: 98 % | SYSTOLIC BLOOD PRESSURE: 132 MMHG | WEIGHT: 162 LBS | BODY MASS INDEX: 28.7 KG/M2 | HEART RATE: 78 BPM

## 2019-10-18 DIAGNOSIS — Z00.00 MEDICARE ANNUAL WELLNESS VISIT, SUBSEQUENT: ICD-10-CM

## 2019-10-18 DIAGNOSIS — I49.5 SICK SINUS SYNDROME (HCC): Chronic | ICD-10-CM

## 2019-10-18 DIAGNOSIS — I10 ESSENTIAL HYPERTENSION: ICD-10-CM

## 2019-10-18 DIAGNOSIS — E11.65 UNCONTROLLED TYPE 2 DIABETES MELLITUS WITH HYPERGLYCEMIA (HCC): ICD-10-CM

## 2019-10-18 DIAGNOSIS — K21.9 GASTROESOPHAGEAL REFLUX DISEASE WITHOUT ESOPHAGITIS: Primary | ICD-10-CM

## 2019-10-18 DIAGNOSIS — R41.3 MEMORY LOSS: ICD-10-CM

## 2019-10-18 LAB — SL AMB POCT HEMOGLOBIN AIC: 6.5 (ref ?–6.5)

## 2019-10-18 PROCEDURE — G0438 PPPS, INITIAL VISIT: HCPCS | Performed by: FAMILY MEDICINE

## 2019-10-18 PROCEDURE — 99214 OFFICE O/P EST MOD 30 MIN: CPT | Performed by: FAMILY MEDICINE

## 2019-10-18 PROCEDURE — 83036 HEMOGLOBIN GLYCOSYLATED A1C: CPT | Performed by: FAMILY MEDICINE

## 2019-10-18 NOTE — ASSESSMENT & PLAN NOTE
GERD symptoms asymptomatic at this time continue with current diet avoid food late at night and food rich in acid if symptoms return contact me for follow-up evaluation and Gastroenterology evaluation

## 2019-10-18 NOTE — ASSESSMENT & PLAN NOTE
Sick sinus syndrome by history continue with Cardiology management and follow up with me at next office visit no change in medication plan at this time patient has noticed shortness of breath or chest pain    Pacemaker management by Cardiology

## 2019-10-18 NOTE — PATIENT INSTRUCTIONS
Diabetes in the Older Adult   AMBULATORY CARE:   What you need to know if you are an older adult with diabetes:  Older adults with diabetes are at risk for heart disease, stroke, kidney disease, blindness, and nerve damage  You may also be at risk for any of the following:  · Poor nutrition or low blood sugar levels    · Confusion or problems with memory, attention, or learning new things    · Trouble controlling urination or frequent urinary tract infections    · Trouble with coordination or balance    · Falls and injuries    · Pain    · Depression    · Open sores on your legs or feet  The ABCs of diabetes: The ABCs stand for certain things you can do to manage or prevent problems caused by diabetes:  · A  stands for A1c test   This test shows the average amount of sugar in your blood over the past 2 to 3 months  High levels of sugar in your blood can cause damage to your heart, blood vessels, kidneys, feet, and eyes  Most older adults with diabetes should have an A1c level less than 7 5  Ask your healthcare provider if this A1c goal is right for you  Your provider can help you make changes if your A1c is too high  · B  stands for blood pressure   High blood pressure can increase your risk for a heart attack, stroke, or kidney disease  Most older adults with diabetes should have a systolic blood pressure (first number) of 140  Your diastolic blood pressure (second number) should be below 90  Ask your healthcare provider if these blood pressure goals are right for you  · C  stands for cholesterol   High levels of cholesterol can block your arteries and cause a heart attack or stroke  Ask your healthcare provider what your cholesterol levels should be  · S  stands for stop smoking   Nicotine and other chemicals in cigarettes and cigars can cause lung damage and make it more difficult to manage your diabetes    Call 911 if you have any of the following:   · You have any of the following signs of a stroke: ¨ Numbness or drooping on one side of your face     ¨ Weakness in an arm or leg    ¨ Confusion or difficulty speaking    ¨ Dizziness, a severe headache, or vision loss    · You have any of the following signs of a heart attack:      ¨ Squeezing, pressure, or pain in your chest that lasts longer than 5 minutes or returns    ¨ Discomfort or pain in your back, neck, jaw, stomach, or arm     ¨ Trouble breathing    ¨ Nausea or vomiting    ¨ Lightheadedness or a sudden cold sweat, especially with chest pain or trouble breathing  Seek care immediately if:   · You have severe abdominal pain, or the pain spreads to your back  You may also be vomiting  · You have trouble staying awake or focusing  · You are shaking or sweating  · You have blurred or double vision  · Your breath has a fruity, sweet smell  · Your breathing is deep and labored, or rapid and shallow  · Your heartbeat is fast and weak  · You fall and get hurt  Contact your healthcare provider if:   · You are vomiting or have diarrhea  · You have an upset stomach and cannot eat the foods on your meal plan  · You feel weak or more tired than usual      · You feel dizzy, have headaches, or are easily irritated  · Your skin is red, warm, dry, or swollen  · You have a wound that does not heal      · You have numbness in your arms or legs  · You have trouble coping with your illness, or you feel anxious or depressed  · You have problems with your memory  · You have changes in your vision  · You have questions or concerns about your condition or care  Treatment for diabetes  includes keeping your blood sugar at a normal level  You must eat the right foods, and exercise regularly  You may need medicine if you cannot control your blood sugar level with nutrition and exercise  You may also need medicine to lower your blood pressure or cholesterol, or medicine to prevent blood clots     Manage the ABCs and prevent problems caused by diabetes:   · Check your blood sugar levels as directed  Your healthcare provider will tell you when and how often to check during the day  Your healthcare provider will also tell you what your blood sugar levels should be before and after a meal  You may need to check for ketones in your urine or blood if your level is higher than directed  Write down your results and show them to your healthcare provider  Your provider may use the results to make changes to your medicine, food, or exercise schedules  Ask your healthcare provider for more information about how to treat a high or low blood sugar level  · Follow your meal plan as directed  A dietitian will help you make a meal plan to keep your blood sugar level steady and make sure you get enough nutrition  Do not skip meals  Your blood sugar level may drop too low if you have taken diabetes medicine and do not eat  Ask your healthcare provider about programs in your community that can deliver the meals to your home  · Try to be active for 30 to 60 minutes most days of the week  Exercise can help keep your blood sugar level steady, decrease your risk of heart disease, and help you lose weight  It can also help improve your balance and decrease your risk for falls  Work with your healthcare provider to create an exercise plan  Ask a family member or friend to exercise with you  Start slow and exercise for 5 to 10 minutes at a time  Examples of activities include walking or swimming  Include muscle strengthening activities 2 to 3 days each week  Include balance training 2 to 3 times each week  Activities that help increase balance include yoga and chana chi      · Maintain a healthy weight  Ask your healthcare provider how much you should weigh  A healthy weight can help you control your diabetes and prevent heart disease  Ask your provider to help you create a weight loss plan if you are overweight   Together you can set manageable weight loss goals  · Do not smoke  Ask your healthcare provider for information if you currently smoke and need help to quit  Do not use e-cigarettes or smokeless tobacco in place of cigarettes or to help you quit  They still contain nicotine  · Manage stress  Stress may increase your blood sugar level  Deep breathing, muscle relaxation, and music may help you relax  Ask your healthcare provider for more information about these practices  Other ways to manage your diabetes:   · Check your feet every day for sores  Look at your whole foot, including the bottom, and between and under your toes  Check for wounds, corns, and calluses  Use a mirror to see the bottom of your feet  The skin on your feet may be shiny, tight, dry, or darker than normal  Your feet may also be cold and pale  Feel your feet by running your hands along the tops, bottoms, sides, and between your toes  Redness, swelling, and warmth are signs of blood flow problems that can lead to a foot ulcer  Do not try to remove corns or calluses yourself  · Wear medical alert identification  Wear medical alert jewelry or carry a card that says you have diabetes  Ask your healthcare provider where to get these items  · Ask about vaccines  You have a higher risk for serious illness if you get the flu, pneumonia, or hepatitis  Ask your healthcare provider if you should get a flu, pneumonia, shingles, or hepatitis B vaccine, and when to get the vaccine  · Keep all appointments  You may need to return to have your A1c checked every 3 months  You will need to return at least once each year to have your feet checked  You will need an eye exam once a year to check for retinopathy  You will also need urine tests every year to check for kidney problems  You may need tests to monitor for heart disease  Write down your questions so you remember to ask them during your visits  · Get help from family and friends    You may need help checking your blood sugar level, giving insulin injections, or preparing your meals  Ask your family and friends to help you with these tasks  Talk to your healthcare provider if you do not have someone at home to help you  A healthcare provider can come to your home to help you with these tasks  Follow up with your healthcare provider as directed: You may need to return to have your A1c checked every 3 months  You will need to return at least once each year to have your feet checked  You will need an eye exam once a year to check for retinopathy  You will also need urine tests every year to check for kidney problems  You may need tests to monitor for heart disease  Write down your questions so you remember to ask them during your visits  © 2017 [facility] Information is for End User's use only and may not be sold, redistributed or otherwise used for commercial purposes  All illustrations and images included in CareNotes® are the copyrighted property of [employer]  or [employer]  The above information is an  only  It is not intended as medical advice for individual conditions or treatments  Talk to your doctor, nurse or pharmacist before following any medical regimen to see if it is safe and effective for you

## 2019-10-18 NOTE — PROGRESS NOTES
Assessment/Plan:       Problem List Items Addressed This Visit        Digestive    GERD (gastroesophageal reflux disease) - Primary     GERD symptoms asymptomatic at this time continue with current diet avoid food late at night and food rich in acid if symptoms return contact me for follow-up evaluation and Gastroenterology evaluation            Endocrine    Un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diabetes mellitus with hyperglycemia ([address] )       Lab Results   Component Value Date    HGBA1C 7 1 (H) 06/05/2019    Diabetes with last A1c at 7 1 patient is continuing on metformin she will need a repeat A1c done  Doing relatively well otherwise watching diet and will follow up at next office visit  A1c done today at this visit showing 6 5 which is an improvement now as she has been watching diet more and active over the summer         Relevant Orders    POCT hemoglobin A1c (Completed)       Cardiovascular and Mediastinum    Sick sinus syndrome (HCC) (Chronic)     Sick sinus syndrome by history continue with Cardiology management and follow up with me at next office visit no change in medication plan at this time patient has noticed shortness of breath or chest pain  Pacemaker management by Cardiology         Essential hypertension     Essential hypertension stable at this time patient i on Maxzide and will continue with this at this point and follow up at next office visit avoiding sodium in the diet exercising regularly            Other    Memory loss     Memory loss with age related cognitive decline she is stable and alert and oriented but has difficulty with short-term memory which appears to be slow cognitive decline due to aging no immediate attention or plan at this point to change her current status will follow up with her on a regular basis at 3-4 months                 Subjective:      Patient ID: Hair Menendez is a 82 y o  female      Patient presents for general physical exam review of laboratory work and medications at this time she is doing relatively well she has back pain at times and joint pains worse when the weather is damp barometric pressure drops and it rains patient feels cold all the time she notes that she needs to remain near the fireplace or by the stove and she feels better at that point but otherwise no pain in her extremities      The following portions of the patient's history were reviewed and updated as appropriate: allergies, current medications, past family history, past medical history, past social history, past surgical history and problem list     Review of Systems   Constitutional: Negative for chills, fatigue and fever  HENT: Negative for congestion, nosebleeds, rhinorrhea, sinus pressure and sore throat  Eyes: Negative for discharge and redness  Respiratory: Negative for cough and shortness of breath  Cardiovascular: Negative for chest pain, palpitations and leg swelling  Gastrointestinal: Negative for abdominal pain, blood in stool and nausea  Endocrine: Negative for cold intolerance, heat intolerance and polyuria  Genitourinary: Negative for dysuria and frequency  Musculoskeletal: Positive for arthralgias, back pain, gait problem and myalgias  Skin: Negative for rash  Neurological: Negative for dizziness, weakness and headaches  Hematological: Negative for adenopathy  Psychiatric/Behavioral: Negative for behavioral problems and sleep disturbance  The patient is not nervous/anxious  Objective:      /70 (BP Location: Left arm, Patient Position: Sitting)   Pulse 78   Ht 5' 3" (1 6 m)   Wt 73 5 kg (162 lb)   SpO2 98%   BMI 28 70 kg/m²        Physical Exam   Constitutional: She is oriented to person, place, and time  She appears well-developed and well-nourished  No distress  HENT:   Head: Normocephalic and atraumatic     Right Ear: External ear normal    Left Ear: External ear normal    Nose: Nose normal    Mouth/Throat: Oropharynx is clear and moist  No oropharyngeal exudate  Eyes: Pupils are equal, round, and reactive to light  Conjunctivae and EOM are normal  Right eye exhibits no discharge  Left eye exhibits no discharge  No scleral icterus  Neck: Normal range of motion  No JVD present  No thyromegaly present  Cardiovascular: Normal rate, regular rhythm and normal heart sounds  No murmur heard  Pulmonary/Chest: Effort normal  She has no wheezes  She has no rales  She exhibits no tenderness  Abdominal: Soft  Bowel sounds are normal  She exhibits no distension and no mass  There is no tenderness  Musculoskeletal: Normal range of motion  She exhibits no edema, tenderness or deformity  Lymphadenopathy:     She has no cervical adenopathy  Neurological: She is alert and oriented to person, place, and time  She has normal reflexes  She displays normal reflexes  No cranial nerve deficit  Coordination normal    Skin: Skin is warm and dry  No rash noted  Psychiatric: She has a normal mood and affect  Her behavior is normal  Judgment and thought content normal    Nursing note and vitals reviewed  Data:    Laboratory Results: I have personally reviewed the pertinent laboratory results/reports   Radiology/Other Diagnostic Testing Results: I have personally reviewed pertinent reports         Lab Results   Component Value Date    WBC 6 37 10/01/2019    HGB 13 0 10/01/2019    HCT 40 6 10/01/2019    MCV 95 10/01/2019     10/01/2019     Lab Results   Component Value Date    K 3 3 (L) 10/01/2019     10/01/2019    CO2 31 10/01/2019    BUN 8 10/01/2019    CREATININE 0 79 10/01/2019    GLUF 104 (H) 06/05/2019    CALCIUM 11 7 (H) 10/01/2019    CORRECTEDCA 11 7 (H) 10/01/2019    AST 35 10/01/2019    ALT 36 10/01/2019    ALKPHOS 97 10/01/2019    EGFR 70 10/01/2019     Lab Results   Component Value Date    CHOLESTEROL 134 06/05/2019    CHOLESTEROL 164 03/05/2019    CHOLESTEROL 156 09/18/2018     Lab Results   Component Value Date    HDL 45 06/05/2019 HDL 58 03/05/2019    HDL 51 09/18/2018     Lab Results   Component Value Date    LDLCALC 58 06/05/2019    LDLCALC 76 03/05/2019    LDLCALC 71 09/18/2018     Lab Results   Component Value Date    TRIG 156 (H) 06/05/2019    TRIG 148 03/05/2019    TRIG 168 (H) 09/18/2018     No results found for: [place], [place]  Lab Results   Component Value Date    VRC2SKOAOPJS 1 690 06/05/2019     Lab Results   Component Value Date    HGBA1C 6 5 10/18/2019     No results found for: BRAYDEN Franco,

## 2019-10-18 NOTE — ASSESSMENT & PLAN NOTE
Lab Results   Component Value Date    HGBA1C 7 1 (H) 06/05/2019    Diabetes with last A1c at 7 1 patient is continuing on metformin she will need a repeat A1c done  Doing relatively well otherwise watching diet and will follow up at next office visit    A1c done today at this visit showing 6 5 which is an improvement now as she has been watching diet more and active over the summer

## 2019-10-18 NOTE — ASSESSMENT & PLAN NOTE
Essential hypertension stable at this time patient i on Maxzide and will continue with this at this point and follow up at next office visit avoiding sodium in the diet exercising regularly

## 2019-10-18 NOTE — ASSESSMENT & PLAN NOTE
Memory loss with age related cognitive decline she is stable and alert and oriented but has difficulty with short-term memory which appears to be slow cognitive decline due to aging no immediate attention or plan at this point to change her current status will follow up with her on a regular basis at 3-4 months

## 2019-10-18 NOTE — PROGRESS NOTES
Assessment and Plan:     Problem List Items Addressed This Visit        Digestive    GERD (gastroesophageal reflux disease) - Primary     GERD symptoms asymptomatic at this time continue with current diet avoid food late at night and food rich in acid if symptoms return contact me for follow-up evaluation and Gastroenterology evaluation            Endocrine    Un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diabetes mellitus with hyperglycemia ([address] )       Lab Results   Component Value Date    HGBA1C 7 1 (H) 06/05/2019    Diabetes with last A1c at 7 1 patient is continuing on metformin she will need a repeat A1c done  Doing relatively well otherwise watching diet and will follow up at next office visit  A1c done today at this visit showing 6 5 which is an improvement now as she has been watching diet more and active over the summer         Relevant Orders    POCT hemoglobin A1c (Completed)       Cardiovascular and Mediastinum    Sick sinus syndrome (HCC) (Chronic)     Sick sinus syndrome by history continue with Cardiology management and follow up with me at next office visit no change in medication plan at this time patient has noticed shortness of breath or chest pain    Pacemaker management by Cardiology         Essential hypertension     Essential hypertension stable at this time patient i on Maxzide and will continue with this at this point and follow up at next office visit avoiding sodium in the diet exercising regularly            Other    Memory loss     Memory loss with age related cognitive decline she is stable and alert and oriented but has difficulty with short-term memory which appears to be slow cognitive decline due to aging no immediate attention or plan at this point to change her current status will follow up with her on a regular basis at 3-4 months         Medicare annual wellness visit, subsequent           Preventive health issues were discussed with patient, and age appropriate screening tests were ordered as noted in patient's After Visit Summary  Personalized health advice and appropriate referrals for health education or preventive services given if needed, as noted in patient's After Visit Summary  History of Present Illness:     Patient presents for Welcome to Medicare visit  Patient Care Team:  Ashwin Rojo DO as PCP - General (Family Medicine)     Review of Systems:     Review of Systems   Problem List:     Patient Active Problem List   Diagnosis    Un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diabetes mellitus with hyperglycemia [facility]    Essential hypertension    Hyperlipidemia    Left breast mass    Depression    Hyponatremia    Hypokalemia    Gross hematuria    Dizziness    Bradycardia    Sick sinus syndrome ([address] )    Cardiac pacemaker in situ    Abnormal ultrasound cardiogram    Anxiety    BMI 33 0-33 9,adult    Breast carcinoma, female, right ([facility] )    GERD (gastroesophageal reflux disease)    Macular degeneration    Malignant neoplasm of upper-inner quadrant of left breast in female, estrogen receptor positive [facility]    Personal history of breast cancer    S/P hysterectomy    Memory loss    Medicare annual wellness visit, subsequent      Past Medical and Surgical History:     Past Medical History:   Diagnosis Date    Cardiac disease     Diabetes mellitus ([facility] )     History of echocardiogram 02/14/2018    EF 0 55 (55%), trace mitral regurgitation   Hyperlipidemia     Hypertension      Past Surgical History:   Procedure Laterality Date    APPENDECTOMY      BREAST LUMPECTOMY Bilateral     CARDIAC PACEMAKER PLACEMENT Left 7/5/2018    [facility]    HYSTERECTOMY        Family History:     Family History   Problem Relation Age of Onset    Alzheimer's disease Father       Social History:     Social History     Socioeconomic History    Marital status:       Spouse name: Not on file    Number of children: Not on file    Years of education: Not on file    Highest education level: Not on file Occupational History    Not on file   Social Needs    Financial resource strain: Not on file    Food insecurity:     Worry: Not on file     Inability: Not on file    Transportation needs:     Medical: Not on file     Non-medical: Not on file   Tobacco Use    Smoking status: Never Smoker    Smokeless tobacco: Never Used   Substance and Sexual Activity    Alcohol use: No    Drug use: No    Sexual activity: Not on file   Lifestyle    Physical activity:     Days per week: Not on file     Minutes per session: Not on file    Stress: Not on file   Relationships    Social connections:     Talks on phone: Not on file     Gets together: Not on file     Attends Rastafarian service: Not on file     Active member of club or organization: Not on file     Attends meetings of clubs or organizations: Not on file     Relationship status: Not on file    Intimate partner violence:     Fear of current or ex partner: Not on file     Emotionally abused: Not on file     Physically abused: Not on file     Forced sexual activity: Not on file   Other Topics Concern    Not on file   Social History Narrative    Not on file      Medications and Allergies:     Current Outpatient Medications   Medication Sig Dispense Refill    anastrozole (ARIMIDEX) 1 mg tablet Take 1 mg by mouth daily      atorvastatin (LIPITOR) 40 mg tablet TAKE ONE TABLET BY MOUTH ONE TIME DAILY  90 tablet 0    DOCOSAHEXAENOIC ACID PO Take 2 capsules by mouth daily      glucose blood (FREESTYLE LITE) test strip 1 strip daily      metFORMIN (GLUCOPHAGE) 500 mg tablet TAKE ONE TABLET BY MOUTH ONE TIME DAILY  90 tablet 0    polyethylene glycol-propylene glycol (SYSTANE) 0 4-0 3 % Apply 4 drops to eye 2 (two) times a day      potassium chloride (K-DUR,KLOR-CON) 10 mEq tablet Take 1 tablet (10 mEq total) by mouth daily 90 tablet 3    sertraline (ZOLOFT) 50 mg tablet Take 1 tablet (50 mg total) by mouth daily 90 tablet 2    triamterene-hydrochlorothiazide (MAXZIDE-25) 37 5-25 mg per tablet TAKE ONE TABLET BY MOUTH ONE TIME DAILY  90 tablet 0     No current facility-administered medications for this visit  Allergies   Allergen Reactions    Celecoxib Swelling     celebrex- swelling of throat    Loratadine Swelling     Jaw swelled,couldn't swallow; face swelling    Aspirin GI Intolerance     Other reaction(s): nausea; okay with Ecotrin    Lisinopril Swelling     denies      Immunizations:     Immunization History   Administered Date(s) Administered    INFLUENZA 09/01/2019      Health Maintenance: There are no preventive care reminders to display for this patient  Topic Date Due    INFLUENZA VACCINE  07/01/2019      Medicare Screening Tests and Risk Assessments:     Chauncey Gage is here for her Subsequent Wellness visit  Health Risk Assessment:   Patient rates overall health as good  Patient feels that their physical health rating is same  Eyesight was rated as slightly worse  Hearing was rated as same  Patient feels that their emotional and mental health rating is same  Pain experienced in the last 7 days has been some  Patient's pain rating has been 2/10  Patient states that she has experienced no weight loss or gain in last 6 months  Depression Screening:   PHQ-2 Score: 0  PHQ-9 Score: 0      Fall Risk Screening: In the past year, patient has experienced: no history of falling in past year      Urinary Incontinence Screening:   Patient has leaked urine accidently in the last six months  Home Safety:  Patient does not have trouble with stairs inside or outside of their home  Patient has working smoke alarms and has working carbon monoxide detector  Home safety hazards include: none  Nutrition:   Current diet is Regular  Medications:   Patient is currently taking over-the-counter supplements  OTC medications include: see medication list  Patient is able to manage medications       Activities of Daily Living (ADLs)/Instrumental Activities of Daily Living (IADLs):   Walk and transfer into and out of bed and chair?: Yes  Dress and groom yourself?: Yes    Bathe or shower yourself?: Yes    Feed yourself? Yes  Do your laundry/housekeeping?: Yes  Manage your money, pay your bills and track your expenses?: Yes  Make your own meals?: Yes    Do your own shopping?: Yes    Previous Hospitalizations:   Any hospitalizations or ED visits within the last 12 months?: No      Advance Care Planning:     Five wishes given: Yes    End of Life Decisions reviewed with patient: Yes      PREVENTIVE SCREENINGS      Cardiovascular Screening:    General: Screening Not Indicated and History Lipid Disorder      Diabetes Screening:     General: Screening Not Indicated and History Diabetes      Colorectal Cancer Screening:     General: Risks and Benefits Discussed      Breast Cancer Screening:     General: History Breast Cancer      Cervical Cancer Screening:    General: Screening Not Indicated      Abdominal Aortic Aneurysm (AAA) Screening:        General: Risks and Benefits Discussed      Lung Cancer Screening:     General: Risks and Benefits Discussed      Hepatitis C Screening:    General: Risks and Benefits Discussed    Other Counseling Topics:   Calcium and vitamin D intake and regular weightbearing exercise       No exam data present     Physical Exam:     /70 (BP Location: Left arm, Patient Position: Sitting)   Pulse 78   Ht 5' 3" (1 6 m)   Wt 73 5 kg (162 lb)   SpO2 98%   BMI 28 70 kg/m²     Physical Exam    Elizabeth Ortega DO

## 2019-10-28 RX ORDER — CEPHALEXIN 250 MG/1
CAPSULE ORAL
Qty: 42 CAPSULE | Refills: 0 | OUTPATIENT
Start: 2019-10-28 | End: 2019-11-06

## 2019-10-28 NOTE — TELEPHONE ENCOUNTER
PT needs an antibiotic, due to she fell and may have hit her kidney and has some blood in her urine, she has no pain and not sure if this will help or not, please advise otherwise if needed

## 2019-10-28 NOTE — TELEPHONE ENCOUNTER
Pt stated that her urine is now clear and doesn't have any pain, will call tomorrow if she thinks she needs medication still

## 2019-10-28 NOTE — TELEPHONE ENCOUNTER
This patient should come in for an appointment due to her age and the nature of the problem it needs to be evaluated more

## 2019-12-20 ENCOUNTER — TRANSCRIBE ORDERS (OUTPATIENT)
Dept: ADMINISTRATIVE | Facility: HOSPITAL | Age: 84
End: 2019-12-20

## 2019-12-20 DIAGNOSIS — Z85.3 PERSONAL HISTORY OF MALIGNANT NEOPLASM OF BREAST: Primary | ICD-10-CM

## 2020-01-06 DIAGNOSIS — E11.65 UNCONTROLLED TYPE 2 DIABETES MELLITUS WITH HYPERGLYCEMIA (HCC): ICD-10-CM

## 2020-01-06 RX ORDER — ATORVASTATIN CALCIUM 40 MG/1
40 TABLET, FILM COATED ORAL DAILY
Qty: 90 TABLET | Refills: 0 | Status: SHIPPED | OUTPATIENT
Start: 2020-01-06 | End: 2020-03-31 | Stop reason: SDUPTHER

## 2020-01-14 ENCOUNTER — REMOTE DEVICE CLINIC VISIT (OUTPATIENT)
Dept: CARDIOLOGY CLINIC | Facility: CLINIC | Age: 85
End: 2020-01-14
Payer: MEDICARE

## 2020-01-14 DIAGNOSIS — Z45.010 ENCOUNTER FOR CHECKING AND TESTING OF CARDIAC PACEMAKER PULSE GENERATOR (BATTERY): ICD-10-CM

## 2020-01-14 DIAGNOSIS — I49.5 SICK SINUS SYNDROME (HCC): Primary | ICD-10-CM

## 2020-01-14 PROCEDURE — 93294 REM INTERROG EVL PM/LDLS PM: CPT | Performed by: INTERNAL MEDICINE

## 2020-01-14 PROCEDURE — 93296 REM INTERROG EVL PM/IDS: CPT | Performed by: INTERNAL MEDICINE

## 2020-01-15 DIAGNOSIS — E11.65 UNCONTROLLED TYPE 2 DIABETES MELLITUS WITH HYPERGLYCEMIA (HCC): ICD-10-CM

## 2020-02-03 ENCOUNTER — APPOINTMENT (OUTPATIENT)
Dept: LAB | Facility: CLINIC | Age: 85
End: 2020-02-03
Payer: MEDICARE

## 2020-02-03 DIAGNOSIS — E11.65 UNCONTROLLED TYPE 2 DIABETES MELLITUS WITH HYPERGLYCEMIA (HCC): ICD-10-CM

## 2020-02-03 DIAGNOSIS — I10 ESSENTIAL HYPERTENSION: ICD-10-CM

## 2020-02-03 LAB
ALBUMIN SERPL BCP-MCNC: 4.1 G/DL (ref 3.5–5)
ALP SERPL-CCNC: 109 U/L (ref 46–116)
ALT SERPL W P-5'-P-CCNC: 53 U/L (ref 12–78)
ANION GAP SERPL CALCULATED.3IONS-SCNC: 4 MMOL/L (ref 4–13)
AST SERPL W P-5'-P-CCNC: 40 U/L (ref 5–45)
BASOPHILS # BLD AUTO: 0.02 THOUSANDS/ΜL (ref 0–0.1)
BASOPHILS NFR BLD AUTO: 0 % (ref 0–1)
BILIRUB SERPL-MCNC: 0.54 MG/DL (ref 0.2–1)
BUN SERPL-MCNC: 11 MG/DL (ref 5–25)
CALCIUM ALBUM COR SERPL-MCNC: 11.2 MG/DL (ref 8.3–10.1)
CALCIUM SERPL-MCNC: 11.3 MG/DL (ref 8.3–10.1)
CHLORIDE SERPL-SCNC: 101 MMOL/L (ref 100–108)
CHOLEST SERPL-MCNC: 153 MG/DL (ref 50–200)
CO2 SERPL-SCNC: 32 MMOL/L (ref 21–32)
CREAT SERPL-MCNC: 0.83 MG/DL (ref 0.6–1.3)
EOSINOPHIL # BLD AUTO: 0.11 THOUSAND/ΜL (ref 0–0.61)
EOSINOPHIL NFR BLD AUTO: 2 % (ref 0–6)
ERYTHROCYTE [DISTWIDTH] IN BLOOD BY AUTOMATED COUNT: 12.4 % (ref 11.6–15.1)
EST. AVERAGE GLUCOSE BLD GHB EST-MCNC: 137 MG/DL
GFR SERPL CREATININE-BSD FRML MDRD: 66 ML/MIN/1.73SQ M
GLUCOSE P FAST SERPL-MCNC: 95 MG/DL (ref 65–99)
HBA1C MFR BLD: 6.4 % (ref 4.2–6.3)
HCT VFR BLD AUTO: 42.2 % (ref 34.8–46.1)
HDLC SERPL-MCNC: 61 MG/DL
HGB BLD-MCNC: 13.7 G/DL (ref 11.5–15.4)
IMM GRANULOCYTES # BLD AUTO: 0.02 THOUSAND/UL (ref 0–0.2)
IMM GRANULOCYTES NFR BLD AUTO: 0 % (ref 0–2)
LDLC SERPL CALC-MCNC: 63 MG/DL (ref 0–100)
LYMPHOCYTES # BLD AUTO: 1.22 THOUSANDS/ΜL (ref 0.6–4.47)
LYMPHOCYTES NFR BLD AUTO: 20 % (ref 14–44)
MCH RBC QN AUTO: 30.9 PG (ref 26.8–34.3)
MCHC RBC AUTO-ENTMCNC: 32.5 G/DL (ref 31.4–37.4)
MCV RBC AUTO: 95 FL (ref 82–98)
MONOCYTES # BLD AUTO: 0.46 THOUSAND/ΜL (ref 0.17–1.22)
MONOCYTES NFR BLD AUTO: 7 % (ref 4–12)
NEUTROPHILS # BLD AUTO: 4.4 THOUSANDS/ΜL (ref 1.85–7.62)
NEUTS SEG NFR BLD AUTO: 71 % (ref 43–75)
NONHDLC SERPL-MCNC: 92 MG/DL
NRBC BLD AUTO-RTO: 0 /100 WBCS
PLATELET # BLD AUTO: 261 THOUSANDS/UL (ref 149–390)
PMV BLD AUTO: 9.3 FL (ref 8.9–12.7)
POTASSIUM SERPL-SCNC: 3.5 MMOL/L (ref 3.5–5.3)
PROT SERPL-MCNC: 7.7 G/DL (ref 6.4–8.2)
RBC # BLD AUTO: 4.43 MILLION/UL (ref 3.81–5.12)
SODIUM SERPL-SCNC: 137 MMOL/L (ref 136–145)
TRIGL SERPL-MCNC: 146 MG/DL
TSH SERPL DL<=0.05 MIU/L-ACNC: 1.61 UIU/ML (ref 0.36–3.74)
WBC # BLD AUTO: 6.23 THOUSAND/UL (ref 4.31–10.16)

## 2020-02-03 PROCEDURE — 80061 LIPID PANEL: CPT

## 2020-02-03 PROCEDURE — 85025 COMPLETE CBC W/AUTO DIFF WBC: CPT

## 2020-02-03 PROCEDURE — 36415 COLL VENOUS BLD VENIPUNCTURE: CPT

## 2020-02-03 PROCEDURE — 83036 HEMOGLOBIN GLYCOSYLATED A1C: CPT

## 2020-02-03 PROCEDURE — 80053 COMPREHEN METABOLIC PANEL: CPT

## 2020-02-03 PROCEDURE — 84443 ASSAY THYROID STIM HORMONE: CPT

## 2020-02-10 ENCOUNTER — OFFICE VISIT (OUTPATIENT)
Dept: FAMILY MEDICINE CLINIC | Facility: CLINIC | Age: 85
End: 2020-02-10
Payer: MEDICARE

## 2020-02-10 VITALS
SYSTOLIC BLOOD PRESSURE: 122 MMHG | WEIGHT: 161.4 LBS | HEIGHT: 63 IN | TEMPERATURE: 97.8 F | DIASTOLIC BLOOD PRESSURE: 78 MMHG | HEART RATE: 62 BPM | OXYGEN SATURATION: 97 % | BODY MASS INDEX: 28.6 KG/M2

## 2020-02-10 DIAGNOSIS — C50.212 MALIGNANT NEOPLASM OF UPPER-INNER QUADRANT OF LEFT BREAST IN FEMALE, ESTROGEN RECEPTOR POSITIVE (HCC): ICD-10-CM

## 2020-02-10 DIAGNOSIS — I49.5 SICK SINUS SYNDROME (HCC): ICD-10-CM

## 2020-02-10 DIAGNOSIS — K21.9 GASTROESOPHAGEAL REFLUX DISEASE WITHOUT ESOPHAGITIS: Primary | ICD-10-CM

## 2020-02-10 DIAGNOSIS — I10 ESSENTIAL HYPERTENSION: ICD-10-CM

## 2020-02-10 DIAGNOSIS — E11.65 UNCONTROLLED TYPE 2 DIABETES MELLITUS WITH HYPERGLYCEMIA (HCC): ICD-10-CM

## 2020-02-10 DIAGNOSIS — Z17.0 MALIGNANT NEOPLASM OF UPPER-INNER QUADRANT OF LEFT BREAST IN FEMALE, ESTROGEN RECEPTOR POSITIVE (HCC): ICD-10-CM

## 2020-02-10 DIAGNOSIS — E78.2 MIXED HYPERLIPIDEMIA: ICD-10-CM

## 2020-02-10 PROCEDURE — 3008F BODY MASS INDEX DOCD: CPT | Performed by: FAMILY MEDICINE

## 2020-02-10 PROCEDURE — 3074F SYST BP LT 130 MM HG: CPT | Performed by: FAMILY MEDICINE

## 2020-02-10 PROCEDURE — 4040F PNEUMOC VAC/ADMIN/RCVD: CPT | Performed by: FAMILY MEDICINE

## 2020-02-10 PROCEDURE — 3044F HG A1C LEVEL LT 7.0%: CPT | Performed by: FAMILY MEDICINE

## 2020-02-10 PROCEDURE — 1160F RVW MEDS BY RX/DR IN RCRD: CPT | Performed by: FAMILY MEDICINE

## 2020-02-10 PROCEDURE — 3078F DIAST BP <80 MM HG: CPT | Performed by: FAMILY MEDICINE

## 2020-02-10 PROCEDURE — 1036F TOBACCO NON-USER: CPT | Performed by: FAMILY MEDICINE

## 2020-02-10 PROCEDURE — 99214 OFFICE O/P EST MOD 30 MIN: CPT | Performed by: FAMILY MEDICINE

## 2020-02-10 RX ORDER — TRIAMTERENE AND HYDROCHLOROTHIAZIDE 37.5; 25 MG/1; MG/1
1 TABLET ORAL DAILY
Qty: 90 TABLET | Refills: 0 | Status: SHIPPED | OUTPATIENT
Start: 2020-02-10 | End: 2020-05-04 | Stop reason: SDUPTHER

## 2020-02-10 RX ORDER — POTASSIUM CHLORIDE 750 MG/1
TABLET, FILM COATED, EXTENDED RELEASE ORAL
COMMUNITY
Start: 2019-11-30 | End: 2021-02-23 | Stop reason: ALTCHOICE

## 2020-02-10 NOTE — ASSESSMENT & PLAN NOTE
Hyperlipidemia continue with atorvastatin 40 mg lipid profile stable follow-up at next office visit in 4 months

## 2020-02-10 NOTE — PATIENT INSTRUCTIONS
Heart Healthy Diet   WHAT YOU NEED TO KNOW:   A heart healthy diet is an eating plan low in total fat, unhealthy fats, and sodium (salt)  A heart healthy diet helps decrease your risk for heart disease and stroke  Limit the amount of fat you eat to 25% to 35% of your total daily calories  Limit sodium to less than 2,300 mg each day  DISCHARGE INSTRUCTIONS:   Healthy fats:  Healthy fats can help improve cholesterol levels  The risk for heart disease is decreased when cholesterol levels are normal  Choose healthy fats, such as the following:  · Unsaturated fat  is found in foods such as soybean, canola, olive, corn, and safflower oils  It is also found in soft tub margarine that is made with liquid vegetable oil  · Omega-3 fat  is found in certain fish, such as salmon, tuna, and trout, and in walnuts and flaxseed  Unhealthy fats:  Unhealthy fats can cause unhealthy cholesterol levels in your blood and increase your risk of heart disease  Limit unhealthy fats, such as the following:  · Cholesterol  is found in animal foods, such as eggs and lobster, and in dairy products made from whole milk  Limit cholesterol to less than 300 milligrams (mg) each day  You may need to limit cholesterol to 200 mg each day if you have heart disease  · Saturated fat  is found in meats, such as schultz and hamburger  It is also found in chicken or turkey skin, whole milk, and butter  Limit saturated fat to less than 7% of your total daily calories  Limit saturated fat to less than 6% if you have heart disease or are at increased risk for it  · Trans fat  is found in packaged foods, such as potato chips and cookies  It is also in hard margarine, some fried foods, and shortening  Avoid trans fats as much as possible    Heart healthy foods and drinks to include:  Ask your dietitian or healthcare provider how many servings to have from each of the following food groups:  · Grains:      ¨ Whole-wheat breads, cereals, and pastas, and brown rice    ¨ Low-fat, low-sodium crackers and chips    · Vegetables:      ¨ Broccoli, green beans, green peas, and spinach    ¨ Collards, kale, and lima beans    ¨ Carrots, sweet potatoes, tomatoes, and peppers    ¨ Canned vegetables with no salt added    · Fruits:      ¨ Bananas, peaches, pears, and pineapple    ¨ Grapes, raisins, and dates    ¨ Oranges, tangerines, grapefruit, orange juice, and grapefruit juice    ¨ Apricots, mangoes, melons, and papaya    ¨ Raspberries and strawberries    ¨ Canned fruit with no added sugar    · Low-fat dairy products:      ¨ Nonfat (skim) milk, 1% milk, and low-fat almond, cashew, or soy milks fortified with calcium    ¨ Low-fat cheese, regular or frozen yogurt, and cottage cheese    · Meats and proteins , such as lean cuts of beef and pork (loin, leg, round), skinless chicken and turkey, legumes, soy products, egg whites, and nuts  Foods and drinks to limit or avoid:  Ask your dietitian or healthcare provider about these and other foods that are high in unhealthy fat, sodium, and sugar:  · Snack or packaged foods , such as frozen dinners, cookies, macaroni and cheese, and cereals with more than 300 mg of sodium per serving    · Canned or dry mixes  for cakes, soups, sauces, or gravies    · Vegetables with added sodium , such as instant potatoes, vegetables with added sauces, or regular canned vegetables    · Other foods high in sodium , such as ketchup, barbecue sauce, salad dressing, pickles, olives, soy sauce, and miso    · High-fat dairy foods  such as whole or 2% milk, cream cheese, or sour cream, and cheeses     · High-fat protein foods  such as high-fat cuts of beef (T-bone steaks, ribs), chicken or turkey with skin, and organ meats, such as liver    · Cured or smoked meats , such as hot dogs, schultz, and sausage    · Unhealthy fats and oils , such as butter, stick margarine, shortening, and cooking oils such as coconut or palm oil    · Food and drinks high in sugar , such as soft drinks (soda), sports drinks, sweetened tea, candy, cake, cookies, pies, and doughnuts  Other diet guidelines to follow:   · Eat more foods containing omega-3 fats  Eat fish high in omega-3 fats at least 2 times a week  · Limit alcohol  Too much alcohol can damage your heart and raise your blood pressure  Women should limit alcohol to 1 drink a day  Men should limit alcohol to 2 drinks a day  A drink of alcohol is 12 ounces of beer, 5 ounces of wine, or 1½ ounces of liquor  · Choose low-sodium foods  High-sodium foods can lead to high blood pressure  Add little or no salt to food you prepare  Use herbs and spices in place of salt  · Eat more fiber  to help lower cholesterol levels  Eat at least 5 servings of fruits and vegetables each day  Eat 3 ounces of whole-grain foods each day  Legumes (beans) are also a good source of fiber  Lifestyle guidelines:   · Do not smoke  Nicotine and other chemicals in cigarettes and cigars can cause lung and heart damage  Ask your healthcare provider for information if you currently smoke and need help to quit  E-cigarettes or smokeless tobacco still contain nicotine  Talk to your healthcare provider before you use these products  · Exercise regularly  to help you maintain a healthy weight and improve your blood pressure and cholesterol levels  Ask your healthcare provider about the best exercise plan for you  Do not start an exercise program without asking your healthcare provider  Follow up with your healthcare provider as directed:  Write down your questions so you remember to ask them during your visits  © 2017 [employer] [employer] Information is for End User's use only and may not be sold, redistributed or otherwise used for commercial purposes  All illustrations and images included in CareNotes® are the copyrighted property of [employer]  or [employer]  The above information is an  only   It is not intended as medical advice for individual conditions or treatments  Talk to your doctor, nurse or pharmacist before following any medical regimen to see if it is safe and effective for you  Heart Healthy Diet   WHAT YOU NEED TO KNOW:   A heart healthy diet is an eating plan low in total fat, unhealthy fats, and sodium (salt)  A heart healthy diet helps decrease your risk for heart disease and stroke  Limit the amount of fat you eat to 25% to 35% of your total daily calories  Limit sodium to less than 2,300 mg each day  DISCHARGE INSTRUCTIONS:   Healthy fats:  Healthy fats can help improve cholesterol levels  The risk for heart disease is decreased when cholesterol levels are normal  Choose healthy fats, such as the following:  · Unsaturated fat  is found in foods such as soybean, canola, olive, corn, and safflower oils  It is also found in soft tub margarine that is made with liquid vegetable oil  · Omega-3 fat  is found in certain fish, such as salmon, tuna, and trout, and in walnuts and flaxseed  Unhealthy fats:  Unhealthy fats can cause unhealthy cholesterol levels in your blood and increase your risk of heart disease  Limit unhealthy fats, such as the following:  · Cholesterol  is found in animal foods, such as eggs and lobster, and in dairy products made from whole milk  Limit cholesterol to less than 300 milligrams (mg) each day  You may need to limit cholesterol to 200 mg each day if you have heart disease  · Saturated fat  is found in meats, such as schultz and hamburger  It is also found in chicken or turkey skin, whole milk, and butter  Limit saturated fat to less than 7% of your total daily calories  Limit saturated fat to less than 6% if you have heart disease or are at increased risk for it  · Trans fat  is found in packaged foods, such as potato chips and cookies  It is also in hard margarine, some fried foods, and shortening  Avoid trans fats as much as possible    Heart healthy foods and drinks to include:  Ask your dietitian or healthcare provider how many servings to have from each of the following food groups:  · Grains:      ¨ Whole-wheat breads, cereals, and pastas, and brown rice    ¨ Low-fat, low-sodium crackers and chips    · Vegetables:      ¨ Broccoli, green beans, green peas, and spinach    ¨ Collards, kale, and lima beans    ¨ Carrots, sweet potatoes, tomatoes, and peppers    ¨ Canned vegetables with no salt added    · Fruits:      ¨ Bananas, peaches, pears, and pineapple    ¨ Grapes, raisins, and dates    ¨ Oranges, tangerines, grapefruit, orange juice, and grapefruit juice    ¨ Apricots, mangoes, melons, and papaya    ¨ Raspberries and strawberries    ¨ Canned fruit with no added sugar    · Low-fat dairy products:      ¨ Nonfat (skim) milk, 1% milk, and low-fat almond, cashew, or soy milks fortified with calcium    ¨ Low-fat cheese, regular or frozen yogurt, and cottage cheese    · Meats and proteins , such as lean cuts of beef and pork (loin, leg, round), skinless chicken and turkey, legumes, soy products, egg whites, and nuts  Foods and drinks to limit or avoid:  Ask your dietitian or healthcare provider about these and other foods that are high in unhealthy fat, sodium, and sugar:  · Snack or packaged foods , such as frozen dinners, cookies, macaroni and cheese, and cereals with more than 300 mg of sodium per serving    · Canned or dry mixes  for cakes, soups, sauces, or gravies    · Vegetables with added sodium , such as instant potatoes, vegetables with added sauces, or regular canned vegetables    · Other foods high in sodium , such as ketchup, barbecue sauce, salad dressing, pickles, olives, soy sauce, and miso    · High-fat dairy foods  such as whole or 2% milk, cream cheese, or sour cream, and cheeses     · High-fat protein foods  such as high-fat cuts of beef (T-bone steaks, ribs), chicken or turkey with skin, and organ meats, such as liver    · Cured or smoked meats , such as hot dogs, schultz, and sausage    · Unhealthy fats and oils , such as butter, stick margarine, shortening, and cooking oils such as coconut or palm oil    · Food and drinks high in sugar , such as soft drinks (soda), sports drinks, sweetened tea, candy, cake, cookies, pies, and doughnuts  Other diet guidelines to follow:   · Eat more foods containing omega-3 fats  Eat fish high in omega-3 fats at least 2 times a week  · Limit alcohol  Too much alcohol can damage your heart and raise your blood pressure  Women should limit alcohol to 1 drink a day  Men should limit alcohol to 2 drinks a day  A drink of alcohol is 12 ounces of beer, 5 ounces of wine, or 1½ ounces of liquor  · Choose low-sodium foods  High-sodium foods can lead to high blood pressure  Add little or no salt to food you prepare  Use herbs and spices in place of salt  · Eat more fiber  to help lower cholesterol levels  Eat at least 5 servings of fruits and vegetables each day  Eat 3 ounces of whole-grain foods each day  Legumes (beans) are also a good source of fiber  Lifestyle guidelines:   · Do not smoke  Nicotine and other chemicals in cigarettes and cigars can cause lung and heart damage  Ask your healthcare provider for information if you currently smoke and need help to quit  E-cigarettes or smokeless tobacco still contain nicotine  Talk to your healthcare provider before you use these products  · Exercise regularly  to help you maintain a healthy weight and improve your blood pressure and cholesterol levels  Ask your healthcare provider about the best exercise plan for you  Do not start an exercise program without asking your healthcare provider  Follow up with your healthcare provider as directed:  Write down your questions so you remember to ask them during your visits  © 2017 [employer] Information is for End User's use only and may not be sold, redistributed or otherwise used for commercial purposes   All illustrations and images included in CareNotes® are the copyrighted property of [employer]  or [employer]  The above information is an  only  It is not intended as medical advice for individual conditions or treatments  Talk to your doctor, nurse or pharmacist before following any medical regimen to see if it is safe and effective for you

## 2020-02-10 NOTE — ASSESSMENT & PLAN NOTE
Lab Results   Component Value Date    HGBA1C 6 4 (H) 02/03/2020    Diabetes under good control A1c is at 6 4 now on recent laboratory work patient will continue with metformin continue with atorvastatin she will continue with her proper diet recheck lab work again in 3-4 months at this time

## 2020-02-10 NOTE — ASSESSMENT & PLAN NOTE
GERD symptoms continue with current medication at this time avoid eating late at night no H2 blocker proton pump inhibitor recently low acid diet and follow up at next office visit in 4 months

## 2020-02-10 NOTE — ASSESSMENT & PLAN NOTE
Hypertension stable at this time under relatively good control she is taking triamterene hydrochlorothiazide refill on this medication today follow-up at next office visit

## 2020-02-10 NOTE — PROGRESS NOTES
Assessment/Plan:       Problem List Items Addressed This Visit        Digestive    GERD (gastroesophageal reflux disease) - Primary     GERD symptoms continue with current medication at this time avoid eating late at night no H2 blocker proton pump inhibitor recently low acid diet and follow up at next office visit in 4 months            Endocrine    Un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diabetes mellitus with hyperglycemia ([address] )       Lab Results   Component Value Date    HGBA1C 6 4 (H) 02/03/2020    Diabetes under good control A1c is at 6 4 now on recent laboratory work patient will continue with metformin continue with atorvastatin she will continue with her proper diet recheck lab work again in 3-4 months at this time            Cardiovascular and Mediastinum    Essential hypertension     Hypertension stable at this time under relatively good control she is taking triamterene hydrochlorothiazide refill on this medication today follow-up at next office visit            Other    Hyperlipidemia     Hyperlipidemia continue with atorvastatin 40 mg lipid profile stable follow-up at next office visit in 4 months                 Subjective:      Patient ID: Dereck Delatorre is a 82 y o  female  Patient presents for general checkup today doing relatively well aches and pains from arthritis but otherwise stable no cold symptoms here to review all her lab work and have medication refills      The following portions of the patient's history were reviewed and updated as appropriate: allergies, current medications, past family history, past medical history, past social history, past surgical history and problem list     Review of Systems   Constitutional: Negative for chills, fatigue and fever  HENT: Negative for congestion, nosebleeds, rhinorrhea, sinus pressure and sore throat  Eyes: Negative for discharge and redness  Respiratory: Negative for cough and shortness of breath      Cardiovascular: Negative for chest pain, palpitations and leg swelling  Gastrointestinal: Negative for abdominal pain, blood in stool and nausea  Endocrine: Negative for cold intolerance, heat intolerance and polyuria  Genitourinary: Negative for dysuria and frequency  Musculoskeletal: Negative for arthralgias, back pain and myalgias  Skin: Negative for rash  Neurological: Negative for dizziness, weakness and headaches  Hematological: Negative for adenopathy  Psychiatric/Behavioral: Negative for behavioral problems and sleep disturbance  The patient is not nervous/anxious  Objective:      /78   Pulse 62   Temp 97 8 °F (36 6 °C)   Ht 5' 3" (1 6 m)   Wt 73 2 kg (161 lb 6 4 oz)   SpO2 97%   BMI 28 59 kg/m²        Physical Exam   Constitutional: She is oriented to person, place, and time  She appears well-developed and well-nourished  No distress  HENT:   Head: Normocephalic and atraumatic  Right Ear: External ear normal    Left Ear: External ear normal    Nose: Nose normal    Mouth/Throat: Oropharynx is clear and moist  No oropharyngeal exudate  Eyes: Pupils are equal, round, and reactive to light  Conjunctivae and EOM are normal  Right eye exhibits no discharge  Left eye exhibits no discharge  No scleral icterus  Neck: Normal range of motion  No JVD present  No thyromegaly present  Cardiovascular: Normal rate, regular rhythm and normal heart sounds  Pulses are no weak pulses  No murmur heard  Pulses:       Dorsalis pedis pulses are 2+ on the right side, and 2+ on the left side  Posterior tibial pulses are 2+ on the right side, and 2+ on the left side  Pulmonary/Chest: Effort normal  She has no wheezes  She has no rales  She exhibits no tenderness  Abdominal: Soft  Bowel sounds are normal  She exhibits no distension and no mass  There is no tenderness  Musculoskeletal: Normal range of motion  She exhibits no edema, tenderness or deformity     Feet:   Right Foot:   Skin Integrity: Negative for ulcer, skin breakdown, erythema, warmth, callus or dry skin  Left Foot:   Skin Integrity: Negative for ulcer, skin breakdown, erythema, warmth, callus or dry skin  Lymphadenopathy:     She has no cervical adenopathy  Neurological: She is alert and oriented to person, place, and time  She has normal reflexes  She displays normal reflexes  No cranial nerve deficit  Coordination normal    Skin: Skin is warm and dry  No rash noted  Psychiatric: She has a normal mood and affect  Her behavior is normal  Judgment and thought content normal    Nursing note and vitals reviewed  Patient's shoes and socks removed  Right Foot/Ankle   Right Foot Inspection  Skin Exam: skin normal and skin intact no dry skin, no warmth, no callus, no erythema, no maceration, no abnormal color, no pre-ulcer, no ulcer and no callus                          Toe Exam: ROM and strength within normal limits  Sensory       Monofilament testing: intact  Vascular  Capillary refills: < 3 seconds  The right DP pulse is 2+  The right PT pulse is 2+  Left Foot/Ankle  Left Foot Inspection  Skin Exam: skin normal and skin intactno dry skin, no warmth, no erythema, no maceration, normal color, no pre-ulcer, no ulcer and no callus                         Toe Exam: ROM and strength within normal limits                   Sensory       Monofilament: intact  Vascular  Capillary refills: < 3 seconds  The left DP pulse is 2+  The left PT pulse is 2+  Assign Risk Category:  No deformity present; No loss of protective sensation; No weak pulses       Risk: 0      Data:    Laboratory Results: I have personally reviewed the pertinent laboratory results/reports   Radiology/Other Diagnostic Testing Results: I have personally reviewed pertinent reports         Lab Results   Component Value Date    WBC 6 23 02/03/2020    HGB 13 7 02/03/2020    HCT 42 2 02/03/2020    MCV 95 02/03/2020     02/03/2020     Lab Results   Component Value Date    K 3 5 02/03/2020     02/03/2020    CO2 32 02/03/2020    BUN 11 02/03/2020    CREATININE 0 83 02/03/2020    GLUF 95 02/03/2020    CALCIUM 11 3 (H) 02/03/2020    CORRECTEDCA 11 2 (H) 02/03/2020    AST 40 02/03/2020    ALT 53 02/03/2020    ALKPHOS 109 02/03/2020    EGFR 66 02/03/2020     Lab Results   Component Value Date    CHOLESTEROL 153 02/03/2020    CHOLESTEROL 134 06/05/2019    CHOLESTEROL 164 03/05/2019     Lab Results   Component Value Date    HDL 61 02/03/2020    HDL 45 06/05/2019    HDL 58 03/05/2019     Lab Results   Component Value Date    LDLCALC 63 02/03/2020    LDLCALC 58 06/05/2019    LDLCALC 76 03/05/2019     Lab Results   Component Value Date    TRIG 146 02/03/2020    TRIG 156 (H) 06/05/2019    TRIG 148 03/05/2019     No results found for: [place], [place]  Lab Results   Component Value Date    NRO3AMPLZWHS 1 610 02/03/2020     Lab Results   Component Value Date    HGBA1C 6 4 (H) 02/03/2020     No results found for: BRAYDEN Rojo DO

## 2020-02-23 DIAGNOSIS — E11.65 UNCONTROLLED TYPE 2 DIABETES MELLITUS WITH HYPERGLYCEMIA (HCC): ICD-10-CM

## 2020-02-25 NOTE — PROGRESS NOTES
[employer] remote check pacer  No events  Normal battery        Current Outpatient Medications:     anastrozole (ARIMIDEX) 1 mg tablet, Take 1 mg by mouth daily, Disp: , Rfl:     atorvastatin (LIPITOR) 40 mg tablet, Take 1 tablet (40 mg total) by mouth daily, Disp: 90 tablet, Rfl: 0    DOCOSAHEXAENOIC ACID PO, Take 2 capsules by mouth daily, Disp: , Rfl:     glucose blood (FREESTYLE LITE) test strip, 1 strip daily, Disp: , Rfl:     KLOR-CON 10 10 MEQ tablet, , Disp: , Rfl:     metFORMIN (GLUCOPHAGE) 500 mg tablet, Take 1 tablet (500 mg total) by mouth daily, Disp: 90 tablet, Rfl: 0    polyethylene glycol-propylene glycol (SYSTANE) 0 4-0 3 %, Apply 4 drops to eye 2 (two) times a day, Disp: , Rfl:     potassium chloride (K-DUR,KLOR-CON) 10 mEq tablet, Take 1 tablet (10 mEq total) by mouth daily, Disp: 90 tablet, Rfl: 3    sertraline (ZOLOFT) 50 mg tablet, TAKE ONE TABLET BY MOUTH ONE TIME DAILY , Disp: 90 tablet, Rfl: 1    triamterene-hydrochlorothiazide (MAXZIDE-25) 37 5-25 mg per tablet, Take 1 tablet by mouth daily, Disp: 90 tablet, Rfl: 0

## 2020-03-31 DIAGNOSIS — E11.65 UNCONTROLLED TYPE 2 DIABETES MELLITUS WITH HYPERGLYCEMIA (HCC): ICD-10-CM

## 2020-03-31 RX ORDER — ATORVASTATIN CALCIUM 40 MG/1
40 TABLET, FILM COATED ORAL DAILY
Qty: 90 TABLET | Refills: 1 | Status: SHIPPED | OUTPATIENT
Start: 2020-03-31 | End: 2020-11-13 | Stop reason: SDUPTHER

## 2020-04-14 DIAGNOSIS — E11.65 UNCONTROLLED TYPE 2 DIABETES MELLITUS WITH HYPERGLYCEMIA (HCC): ICD-10-CM

## 2020-04-16 ENCOUNTER — REMOTE DEVICE CLINIC VISIT (OUTPATIENT)
Dept: CARDIOLOGY CLINIC | Facility: CLINIC | Age: 85
End: 2020-04-16
Payer: MEDICARE

## 2020-04-16 DIAGNOSIS — I49.5 SICK SINUS SYNDROME (HCC): Primary | ICD-10-CM

## 2020-04-16 DIAGNOSIS — Z45.010 ENCOUNTER FOR CHECKING AND TESTING OF CARDIAC PACEMAKER PULSE GENERATOR (BATTERY): ICD-10-CM

## 2020-04-16 PROCEDURE — 93296 REM INTERROG EVL PM/IDS: CPT | Performed by: INTERNAL MEDICINE

## 2020-04-16 PROCEDURE — 93294 REM INTERROG EVL PM/LDLS PM: CPT | Performed by: INTERNAL MEDICINE

## 2020-05-04 DIAGNOSIS — E11.65 UNCONTROLLED TYPE 2 DIABETES MELLITUS WITH HYPERGLYCEMIA (HCC): ICD-10-CM

## 2020-05-04 RX ORDER — TRIAMTERENE AND HYDROCHLOROTHIAZIDE 37.5; 25 MG/1; MG/1
1 TABLET ORAL DAILY
Qty: 90 TABLET | Refills: 0 | Status: SHIPPED | OUTPATIENT
Start: 2020-05-04 | End: 2020-08-06 | Stop reason: SDUPTHER

## 2020-05-29 DIAGNOSIS — I10 ESSENTIAL HYPERTENSION: ICD-10-CM

## 2020-05-29 RX ORDER — POTASSIUM CHLORIDE 750 MG/1
TABLET, FILM COATED, EXTENDED RELEASE ORAL
Qty: 90 TABLET | Refills: 0 | Status: SHIPPED | OUTPATIENT
Start: 2020-05-29 | End: 2020-09-04

## 2020-06-16 ENCOUNTER — APPOINTMENT (OUTPATIENT)
Dept: LAB | Facility: CLINIC | Age: 85
End: 2020-06-16
Payer: MEDICARE

## 2020-06-16 DIAGNOSIS — E11.65 UNCONTROLLED TYPE 2 DIABETES MELLITUS WITH HYPERGLYCEMIA (HCC): ICD-10-CM

## 2020-06-16 LAB
ALBUMIN SERPL BCP-MCNC: 3.9 G/DL (ref 3.5–5)
ALP SERPL-CCNC: 80 U/L (ref 46–116)
ALT SERPL W P-5'-P-CCNC: 22 U/L (ref 12–78)
ANION GAP SERPL CALCULATED.3IONS-SCNC: 6 MMOL/L (ref 4–13)
AST SERPL W P-5'-P-CCNC: 23 U/L (ref 5–45)
BASOPHILS # BLD AUTO: 0.01 THOUSANDS/ΜL (ref 0–0.1)
BASOPHILS NFR BLD AUTO: 0 % (ref 0–1)
BILIRUB SERPL-MCNC: 0.7 MG/DL (ref 0.2–1)
BUN SERPL-MCNC: 10 MG/DL (ref 5–25)
CALCIUM ALBUM COR SERPL-MCNC: 11.4 MG/DL (ref 8.3–10.1)
CALCIUM SERPL-MCNC: 11.3 MG/DL (ref 8.3–10.1)
CHLORIDE SERPL-SCNC: 95 MMOL/L (ref 100–108)
CHOLEST SERPL-MCNC: 134 MG/DL (ref 50–200)
CO2 SERPL-SCNC: 31 MMOL/L (ref 21–32)
CREAT SERPL-MCNC: 0.89 MG/DL (ref 0.6–1.3)
EOSINOPHIL # BLD AUTO: 0.03 THOUSAND/ΜL (ref 0–0.61)
EOSINOPHIL NFR BLD AUTO: 0 % (ref 0–6)
ERYTHROCYTE [DISTWIDTH] IN BLOOD BY AUTOMATED COUNT: 12 % (ref 11.6–15.1)
EST. AVERAGE GLUCOSE BLD GHB EST-MCNC: 123 MG/DL
GFR SERPL CREATININE-BSD FRML MDRD: 61 ML/MIN/1.73SQ M
GLUCOSE P FAST SERPL-MCNC: 111 MG/DL (ref 65–99)
HBA1C MFR BLD: 5.9 %
HCT VFR BLD AUTO: 39.4 % (ref 34.8–46.1)
HDLC SERPL-MCNC: 51 MG/DL
HGB BLD-MCNC: 13.1 G/DL (ref 11.5–15.4)
IMM GRANULOCYTES # BLD AUTO: 0.02 THOUSAND/UL (ref 0–0.2)
IMM GRANULOCYTES NFR BLD AUTO: 0 % (ref 0–2)
LDLC SERPL CALC-MCNC: 60 MG/DL (ref 0–100)
LYMPHOCYTES # BLD AUTO: 0.87 THOUSANDS/ΜL (ref 0.6–4.47)
LYMPHOCYTES NFR BLD AUTO: 13 % (ref 14–44)
MCH RBC QN AUTO: 31 PG (ref 26.8–34.3)
MCHC RBC AUTO-ENTMCNC: 33.2 G/DL (ref 31.4–37.4)
MCV RBC AUTO: 93 FL (ref 82–98)
MONOCYTES # BLD AUTO: 0.53 THOUSAND/ΜL (ref 0.17–1.22)
MONOCYTES NFR BLD AUTO: 8 % (ref 4–12)
NEUTROPHILS # BLD AUTO: 5.25 THOUSANDS/ΜL (ref 1.85–7.62)
NEUTS SEG NFR BLD AUTO: 79 % (ref 43–75)
NONHDLC SERPL-MCNC: 83 MG/DL
NRBC BLD AUTO-RTO: 0 /100 WBCS
PLATELET # BLD AUTO: 252 THOUSANDS/UL (ref 149–390)
PMV BLD AUTO: 9.4 FL (ref 8.9–12.7)
POTASSIUM SERPL-SCNC: 3.2 MMOL/L (ref 3.5–5.3)
PROT SERPL-MCNC: 7.3 G/DL (ref 6.4–8.2)
RBC # BLD AUTO: 4.22 MILLION/UL (ref 3.81–5.12)
SODIUM SERPL-SCNC: 132 MMOL/L (ref 136–145)
TRIGL SERPL-MCNC: 114 MG/DL
TSH SERPL DL<=0.05 MIU/L-ACNC: 2.59 UIU/ML (ref 0.36–3.74)
WBC # BLD AUTO: 6.71 THOUSAND/UL (ref 4.31–10.16)

## 2020-06-16 PROCEDURE — 36415 COLL VENOUS BLD VENIPUNCTURE: CPT

## 2020-06-16 PROCEDURE — 85025 COMPLETE CBC W/AUTO DIFF WBC: CPT

## 2020-06-16 PROCEDURE — 80061 LIPID PANEL: CPT

## 2020-06-16 PROCEDURE — 84443 ASSAY THYROID STIM HORMONE: CPT

## 2020-06-16 PROCEDURE — 83036 HEMOGLOBIN GLYCOSYLATED A1C: CPT

## 2020-06-16 PROCEDURE — 80053 COMPREHEN METABOLIC PANEL: CPT

## 2020-06-22 ENCOUNTER — OFFICE VISIT (OUTPATIENT)
Dept: FAMILY MEDICINE CLINIC | Facility: CLINIC | Age: 85
End: 2020-06-22
Payer: MEDICARE

## 2020-06-22 VITALS
TEMPERATURE: 98.4 F | RESPIRATION RATE: 20 BRPM | HEART RATE: 65 BPM | OXYGEN SATURATION: 95 % | BODY MASS INDEX: 26.89 KG/M2 | WEIGHT: 151.8 LBS

## 2020-06-22 DIAGNOSIS — F32.A DEPRESSION, UNSPECIFIED DEPRESSION TYPE: ICD-10-CM

## 2020-06-22 DIAGNOSIS — E78.2 MIXED HYPERLIPIDEMIA: ICD-10-CM

## 2020-06-22 DIAGNOSIS — I10 ESSENTIAL HYPERTENSION: ICD-10-CM

## 2020-06-22 DIAGNOSIS — E11.65 UNCONTROLLED TYPE 2 DIABETES MELLITUS WITH HYPERGLYCEMIA (HCC): ICD-10-CM

## 2020-06-22 DIAGNOSIS — I49.5 SICK SINUS SYNDROME (HCC): Chronic | ICD-10-CM

## 2020-06-22 DIAGNOSIS — K21.9 GASTROESOPHAGEAL REFLUX DISEASE WITHOUT ESOPHAGITIS: Primary | ICD-10-CM

## 2020-06-22 PROCEDURE — 4040F PNEUMOC VAC/ADMIN/RCVD: CPT | Performed by: FAMILY MEDICINE

## 2020-06-22 PROCEDURE — 3074F SYST BP LT 130 MM HG: CPT | Performed by: FAMILY MEDICINE

## 2020-06-22 PROCEDURE — 3078F DIAST BP <80 MM HG: CPT | Performed by: FAMILY MEDICINE

## 2020-06-22 PROCEDURE — 3044F HG A1C LEVEL LT 7.0%: CPT | Performed by: FAMILY MEDICINE

## 2020-06-22 PROCEDURE — 1036F TOBACCO NON-USER: CPT | Performed by: FAMILY MEDICINE

## 2020-06-22 PROCEDURE — 99214 OFFICE O/P EST MOD 30 MIN: CPT | Performed by: FAMILY MEDICINE

## 2020-06-22 PROCEDURE — 1160F RVW MEDS BY RX/DR IN RCRD: CPT | Performed by: FAMILY MEDICINE

## 2020-06-22 RX ORDER — POTASSIUM CHLORIDE 750 MG/1
TABLET, EXTENDED RELEASE ORAL
COMMUNITY
Start: 2020-05-29 | End: 2021-02-23 | Stop reason: ALTCHOICE

## 2020-07-14 DIAGNOSIS — E11.65 UNCONTROLLED TYPE 2 DIABETES MELLITUS WITH HYPERGLYCEMIA (HCC): ICD-10-CM

## 2020-07-16 ENCOUNTER — REMOTE DEVICE CLINIC VISIT (OUTPATIENT)
Dept: CARDIOLOGY CLINIC | Facility: CLINIC | Age: 85
End: 2020-07-16
Payer: MEDICARE

## 2020-07-16 DIAGNOSIS — Z45.010 ENCOUNTER FOR CHECKING AND TESTING OF CARDIAC PACEMAKER PULSE GENERATOR (BATTERY): ICD-10-CM

## 2020-07-16 DIAGNOSIS — I49.5 SICK SINUS SYNDROME (HCC): Primary | ICD-10-CM

## 2020-07-16 PROCEDURE — 93294 REM INTERROG EVL PM/LDLS PM: CPT | Performed by: INTERNAL MEDICINE

## 2020-07-16 PROCEDURE — 93296 REM INTERROG EVL PM/IDS: CPT | Performed by: INTERNAL MEDICINE

## 2020-07-21 LAB
LEFT EYE DIABETIC RETINOPATHY: NORMAL
RIGHT EYE DIABETIC RETINOPATHY: NORMAL

## 2020-08-06 DIAGNOSIS — E11.65 UNCONTROLLED TYPE 2 DIABETES MELLITUS WITH HYPERGLYCEMIA (HCC): ICD-10-CM

## 2020-08-06 RX ORDER — TRIAMTERENE AND HYDROCHLOROTHIAZIDE 37.5; 25 MG/1; MG/1
1 TABLET ORAL DAILY
Qty: 90 TABLET | Refills: 1 | Status: SHIPPED | OUTPATIENT
Start: 2020-08-06 | End: 2021-01-29 | Stop reason: SDUPTHER

## 2020-08-06 NOTE — PROGRESS NOTES
[employer] remote check pacer  No events  [place] battery function      Current Outpatient Medications:     anastrozole (ARIMIDEX) 1 mg tablet, Take 1 mg by mouth daily, Disp: , Rfl:     atorvastatin (LIPITOR) 40 mg tablet, Take 1 tablet (40 mg total) by mouth daily, Disp: 90 tablet, Rfl: 1    DOCOSAHEXAENOIC ACID PO, Take 2 capsules by mouth daily, Disp: , Rfl:     KLOR-CON 10 10 MEQ tablet, , Disp: , Rfl:     metFORMIN (GLUCOPHAGE) 500 mg tablet, Take 1 tablet (500 mg total) by mouth daily, Disp: 90 tablet, Rfl: 0    polyethylene glycol-propylene glycol (SYSTANE) 0 4-0 3 %, Apply 4 drops to eye 2 (two) times a day, Disp: , Rfl:     potassium chloride (K-DUR) 10 mEq tablet, TAKE ONE TABLET BY MOUTH ONE TIME DAILY , Disp: 90 tablet, Rfl: 0    potassium chloride (K-DUR,KLOR-CON) 10 mEq tablet, , Disp: , Rfl:     sertraline (ZOLOFT) 50 mg tablet, TAKE ONE TABLET BY MOUTH ONE TIME DAILY , Disp: 90 tablet, Rfl: 1    triamterene-hydrochlorothiazide (MAXZIDE-25) 37 5-25 mg per tablet, Take 1 tablet by mouth daily, Disp: 90 tablet, Rfl: 0

## 2020-08-31 DIAGNOSIS — E11.65 UNCONTROLLED TYPE 2 DIABETES MELLITUS WITH HYPERGLYCEMIA (HCC): ICD-10-CM

## 2020-09-04 DIAGNOSIS — I10 ESSENTIAL HYPERTENSION: ICD-10-CM

## 2020-09-04 RX ORDER — POTASSIUM CHLORIDE 750 MG/1
TABLET, EXTENDED RELEASE ORAL
Qty: 90 TABLET | Refills: 0 | Status: SHIPPED | OUTPATIENT
Start: 2020-09-04 | End: 2021-02-23 | Stop reason: SDUPTHER

## 2020-10-16 ENCOUNTER — LAB (OUTPATIENT)
Dept: LAB | Facility: CLINIC | Age: 85
End: 2020-10-16
Payer: COMMERCIAL

## 2020-10-16 DIAGNOSIS — E11.65 UNCONTROLLED TYPE 2 DIABETES MELLITUS WITH HYPERGLYCEMIA (HCC): ICD-10-CM

## 2020-10-16 LAB
ALBUMIN SERPL BCP-MCNC: 4 G/DL (ref 3.5–5)
ALP SERPL-CCNC: 98 U/L (ref 46–116)
ALT SERPL W P-5'-P-CCNC: 20 U/L (ref 12–78)
ANION GAP SERPL CALCULATED.3IONS-SCNC: 3 MMOL/L (ref 4–13)
AST SERPL W P-5'-P-CCNC: 19 U/L (ref 5–45)
BASOPHILS # BLD AUTO: 0.02 THOUSANDS/ΜL (ref 0–0.1)
BASOPHILS NFR BLD AUTO: 0 % (ref 0–1)
BILIRUB SERPL-MCNC: 0.63 MG/DL (ref 0.2–1)
BUN SERPL-MCNC: 14 MG/DL (ref 5–25)
CALCIUM SERPL-MCNC: 11.6 MG/DL (ref 8.3–10.1)
CHLORIDE SERPL-SCNC: 100 MMOL/L (ref 100–108)
CHOLEST SERPL-MCNC: 141 MG/DL (ref 50–200)
CO2 SERPL-SCNC: 33 MMOL/L (ref 21–32)
CREAT SERPL-MCNC: 1.01 MG/DL (ref 0.6–1.3)
EOSINOPHIL # BLD AUTO: 0.06 THOUSAND/ΜL (ref 0–0.61)
EOSINOPHIL NFR BLD AUTO: 1 % (ref 0–6)
ERYTHROCYTE [DISTWIDTH] IN BLOOD BY AUTOMATED COUNT: 12.2 % (ref 11.6–15.1)
EST. AVERAGE GLUCOSE BLD GHB EST-MCNC: 120 MG/DL
GFR SERPL CREATININE-BSD FRML MDRD: 52 ML/MIN/1.73SQ M
GLUCOSE P FAST SERPL-MCNC: 113 MG/DL (ref 65–99)
HBA1C MFR BLD: 5.8 %
HCT VFR BLD AUTO: 37.3 % (ref 34.8–46.1)
HDLC SERPL-MCNC: 65 MG/DL
HGB BLD-MCNC: 12.3 G/DL (ref 11.5–15.4)
IMM GRANULOCYTES # BLD AUTO: 0.02 THOUSAND/UL (ref 0–0.2)
IMM GRANULOCYTES NFR BLD AUTO: 0 % (ref 0–2)
LDLC SERPL CALC-MCNC: 52 MG/DL (ref 0–100)
LYMPHOCYTES # BLD AUTO: 1 THOUSANDS/ΜL (ref 0.6–4.47)
LYMPHOCYTES NFR BLD AUTO: 18 % (ref 14–44)
MCH RBC QN AUTO: 30.8 PG (ref 26.8–34.3)
MCHC RBC AUTO-ENTMCNC: 33 G/DL (ref 31.4–37.4)
MCV RBC AUTO: 94 FL (ref 82–98)
MONOCYTES # BLD AUTO: 0.37 THOUSAND/ΜL (ref 0.17–1.22)
MONOCYTES NFR BLD AUTO: 7 % (ref 4–12)
NEUTROPHILS # BLD AUTO: 4.12 THOUSANDS/ΜL (ref 1.85–7.62)
NEUTS SEG NFR BLD AUTO: 74 % (ref 43–75)
NONHDLC SERPL-MCNC: 76 MG/DL
NRBC BLD AUTO-RTO: 0 /100 WBCS
PLATELET # BLD AUTO: 230 THOUSANDS/UL (ref 149–390)
PMV BLD AUTO: 9.6 FL (ref 8.9–12.7)
POTASSIUM SERPL-SCNC: 4 MMOL/L (ref 3.5–5.3)
PROT SERPL-MCNC: 7.5 G/DL (ref 6.4–8.2)
RBC # BLD AUTO: 3.99 MILLION/UL (ref 3.81–5.12)
SODIUM SERPL-SCNC: 136 MMOL/L (ref 136–145)
TRIGL SERPL-MCNC: 120 MG/DL
TSH SERPL DL<=0.05 MIU/L-ACNC: 2.6 UIU/ML (ref 0.36–3.74)
WBC # BLD AUTO: 5.59 THOUSAND/UL (ref 4.31–10.16)

## 2020-10-16 PROCEDURE — 80061 LIPID PANEL: CPT

## 2020-10-16 PROCEDURE — 80053 COMPREHEN METABOLIC PANEL: CPT

## 2020-10-16 PROCEDURE — 84443 ASSAY THYROID STIM HORMONE: CPT

## 2020-10-16 PROCEDURE — 85025 COMPLETE CBC W/AUTO DIFF WBC: CPT

## 2020-10-16 PROCEDURE — 83036 HEMOGLOBIN GLYCOSYLATED A1C: CPT

## 2020-10-16 PROCEDURE — 36415 COLL VENOUS BLD VENIPUNCTURE: CPT

## 2020-10-19 ENCOUNTER — REMOTE DEVICE CLINIC VISIT (OUTPATIENT)
Dept: CARDIOLOGY CLINIC | Facility: CLINIC | Age: 85
End: 2020-10-19
Payer: COMMERCIAL

## 2020-10-19 DIAGNOSIS — Z45.010 ENCOUNTER FOR CHECKING AND TESTING OF CARDIAC PACEMAKER PULSE GENERATOR (BATTERY): ICD-10-CM

## 2020-10-19 DIAGNOSIS — I49.5 SICK SINUS SYNDROME (HCC): Primary | ICD-10-CM

## 2020-10-19 PROCEDURE — 93296 REM INTERROG EVL PM/IDS: CPT | Performed by: INTERNAL MEDICINE

## 2020-10-19 PROCEDURE — 93294 REM INTERROG EVL PM/LDLS PM: CPT | Performed by: INTERNAL MEDICINE

## 2020-10-22 ENCOUNTER — OFFICE VISIT (OUTPATIENT)
Dept: FAMILY MEDICINE CLINIC | Facility: CLINIC | Age: 85
End: 2020-10-22
Payer: COMMERCIAL

## 2020-10-22 VITALS
TEMPERATURE: 98.7 F | HEART RATE: 78 BPM | SYSTOLIC BLOOD PRESSURE: 122 MMHG | DIASTOLIC BLOOD PRESSURE: 78 MMHG | BODY MASS INDEX: 25.87 KG/M2 | OXYGEN SATURATION: 97 % | WEIGHT: 146 LBS | HEIGHT: 63 IN

## 2020-10-22 DIAGNOSIS — R26.89 BALANCE PROBLEM: ICD-10-CM

## 2020-10-22 DIAGNOSIS — Z00.00 MEDICARE ANNUAL WELLNESS VISIT, SUBSEQUENT: ICD-10-CM

## 2020-10-22 DIAGNOSIS — E83.52 HYPERCALCEMIA: ICD-10-CM

## 2020-10-22 DIAGNOSIS — K21.9 GASTROESOPHAGEAL REFLUX DISEASE WITHOUT ESOPHAGITIS: Primary | ICD-10-CM

## 2020-10-22 DIAGNOSIS — I49.5 SICK SINUS SYNDROME (HCC): Chronic | ICD-10-CM

## 2020-10-22 DIAGNOSIS — E11.65 UNCONTROLLED TYPE 2 DIABETES MELLITUS WITH HYPERGLYCEMIA (HCC): ICD-10-CM

## 2020-10-22 DIAGNOSIS — I10 ESSENTIAL HYPERTENSION: ICD-10-CM

## 2020-10-22 PROCEDURE — G0439 PPPS, SUBSEQ VISIT: HCPCS | Performed by: FAMILY MEDICINE

## 2020-10-22 PROCEDURE — 99214 OFFICE O/P EST MOD 30 MIN: CPT | Performed by: FAMILY MEDICINE

## 2020-11-13 DIAGNOSIS — E11.65 UNCONTROLLED TYPE 2 DIABETES MELLITUS WITH HYPERGLYCEMIA (HCC): ICD-10-CM

## 2020-11-13 RX ORDER — ATORVASTATIN CALCIUM 40 MG/1
40 TABLET, FILM COATED ORAL DAILY
Qty: 90 TABLET | Refills: 1 | Status: SHIPPED | OUTPATIENT
Start: 2020-11-13 | End: 2021-01-29 | Stop reason: SDUPTHER

## 2020-12-04 ENCOUNTER — HOSPITAL ENCOUNTER (OUTPATIENT)
Dept: MAMMOGRAPHY | Facility: HOSPITAL | Age: 85
Discharge: HOME | End: 2020-12-04
Payer: COMMERCIAL

## 2020-12-04 VITALS — BODY MASS INDEX: 24.8 KG/M2 | HEIGHT: 63 IN | WEIGHT: 140 LBS

## 2020-12-04 DIAGNOSIS — Z85.3 PERSONAL HISTORY OF MALIGNANT NEOPLASM OF BREAST: ICD-10-CM

## 2020-12-04 PROCEDURE — 77066 DX MAMMO INCL CAD BI: CPT

## 2020-12-04 PROCEDURE — G0279 TOMOSYNTHESIS, MAMMO: HCPCS

## 2021-01-20 ENCOUNTER — REMOTE DEVICE CLINIC VISIT (OUTPATIENT)
Dept: CARDIOLOGY CLINIC | Facility: CLINIC | Age: 86
End: 2021-01-20
Payer: COMMERCIAL

## 2021-01-20 DIAGNOSIS — Z45.010 ENCOUNTER FOR CHECKING AND TESTING OF CARDIAC PACEMAKER PULSE GENERATOR (BATTERY): ICD-10-CM

## 2021-01-20 DIAGNOSIS — I49.5 SICK SINUS SYNDROME (HCC): Primary | ICD-10-CM

## 2021-01-20 PROCEDURE — 93296 REM INTERROG EVL PM/IDS: CPT | Performed by: INTERNAL MEDICINE

## 2021-01-20 PROCEDURE — 93294 REM INTERROG EVL PM/LDLS PM: CPT | Performed by: INTERNAL MEDICINE

## 2021-01-29 DIAGNOSIS — E11.65 UNCONTROLLED TYPE 2 DIABETES MELLITUS WITH HYPERGLYCEMIA (HCC): ICD-10-CM

## 2021-01-29 RX ORDER — ATORVASTATIN CALCIUM 40 MG/1
40 TABLET, FILM COATED ORAL DAILY
Qty: 90 TABLET | Refills: 1 | Status: SHIPPED | OUTPATIENT
Start: 2021-01-29 | End: 2021-07-28

## 2021-01-29 RX ORDER — TRIAMTERENE AND HYDROCHLOROTHIAZIDE 37.5; 25 MG/1; MG/1
1 TABLET ORAL DAILY
Qty: 90 TABLET | Refills: 1 | Status: SHIPPED | OUTPATIENT
Start: 2021-01-29 | End: 2021-08-03

## 2021-02-11 NOTE — PROGRESS NOTES
[facility] remote check pacer  No events  Normal battery function        Current Outpatient Medications:     anastrozole (ARIMIDEX) 1 mg tablet, Take 1 mg by mouth daily, Disp: , Rfl:     atorvastatin (LIPITOR) 40 mg tablet, Take 1 tablet (40 mg total) by mouth daily, Disp: 90 tablet, Rfl: 1    DOCOSAHEXAENOIC ACID PO, Take 2 capsules by mouth daily, Disp: , Rfl:     KLOR-CON 10 10 MEQ tablet, , Disp: , Rfl:     metFORMIN (GLUCOPHAGE) 500 mg tablet, Take 1 tablet (500 mg total) by mouth daily, Disp: 90 tablet, Rfl: 3    polyethylene glycol-propylene glycol (SYSTANE) 0 4-0 3 %, Apply 4 drops to eye 2 (two) times a day, Disp: , Rfl:     potassium chloride (K-DUR,KLOR-CON) 10 mEq tablet, , Disp: , Rfl:     potassium chloride (K-DUR,KLOR-CON) 10 mEq tablet, TAKE ONE TABLET BY MOUTH ONE TIME DAILY , Disp: 90 tablet, Rfl: 0    sertraline (ZOLOFT) 50 mg tablet, Take 1 tablet (50 mg total) by mouth daily, Disp: 90 tablet, Rfl: 1    triamterene-hydrochlorothiazide (MAXZIDE-25) 37 5-25 mg per tablet, Take 1 tablet by mouth daily, Disp: 90 tablet, Rfl: 1

## 2021-02-17 ENCOUNTER — TELEPHONE (OUTPATIENT)
Dept: FAMILY MEDICINE CLINIC | Facility: CLINIC | Age: 86
End: 2021-02-17

## 2021-02-17 DIAGNOSIS — N39.0 LOWER URINARY TRACT INFECTION: Primary | ICD-10-CM

## 2021-02-20 ENCOUNTER — LAB (OUTPATIENT)
Dept: LAB | Facility: CLINIC | Age: 86
End: 2021-02-20
Payer: COMMERCIAL

## 2021-02-20 DIAGNOSIS — K21.9 GASTROESOPHAGEAL REFLUX DISEASE WITHOUT ESOPHAGITIS: ICD-10-CM

## 2021-02-20 DIAGNOSIS — R26.89 BALANCE PROBLEM: ICD-10-CM

## 2021-02-20 DIAGNOSIS — E83.52 HYPERCALCEMIA: ICD-10-CM

## 2021-02-20 DIAGNOSIS — Z00.00 MEDICARE ANNUAL WELLNESS VISIT, SUBSEQUENT: ICD-10-CM

## 2021-02-20 DIAGNOSIS — I10 ESSENTIAL HYPERTENSION: ICD-10-CM

## 2021-02-20 DIAGNOSIS — I49.5 SICK SINUS SYNDROME (HCC): Chronic | ICD-10-CM

## 2021-02-20 DIAGNOSIS — E11.65 UNCONTROLLED TYPE 2 DIABETES MELLITUS WITH HYPERGLYCEMIA (HCC): ICD-10-CM

## 2021-02-20 LAB
ALBUMIN SERPL BCP-MCNC: 4.1 G/DL (ref 3.5–5)
ALP SERPL-CCNC: 108 U/L (ref 46–116)
ALT SERPL W P-5'-P-CCNC: 15 U/L (ref 12–78)
ANION GAP SERPL CALCULATED.3IONS-SCNC: 5 MMOL/L (ref 4–13)
AST SERPL W P-5'-P-CCNC: 19 U/L (ref 5–45)
BASOPHILS # BLD AUTO: 0.03 THOUSANDS/ΜL (ref 0–0.1)
BASOPHILS NFR BLD AUTO: 1 % (ref 0–1)
BILIRUB SERPL-MCNC: 0.46 MG/DL (ref 0.2–1)
BUN SERPL-MCNC: 9 MG/DL (ref 5–25)
CALCIUM SERPL-MCNC: 11.2 MG/DL (ref 8.3–10.1)
CHLORIDE SERPL-SCNC: 95 MMOL/L (ref 100–108)
CHOLEST SERPL-MCNC: 148 MG/DL (ref 50–200)
CO2 SERPL-SCNC: 35 MMOL/L (ref 21–32)
CREAT SERPL-MCNC: 0.79 MG/DL (ref 0.6–1.3)
EOSINOPHIL # BLD AUTO: 0.09 THOUSAND/ΜL (ref 0–0.61)
EOSINOPHIL NFR BLD AUTO: 1 % (ref 0–6)
ERYTHROCYTE [DISTWIDTH] IN BLOOD BY AUTOMATED COUNT: 12.1 % (ref 11.6–15.1)
EST. AVERAGE GLUCOSE BLD GHB EST-MCNC: 120 MG/DL
GFR SERPL CREATININE-BSD FRML MDRD: 69 ML/MIN/1.73SQ M
GLUCOSE P FAST SERPL-MCNC: 99 MG/DL (ref 65–99)
HBA1C MFR BLD: 5.8 %
HCT VFR BLD AUTO: 37.5 % (ref 34.8–46.1)
HDLC SERPL-MCNC: 66 MG/DL
HGB BLD-MCNC: 12.3 G/DL (ref 11.5–15.4)
IMM GRANULOCYTES # BLD AUTO: 0.02 THOUSAND/UL (ref 0–0.2)
IMM GRANULOCYTES NFR BLD AUTO: 0 % (ref 0–2)
LDLC SERPL CALC-MCNC: 58 MG/DL (ref 0–100)
LYMPHOCYTES # BLD AUTO: 1.05 THOUSANDS/ΜL (ref 0.6–4.47)
LYMPHOCYTES NFR BLD AUTO: 17 % (ref 14–44)
MCH RBC QN AUTO: 29.4 PG (ref 26.8–34.3)
MCHC RBC AUTO-ENTMCNC: 32.8 G/DL (ref 31.4–37.4)
MCV RBC AUTO: 90 FL (ref 82–98)
MONOCYTES # BLD AUTO: 0.55 THOUSAND/ΜL (ref 0.17–1.22)
MONOCYTES NFR BLD AUTO: 9 % (ref 4–12)
NEUTROPHILS # BLD AUTO: 4.52 THOUSANDS/ΜL (ref 1.85–7.62)
NEUTS SEG NFR BLD AUTO: 72 % (ref 43–75)
NONHDLC SERPL-MCNC: 82 MG/DL
NRBC BLD AUTO-RTO: 0 /100 WBCS
PLATELET # BLD AUTO: 286 THOUSANDS/UL (ref 149–390)
PMV BLD AUTO: 9.1 FL (ref 8.9–12.7)
POTASSIUM SERPL-SCNC: 2.9 MMOL/L (ref 3.5–5.3)
PROT SERPL-MCNC: 7.3 G/DL (ref 6.4–8.2)
PTH-INTACT SERPL-MCNC: 103.9 PG/ML (ref 18.4–80.1)
RBC # BLD AUTO: 4.19 MILLION/UL (ref 3.81–5.12)
SODIUM SERPL-SCNC: 135 MMOL/L (ref 136–145)
TRIGL SERPL-MCNC: 118 MG/DL
TSH SERPL DL<=0.05 MIU/L-ACNC: 1.43 UIU/ML (ref 0.36–3.74)
WBC # BLD AUTO: 6.26 THOUSAND/UL (ref 4.31–10.16)

## 2021-02-20 PROCEDURE — 83036 HEMOGLOBIN GLYCOSYLATED A1C: CPT

## 2021-02-20 PROCEDURE — 85025 COMPLETE CBC W/AUTO DIFF WBC: CPT

## 2021-02-20 PROCEDURE — 80053 COMPREHEN METABOLIC PANEL: CPT

## 2021-02-20 PROCEDURE — 84443 ASSAY THYROID STIM HORMONE: CPT

## 2021-02-20 PROCEDURE — 36415 COLL VENOUS BLD VENIPUNCTURE: CPT

## 2021-02-20 PROCEDURE — 83970 ASSAY OF PARATHORMONE: CPT

## 2021-02-20 PROCEDURE — 80061 LIPID PANEL: CPT

## 2021-02-23 ENCOUNTER — OFFICE VISIT (OUTPATIENT)
Dept: FAMILY MEDICINE CLINIC | Facility: CLINIC | Age: 86
End: 2021-02-23
Payer: COMMERCIAL

## 2021-02-23 VITALS
TEMPERATURE: 98.9 F | SYSTOLIC BLOOD PRESSURE: 132 MMHG | HEIGHT: 63 IN | WEIGHT: 147 LBS | OXYGEN SATURATION: 96 % | BODY MASS INDEX: 26.05 KG/M2 | HEART RATE: 78 BPM | DIASTOLIC BLOOD PRESSURE: 72 MMHG

## 2021-02-23 DIAGNOSIS — C50.212 MALIGNANT NEOPLASM OF UPPER-INNER QUADRANT OF LEFT BREAST IN FEMALE, ESTROGEN RECEPTOR POSITIVE (HCC): ICD-10-CM

## 2021-02-23 DIAGNOSIS — E87.6 HYPOKALEMIA: ICD-10-CM

## 2021-02-23 DIAGNOSIS — E11.65 UNCONTROLLED TYPE 2 DIABETES MELLITUS WITH HYPERGLYCEMIA (HCC): Primary | ICD-10-CM

## 2021-02-23 DIAGNOSIS — Z17.0 MALIGNANT NEOPLASM OF UPPER-INNER QUADRANT OF LEFT BREAST IN FEMALE, ESTROGEN RECEPTOR POSITIVE (HCC): ICD-10-CM

## 2021-02-23 DIAGNOSIS — I10 ESSENTIAL HYPERTENSION: ICD-10-CM

## 2021-02-23 DIAGNOSIS — I49.5 SICK SINUS SYNDROME (HCC): Chronic | ICD-10-CM

## 2021-02-23 DIAGNOSIS — R26.89 BALANCE PROBLEM: ICD-10-CM

## 2021-02-23 PROCEDURE — 1036F TOBACCO NON-USER: CPT | Performed by: FAMILY MEDICINE

## 2021-02-23 PROCEDURE — 3075F SYST BP GE 130 - 139MM HG: CPT | Performed by: FAMILY MEDICINE

## 2021-02-23 PROCEDURE — 3078F DIAST BP <80 MM HG: CPT | Performed by: FAMILY MEDICINE

## 2021-02-23 PROCEDURE — 1160F RVW MEDS BY RX/DR IN RCRD: CPT | Performed by: FAMILY MEDICINE

## 2021-02-23 PROCEDURE — 99214 OFFICE O/P EST MOD 30 MIN: CPT | Performed by: FAMILY MEDICINE

## 2021-02-23 RX ORDER — POTASSIUM CHLORIDE 750 MG/1
TABLET, EXTENDED RELEASE ORAL
Qty: 180 TABLET | Refills: 3 | Status: SHIPPED | OUTPATIENT
Start: 2021-02-23 | End: 2022-02-24

## 2021-02-23 NOTE — ASSESSMENT & PLAN NOTE
Hypokalemia- potassium reviewed at 2 9 patient will increase her potassium tablets and start taking 2 daily now re-evaluate laboratory work in 4 months

## 2021-02-23 NOTE — PROGRESS NOTES
Assessment/Plan:       Problem List Items Addressed This Visit        Endocrine    Un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diabetes mellitus with hyperglycemia ([facility] [address] ) - Primary       Lab Results   Component Value Date    HGBA1C 5 8 (H) 02/20/2021    diabetes under stable control continue current diet regimen A1c reviewed at 5 8 follow-up with laboratory work at next office visit diabetic foot exam completed today no neurologic deficits circulatory  function is good         Relevant Orders    Microalbumin / creatinine urine ratio    CBC and differential    Comprehensive metabolic panel    Lipid panel    TSH, 3rd generation with Free T4 reflex    Hemoglobin A1C       Cardiovascular and Mediastinum    Sick sinus syndrome (HCC) (Chronic)      Sick sinus syndrome is stable pacemaker no symptomatology no chest pain shortness of breath follow-up with Cardiology as scheduled         Relevant Orders    CBC and differential    Comprehensive metabolic panel    Lipid panel    TSH, 3rd generation with Free T4 reflex    Hemoglobin A1C    Essential hypertension      Hypertension stable avoid sodium in the diet exercise regularly maintain good diet plan and follow-up at next scheduled office visit         Relevant Medications    potassium chloride (K-DUR,KLOR-CON) 10 mEq tablet    Other Relevant Orders    CBC and differential    Comprehensive metabolic panel    Lipid panel    TSH, 3rd generation with Free T4 reflex    Hemoglobin A1C       Other    Hypokalemia     Hypokalemia- potassium reviewed at 2 9 patient will increase her potassium tablets and start taking 2 daily now re-evaluate laboratory work in 4 months         Malignant neoplasm of upper-inner quadrant of left breast in female, estrogen receptor positive ([facility] [address] )    Relevant Orders    CBC and differential    Comprehensive metabolic panel    Lipid panel    TSH, 3rd generation with Free T4 reflex    Hemoglobin A1C    Balance problem      Patient has been stable with ambulation it is recommended that she continue to walk every day work on exercises for balance and if symptoms change or worsen refer over for physical therapy  Continue cane assistance when out of the house  Relevant Orders    CBC and differential    Comprehensive metabolic panel    Lipid panel    TSH, 3rd generation with Free T4 reflex    Hemoglobin A1C            Subjective:      Patient ID: Wen Wiggins is a 82 y o  female  Patient presents today for general checkup and evaluation in regards to discussion about diabetes and medications      The following portions of the patient's history were reviewed and updated as appropriate: allergies, current medications, past family history, past medical history, past social history, past surgical history and problem list     Review of Systems   Constitutional: Negative for chills, fatigue and fever  HENT: Negative for congestion, nosebleeds, rhinorrhea, sinus pressure and sore throat  Eyes: Negative for discharge and redness  Respiratory: Negative for cough and shortness of breath  Cardiovascular: Negative for chest pain, palpitations and leg swelling  Gastrointestinal: Negative for abdominal pain, blood in stool and nausea  Endocrine: Negative for cold intolerance, heat intolerance and polyuria  Genitourinary: Negative for dysuria and frequency  Musculoskeletal: Negative for arthralgias, back pain and myalgias  Skin: Negative for rash  Neurological: Negative for dizziness, weakness and headaches  Hematological: Negative for adenopathy  Psychiatric/Behavioral: Negative for behavioral problems and sleep disturbance  The patient is not nervous/anxious  Objective:      /72   Pulse 78   Temp 98 9 °F (37 2 °C)   Ht 5' 3" (1 6 m)   Wt 66 7 kg (147 lb)   SpO2 96%   BMI 26 04 kg/m²        Physical Exam  Vitals signs and nursing note reviewed  Constitutional:       General: She is not in acute distress  Appearance: She is well-developed  HENT:      Head: Normocephalic and atraumatic  Right Ear: Tympanic membrane and external ear normal       Left Ear: Tympanic membrane and external ear normal       Nose: Nose normal       Mouth/Throat:      Mouth: Mucous membranes are moist       Pharynx: Oropharynx is clear  No oropharyngeal exudate  Eyes:      General: No scleral icterus  Right eye: No discharge  Left eye: No discharge  Conjunctiva/sclera: Conjunctivae normal       Pupils: Pupils are equal, round, and reactive to light  Neck:      Musculoskeletal: Normal range of motion  Thyroid: No thyromegaly  Vascular: No JVD  Cardiovascular:      Rate and Rhythm: Normal rate and regular rhythm  Heart sounds: Normal heart sounds  No murmur  Pulmonary:      Effort: Pulmonary effort is normal       Breath sounds: No wheezing or rales  Chest:      Chest wall: No tenderness  Abdominal:      General: Bowel sounds are normal  There is no distension  Palpations: Abdomen is soft  There is no mass  Tenderness: There is no abdominal tenderness  Musculoskeletal: Normal range of motion  General: No tenderness or deformity  Lymphadenopathy:      Cervical: No cervical adenopathy  Skin:     General: Skin is warm and dry  Capillary Refill: Capillary refill takes less than 2 seconds  Findings: No rash  Neurological:      General: No focal deficit present  Mental Status: She is alert and oriented to person, place, and time  Cranial Nerves: No cranial nerve deficit  Coordination: Coordination normal       Deep Tendon Reflexes: Reflexes are normal and symmetric  Reflexes normal    Psychiatric:         Mood and Affect: Mood normal          Behavior: Behavior normal          Thought Content:  Thought content normal          Judgment: Judgment normal           Data:    Laboratory Results: I have personally reviewed the pertinent laboratory results/reports   Radiology/Other Diagnostic Testing Results: I have personally reviewed pertinent reports         Lab Results   Component Value Date    WBC 6 26 02/20/2021    HGB 12 3 02/20/2021    HCT 37 5 02/20/2021    MCV 90 02/20/2021     02/20/2021     Lab Results   Component Value Date    K 2 9 (L) 02/20/2021    CL 95 (L) 02/20/2021    CO2 35 (H) 02/20/2021    BUN 9 02/20/2021    CREATININE 0 79 02/20/2021    GLUF 99 02/20/2021    CALCIUM 11 2 (H) 02/20/2021    CORRECTEDCA 11 4 (H) 06/16/2020    AST 19 02/20/2021    ALT 15 02/20/2021    ALKPHOS 108 02/20/2021    EGFR 69 02/20/2021     Lab Results   Component Value Date    CHOLESTEROL 148 02/20/2021    CHOLESTEROL 141 10/16/2020    CHOLESTEROL 134 06/16/2020     Lab Results   Component Value Date    HDL 66 02/20/2021    HDL 65 10/16/2020    HDL 51 06/16/2020     Lab Results   Component Value Date    LDLCALC 58 02/20/2021    LDLCALC 52 10/16/2020    LDLCALC 60 06/16/2020     Lab Results   Component Value Date    TRIG 118 02/20/2021    TRIG 120 10/16/2020    TRIG 114 06/16/2020     No results found for: [place], [place]  Lab Results   Component Value Date    [id number] 1 430 02/20/2021     Lab Results   Component Value Date    HGBA1C 5 8 (H) 02/20/2021     No results found for: PSA    [facility], DO

## 2021-02-23 NOTE — ASSESSMENT & PLAN NOTE
Sick sinus syndrome is stable pacemaker no symptomatology no chest pain shortness of breath follow-up with Cardiology as scheduled

## 2021-02-23 NOTE — PATIENT INSTRUCTIONS
Meal Planning with Diabetes Exchanges   AMBULATORY CARE:   Diabetes exchanges  are servings of food that contain similar amounts of carbohydrate, fat, protein, and calories within a food group  The exchanges can be used to develop a healthy meal plan that helps to keep your blood sugar within the recommended levels  A meal plan with the right amount of carbohydrates is especially important  Your blood sugar naturally rises after you eat carbohydrates  Too many carbohydrates in 1 meal or snack can raise your blood sugar level  Carbohydrates are found in starches, fruit, milk, yogurt, and sweets  Call your doctor if:   · You have high blood sugar levels during a certain time of day, or almost all of the time  · You often have low blood sugar levels  · You have questions or concerns about your condition or care  Create a meal plan with exchanges:  A dietitian will work with you to develop a healthy meal plan that is right for you  This meal plan will include the amount of exchanges you can have from each food group throughout the day  Follow your meal plan by keeping track of the amount of exchanges you eat for each meal and snack  Your meal plan will be based on your age, weight, blood sugar levels, medicine, and activity level  Starch food group exchanges:  Each exchange below contains about 15 grams of carbohydrate , 3 grams of protein, 1 gram of fat, and 80 calories  · 1 ounce of white, whole wheat or rye bread (1 slice)    · 1 ounce of bagel (about ¼ of a bagel)    · 1 6-inch flour or corn tortilla or 1 4-inch pancake (about ¼ inch thick)    · ?  cup of cooked pasta or rice    · ¾ cup of dry, ready-to-eat cereal with no sugar added     · ½ cup of cooked cereal, such as oatmeal    · 3 paula cracker squares or 8 animal crackers    · 6 saltine-type crackers or     · 3 cups of popcorn or ¾ ounce of pretzels     · Starchy vegetables and cooked legumes:      ? ½ cup of corn, green peas, sweet potatoes, or mashed potatoes     ? ¼ of a large baked potato     ? 1 cup of acorn, butternut squash, or pumpkin     ? ½ cup of beans, lentils, or peas (such as kumar, kidney, or black-eyed)    ? ? cup of lima beans    Fruit group exchanges:  Each exchange contains about 15 grams of carbohydrate  and 60 calories  · 1 small (4 ounce) apple, banana orange, or nectarine    · ½ cup of canned or fresh fruit    · ½ cup (4 ounces) of unsweetened fruit juice    · 2 tablespoons of dried fruit    Milk group exchanges:  Each exchange contains about 12 grams of carbohydrate  and 8 grams of protein  The amount of fat and calories in each serving depends on the type of milk (such as whole, low-fat, or fat-free)  · 1 cup fat-free or low-fat milk    · ¾ cup of plain, nonfat yogurt    · 1 cup fat-free, flavored yogurt with artificial (no calorie) sweetener    Non-starchy vegetable group exchanges:  Each exchange contains about 5 grams of carbohydrate , 2 grams of protein, and 25 calories  Examples include beets, broccoli, cabbage, carrots, cauliflower, cucumber, mushrooms, tomatoes, and zucchini  · ½ cup of cooked vegetables or 1 cup of raw vegetables     · ½ cup of vegetable juice    Meat and meat substitute group exchanges:  Each exchange of a lean meat  listed below contains about 7 grams of protein, 0 to 3 grams of fat, and 45 calories  The meat and meat substitutes food group does not contain any carbohydrates  Medium and high-fat meats have more calories  · 1 ounce of chicken or turkey without skin, or 1 ounce of fish (not breaded or fried)     · 1 ounce of lean beef, pork, or lamb     · 1-inch cube or 1 ounce of low-fat cheese     · 2 egg whites or ¼ cup of egg substitute     · ½ cup of tofu    Sweets, desserts, and other carbohydrate group exchanges:   · Sweets and other desserts:  Each exchange has about 15 grams of carbohydrate   ? 1 ounce of tao food cake or 2-inch square cake (unfrosted)    ? 2 small cookies     ?  ½ cup of sugar-free, fat-free ice cream    ? 1 tablespoon of syrup, jam, jelly, table sugar, or honey    · Combination foods:     ? 1 cup of an entrée, such as lasagna, spaghetti with meatballs, macaroni and cheese, and chili with beans (each serving counts as 2 carbohydrate exchanges )     ? 1 cup of tomato or vegetable beef soup (each serving counts as 1 carbohydrate exchange )    Fat group exchanges:  Each exchange contains 5 grams of fat and 45 calories  · 1 teaspoon of oil (such as canola, olive, or corn oil)     · 6 almonds or cashews, 10 peanuts, or 4 pecan halves     · 2 tablespoons of avocado     · ½ tablespoon of peanut butter     · 1 teaspoon of regular margarine or 2 teaspoons of low-fat margarine     · 1 teaspoon of regular butter or 1 tablespoon of low-fat butter     · 1 teaspoon of regular mayonnaise or 1 tablespoon of low-fat mayonnaise     · 1 tablespoon of regular salad dressing or 2 tablespoons of low-fat salad dressing    Free foods: The foods on this list are called free foods because they have very few calories  Free foods usually do not increase your blood sugar if you limit them  · 1 tablespoon of catsup or taco sauce     · ¼ cup of salsa     · 2 tablespoons of sugar-free syrup or 2 teaspoons of light jam or jelly     · 1 tablespoon of fat-free salad dressing     · 4 tablespoons of fat-free margarine or fat-free mayonnaise     · Sugar-free drinks: diet soda, sugar-free drink mixes, or mineral water     · Low-sodium bouillon or fat-free broth     · Mustard     · Seasonings such as spices, herbs, and garlic     · Sugar-free gelatin without added fruit    Other healthy nutrition guidelines:   · Limit drinks with sugar substitutes  Your dietitian or healthcare provider will encourage you to drink water  Water helps your kidneys to function properly  Ask how much water you should drink every day  · Eat more fiber    Choose foods that are good sources of fiber, such as fruits, vegetables, and whole grains  Cereals that contain 5 or more grams of fiber per serving are good sources of fiber  Legumes such as garbanzo, kumar beans, kidney beans, and lentils are also good sources  · Limit fat  Ask your dietitian or healthcare provider how much fat you should eat each day  Choose foods low in fat, saturated fat, trans fat, and cholesterol  Examples include turkey or chicken without the skin, fish, lean cuts of meat, and beans  Low-fat dairy foods, such as low-fat or fat-free milk and low-fat yogurt are also good choices  Omega-3 fatty acids are healthy fats that are found in canola oil, soybean oil and fatty fish  [place], albacore tuna, and sardines are good sources of omega 3 fatty acids  Eat 2 servings of these types of fish each week  Do not eat fried fish  · Limit sugar  Sugar and sweets must be counted toward the carbohydrate exchanges that you can have within your meal plan  Limit sugar and sweets because they are usually also high in calories and fat  Eat smaller portions of sweets by sharing a dessert or asking for a child-size portion at a restaurant  · Limit sodium  (salt) to about 2,300 mg per day  You may need to eat even less sodium if you have certain medical conditions  Foods high in sodium include soy sauce, potato chips, and soup  · Limit alcohol  Ask your healthcare provider if it is safe for you to drink alcohol  If alcohol is safe for you to have, eat a meal when you drink alcohol  If you drink alcohol on an empty stomach, your blood sugar may drop to a low level  Women 21 years or older and men 72 years or older should limit alcohol to 1 drink a day  Men aged 24 to 59 years should limit alcohol to 2 drinks a day  A drink of alcohol is 5 ounces of wine, 12 ounces of beer, or 1½ ounces of liquor  Other ways to manage your diabetes:   · Control your blood sugar level  Test your blood sugar level regularly and keep a record of the results   Ask your healthcare provider when and how often to test your blood sugar  You may need to check your blood sugar level at least 3 times each day  · Talk to your healthcare provider about your weight  Ask if you need to lose weight, and how much you need to lose  If you are overweight, you may need to make other changes to lose weight  Ask your healthcare provider to help you create a weight loss program      · Get regular physical activity  Physical activity can help decrease your blood sugar level  It can also help to decrease your risk for heart disease and help you lose weight  Adults should have moderate intensity physical activity for at least 150 minutes every week  Spread the amount of activity over at least 3 days a week  Do not skip more than 2 days in a row  Children should get at least 60 minutes of moderate physical activity on most days of the week  Examples of moderate physical activity include brisk walking, running, and swimming  Do not sit for longer than 30 minutes  Work with your healthcare provider to create a plan for physical activity  © Copyright [address] Information is for End User's use only and may not be sold, redistributed or otherwise used for commercial purposes  All illustrations and images included in CareNotes® are the copyrighted property of [employer]  or [address] [place] [address]  The above information is an  only  It is not intended as medical advice for individual conditions or treatments  Talk to your doctor, nurse or pharmacist before following any medical regimen to see if it is safe and effective for you

## 2021-02-23 NOTE — ASSESSMENT & PLAN NOTE
Patient has been stable with ambulation it is recommended that she continue to walk every day work on exercises for balance and if symptoms change or worsen refer over for physical therapy  Continue cane assistance when out of the house

## 2021-02-23 NOTE — ASSESSMENT & PLAN NOTE
Hypertension stable avoid sodium in the diet exercise regularly maintain good diet plan and follow-up at next scheduled office visit

## 2021-03-03 DIAGNOSIS — E11.65 UNCONTROLLED TYPE 2 DIABETES MELLITUS WITH HYPERGLYCEMIA (HCC): ICD-10-CM

## 2021-04-06 ENCOUNTER — OFFICE VISIT (OUTPATIENT)
Dept: FAMILY MEDICINE CLINIC | Facility: CLINIC | Age: 86
End: 2021-04-06
Payer: COMMERCIAL

## 2021-04-06 VITALS
BODY MASS INDEX: 26.58 KG/M2 | HEART RATE: 75 BPM | OXYGEN SATURATION: 91 % | SYSTOLIC BLOOD PRESSURE: 120 MMHG | HEIGHT: 63 IN | WEIGHT: 150 LBS | TEMPERATURE: 98.5 F | DIASTOLIC BLOOD PRESSURE: 80 MMHG

## 2021-04-06 DIAGNOSIS — R31.0 GROSS HEMATURIA: ICD-10-CM

## 2021-04-06 DIAGNOSIS — R39.9 UTI SYMPTOMS: Primary | ICD-10-CM

## 2021-04-06 DIAGNOSIS — E11.65 UNCONTROLLED TYPE 2 DIABETES MELLITUS WITH HYPERGLYCEMIA (HCC): ICD-10-CM

## 2021-04-06 LAB
BACTERIA UR QL AUTO: ABNORMAL /HPF
BILIRUB UR QL STRIP: NEGATIVE
CLARITY UR: ABNORMAL
COLOR UR: YELLOW
GLUCOSE UR STRIP-MCNC: NEGATIVE MG/DL
HGB UR QL STRIP.AUTO: ABNORMAL
KETONES UR STRIP-MCNC: NEGATIVE MG/DL
LEUKOCYTE ESTERASE UR QL STRIP: ABNORMAL
NITRITE UR QL STRIP: NEGATIVE
NON-SQ EPI CELLS URNS QL MICRO: ABNORMAL /HPF
OTHER STN SPEC: ABNORMAL
PH UR STRIP.AUTO: 7 [PH]
PROT UR STRIP-MCNC: ABNORMAL MG/DL
RBC #/AREA URNS AUTO: ABNORMAL /HPF
SL AMB  POCT GLUCOSE, UA: NORMAL
SL AMB LEUKOCYTE ESTERASE,UA: NORMAL
SL AMB POCT BILIRUBIN,UA: NORMAL
SL AMB POCT BLOOD,UA: NORMAL
SL AMB POCT CLARITY,UA: NORMAL
SL AMB POCT COLOR,UA: NORMAL
SL AMB POCT KETONES,UA: NORMAL
SL AMB POCT NITRITE,UA: NORMAL
SL AMB POCT PH,UA: 6.5
SL AMB POCT SPECIFIC GRAVITY,UA: 1.01
SL AMB POCT URINE PROTEIN: NORMAL
SL AMB POCT UROBILINOGEN: 0.2
SP GR UR STRIP.AUTO: 1.01 (ref 1–1.03)
UROBILINOGEN UR QL STRIP.AUTO: 0.2 E.U./DL
WBC #/AREA URNS AUTO: ABNORMAL /HPF

## 2021-04-06 PROCEDURE — 81002 URINALYSIS NONAUTO W/O SCOPE: CPT | Performed by: FAMILY MEDICINE

## 2021-04-06 PROCEDURE — 81001 URINALYSIS AUTO W/SCOPE: CPT | Performed by: FAMILY MEDICINE

## 2021-04-06 PROCEDURE — 87086 URINE CULTURE/COLONY COUNT: CPT | Performed by: FAMILY MEDICINE

## 2021-04-06 PROCEDURE — 99214 OFFICE O/P EST MOD 30 MIN: CPT | Performed by: FAMILY MEDICINE

## 2021-04-06 RX ORDER — SULFAMETHOXAZOLE AND TRIMETHOPRIM 800; 160 MG/1; MG/1
1 TABLET ORAL EVERY 12 HOURS SCHEDULED
Qty: 14 TABLET | Refills: 1 | Status: SHIPPED | OUTPATIENT
Start: 2021-04-06 | End: 2021-04-13

## 2021-04-06 NOTE — PROGRESS NOTES
BMI Counseling: Body mass index is 26 57 kg/m²  The BMI is above normal  Nutrition recommendations include reducing portion sizes, decreasing overall calorie intake, 3-5 servings of fruits/vegetables daily, reducing fast food intake, consuming healthier snacks, decreasing soda and/or juice intake, moderation in carbohydrate intake, increasing intake of lean protein, reducing intake of saturated fat and trans fat and reducing intake of cholesterol  Exercise recommendations include exercising 3-5 times per week

## 2021-04-06 NOTE — ASSESSMENT & PLAN NOTE
Diabetes stable with A1c at 5 8 continue with current diet regimen and metformin at 500 mg  Lab Results   Component Value Date    HGBA1C 5 8 (H) 02/20/2021

## 2021-04-06 NOTE — ASSESSMENT & PLAN NOTE
Lower urinary tract symptoms will start patient on antibiotics if symptoms do not improve will refer for urology evaluation

## 2021-04-06 NOTE — PROGRESS NOTES
Assessment/Plan:       Problem List Items Addressed This Visit        Endocrine    Un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diabetes mellitus with hyperglycemia ([facility] [address] )     Diabetes stable with A1c at 5 8 continue with current diet regimen and metformin at 500 mg  Lab Results   Component Value Date    HGBA1C 5 8 (H) 02/20/2021               Genitourinary    Gross hematuria      Lower urinary tract symptoms will start patient on antibiotics if symptoms do not improve will refer for urology evaluation           Other Visit Diagnoses     UTI symptoms    -  Primary    Relevant Orders    POCT urine dip (Completed)    UA w Reflex to Microscopic w Reflex to Culture - Clinic Collect            Subjective:      Patient ID: Monalisa Way is a 82 y o  female  Patient presents for general checkup evaluation and urinary tract infection symptoms      The following portions of the patient's history were reviewed and updated as appropriate: allergies, current medications, past family history, past medical history, past social history, past surgical history and problem list     Review of Systems   Constitutional: Negative for chills, fatigue and fever  HENT: Negative for congestion, nosebleeds, rhinorrhea, sinus pressure and sore throat  Eyes: Negative for discharge and redness  Respiratory: Negative for cough and shortness of breath  Cardiovascular: Negative for chest pain, palpitations and leg swelling  Gastrointestinal: Negative for abdominal pain, blood in stool and nausea  Endocrine: Negative for cold intolerance, heat intolerance and polyuria  Genitourinary: Positive for dysuria, frequency and urgency  Musculoskeletal: Negative for arthralgias, back pain and myalgias  Skin: Negative for rash  Neurological: Negative for dizziness, weakness and headaches  Hematological: Negative for adenopathy  Psychiatric/Behavioral: Negative for behavioral problems and sleep disturbance  The patient is not nervous/anxious  Objective:      /80 (BP Location: Left arm, Patient Position: Sitting)   Pulse 75   Temp 98 5 °F (36 9 °C)   Ht 5' 3" (1 6 m)   Wt 68 kg (150 lb)   SpO2 91%   BMI 26 57 kg/m²        Physical Exam  Vitals signs and nursing note reviewed  Constitutional:       General: She is not in acute distress  Appearance: She is well-developed  HENT:      Head: Normocephalic and atraumatic  Right Ear: External ear normal       Left Ear: External ear normal       Nose: Nose normal       Mouth/Throat:      Pharynx: No oropharyngeal exudate  Eyes:      General: No scleral icterus  Right eye: No discharge  Left eye: No discharge  Conjunctiva/sclera: Conjunctivae normal       Pupils: Pupils are equal, round, and reactive to light  Neck:      Musculoskeletal: Normal range of motion  Thyroid: No thyromegaly  Vascular: No JVD  Cardiovascular:      Rate and Rhythm: Normal rate and regular rhythm  Heart sounds: Normal heart sounds  No murmur  Pulmonary:      Effort: Pulmonary effort is normal       Breath sounds: No wheezing or rales  Chest:      Chest wall: No tenderness  Abdominal:      General: Bowel sounds are normal  There is no distension  Palpations: Abdomen is soft  There is no mass  Tenderness: There is abdominal tenderness  Musculoskeletal: Normal range of motion  General: No tenderness or deformity  Lymphadenopathy:      Cervical: No cervical adenopathy  Skin:     General: Skin is warm and dry  Findings: No rash  Neurological:      Mental Status: She is alert and oriented to person, place, and time  Cranial Nerves: No cranial nerve deficit  Coordination: Coordination normal       Deep Tendon Reflexes: Reflexes are normal and symmetric  Reflexes normal    Psychiatric:         Behavior: Behavior normal          Thought Content:  Thought content normal          Judgment: Judgment normal           Data:    Laboratory Results: I have personally reviewed the pertinent laboratory results/reports   Radiology/Other Diagnostic Testing Results: I have personally reviewed pertinent reports         Lab Results   Component Value Date    WBC 6 26 02/20/2021    HGB 12 3 02/20/2021    HCT 37 5 02/20/2021    MCV 90 02/20/2021     02/20/2021     Lab Results   Component Value Date    K 2 9 (L) 02/20/2021    CL 95 (L) 02/20/2021    CO2 35 (H) 02/20/2021    BUN 9 02/20/2021    CREATININE 0 79 02/20/2021    GLUF 99 02/20/2021    CALCIUM 11 2 (H) 02/20/2021    CORRECTEDCA 11 4 (H) 06/16/2020    AST 19 02/20/2021    ALT 15 02/20/2021    ALKPHOS 108 02/20/2021    EGFR 69 02/20/2021     Lab Results   Component Value Date    CHOLESTEROL 148 02/20/2021    CHOLESTEROL 141 10/16/2020    CHOLESTEROL 134 06/16/2020     Lab Results   Component Value Date    HDL 66 02/20/2021    HDL 65 10/16/2020    HDL 51 06/16/2020     Lab Results   Component Value Date    LDLCALC 58 02/20/2021    LDLCALC 52 10/16/2020    LDLCALC 60 06/16/2020     Lab Results   Component Value Date    TRIG 118 02/20/2021    TRIG 120 10/16/2020    TRIG 114 06/16/2020     No results found for: [place], [place]  Lab Results   Component Value Date    [id number] 1 430 02/20/2021     Lab Results   Component Value Date    HGBA1C 5 8 (H) 02/20/2021     No results found for: BRAYDEN Jon DO

## 2021-04-08 ENCOUNTER — TELEPHONE (OUTPATIENT)
Dept: FAMILY MEDICINE CLINIC | Facility: CLINIC | Age: 86
End: 2021-04-08

## 2021-04-08 ENCOUNTER — HOSPITAL ENCOUNTER (EMERGENCY)
Facility: HOSPITAL | Age: 86
Discharge: HOME | End: 2021-04-08
Attending: EMERGENCY MEDICINE | Admitting: EMERGENCY MEDICINE
Payer: COMMERCIAL

## 2021-04-08 ENCOUNTER — APPOINTMENT (EMERGENCY)
Dept: CT IMAGING | Facility: HOSPITAL | Age: 86
End: 2021-04-08
Payer: COMMERCIAL

## 2021-04-08 VITALS
OXYGEN SATURATION: 97 % | RESPIRATION RATE: 18 BRPM | TEMPERATURE: 98.4 F | DIASTOLIC BLOOD PRESSURE: 51 MMHG | HEART RATE: 89 BPM | BODY MASS INDEX: 26.95 KG/M2 | SYSTOLIC BLOOD PRESSURE: 101 MMHG | WEIGHT: 152.12 LBS

## 2021-04-08 DIAGNOSIS — D49.4 BLADDER TUMOR: Primary | ICD-10-CM

## 2021-04-08 DIAGNOSIS — R31.9 HEMATURIA: ICD-10-CM

## 2021-04-08 LAB
ALBUMIN SERPL BCP-MCNC: 3.4 G/DL (ref 3.5–5)
ALP SERPL-CCNC: 80 U/L (ref 46–116)
ALT SERPL W P-5'-P-CCNC: 17 U/L (ref 12–78)
ANION GAP SERPL CALCULATED.3IONS-SCNC: 4 MMOL/L (ref 4–13)
AST SERPL W P-5'-P-CCNC: 17 U/L (ref 5–45)
ATRIAL RATE: 83 BPM
ATRIAL RATE: 93 BPM
BACTERIA UR CULT: NORMAL
BACTERIA UR QL AUTO: ABNORMAL /HPF
BASOPHILS # BLD AUTO: 0.01 THOUSANDS/ΜL (ref 0–0.1)
BASOPHILS NFR BLD AUTO: 0 % (ref 0–1)
BILIRUB DIRECT SERPL-MCNC: 0.08 MG/DL (ref 0–0.2)
BILIRUB SERPL-MCNC: 0.26 MG/DL (ref 0.2–1)
BILIRUB UR QL STRIP: NEGATIVE
BUN SERPL-MCNC: 13 MG/DL (ref 5–25)
CALCIUM SERPL-MCNC: 10.7 MG/DL (ref 8.3–10.1)
CHLORIDE SERPL-SCNC: 95 MMOL/L (ref 100–108)
CLARITY UR: ABNORMAL
CO2 SERPL-SCNC: 31 MMOL/L (ref 21–32)
COLOR UR: ABNORMAL
CREAT SERPL-MCNC: 1.27 MG/DL (ref 0.6–1.3)
EOSINOPHIL # BLD AUTO: 0.01 THOUSAND/ΜL (ref 0–0.61)
EOSINOPHIL NFR BLD AUTO: 0 % (ref 0–6)
ERYTHROCYTE [DISTWIDTH] IN BLOOD BY AUTOMATED COUNT: 12.9 % (ref 11.6–15.1)
GFR SERPL CREATININE-BSD FRML MDRD: 39 ML/MIN/1.73SQ M
GLUCOSE SERPL-MCNC: 246 MG/DL (ref 65–140)
GLUCOSE UR STRIP-MCNC: NEGATIVE MG/DL
HCT VFR BLD AUTO: 25.3 % (ref 34.8–46.1)
HGB BLD-MCNC: 8.1 G/DL (ref 11.5–15.4)
HGB UR QL STRIP.AUTO: ABNORMAL
IMM GRANULOCYTES # BLD AUTO: 0.06 THOUSAND/UL (ref 0–0.2)
IMM GRANULOCYTES NFR BLD AUTO: 1 % (ref 0–2)
KETONES UR STRIP-MCNC: NEGATIVE MG/DL
LEUKOCYTE ESTERASE UR QL STRIP: ABNORMAL
LYMPHOCYTES # BLD AUTO: 0.23 THOUSANDS/ΜL (ref 0.6–4.47)
LYMPHOCYTES NFR BLD AUTO: 2 % (ref 14–44)
MCH RBC QN AUTO: 27.6 PG (ref 26.8–34.3)
MCHC RBC AUTO-ENTMCNC: 32 G/DL (ref 31.4–37.4)
MCV RBC AUTO: 86 FL (ref 82–98)
MONOCYTES # BLD AUTO: 0.37 THOUSAND/ΜL (ref 0.17–1.22)
MONOCYTES NFR BLD AUTO: 4 % (ref 4–12)
NEUTROPHILS # BLD AUTO: 9.64 THOUSANDS/ΜL (ref 1.85–7.62)
NEUTS SEG NFR BLD AUTO: 93 % (ref 43–75)
NITRITE UR QL STRIP: NEGATIVE
NON-SQ EPI CELLS URNS QL MICRO: ABNORMAL /HPF
NRBC BLD AUTO-RTO: 0 /100 WBCS
OTHER STN SPEC: ABNORMAL
P AXIS: 48 DEGREES
P AXIS: 50 DEGREES
PH UR STRIP.AUTO: 7.5 [PH]
PLATELET # BLD AUTO: 272 THOUSANDS/UL (ref 149–390)
PMV BLD AUTO: 8.6 FL (ref 8.9–12.7)
POTASSIUM SERPL-SCNC: 3.7 MMOL/L (ref 3.5–5.3)
PR INTERVAL: 172 MS
PR INTERVAL: 176 MS
PROT SERPL-MCNC: 6.6 G/DL (ref 6.4–8.2)
PROT UR STRIP-MCNC: ABNORMAL MG/DL
QRS AXIS: 52 DEGREES
QRS AXIS: 57 DEGREES
QRSD INTERVAL: 126 MS
QRSD INTERVAL: 136 MS
QT INTERVAL: 378 MS
QT INTERVAL: 390 MS
QTC INTERVAL: 458 MS
QTC INTERVAL: 469 MS
RBC # BLD AUTO: 2.93 MILLION/UL (ref 3.81–5.12)
RBC #/AREA URNS AUTO: ABNORMAL /HPF
SODIUM SERPL-SCNC: 130 MMOL/L (ref 136–145)
SP GR UR STRIP.AUTO: 1.01 (ref 1–1.03)
T WAVE AXIS: 33 DEGREES
T WAVE AXIS: 8 DEGREES
TROPONIN I SERPL-MCNC: <0.02 NG/ML
UROBILINOGEN UR QL STRIP.AUTO: 0.2 E.U./DL
VENTRICULAR RATE: 83 BPM
VENTRICULAR RATE: 93 BPM
WBC # BLD AUTO: 10.32 THOUSAND/UL (ref 4.31–10.16)
WBC #/AREA URNS AUTO: ABNORMAL /HPF

## 2021-04-08 PROCEDURE — 74176 CT ABD & PELVIS W/O CONTRAST: CPT

## 2021-04-08 PROCEDURE — 93010 ELECTROCARDIOGRAM REPORT: CPT | Performed by: INTERNAL MEDICINE

## 2021-04-08 PROCEDURE — 36415 COLL VENOUS BLD VENIPUNCTURE: CPT | Performed by: EMERGENCY MEDICINE

## 2021-04-08 PROCEDURE — 99285 EMERGENCY DEPT VISIT HI MDM: CPT | Performed by: EMERGENCY MEDICINE

## 2021-04-08 PROCEDURE — 93005 ELECTROCARDIOGRAM TRACING: CPT

## 2021-04-08 PROCEDURE — 84484 ASSAY OF TROPONIN QUANT: CPT | Performed by: EMERGENCY MEDICINE

## 2021-04-08 PROCEDURE — 80048 BASIC METABOLIC PNL TOTAL CA: CPT | Performed by: EMERGENCY MEDICINE

## 2021-04-08 PROCEDURE — 87086 URINE CULTURE/COLONY COUNT: CPT | Performed by: EMERGENCY MEDICINE

## 2021-04-08 PROCEDURE — 99284 EMERGENCY DEPT VISIT MOD MDM: CPT

## 2021-04-08 PROCEDURE — 85025 COMPLETE CBC W/AUTO DIFF WBC: CPT | Performed by: EMERGENCY MEDICINE

## 2021-04-08 PROCEDURE — 81001 URINALYSIS AUTO W/SCOPE: CPT | Performed by: EMERGENCY MEDICINE

## 2021-04-08 PROCEDURE — 80076 HEPATIC FUNCTION PANEL: CPT | Performed by: EMERGENCY MEDICINE

## 2021-04-08 PROCEDURE — 96360 HYDRATION IV INFUSION INIT: CPT

## 2021-04-08 RX ADMIN — SODIUM CHLORIDE 500 ML: 0.9 INJECTION, SOLUTION INTRAVENOUS at 14:54

## 2021-04-08 NOTE — TELEPHONE ENCOUNTER
Pt family called  Stated pt is weak, dizzy, diarrhea all out of sorts  As per   Referred to ED  For evaluation   Pt and family agreeable

## 2021-04-08 NOTE — ED NOTES
Discharged reviewed with pt  Pt verbalized understanding and has no further questions at this time  Pt ambulatory off unit with steady gait       Natalie Marie RN  04/08/21 2244

## 2021-04-08 NOTE — ED PROVIDER NOTES
History  Chief Complaint   Patient presents with    Urinary Urgency     pt w/ urinary urgency, onset today, denies pain w/ uriantion     Dizziness     82year old female comes to the ED with hematuria and feeling "like my kidney  inside of me"  Malaise and fatigue are the only associated symptoms  D/D includes but is not limited to UTI, Pyelo, acute renal failure  f      History provided by:  Patient and relative   used: No    Dizziness  Quality:  Lightheadedness  Severity:  Mild  Onset quality:  Gradual  Timing:  Constant  Chronicity:  New  Context: not with bowel movement, not with eye movement and not with inactivity    Relieved by:  Nothing  Worsened by:  Nothing  Ineffective treatments:  None tried      Prior to Admission Medications   Prescriptions Last Dose Informant Patient Reported? Taking?    DOCOSAHEXAENOIC ACID PO   Yes No   Sig: Take 2 capsules by mouth daily   anastrozole (ARIMIDEX) 1 mg tablet   Yes No   Sig: Take 1 mg by mouth daily   atorvastatin (LIPITOR) 40 mg tablet   No No   Sig: Take 1 tablet (40 mg total) by mouth daily   metFORMIN (GLUCOPHAGE) 500 mg tablet   No No   Sig: Take 1 tablet (500 mg total) by mouth daily   polyethylene glycol-propylene glycol (SYSTANE) 0 4-0 3 %   Yes No   Sig: Apply 4 drops to eye 2 (two) times a day   potassium chloride (K-DUR,KLOR-CON) 10 mEq tablet   No No   Sig: Take 2 tablets daily   sertraline (ZOLOFT) 50 mg tablet   No No   Sig: TAKE ONE TABLET BY MOUTH ONE TIME DAILY    sulfamethoxazole-trimethoprim (BACTRIM DS) 800-160 mg per tablet   No No   Sig: Take 1 tablet by mouth every 12 (twelve) hours for 7 days   triamterene-hydrochlorothiazide (MAXZIDE-25) 37 5-25 mg per tablet   No No   Sig: Take 1 tablet by mouth daily      Facility-Administered Medications: None       Past Medical History:   Diagnosis Date    Breast cancer ([facility] [address] )     right breast     Breast cancer ([facility] [address] )     left [id number]    Cardiac disease     Diabetes mellitus ([facility] [address] )     History of chemotherapy     right breast 1996    History of echocardiogram 02/14/2018    EF 0 55 (55%), trace mitral regurgitation   Hx of radiation therapy     2017  left breast    Hyperlipidemia     Hypertension        Past Surgical History:   Procedure Laterality Date    APPENDECTOMY      BREAST LUMPECTOMY Bilateral     CARDIAC PACEMAKER PLAC[facility] 7/5/2018    [facility]    HYSTERECTOMY         Family History   Problem Relation Age of Onset    Alzheimer's disease Father     Uterine cancer Daughter      I have reviewed and agree with the history as documented  E-Cigarette/Vaping    E-Cigarette Use Never User      E-Cigarette/Vaping Substances    Nicotine No     THC No     CBD No     Flavoring No     Other No     Unknown No      Social History     Tobacco Use    Smoking status: Never Smoker    Smokeless tobacco: Never Used   Substance Use Topics    Alcohol use: No    Drug use: No       Review of Systems   Neurological: Positive for dizziness  All other systems reviewed and are negative  Physical Exam  Physical Exam  Vitals signs and nursing note reviewed  Constitutional:       Appearance: She is well-developed  HENT:      Head: Normocephalic and atraumatic  Right Ear: External ear normal       Left Ear: External ear normal    Eyes:      Conjunctiva/sclera: Conjunctivae normal       Pupils: Pupils are equal, round, and reactive to light  Neck:      Thyroid: No thyromegaly  Vascular: No JVD  Trachea: No tracheal deviation  Cardiovascular:      Rate and Rhythm: Normal rate  Pulmonary:      Effort: Pulmonary effort is normal       Breath sounds: Normal breath sounds  No stridor  Abdominal:      General: There is no distension  Palpations: Abdomen is soft  There is no mass  Tenderness: There is no abdominal tenderness  There is no guarding  Hernia: No hernia is present  Musculoskeletal: Normal range of motion  General: No tenderness or deformity  Lymphadenopathy:      Cervical: No cervical adenopathy  Skin:     General: Skin is warm  Coloration: Skin is not pale  Findings: No erythema or rash  Neurological:      Mental Status: She is alert and oriented to person, place, and time     Psychiatric:         Behavior: Behavior normal          Vital Signs  ED Triage Vitals [04/08/21 1421]   Temperature Pulse Respirations Blood Pressure SpO2   98 4 °F (36 9 °C) 95 18 128/60 98 %      Temp Source Heart Rate Source Patient Position - Orthostatic VS BP Location FiO2 (%)   Oral Monitor Sitting Left arm --      Pain Score       --           Vitals:    04/08/21 1421 04/08/21 1734   BP: 128/60 101/51   Pulse: 95 89   Patient Position - Orthostatic VS: Sitting          Visual Acuity      ED Medications  Medications   sodium chloride 0 9 % bolus 500 mL (0 mL Intravenous Stopped 4/8/21 1600)       Diagnostic Studies  Results Reviewed     Procedure Component Value Units Date/Time    Urine culture [[id number]] Collected: 04/08/21 1605    Lab Status: Final result Specimen: Urine, Clean Catch Updated: 04/09/21 1722     Urine Culture No Growth <1000 cfu/mL    Urine Microscopic [[id number]]  (Abnormal) Collected: 04/08/21 1605    Lab Status: Final result Specimen: Urine, Clean Catch Updated: 04/08/21 1635     RBC, UA 10-20 /hpf      WBC, UA 10-20 /hpf      Epithelial Cells Occasional /hpf      Bacteria, UA Occasional /hpf      OTHER OBSERVATIONS Transitional Epithelial Cells    UA w Reflex to Microscopic w Reflex to Culture [[id number]]  (Abnormal) Collected: 04/08/21 1605    Lab Status: Final result Specimen: Urine, Clean Catch Updated: 04/08/21 1622     Color, UA Light Yellow     Clarity, UA Slightly Cloudy     Specific [place], UA 1 010     pH, UA 7 5     Leukocytes, UA Moderate     Nitrite, UA Negative     Protein, UA 30 (1+) mg/dl      Glucose, UA Negative mg/dl      Ketones, UA Negative mg/dl      Urobilinogen, UA 0 2 E U /dl Bilirubin, UA Negative     Blood, UA Large    CBC and differential [[id number]]  (Abnormal) Collected: 04/08/21 1452    Lab Status: Final result Specimen: Blood from Arm, Left Updated: 04/08/21 1546     WBC 10 32 Thousand/uL      RBC 2 93 Million/uL      Hemoglobin 8 1 g/dL      Hematocrit 25 3 %      MCV 86 fL      MCH 27 6 pg      MCHC 32 0 g/dL      RDW 12 9 %      MPV 8 6 fL      Platelets 135 Thousands/uL      nRBC 0 /100 WBCs      Neutrophils Relative 93 %      Immat GRANS % 1 %      Lymphocytes Relative 2 %      Monocytes Relative 4 %      Eosinophils Relative 0 %      Basophils Relative 0 %      Neutrophils Absolute 9 64 Thousands/µL      Immature Grans Absolute 0 06 Thousand/uL      Lymphocytes Absolute 0 23 Thousands/µL      Monocytes Absolute 0 37 Thousand/µL      Eosinophils Absolute 0 01 Thousand/µL      Basophils Absolute 0 01 Thousands/µL     Narrative: This is an appended report  These results have been appended to a previously verified report      Troponin I [[id number]]  (Normal) Collected: 04/08/21 1452    Lab Status: Final result Specimen: Blood from Arm, Left Updated: 04/08/21 1519     Troponin I <0 02 ng/mL     Basic metabolic panel [[id number]]  (Abnormal) Collected: 04/08/21 1452    Lab Status: Final result Specimen: Blood from Arm, Left Updated: 04/08/21 1518     Sodium 130 mmol/L      Potassium 3 7 mmol/L      Chloride 95 mmol/L      CO2 31 mmol/L      ANION GAP 4 mmol/L      BUN 13 mg/dL      Creatinine 1 27 mg/dL      Glucose 246 mg/dL      Calcium 10 7 mg/dL      eGFR 39 ml/min/1 73sq m     Narrative:      [facility] guidelines for Chronic Kidney Disease (CKD):     Stage 1 with normal or high GFR (GFR > 90 mL/min/1 73 square meters)    Stage 2 Mild CKD (GFR = 60-89 mL/min/1 73 square meters)    Stage 3A Moderate CKD (GFR = 45-59 mL/min/1 73 square meters)    Stage 3B Moderate CKD (GFR = 30-44 mL/min/1 73 square meters)    Stage 4 Severe CKD (GFR = 15-29 mL/min/1 73 square meters)    Stage 5 End Stage CKD (GFR <15 mL/min/1 73 square meters)  Note: GFR calculation is accurate only with a steady state creatinine    Hepatic function panel [[id number]]  (Abnormal) Collected: 04/08/21 1452    Lab Status: Final result Specimen: Blood from Arm, Left Updated: 04/08/21 1518     Total Bilirubin 0 26 mg/dL      Bilirubin, Direct 0 08 mg/dL      Alkaline Phosphatase 80 U/L      AST 17 U/L      ALT 17 U/L      Total Protein 6 6 g/dL      Albumin 3 4 g/dL                  CT renal stone study abdomen pelvis wo contrast   Final Result by Filippo Mauricio DO (04/08 1739)      1  No acute intra-abdominal abnormality  2  3 3 x 2 6 cm right posterolateral bladder mass, suspicious for bladder cancer until proven otherwise  3  Although evaluation is limited without contrast, there appears to be heterogeneous density as well as distention of the right renal pelvis  Possible underlying urothelial tumor cannot be excluded  Outpatient urologic consultation and further workup    is warranted to exclude tumor  4  Colonic diverticulosis without evidence of diverticulitis  Additional incidental findings as detailed above  Limited study without IV or oral contrast        I personally discussed this study with Everton Pendleton on 4/8/2021 at 5:37 PM                Workstation performed: [id number]                    Procedures  Procedures         ED Course                             SBIRT 20yo+      Most Recent Value   SBIRT (25 yo +)   In order to provide better care to our patients, we are screening all of our patients for alcohol and drug use  Would it be okay to ask you these screening questions? Yes Filed at: 04/08/2021 1500   Initial Alcohol Screen: US AUDIT-C    1  How often do you have a drink containing alcohol?  0 Filed at: 04/08/2021 1500   2  How many drinks containing alcohol do you have on a typical day you are drinking?    0 Filed at: 04/08/2021 1500 3b  FEMALE Any Age, or MALE 65+: How often do you have 4 or more drinks on one occassion? 0 Filed at: 04/08/2021 1500   Audit-C Score  0 Filed at: 04/08/2021 1500   SETH: How many times in the past year have you    Used an illegal drug or used a prescription medication for non-medical reasons? Never Filed at: 04/08/2021 1500                    MDM  Number of Diagnoses or Management Options  Bladder tumor: new and requires workup  Hematuria: new and requires workup  Diagnosis management comments: I told the patient and her daughter my concerns this is bladder cancer       Amount and/or Complexity of Data Reviewed  Clinical lab tests: ordered and reviewed  Tests in the radiology section of CPT®: ordered and reviewed  Decide to obtain previous medical records or to obtain history from someone other than the patient: yes  Review and summarize past medical records: yes    Patient Progress  Patient progress: stable      Disposition  Final diagnoses:   Bladder tumor   Hematuria     Time reflects when diagnosis was documented in both MDM as applicable and the Disposition within this note     Time User Action Codes Description Comment    4/8/2021  5:47 PM Norberto Riley [D49 4] Bladder tumor     4/8/2021  5:47 PM Norberto Riley [R31 9] Hematuria       ED Disposition     ED Disposition Condition Date/Time Comment    Discharge Stable u Apr 8, 2021  5:47 PM Roseann Baxter Schoenberger discharge to home/self care  Follow-up Information     Follow up With Specialties Details Why Contact Laina Jose DO Family Medicine   [address]  [address]  [address] [facility] [address]  [phone]            Discharge Medication List as of 4/8/2021  5:49 PM      CONTINUE these medications which have NOT CHANGED    Details   anastrozole (ARIMIDEX) 1 mg tablet Take 1 mg by mouth daily, Starting Mon 4/9/2018, Historical Med      atorvastatin (LIPITOR) 40 mg tablet Take 1 tablet (40 mg total) by mouth daily, Starting Fri 1/29/2021, Until Thu 4/29/2021, Normal      DOCOSAHEXAENOIC ACID PO Take 2 capsules by mouth daily, Starting Tue 6/24/2014, Historical Med      metFORMIN (GLUCOPHAGE) 500 mg tablet Take 1 tablet (500 mg total) by mouth daily, Starting Thu 10/22/2020, Normal      polyethylene glycol-propylene glycol (SYSTANE) 0 4-0 3 % Apply 4 drops to eye 2 (two) times a day, Starting Tue 6/24/2014, Historical Med      potassium chloride (K-DUR,KLOR-CON) 10 mEq tablet Take 2 tablets daily, Normal      sertraline (ZOLOFT) 50 mg tablet TAKE ONE TABLET BY MOUTH ONE TIME DAILY , Normal      sulfamethoxazole-trimethoprim (BACTRIM DS) 800-160 mg per tablet Take 1 tablet by mouth every 12 (twelve) hours for 7 days, Starting Tue 4/6/2021, Until Tue 4/13/2021, Normal      triamterene-hydrochlorothiazide (MAXZIDE-25) 37 5-25 mg per tablet Take 1 tablet by mouth daily, Starting Fri 1/29/2021, Normal               PDMP Review     None          ED Provider  Electronically Signed by           Mayra Hart DO  04/11/21 2346

## 2021-04-09 ENCOUNTER — TELEPHONE (OUTPATIENT)
Dept: UROLOGY | Facility: CLINIC | Age: 86
End: 2021-04-09

## 2021-04-09 ENCOUNTER — TELEPHONE (OUTPATIENT)
Dept: FAMILY MEDICINE CLINIC | Facility: CLINIC | Age: 86
End: 2021-04-09

## 2021-04-09 LAB
BACTERIA UR CULT: NORMAL
LEFT EYE DIABETIC RETINOPATHY: NORMAL
RIGHT EYE DIABETIC RETINOPATHY: NORMAL

## 2021-04-09 NOTE — TELEPHONE ENCOUNTER
----- Message from Tejal Trevino MD sent at 4/8/2021  9:12 PM EDT -----  Regarding: Schedule appointment  Hello,  I received a message from this patient's PCP requesting an appointment  It looks like she has a bladder mass on CT  Can you arrange an appointment in 7-10 days with a provider?   Thanks,  [employer]

## 2021-04-09 NOTE — TELEPHONE ENCOUNTER
Pt's grandson confirmed appointment date time location, he also mentioned that Cande Simental is having hard time with memory/comprehending

## 2021-04-09 NOTE — TELEPHONE ENCOUNTER
Pt called stating that she went to the ER yesterday  She does have a F/U apt with the Urology  Pt was a confused in regards to her results and didn't realize that she has an apt with Urology  Patient gave verbal consent to speak to her grandson in regards to her condition

## 2021-04-09 NOTE — TELEPHONE ENCOUNTER
Spoke with Moody Dinero and she asked that we call her grandson - l/m for Eleanor Browning to call us back   Stated when and where her appt is

## 2021-04-09 NOTE — TELEPHONE ENCOUNTER
Notify patient or her grandson that I will review the reports and discuss this with him next week on Monday I have not been able to look over the reports yet she should remain on her antibiotics until finished and completed as the infection or urologic condition could affect her thinking and confusion

## 2021-04-13 ENCOUNTER — TELEPHONE (OUTPATIENT)
Dept: FAMILY MEDICINE CLINIC | Facility: CLINIC | Age: 86
End: 2021-04-13

## 2021-04-13 NOTE — TELEPHONE ENCOUNTER
Pt grandson called stating that patient is feeling weak and had a episode of vomiting this morning   Is there something that patient can take to help with the nausea and vomiting  OR  Would you like to see patient in office

## 2021-04-13 NOTE — TELEPHONE ENCOUNTER
Have this patient come in tomorrow 11:20 or  another open time as I have received several calls on her and she has issues that I cannot continue to treat over the phone

## 2021-04-14 ENCOUNTER — OFFICE VISIT (OUTPATIENT)
Dept: FAMILY MEDICINE CLINIC | Facility: CLINIC | Age: 86
End: 2021-04-14
Payer: COMMERCIAL

## 2021-04-14 VITALS
HEIGHT: 63 IN | DIASTOLIC BLOOD PRESSURE: 62 MMHG | TEMPERATURE: 98.4 F | OXYGEN SATURATION: 97 % | HEART RATE: 64 BPM | BODY MASS INDEX: 27.11 KG/M2 | WEIGHT: 153 LBS | SYSTOLIC BLOOD PRESSURE: 124 MMHG

## 2021-04-14 DIAGNOSIS — R05.9 COUGH: Primary | ICD-10-CM

## 2021-04-14 PROCEDURE — 3078F DIAST BP <80 MM HG: CPT | Performed by: FAMILY MEDICINE

## 2021-04-14 PROCEDURE — 99213 OFFICE O/P EST LOW 20 MIN: CPT | Performed by: FAMILY MEDICINE

## 2021-04-14 PROCEDURE — 3074F SYST BP LT 130 MM HG: CPT | Performed by: FAMILY MEDICINE

## 2021-04-14 NOTE — ASSESSMENT & PLAN NOTE
Patient came in today after she developed a cough yesterday with mild episode of 1 time emesis of clear liquid that she resolved the symptoms and was able to tolerate her lunch thereafter but she was brought in today just to have a further evaluation as she had been weak and confused for a temporary period of time     Today alert and baseline MS

## 2021-04-14 NOTE — PROGRESS NOTES
Assessment/Plan:       Problem List Items Addressed This Visit     None            Subjective:      Patient ID: Monalisa Way is a 82 y o  female  Patient presents today for follow-up evaluation after yesterday she developed a coughing spell and then vomited 1 time but since then resolved symptoms her grandson visited her at that time around lunchtime and felt that she was not herself and possibly confused she came in today for further evaluation to rule out other etiology as she has multiple factors affecting her health at this point with recent bladder tumor discovered and appointment made for urology follow-up she has had difficulty keeping up with her medical appointments and care and this has presented some stress for her  But today she feels much better overall no nausea and has been able to tolerate her breakfast      The following portions of the patient's history were reviewed and updated as appropriate: allergies, current medications, past family history, past medical history, past social history, past surgical history and problem list     Review of Systems   Constitutional: Negative for chills, fatigue and fever  HENT: Negative for congestion, nosebleeds, rhinorrhea, sinus pressure and sore throat  Eyes: Negative for discharge and redness  Respiratory: Positive for cough  Negative for shortness of breath  Cardiovascular: Negative for chest pain, palpitations and leg swelling  Gastrointestinal: Negative for abdominal pain, blood in stool and nausea  Endocrine: Negative for cold intolerance, heat intolerance and polyuria  Genitourinary: Negative for dysuria and frequency  Musculoskeletal: Negative for arthralgias, back pain and myalgias  Skin: Negative for rash  Neurological: Negative for dizziness, weakness and headaches  Hematological: Negative for adenopathy  Psychiatric/Behavioral: Negative for behavioral problems and sleep disturbance   The patient is not nervous/anxious  Objective:      /62   Pulse 64   Temp 98 4 °F (36 9 °C)   Ht 5' 3" (1 6 m)   Wt 69 4 kg (153 lb)   SpO2 97%   BMI 27 10 kg/m²        Physical Exam  Vitals signs and nursing note reviewed  Constitutional:       General: She is not in acute distress  Appearance: Normal appearance  She is well-developed and normal weight  HENT:      Head: Normocephalic and atraumatic  Right Ear: Tympanic membrane and external ear normal       Left Ear: External ear normal       Nose: Nose normal       Mouth/Throat:      Mouth: Mucous membranes are moist       Pharynx: No oropharyngeal exudate  Eyes:      General: No scleral icterus  Right eye: No discharge  Left eye: No discharge  Conjunctiva/sclera: Conjunctivae normal       Pupils: Pupils are equal, round, and reactive to light  Neck:      Musculoskeletal: Normal range of motion  Thyroid: No thyromegaly  Vascular: No JVD  Cardiovascular:      Rate and Rhythm: Normal rate and regular rhythm  Heart sounds: Normal heart sounds  No murmur  Pulmonary:      Effort: Pulmonary effort is normal       Breath sounds: No wheezing or rales  Chest:      Chest wall: No tenderness  Abdominal:      General: Bowel sounds are normal  There is no distension  Palpations: Abdomen is soft  There is no mass  Tenderness: There is no abdominal tenderness  Musculoskeletal: Normal range of motion  General: No tenderness or deformity  Lymphadenopathy:      Cervical: No cervical adenopathy  Skin:     General: Skin is warm and dry  Findings: No rash  Neurological:      General: No focal deficit present  Mental Status: She is alert and oriented to person, place, and time  Cranial Nerves: No cranial nerve deficit  Coordination: Coordination normal       Deep Tendon Reflexes: Reflexes are normal and symmetric   Reflexes normal    Psychiatric:         Mood and Affect: Mood normal          Behavior: Behavior normal          Thought Content: Thought content normal          Judgment: Judgment normal           Data:    Laboratory Results: I have personally reviewed the pertinent laboratory results/reports   Radiology/Other Diagnostic Testing Results: I have personally reviewed pertinent reports         Lab Results   Component Value Date    WBC 10 32 (H) 04/08/2021    HGB 8 1 (L) 04/08/2021    HCT 25 3 (L) 04/08/2021    MCV 86 04/08/2021     04/08/2021     Lab Results   Component Value Date    K 3 7 04/08/2021    CL 95 (L) 04/08/2021    CO2 31 04/08/2021    BUN 13 04/08/2021    CREATININE 1 27 04/08/2021    GLUF 99 02/20/2021    CALCIUM 10 7 (H) 04/08/2021    CORRECTEDCA 11 4 (H) 06/16/2020    AST 17 04/08/2021    ALT 17 04/08/2021    ALKPHOS 80 04/08/2021    EGFR 39 04/08/2021     Lab Results   Component Value Date    CHOLESTEROL 148 02/20/2021    CHOLESTEROL 141 10/16/2020    CHOLESTEROL 134 06/16/2020     Lab Results   Component Value Date    HDL 66 02/20/2021    HDL 65 10/16/2020    HDL 51 06/16/2020     Lab Results   Component Value Date    LDLCALC 58 02/20/2021    LDLCALC 52 10/16/2020    LDLCALC 60 06/16/2020     Lab Results   Component Value Date    TRIG 118 02/20/2021    TRIG 120 10/16/2020    TRIG 114 06/16/2020     No results found for: [place], [place]  Lab Results   Component Value Date    [id number] 1 430 02/20/2021     Lab Results   Component Value Date    HGBA1C 5 8 (H) 02/20/2021     No results found for: PSA    Ashwin MARTINEZ'Mich, DO

## 2021-04-15 ENCOUNTER — TELEPHONE (OUTPATIENT)
Dept: UROLOGY | Facility: CLINIC | Age: 86
End: 2021-04-15

## 2021-04-15 ENCOUNTER — OFFICE VISIT (OUTPATIENT)
Dept: UROLOGY | Facility: CLINIC | Age: 86
End: 2021-04-15
Payer: COMMERCIAL

## 2021-04-15 VITALS
HEIGHT: 63 IN | HEART RATE: 82 BPM | WEIGHT: 152.6 LBS | BODY MASS INDEX: 27.04 KG/M2 | DIASTOLIC BLOOD PRESSURE: 74 MMHG | SYSTOLIC BLOOD PRESSURE: 120 MMHG

## 2021-04-15 DIAGNOSIS — R31.9 HEMATURIA, UNSPECIFIED TYPE: Primary | ICD-10-CM

## 2021-04-15 DIAGNOSIS — N32.89 BLADDER MASS: ICD-10-CM

## 2021-04-15 LAB
SL AMB  POCT GLUCOSE, UA: ABNORMAL
SL AMB LEUKOCYTE ESTERASE,UA: + 2
SL AMB POCT BILIRUBIN,UA: ABNORMAL
SL AMB POCT BLOOD,UA: + 3
SL AMB POCT CLARITY,UA: ABNORMAL
SL AMB POCT COLOR,UA: YELLOW
SL AMB POCT KETONES,UA: ABNORMAL
SL AMB POCT NITRITE,UA: ABNORMAL
SL AMB POCT PH,UA: 8
SL AMB POCT SPECIFIC GRAVITY,UA: 1.01
SL AMB POCT URINE PROTEIN: + 200
SL AMB POCT UROBILINOGEN: ABNORMAL

## 2021-04-15 PROCEDURE — 88112 CYTOPATH CELL ENHANCE TECH: CPT | Performed by: PATHOLOGY

## 2021-04-15 PROCEDURE — 52000 CYSTOURETHROSCOPY: CPT | Performed by: UROLOGY

## 2021-04-15 PROCEDURE — 1160F RVW MEDS BY RX/DR IN RCRD: CPT | Performed by: UROLOGY

## 2021-04-15 PROCEDURE — 99205 OFFICE O/P NEW HI 60 MIN: CPT | Performed by: UROLOGY

## 2021-04-15 PROCEDURE — 81002 URINALYSIS NONAUTO W/O SCOPE: CPT | Performed by: UROLOGY

## 2021-04-15 PROCEDURE — 1036F TOBACCO NON-USER: CPT | Performed by: UROLOGY

## 2021-04-15 NOTE — PROGRESS NOTES
UROLOGY NEW CONSULT NOTE     CHIEF COMPLAINT   Stewart Mathew is a 82 y o  female with a complaint of No chief complaint on file  History of Present Illness:     82 y o  female who presents after recent gross hematuria episode  Patient had CT scan which demonstrated concern for large bladder tumor  She did previously see an outside urologist last in 2018 while hospitalized  Patient had gross hematuria at that time with a questionable UTI  Returns today for expedited visit and possible cystoscopy  Past Medical History:     Past Medical History:   Diagnosis Date    Breast cancer ([facility] [address] [facility]     right breast 1999    Breast cancer ([facility] [address] )     left [id number]    Cardiac disease     Diabetes mellitus ([facility] [address] [facility]     History of chemotherapy     right breast 1996    History of echocardiogram 02/14/2018    EF 0 55 (55%), trace mitral regurgitation      Hx of radiation therapy     2017  left breast    Hyperlipidemia     Hypertension        PAST SURGICAL HISTORY:     Past Surgical History:   Procedure Laterality Date    APPENDECTOMY      BREAST LUMPECTOMY Bilateral     CARDIAC PACEMAKER PLACEMENT Left 7/5/2018    [facility]    HYSTERECTOMY         CURRENT MEDICATIONS:     Current Outpatient Medications   Medication Sig Dispense Refill    anastrozole (ARIMIDEX) 1 mg tablet Take 1 mg by mouth daily      atorvastatin (LIPITOR) 40 mg tablet Take 1 tablet (40 mg total) by mouth daily 90 tablet 1    DOCOSAHEXAENOIC ACID PO Take 2 capsules by mouth daily      metFORMIN (GLUCOPHAGE) 500 mg tablet Take 1 tablet (500 mg total) by mouth daily 90 tablet 3    polyethylene glycol-propylene glycol (SYSTANE) 0 4-0 3 % Apply 4 drops to eye 2 (two) times a day      potassium chloride (K-DUR,KLOR-CON) 10 mEq tablet Take 2 tablets daily 180 tablet 3    sertraline (ZOLOFT) 50 mg tablet TAKE ONE TABLET BY MOUTH ONE TIME DAILY  90 tablet 0    triamterene-hydrochlorothiazide (MAXZIDE-25) 37 5-25 mg per tablet Take 1 tablet by mouth daily 90 tablet 1     No current facility-administered medications for this visit  ALLERGIES:     Allergies   Allergen Reactions    Celecoxib Swelling     celebrex- swelling of throat    Loratadine Swelling     Jaw swelled,couldn't swallow; face swelling    Aspirin GI Intolerance     Other reaction(s): nausea; okay with Ecotrin    Lisinopril Swelling     denies       SOCIAL HISTORY:     Social History     Socioeconomic History    Marital status:       Spouse name: Not on file    Number of children: Not on file    Years of education: Not on file    Highest education level: Not on file   Occupational History    Not on file   Social Needs    Financial resource strain: Not on file    Food insecurity     Worry: Not on file     Inability: Not on file    Transportation needs     Medical: Not on file     Non-medical: Not on file   Tobacco Use    Smoking status: Never Smoker    Smokeless tobacco: Never Used   Substance and Sexual Activity    Alcohol use: No    Drug use: No    Sexual activity: Not Currently   Lifestyle    Physical activity     Days per week: Not on file     Minutes per session: Not on file    Stress: Not on file   Relationships    Social connections     Talks on phone: Not on file     Gets together: Not on file     Attends L[employer]-day service: Not on file     Active member of club or organization: Not on file     Attends meetings of clubs or organizations: Not on file     Relationship status: Not on file    Intimate partner violence     Fear of current or ex partner: Not on file     Emotionally abused: Not on file     Physically abused: Not on file     Forced sexual activity: Not on file   Other Topics Concern    Not on file   Social History Narrative    Not on file       SOCIAL HISTORY:     Family History   Problem Relation Age of Onset    Alzheimer's disease Father     Uterine cancer Daughter        REVIEW OF SYSTEMS:     Review of Systems   Constitutional: Negative  Respiratory: Negative  Cardiovascular: Negative  Gastrointestinal: Negative  Genitourinary: Positive for hematuria  Musculoskeletal: Positive for gait problem  Skin: Negative  Psychiatric/Behavioral: Negative  PHYSICAL EXAM:     There were no vitals taken for this visit  General:  Healthy appearing elderly female in no acute distress  They have a normal affect  There is not appear to be any gross neurologic defects or abnormalities  HEENT:  Normocephalic, atraumatic  Neck is supple without any palpable lymphadenopathy  Cardiovascular:  Patient has normal palpable distal radial pulses  There is no significant peripheral edema  No JVD is noted  Respiratory:  Patient has unlabored respirations  There is no audible wheeze or rhonchi  Abdomen:   Abdomen is soft and nontender  There is no tympany  Inguinal and umbilical hernia are not appreciated  Genitourinary: Atrophic female genitalia     Musculoskeletal:  Patient does not have significant CVA tenderness in the  flank with palpation or percussion  They full range of motion in all 4 extremities  Strength in all 4 extremities appears congruent  Utilizes a cane for ambulation  Dermatologic:  Patient has no skin abnormalities or rashes        LABS:     CBC:   Lab Results   Component Value Date    WBC 10 32 (H) 2021    HGB 8 1 (L) 2021    HCT 25 3 (L) 2021    MCV 86 2021     2021       BMP:   Lab Results   Component Value Date    CALCIUM 10 7 (H) 2021    K 3 7 2021    CO2 31 2021    CL 95 (L) 2021    BUN 13 2021    CREATININE 1 27 2021       IMAGIN/8/21  CT ABDOMEN AND PELVIS WITHOUT IV CONTRAST - LOW DOSE RENAL STONE      INDICATION:  Abdominal pain      COMPARISON:  CT abdomen pelvis 2018     TECHNIQUE:  Low dose thin section CT examination of the abdomen and pelvis was performed without intravenous or oral contrast according to a protocol specifically designed to evaluate for urinary tract calculus  Axial, sagittal, and coronal 2D   reformatted images were created from the source data and submitted for interpretation  Evaluation for pathology in the abdomen and pelvis that is unrelated to urinary tract calculi is limited       Radiation dose length product (DLP) for this visit:  336 mGy-cm  This examination, like all CT scans performed in the [facility], was performed utilizing techniques to minimize radiation dose exposure, including the use of iterative   reconstruction and automated exposure control       FINDINGS:     RIGHT KIDNEY AND URETER:  Although evaluation is limited without contrast, there appears to be heterogeneous density as well as distention of the right renal pelvis  Possible underlying tumor cannot be excluded  Recommend nonemergent renal protocol CT study with IV contrast and   delayed imaging  No clear hydronephrosis  Multiple renal cysts are seen      LEFT KIDNEY AND URETER:  No urinary tract calculi  No hydronephrosis or hydroureter  Multiple left renal cysts, 1 of the upper pole cysts demonstrating small wall calcification      URINARY BLADDER:   3 3 x 2 6 cm right posterolateral bladder mass, suspicious for transitional cell carcinoma until proven otherwise      Coarse mitral valve annulus calcification  The lung bases are grossly clear      Limited low radiation dose noncontrast CT evaluation demonstrates no clinically significant abnormality of liver, spleen or adrenal glands  Gallbladder is not visualized  Moderate fatty involution of the pancreas without acute findings  No ascites  Evaluation of lymphadenopathy is limited in the absence of intravenous contrast  No bulky adenopathy detected on this noncontrast study  The small bowel is normal caliber  Descending and sigmoid colon diverticulosis without evidence of diverticulitis  Appendix surgically absent per history    Status post hysterectomy      Moderately severe compression fracture deformity of T12 is new from the previous study although has a chronic appearance  There is mild retropulsion of the upper posterior vertebral corner causing mild central canal stenosis  Multilevel degenerative   changes of the spine are noted      IMPRESSION:     1  No acute intra-abdominal abnormality      2  3 3 x 2 6 cm right posterolateral bladder mass, suspicious for bladder cancer until proven otherwise      3  Although evaluation is limited without contrast, there appears to be heterogeneous density as well as distention of the right renal pelvis  Possible underlying urothelial tumor cannot be excluded  Outpatient urologic consultation and further workup   is warranted to exclude tumor      4  Colonic diverticulosis without evidence of diverticulitis  PROCEDURE:     SEE NOTE    ASSESSMENT:     82 y o  female with bladder mass    PLAN:     Patient has large bladder tumor  On the right lateral sidewall  I have discussed with her next step planning for cystoscopy with transurethral resection of her bladder and possible mitomycin instillation  Risks and benefits were discussed and reviewed and the patient agrees to proceed  She signed informed consent  Will need medical clearance and scheduling in the near future

## 2021-04-15 NOTE — PATIENT INSTRUCTIONS
Transurethral Resection of Bladder Tumors   WHAT YOU NEED TO KNOW:   Transurethral resection of bladder tumors (TURBT) is surgery to remove one or more tumors from your bladder  HOW TO PREPARE:   The week before your surgery:   · Write down the correct date, time, and location of your surgery  · Arrange a ride home  Ask a family member or friend to drive you home after your surgery or procedure  Do not drive yourself home  · Ask your healthcare provider if you need to stop using aspirin or any other prescribed or over-the-counter medicine before your procedure or surgery  · Bring your medicine bottles or a list of your medicines when you see your healthcare provider  Tell your provider if you are allergic to any medicine  Tell your provider if you use any herbs, food supplements, or over-the-counter medicine  · You may need blood or urine tests before your surgery  You may also need a CT scan or a cystoscopy  Talk to your healthcare provider about these or other tests you may need  Write down the date, time, and location for each test     The night before your surgery:  Ask healthcare providers about directions for eating and drinking  The day of your surgery:   · Ask your healthcare provider before you take any medicine on the day of your surgery  Bring a list of all the medicines you take, or your pill bottles, with you to the hospital  Providers will check that your medicines will not interact poorly with the medicine you need for surgery  · You or a close family member will be asked to sign a legal document called a consent form  It gives healthcare providers permission to do the procedure or surgery  It also explains the problems that may happen, and your choices  Make sure all your questions are answered before you sign this form  · Healthcare providers may insert an intravenous tube (IV) into your vein  A vein in the arm is usually chosen   Through the IV tube, you may be given liquids and medicine  · An anesthesiologist will talk to you before your surgery  You may need medicine to keep you asleep or numb an area of your body during surgery  Tell healthcare providers if you or anyone in your family has had a problem with anesthesia in the past     WHAT WILL HAPPEN:   What will happen: Your surgeon will insert a scope through your urethra and into your bladder  He will put fluid through the scope to wash your bladder and widen it for surgery  The scope will have a wire with an electric current  The current is used to stop bleeding in your bladder and remove bladder tumors  Your surgeon may also remove muscle and tissue from your bladder  He may use the scope to insert medicine into your bladder  The medicine will destroy pieces of tumor in your bladder and help prevent new tumors from growing  Your surgeon will remove the scope  After your surgery: You will be taken to a room to rest until you are fully awake  You will be monitored closely for any problems  Do not get out of bed until your healthcare provider says it is okay  You will then be able to go home or be taken to your hospital room  CONTACT YOUR HEALTHCARE PROVIDER IF:   · You cannot make it to your surgery  · You have a fever  · You get a cold or the flu  · You have questions or concerns about your surgery  SEEK CARE IMMEDIATELY IF:   · You have bleeding from your urethra that does not stop  · You start to urinate less often, very little, or not at all  · You have severe pain in your abdomen or pelvis  RISKS:   You may bleed more than expected or get an infection  Your bladder may be damaged  It may be painful to urinate, or you may have blood in your urine  You may feel discomfort in your abdomen or pelvis  You may feel like you need to urinate more often or without warning  You may develop more bladder tumors  CARE AGREEMENT:   You have the right to help plan your care   Learn about your health condition and how it may be treated  Discuss treatment options with your healthcare providers to decide what care you want to receive  You always have the right to refuse treatment  © Copyright [employer] 2018 Information is for End User's use only and may not be sold, redistributed or otherwise used for commercial purposes  All illustrations and images included in CareNotes® are the copyrighted property of A D A M , Inc  or [facility] Jordyn Su   The above information is an  only  It is not intended as medical advice for individual conditions or treatments  Talk to your doctor, nurse or pharmacist before following any medical regimen to see if it is safe and effective for you

## 2021-04-15 NOTE — PROGRESS NOTES
Cystoscopy     Date/Time 4/15/2021 2:36 PM     Performed by  Jyoti Plaza MD     Authorized by Jyoti Plaza MD      Universal Protocol:  Timeout called at: 4/15/2021 2:37 PM       Procedure Details:  Procedure type: cystoscopy    Patient tolerance: Patient tolerated the procedure well with no immediate complications    Additional Procedure Details:   Cystoscopy Procedure note    Risk and benefits of flexible cystoscopy were discussed  Informed consent was obtained  A urine dip is adequate for cystoscopy  The patient was placed into the modified supine position  Her genitalia was prepped with Betadine and draped in a sterile fashion  Viscous 2% lidocaine was instilled into the urethra and allowed dwell time for local anesthesia  Flexible cystoscopy was then performed with a 16F scope  Urethra was inspected on entrance and exit of the scope  The bladder was thoroughly inspected in a 360 degree fashion  Both ureteral orifices were visualized in their orthotopic location with clear efflux of urine  The bladder mucosa was thoroughly inspected  There was an easily visualized large papillary tumor greater than 3 cm along the right lateral sidewall  Retroflexion for evaluation of the bladder neck was normal       Overall this was a   Cystoscopy positive for right lateral bladder tumor  A female office staff member was present throughout the entire procedure

## 2021-04-15 NOTE — TELEPHONE ENCOUNTER
L/m for grandemiliana Erica Jose to call our direct line back to see if he can bring Kwasi Miguel in at 2 instead of 3 - he called back and is bringing her at 2

## 2021-04-16 ENCOUNTER — TELEPHONE (OUTPATIENT)
Dept: UROLOGY | Facility: AMBULATORY SURGERY CENTER | Age: 86
End: 2021-04-16

## 2021-04-16 NOTE — TELEPHONE ENCOUNTER
I called and spoke with pt to discuss scheduling surgery  Patient would like me to discuss scheduling with her daughter Kira Suh  I will call Kira Suh to discuss

## 2021-04-16 NOTE — TELEPHONE ENCOUNTER
I called pt's daughter Maeve Salgado and was unable to leave a message due to her voicemail not being set up  I will call again next week

## 2021-04-16 NOTE — TELEPHONE ENCOUNTER
Patient's daughter returned my call  I discussed a date I had in mind, since it was noted that [facility] would like [facility]  Pt's daughter will be driving her and is willing to take her to [place] or [facility] if there is anything sooner  For now I will hold 5/27 w/ Dr Damari Paul at [facility]  Pt's daughter is aware that I will have to review this with Dr Damari Paul and his  to make sure that would be okay, if that is the first available date  I will continue to work on this and call Ziyad Mckeon next week

## 2021-04-20 ENCOUNTER — PREP FOR PROCEDURE (OUTPATIENT)
Dept: UROLOGY | Facility: AMBULATORY SURGERY CENTER | Age: 86
End: 2021-04-20

## 2021-04-20 DIAGNOSIS — N32.89 BLADDER MASS: Primary | ICD-10-CM

## 2021-04-20 DIAGNOSIS — Z01.818 PRE-OPERATIVE EXAMINATION: ICD-10-CM

## 2021-04-20 DIAGNOSIS — R39.89 SUSPECTED UTI: ICD-10-CM

## 2021-04-20 NOTE — TELEPHONE ENCOUNTER
I called and spoke with pt's daughter Lucho Taylor and scheduled patient for surgery on 5/21 with Dr Lo Trujillo at the [facility]  I reviewed pre op instructions and pre op testing with pt's daughter  Patient will need a medical clearance  Daughter stated she was just there s they may be able to clear her without another visit  I explained to Lucho Taylor that I will call and touch base with the office and take care of scheduling a clearance appt if needed  I will email pt's surgery packet to pt's daughter

## 2021-04-20 NOTE — TELEPHONE ENCOUNTER
Attempted to call daughter and unable to leave a message  Called patient and she is forgetful, asked that she have her daughter call the office to discuss surgical FU    [facility] will only be available for FU in [place] after 5/21  She can choose to FU with him or FU with Dr Patt Gale or Dr Bismark Hsieh in the [facility] office  Awaiting a call back for preference

## 2021-04-22 ENCOUNTER — PREP FOR PROCEDURE (OUTPATIENT)
Dept: UROLOGY | Facility: AMBULATORY SURGERY CENTER | Age: 86
End: 2021-04-22

## 2021-04-22 NOTE — TELEPHONE ENCOUNTER
Attempted to contact daughter to discuss post op options below and phone went straight to voicemail not set up yet

## 2021-04-22 NOTE — TELEPHONE ENCOUNTER
I called and scheduled patient for medical clearance appt with Dr Markie Carpio on 5/11  This information will be included in patient's surgery packet that will be emailed to patient's daughter today  I will also ask pt's daughter via email if she would like to have pt scheduled for post op appt in [place] with another physician, or rogelio to stay with Dr Farhan Combs

## 2021-04-27 ENCOUNTER — REMOTE DEVICE CLINIC VISIT (OUTPATIENT)
Dept: CARDIOLOGY CLINIC | Facility: CLINIC | Age: 86
End: 2021-04-27
Payer: COMMERCIAL

## 2021-04-27 DIAGNOSIS — Z45.010 ENCOUNTER FOR CHECKING AND TESTING OF CARDIAC PACEMAKER PULSE GENERATOR (BATTERY): ICD-10-CM

## 2021-04-27 DIAGNOSIS — I49.5 SICK SINUS SYNDROME (HCC): Primary | ICD-10-CM

## 2021-04-27 PROCEDURE — 93296 REM INTERROG EVL PM/IDS: CPT | Performed by: INTERNAL MEDICINE

## 2021-04-27 PROCEDURE — 93294 REM INTERROG EVL PM/LDLS PM: CPT | Performed by: INTERNAL MEDICINE

## 2021-04-28 NOTE — TELEPHONE ENCOUNTER
Attempted to call daughter again today to ask if she wishes to fu after TURBT with Dr Winslow Fraction but travel to [facility], vs  Fu with another physician in the [facility] office  Call went straight to voicemail and voicemail not set up yet

## 2021-05-04 NOTE — TELEPHONE ENCOUNTER
Attempted to call daughter again today to ask if she wishes to fu after TURBT with Dr Desiree Montero but travel to [facility], vs  Fu with another physician in the [facility] office  Call went straight to voicemail and voicemail not set up yet

## 2021-05-05 ENCOUNTER — RA CDI HCC (OUTPATIENT)
Dept: OTHER | Facility: HOSPITAL | Age: 86
End: 2021-05-05

## 2021-05-05 NOTE — PROGRESS NOTES
[facility]  coding opportunities          Chart reviewed, no opportunity found: CHART REVIEWED, NO OPPORTUNITY FOUND              Patients insurance company: [facility] [address]  (Medicare Advantage and Commercial)

## 2021-05-07 ENCOUNTER — TELEPHONE (OUTPATIENT)
Dept: OTHER | Facility: OTHER | Age: 86
End: 2021-05-07

## 2021-05-07 NOTE — TELEPHONE ENCOUNTER
Patient call and cancel her appointment cause she did not know she had appointment and would like for someone in the office to give her a call

## 2021-05-07 NOTE — PROGRESS NOTES
Merlin remote check pacer  No events  Normal battery function        Current Outpatient Medications:     anastrozole (ARIMIDEX) 1 mg tablet, Take 1 mg by mouth daily, Disp: , Rfl:     atorvastatin (LIPITOR) 40 mg tablet, Take 1 tablet (40 mg total) by mouth daily, Disp: 90 tablet, Rfl: 1    DOCOSAHEXAENOIC ACID PO, Take 2 capsules by mouth daily, Disp: , Rfl:     metFORMIN (GLUCOPHAGE) 500 mg tablet, Take 1 tablet (500 mg total) by mouth daily, Disp: 90 tablet, Rfl: 3    polyethylene glycol-propylene glycol (SYSTANE) 0 4-0 3 %, Apply 4 drops to eye 2 (two) times a day, Disp: , Rfl:     potassium chloride (K-DUR,KLOR-CON) 10 mEq tablet, Take 2 tablets daily, Disp: 180 tablet, Rfl: 3    sertraline (ZOLOFT) 50 mg tablet, TAKE ONE TABLET BY MOUTH ONE TIME DAILY , Disp: 90 tablet, Rfl: 0    triamterene-hydrochlorothiazide (MAXZIDE-25) 37 5-25 mg per tablet, Take 1 tablet by mouth daily, Disp: 90 tablet, Rfl: 1

## 2021-05-11 ENCOUNTER — OFFICE VISIT (OUTPATIENT)
Dept: FAMILY MEDICINE CLINIC | Facility: CLINIC | Age: 86
End: 2021-05-11
Payer: COMMERCIAL

## 2021-05-11 VITALS — WEIGHT: 149 LBS | HEIGHT: 63 IN | BODY MASS INDEX: 26.4 KG/M2

## 2021-05-11 DIAGNOSIS — Z95.0 CARDIAC PACEMAKER IN SITU: ICD-10-CM

## 2021-05-11 DIAGNOSIS — Z01.818 PRE-OP EXAM: Primary | ICD-10-CM

## 2021-05-11 DIAGNOSIS — R00.1 BRADYCARDIA: ICD-10-CM

## 2021-05-11 DIAGNOSIS — R26.89 BALANCE PROBLEM: ICD-10-CM

## 2021-05-11 DIAGNOSIS — R42 DIZZINESS: ICD-10-CM

## 2021-05-11 DIAGNOSIS — E87.1 HYPONATREMIA: ICD-10-CM

## 2021-05-11 DIAGNOSIS — F41.9 ANXIETY: ICD-10-CM

## 2021-05-11 DIAGNOSIS — C50.911 BREAST CARCINOMA, FEMALE, RIGHT (HCC): ICD-10-CM

## 2021-05-11 DIAGNOSIS — C50.212 MALIGNANT NEOPLASM OF UPPER-INNER QUADRANT OF LEFT BREAST IN FEMALE, ESTROGEN RECEPTOR POSITIVE (HCC): ICD-10-CM

## 2021-05-11 DIAGNOSIS — I49.5 SICK SINUS SYNDROME (HCC): Chronic | ICD-10-CM

## 2021-05-11 DIAGNOSIS — R31.0 GROSS HEMATURIA: ICD-10-CM

## 2021-05-11 DIAGNOSIS — F32.A DEPRESSION, UNSPECIFIED DEPRESSION TYPE: ICD-10-CM

## 2021-05-11 DIAGNOSIS — E87.6 HYPOKALEMIA: ICD-10-CM

## 2021-05-11 DIAGNOSIS — I10 ESSENTIAL HYPERTENSION: ICD-10-CM

## 2021-05-11 DIAGNOSIS — K21.9 GASTROESOPHAGEAL REFLUX DISEASE WITHOUT ESOPHAGITIS: ICD-10-CM

## 2021-05-11 DIAGNOSIS — E78.2 MIXED HYPERLIPIDEMIA: ICD-10-CM

## 2021-05-11 DIAGNOSIS — E11.65 UNCONTROLLED TYPE 2 DIABETES MELLITUS WITH HYPERGLYCEMIA (HCC): ICD-10-CM

## 2021-05-11 DIAGNOSIS — N63.20 LEFT BREAST MASS: ICD-10-CM

## 2021-05-11 DIAGNOSIS — Z17.0 MALIGNANT NEOPLASM OF UPPER-INNER QUADRANT OF LEFT BREAST IN FEMALE, ESTROGEN RECEPTOR POSITIVE (HCC): ICD-10-CM

## 2021-05-11 PROBLEM — C67.9 TRANSITIONAL CELL BLADDER CANCER (HCC): Status: ACTIVE | Noted: 2021-05-11

## 2021-05-11 PROBLEM — C67.9 TRANSITIONAL CELL BLADDER CANCER (HCC): Status: RESOLVED | Noted: 2021-05-11 | Resolved: 2021-05-11

## 2021-05-11 PROCEDURE — 93000 ELECTROCARDIOGRAM COMPLETE: CPT | Performed by: FAMILY MEDICINE

## 2021-05-11 PROCEDURE — 99215 OFFICE O/P EST HI 40 MIN: CPT | Performed by: FAMILY MEDICINE

## 2021-05-11 PROCEDURE — 1160F RVW MEDS BY RX/DR IN RCRD: CPT | Performed by: FAMILY MEDICINE

## 2021-05-11 PROCEDURE — 1036F TOBACCO NON-USER: CPT | Performed by: FAMILY MEDICINE

## 2021-05-11 NOTE — PROGRESS NOTES
FAMILY MEDICINE PRE-OPERATIVE EVALUATION  [facility] PHYSICIAN GROUP - [facility] [address] [facility] PRACTICE    NAME: Vergil Matt Schoenberger  AGE: 82 y o  SEX: female  : 1935     DATE: 2021     Family Medicine Pre-Operative Evaluation:     Chief Complaint: Pre-operative Evaluation     Surgery:  Trans urethral resection of bladder tumor  Anticipated Date of Surgery:  May 21, 2021  Referring Provider:  Steve Harris MD        History of Present Illness:     Kranthi Ernandez is a 82 y o  female who presents to the office today for a preoperative consultation at the request of surgeon, Dr Goldy Mojica, who plans on performing TURBT on May 21, 2021  Planned anesthesia is general  Patient has a bleeding risk of: no recent abnormal bleeding  Patient does not have objections to receiving blood products if needed  Current anti-platelet/anti-coagulation medications that the patient is prescribed includes: NONE  Review of Systems:     Review of Systems   Constitutional: Negative for chills, fatigue and fever  HENT: Negative for congestion, nosebleeds, rhinorrhea, sinus pressure and sore throat  Eyes: Negative for discharge and redness  Respiratory: Negative for cough and shortness of breath  Cardiovascular: Negative for chest pain, palpitations and leg swelling  Gastrointestinal: Negative for abdominal pain, blood in stool and nausea  Endocrine: Negative for cold intolerance, heat intolerance and polyuria  Genitourinary: Negative for dysuria and frequency  Musculoskeletal: Negative for arthralgias, back pain and myalgias  Skin: Negative for rash  Neurological: Negative for dizziness, weakness and headaches  Hematological: Negative for adenopathy  Psychiatric/Behavioral: Negative for behavioral problems and sleep disturbance  The patient is not nervous/anxious           Problem List:     Patient Active Problem List   Diagnosis    Un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diabetes mellitus with hyperglycemia ([facility] )    Essential hypertension    Hyperlipidemia    Left breast mass    Depression    Hyponatremia    Hypokalemia    Gross hematuria    Dizziness    Bradycardia    Sick sinus syndrome (HCC)    Cardiac pacemaker in situ    Abnormal ultrasound cardiogram    Anxiety    BMI 33 0-33 9,adult    Breast carcinoma, female, right (HCC)    GERD (gastroesophageal reflux disease)    Macular degeneration    Malignant neoplasm of upper-inner quadrant of left breast in female, estrogen receptor positive ([facility] )    Personal history of breast cancer    S/P hysterectomy    Memory loss    Medicare annual wellness visit, subsequent    Hypercalcemia    Balance problem    Cough        Allergies:      Allergies   Allergen Reactions    Celecoxib Swelling     celebrex- swelling of throat    Loratadine Swelling     Jaw swelled,couldn't swallow; face swelling    Aspirin GI Intolerance     Other reaction(s): nausea; okay with Ecotrin    Lisinopril Swelling     denies        Current Medications:       Current Outpatient Medications:     anastrozole (ARIMIDEX) 1 mg tablet, Take 1 mg by mouth daily, Disp: , Rfl:     Apoaequorin (Prevagen) 10 MG CAPS, Take 10 mg by mouth, Disp: , Rfl:     atorvastatin (LIPITOR) 40 mg tablet, Take 1 tablet (40 mg total) by mouth daily, Disp: 90 tablet, Rfl: 1    DOCOSAHEXAENOIC ACID PO, Take 2 capsules by mouth daily, Disp: , Rfl:     metFORMIN (GLUCOPHAGE) 500 mg tablet, Take 1 tablet (500 mg total) by mouth daily, Disp: 90 tablet, Rfl: 3    polyethylene glycol-propylene glycol (SYSTANE) 0 4-0 3 %, Apply 4 drops to eye 2 (two) times a day, Disp: , Rfl:     potassium chloride (K-DUR,KLOR-CON) 10 mEq tablet, Take 2 tablets daily, Disp: 180 tablet, Rfl: 3    sertraline (ZOLOFT) 50 mg tablet, TAKE ONE TABLET BY MOUTH ONE TIME DAILY , Disp: 90 tablet, Rfl: 0    triamterene-hydrochlorothiazide (MAXZIDE-25) 37 5-25 mg per tablet, Take 1 tablet by mouth daily, Disp: 90 tablet, Rfl: 1     Past History:     Past Medical History:   Diagnosis Date    Breast cancer ([facility] [address] )     right breast 1999    Breast cancer ([facility] [address] )     left 2813-3028    Cardiac disease     Diabetes mellitus ([facility] [address] )     History of chemotherapy     right breast 1996    History of echocardiogram 02/14/2018    EF 0 55 (55%), trace mitral regurgitation   Hx of radiation therapy     2017  left breast    Hyperlipidemia     Hypertension         Past Surgical History:   Procedure Laterality Date    APPENDECTOMY      BREAST LUMPECTOMY Bilateral     C[facility] PAC[facility]    HYSTERECTOMY          Family History   Problem Relation Age of Onset    Alzheimer's disease Father     Uterine cancer Daughter         Social History     Socioeconomic History    Marital status:       Spouse name: Not on file    Number of children: Not on file    Years of education: Not on file    Highest education level: Not on file   Occupational History    Not on file   Social Needs    Financial resource strain: Not on file    Food insecurity     Worry: Not on file     Inability: Not on file    Transportation needs     Medical: Not on file     Non-medical: Not on file   Tobacco Use    Smoking status: Never Smoker    Smokeless tobacco: Never Used   Substance and Sexual Activity    Alcohol use: No    Drug use: No    Sexual activity: Not Currently   Lifestyle    Physical activity     Days per week: Not on file     Minutes per session: Not on file    Stress: Not on file   Relationships    Social connections     Talks on phone: Not on file     Gets together: Not on file     Attends [employer] service: Not on file     Active member of club or organization: Not on file     Attends meetings of clubs or organizations: Not on file     Relationship status: Not on file    Intimate partner violence     Fear of current or ex partner: Not on file     Emotionally abused: Not on file     Physically abused: Not on file     Forced sexual activity: Not on file   Other Topics Concern    Not on file   Social History Narrative    Not on file        Physical Exam:      Ht 5' 3" (1 6 m)   Wt 67 6 kg (149 lb)   BMI 26 39 kg/m²     Physical Exam  Vitals signs and nursing note reviewed  Constitutional:       General: She is not in acute distress  Appearance: Normal appearance  She is well-developed and normal weight  HENT:      Head: Normocephalic and atraumatic  Right Ear: Tympanic membrane, ear canal and external ear normal       Left Ear: Tympanic membrane, ear canal and external ear normal       Nose: Nose normal       Mouth/Throat:      Mouth: Mucous membranes are moist       Pharynx: Oropharynx is clear  No oropharyngeal exudate  Eyes:      General: No scleral icterus  Right eye: No discharge  Left eye: No discharge  Extraocular Movements: Extraocular movements intact  Conjunctiva/sclera: Conjunctivae normal       Pupils: Pupils are equal, round, and reactive to light  Neck:      Musculoskeletal: Normal range of motion  Thyroid: No thyromegaly  Vascular: No JVD  Cardiovascular:      Rate and Rhythm: Normal rate and regular rhythm  Heart sounds: Normal heart sounds  No murmur  Pulmonary:      Effort: Pulmonary effort is normal       Breath sounds: No wheezing or rales  Chest:      Chest wall: No tenderness  Abdominal:      General: Bowel sounds are normal  There is no distension  Palpations: Abdomen is soft  There is no mass  Tenderness: There is no abdominal tenderness  Musculoskeletal: Normal range of motion  General: No tenderness or deformity  Lymphadenopathy:      Cervical: No cervical adenopathy  Skin:     General: Skin is warm and dry  Capillary Refill: Capillary refill takes less than 2 seconds  Findings: No rash  Neurological:      General: No focal deficit present        Mental Status: She is alert and oriented to person, place, and time  Mental status is at baseline  Cranial Nerves: No cranial nerve deficit  Coordination: Coordination normal       Deep Tendon Reflexes: Reflexes are normal and symmetric  Reflexes normal    Psychiatric:         Mood and Affect: Mood normal          Behavior: Behavior normal          Thought Content: Thought content normal          Judgment: Judgment normal            Data:     Pre-operative work-up    Laboratory Results: I have personally reviewed the pertinent laboratory results/reports     EKG: I have personally reviewed pertinent reports  Normal Sinus rhythm nonspecific intraventricular block- unchanged    Chest x-ray: I have personally reviewed pertinent reports  Assessment:     1  Pre-op exam  POCT ECG   2  Gastroesophageal reflux disease without esophagitis     3  Un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diabetes mellitus with hyperglycemia ([facility] [address] )     4  Sick sinus syndrome ([facility] [address] )     5  Essential hypertension     6  Gross hematuria     7  Mixed hyperlipidemia     8  Left breast mass     9  Depression, unspecified depression type     10  Hyponatremia     11  Hypokalemia     12  Dizziness     13  Bradycardia     14  Cardiac pacemaker in situ     15  Anxiety     16  Breast carcinoma, female, right ([facility] [address] )     17  Malignant neoplasm of upper-inner quadrant of left breast in female, estrogen receptor positive ([facility] [address] )     18  Balance problem          Plan:     82 y o  female with planned surgery: TURBT  Cardiac Risk Estimation:  Class 2  Clearance  Patient is CLEARED for surgery without any additional cardiac testing       DO MARI Issa [facility] [address]  [address] [facility] [address]  Phone#  [phone]  Fax#  [phone]

## 2021-05-11 NOTE — LETTER
May 11, 2021     Michelet Deleon MD  [address]  [address] [facility]    Patient: M[address]isa [address]   YOB: 1935   Date of Visit: 2021       Dear Dr Aidan Rowe Recipients: Thank you for referring Bradley Odonnell to me for evaluation  Below are my notes for this consultation  If you have questions, please do not hesitate to call me  I look forward to following your patient along with you  Sincerely,        Debby Gibbons DO        CC: No Recipients  Debby Gibbons DO  2021  9:36 AM  Incomplete  FAMILY MEDICINE PRE-OPERATIVE EVALUATION  [facility] PHYSICIAN GROUP - [address]    NAME: Jeannie Mercy Schoenberger  AGE: 82 y o  SEX: female  : 1935     DATE: 2021     Family Medicine Pre-Operative Evaluation:     Chief Complaint: Pre-operative Evaluation     Surgery:  Trans urethral resection of bladder tumor  Anticipated Date of Surgery:  May 21, 2021  Referring Provider:  Charmayne Parlor, MD        History of Present Illness:     Monalisa Way is a 82 y o  female who presents to the office today for a preoperative consultation at the request of surgeon, Dr Jaden Segura, who plans on performing TURBT on May 21, 2021  Planned anesthesia is general  Patient has a bleeding risk of: no recent abnormal bleeding  Patient does not have objections to receiving blood products if needed  Current anti-platelet/anti-coagulation medications that the patient is prescribed includes: NONE  Review of Systems:     Review of Systems   Constitutional: Negative for chills, fatigue and fever  HENT: Negative for congestion, nosebleeds, rhinorrhea, sinus pressure and sore throat  Eyes: Negative for discharge and redness  Respiratory: Negative for cough and shortness of breath  Cardiovascular: Negative for chest pain, palpitations and leg swelling  Gastrointestinal: Negative for abdominal pain, blood in stool and nausea  Endocrine: Negative for cold intolerance, heat intolerance and polyuria  Genitourinary: Negative for dysuria and frequency  Musculoskeletal: Negative for arthralgias, back pain and myalgias  Skin: Negative for rash  Neurological: Negative for dizziness, weakness and headaches  Hematological: Negative for adenopathy  Psychiatric/Behavioral: Negative for behavioral problems and sleep disturbance  The patient is not nervous/anxious  Problem List:     Patient Active Problem List   Diagnosis    Un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diabetes mellitus with hyperglycemia ([place] [address] )    Essential hypertension    Hyperlipidemia    Left breast mass    Depression    Hyponatremia    Hypokalemia    Gross hematuria    Dizziness    Bradycardia    Sick sinus syndrome ([facility] )    Cardiac pacemaker in situ    Abnormal ultrasound cardiogram    Anxiety    BMI 33 0-33 9,adult    Breast carcinoma, female, right ([facility] )    GERD (gastroesophageal reflux disease)    Macular degeneration    Malignant neoplasm of upper-inner quadrant of left breast in female, estrogen receptor positive ([place] [address] )    Personal history of breast cancer    S/P hysterectomy    Memory loss    Medicare annual wellness visit, subsequent    Hypercalcemia    Balance problem    Cough        Allergies:      Allergies   Allergen Reactions    Celecoxib Swelling     celebrex- swelling of throat    Loratadine Swelling     Jaw swelled,couldn't swallow; face swelling    Aspirin GI Intolerance     Other reaction(s): nausea; okay with Ecotrin    Lisinopril Swelling     denies        Current Medications:       Current Outpatient Medications:     anastrozole (ARIMIDEX) 1 mg tablet, Take 1 mg by mouth daily, Disp: , Rfl:     Apoaequorin (Prevagen) 10 MG CAPS, Take 10 mg by mouth, Disp: , Rfl:     atorvastatin (LIPITOR) 40 mg tablet, Take 1 tablet (40 mg total) by mouth daily, Disp: 90 tablet, Rfl: 1    DOCOSAHEXAENOIC ACID PO, Take 2 capsules by mouth daily, Disp: , Rfl:     metFORMIN (GLUCOPHAGE) 500 mg tablet, Take 1 tablet (500 mg total) by mouth daily, Disp: 90 tablet, Rfl: 3    polyethylene glycol-propylene glycol (SYSTANE) 0 4-0 3 %, Apply 4 drops to eye 2 (two) times a day, Disp: , Rfl:     potassium chloride (K-DUR,KLOR-CON) 10 mEq tablet, Take 2 tablets daily, Disp: 180 tablet, Rfl: 3    sertraline (ZOLOFT) 50 mg tablet, TAKE ONE TABLET BY MOUTH ONE TIME DAILY , Disp: 90 tablet, Rfl: 0    triamterene-hydrochlorothiazide (MAXZIDE-25) 37 5-25 mg per tablet, Take 1 tablet by mouth daily, Disp: 90 tablet, Rfl: 1     Past History:     Past Medical History:   Diagnosis Date    Breast cancer ([facility] )     right breast 1999    Breast cancer ([facility] )     left 6135-2294    Cardiac disease     Diabetes mellitus ([facility] )     History of chemotherapy     right breast 1996    History of echocardiogram 02/14/2018    EF 0 55 (55%), trace mitral regurgitation   Hx of radiation therapy     2017  left breast    Hyperlipidemia     Hypertension         Past Surgical History:   Procedure Laterality Date    APPENDECTOMY      BREAST LUMPECTOMY Bilateral     CARDIAC PACEMAKER PLACEMENT [facility]    HYSTERECTOMY          Family History   Problem Relation Age of Onset    Alzheimer's disease Father     Uterine cancer Daughter         Social History     Socioeconomic History    Marital status:       Spouse name: Not on file    Number of children: Not on file    Years of education: Not on file    Highest education level: Not on file   Occupational History    Not on file   Social Needs    Financial resource strain: Not on file    Food insecurity     Worry: Not on file     Inability: Not on file    Transportation needs     Medical: Not on file     Non-medical: Not on file   Tobacco Use    Smoking status: Never Smoker    Smokeless tobacco: Never Used   Substance and Sexual Activity    Alcohol use: No    Drug use: No    Sexual activity: Not Currently Lifestyle    Physical activity     Days per week: Not on file     Minutes per session: Not on file    Stress: Not on file   Relationships    Social connections     Talks on phone: Not on file     Gets together: Not on file     Attends [employer] service: Not on file     Active member of club or organization: Not on file     Attends meetings of clubs or organizations: Not on file     Relationship status: Not on file    Intimate partner violence     Fear of current or ex partner: Not on file     Emotionally abused: Not on file     Physically abused: Not on file     Forced sexual activity: Not on file   Other Topics Concern    Not on file   Social History Narrative    Not on file        Physical Exam:      Ht 5' 3" (1 6 m)   Wt 67 6 kg (149 lb)   BMI 26 39 kg/m²     Physical Exam  Vitals signs and nursing note reviewed  Constitutional:       General: She is not in acute distress  Appearance: Normal appearance  She is well-developed and normal weight  HENT:      Head: Normocephalic and atraumatic  Right Ear: Tympanic membrane, ear canal and external ear normal       Left Ear: Tympanic membrane, ear canal and external ear normal       Nose: Nose normal       Mouth/Throat:      Mouth: Mucous membranes are moist       Pharynx: Oropharynx is clear  No oropharyngeal exudate  Eyes:      General: No scleral icterus  Right eye: No discharge  Left eye: No discharge  Extraocular Movements: Extraocular movements intact  Conjunctiva/sclera: Conjunctivae normal       Pupils: Pupils are equal, round, and reactive to light  Neck:      Musculoskeletal: Normal range of motion  Thyroid: No thyromegaly  Vascular: No JVD  Cardiovascular:      Rate and Rhythm: Normal rate and regular rhythm  Heart sounds: Normal heart sounds  No murmur  Pulmonary:      Effort: Pulmonary effort is normal       Breath sounds: No wheezing or rales  Chest:      Chest wall: No tenderness  Abdominal:      General: Bowel sounds are normal  There is no distension  Palpations: Abdomen is soft  There is no mass  Tenderness: There is no abdominal tenderness  Musculoskeletal: Normal range of motion  General: No tenderness or deformity  Lymphadenopathy:      Cervical: No cervical adenopathy  Skin:     General: Skin is warm and dry  Capillary Refill: Capillary refill takes less than 2 seconds  Findings: No rash  Neurological:      General: No focal deficit present  Mental Status: She is alert and oriented to person, place, and time  Mental status is at baseline  Cranial Nerves: No cranial nerve deficit  Coordination: Coordination normal       Deep Tendon Reflexes: Reflexes are normal and symmetric  Reflexes normal    Psychiatric:         Mood and Affect: Mood normal          Behavior: Behavior normal          Thought Content: Thought content normal          Judgment: Judgment normal            Data:     Pre-operative work-up    Laboratory Results: I have personally reviewed the pertinent laboratory results/reports     EKG: I have personally reviewed pertinent reports  Normal Sinus rhythm nonspecific intraventricular block- unchanged    Chest x-ray: I have personally reviewed pertinent reports  Assessment:     1  Pre-op exam  POCT ECG   2  Gastroesophageal reflux disease without esophagitis     3  Un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diabetes mellitus with hyperglycemia ([address] )     4  Sick sinus syndrome ([address] )     5  Essential hypertension     6  Gross hematuria     7  Mixed hyperlipidemia     8  Left breast mass     9  Depression, unspecified depression type     10  Hyponatremia     11  Hypokalemia     12  Dizziness     13  Bradycardia     14  Cardiac pacemaker in situ     15  Anxiety     16  Breast carcinoma, female, right ([address] )     17  Malignant neoplasm of upper-inner quadrant of left breast in female, estrogen receptor positive ([address] )     18   Balance problem Plan:     82 y o  female with planned surgery: TURBT  Cardiac Risk Estimation:  Class 2  Clearance  Patient is CLEARED for surgery without any additional cardiac testing       DO MARI Smith [facility] [address]  [address] [facility] [address]  Phone#  [phone]  Fax#  [phone]

## 2021-05-18 NOTE — PRE-PROCEDURE INSTRUCTIONS
Pre-Surgery Instructions:   Medication Instructions    anastrozole (ARIMIDEX) 1 mg tablet Instructed patient to continue taking as prescribed up to and including DOS with sips of water   Apoaequorin (Prevagen) 10 MG CAPS Instructed patient per Anesthesia Guidelines   atorvastatin (LIPITOR) 40 mg tablet Instructed patient to continue taking as prescribed up to and including DOS with sips of water   DOCOSAHEXAENOIC ACID PO Instructed patient per Anesthesia Guidelines   metFORMIN (GLUCOPHAGE) 500 mg tablet Instructed patient to continue taking as prescribed up to but NOT including DOS   polyethylene glycol-propylene glycol (SYSTANE) 0 4-0 3 % Instructed patient to continue taking as prescribed up to and including DOS    potassium chloride (K-DUR,KLOR-CON) 10 mEq tablet Instructed patient to continue taking as prescribed up to but NOT including DOS   sertraline (ZOLOFT) 50 mg tablet Instructed patient to continue taking as prescribed up to and including DOS with sips of water   triamterene-hydrochlorothiazide (MAXZIDE-25) 37 5-25 mg per tablet Instructed patient to continue taking as prescribed up to but NOT including DOS  Med list reviewed as above  Per anesthesia guidelines, pt will hold vitamins prior to surgery  Also instructed pt not to start any new vitamins/supplements preoperatively and to avoid NSAID's  3 days prior to surgery  Tylenol is acceptable if needed  Pt will shower with Dial soap as instructed by surgeon evening before and am of surgery  Reviewed [facility] current covid visitor restriction policy and pt understands that it may change at any time  All information within "My Surgical Experience" pamphlet reviewed and patient verbalizes understanding and compliance  All questions answered

## 2021-05-21 ENCOUNTER — TELEPHONE (OUTPATIENT)
Dept: UROLOGY | Facility: CLINIC | Age: 86
End: 2021-05-21

## 2021-05-21 ENCOUNTER — TELEPHONE (OUTPATIENT)
Dept: FAMILY MEDICINE CLINIC | Facility: CLINIC | Age: 86
End: 2021-05-21

## 2021-05-21 ENCOUNTER — HOSPITAL ENCOUNTER (OUTPATIENT)
Facility: AMBULARY SURGERY CENTER | Age: 86
Setting detail: OUTPATIENT SURGERY
Discharge: HOME | End: 2021-05-21
Attending: UROLOGY | Admitting: UROLOGY
Payer: COMMERCIAL

## 2021-05-21 VITALS
BODY MASS INDEX: 26.58 KG/M2 | RESPIRATION RATE: 18 BRPM | DIASTOLIC BLOOD PRESSURE: 79 MMHG | HEIGHT: 63 IN | WEIGHT: 150 LBS | HEART RATE: 63 BPM | OXYGEN SATURATION: 100 % | TEMPERATURE: 98.1 F | SYSTOLIC BLOOD PRESSURE: 142 MMHG

## 2021-05-21 DIAGNOSIS — N32.89 BLADDER MASS: ICD-10-CM

## 2021-05-21 DIAGNOSIS — R31.9 HEMATURIA, UNSPECIFIED TYPE: ICD-10-CM

## 2021-05-21 LAB
BASOPHILS # BLD AUTO: 0.03 THOUSANDS/ΜL (ref 0–0.1)
BASOPHILS NFR BLD AUTO: 1 % (ref 0–1)
EOSINOPHIL # BLD AUTO: 0.08 THOUSAND/ΜL (ref 0–0.61)
EOSINOPHIL NFR BLD AUTO: 1 % (ref 0–6)
ERYTHROCYTE [DISTWIDTH] IN BLOOD BY AUTOMATED COUNT: 15.2 % (ref 11.6–15.1)
HCT VFR BLD AUTO: 27.2 % (ref 34.8–46.1)
HGB BLD-MCNC: 7.9 G/DL (ref 11.5–15.4)
IMM GRANULOCYTES # BLD AUTO: 0.03 THOUSAND/UL (ref 0–0.2)
IMM GRANULOCYTES NFR BLD AUTO: 1 % (ref 0–2)
LYMPHOCYTES # BLD AUTO: 1.09 THOUSANDS/ΜL (ref 0.6–4.47)
LYMPHOCYTES NFR BLD AUTO: 18 % (ref 14–44)
MCH RBC QN AUTO: 23.3 PG (ref 26.8–34.3)
MCHC RBC AUTO-ENTMCNC: 29 G/DL (ref 31.4–37.4)
MCV RBC AUTO: 80 FL (ref 82–98)
MONOCYTES # BLD AUTO: 0.53 THOUSAND/ΜL (ref 0.17–1.22)
MONOCYTES NFR BLD AUTO: 9 % (ref 4–12)
NEUTROPHILS # BLD AUTO: 4.2 THOUSANDS/ΜL (ref 1.85–7.62)
NEUTS SEG NFR BLD AUTO: 70 % (ref 43–75)
NRBC BLD AUTO-RTO: 0 /100 WBCS
PLATELET # BLD AUTO: 246 THOUSANDS/UL (ref 149–390)
PMV BLD AUTO: 8.8 FL (ref 8.9–12.7)
RBC # BLD AUTO: 3.39 MILLION/UL (ref 3.81–5.12)
WBC # BLD AUTO: 5.96 THOUSAND/UL (ref 4.31–10.16)

## 2021-05-21 PROCEDURE — 85025 COMPLETE CBC W/AUTO DIFF WBC: CPT | Performed by: STUDENT IN AN ORGANIZED HEALTH CARE EDUCATION/TRAINING PROGRAM

## 2021-05-21 RX ORDER — CEFAZOLIN SODIUM 1 G/50ML
1000 SOLUTION INTRAVENOUS ONCE
Status: DISCONTINUED | OUTPATIENT
Start: 2021-05-21 | End: 2021-05-21 | Stop reason: HOSPADM

## 2021-05-21 NOTE — QUICK NOTE
Unfortunately, Mami's hemoglobin has taken a relatively precipitous drop  Interestingly, she is asymptomatic and denies hematuria or any signs of visible bleeding from elsewhere  She and her grandson report that she has had more energy and activity over the last 2 weeks  She denies chest pain, shortness of breath, headaches or dizziness  Given the patient's prior cardiac history and age, anesthesia team does not feel comfortable proceeding with bladder resection today in ambulatory surgery center  Patient will be discharged home and the date will be rescheduled in a main operating room  I have asked the patient to be seen by her primary care team next week and I have given her instructions to present to the emergency room with any constitutional signs or symptoms

## 2021-05-21 NOTE — DISCHARGE INSTRUCTIONS
Your procedure today was canceled due to a precipitous drop in your hemoglobin  It was not safe to perform your surgery and a ambulatory surgical center and this will need to be rescheduled in the main operating room  As you are asymptomatic, I am not recommending that you proceed to the emergency room her for admission  I would like you to be seen by her primary care doctor next week  If you experience headaches, dizziness, chest pain or shortness of breath or significant fatigue, he should be seen in the emergency room or contact our office for advice  Our office will contact you with the rescheduled surgical date

## 2021-05-21 NOTE — TELEPHONE ENCOUNTER
Urology called to let you know that pt's surgery was cancelled due to hemoglobin levels were greatly decreased  If you have any questions you can call the office

## 2021-05-21 NOTE — TELEPHONE ENCOUNTER
Admits case was canceled for the ambulatory surgery center today given some asymptomatic anemia with a hemoglobin decreased from 12-8 to 7  She actually feels wonderfully and has no complaints of active bleeding or constitutional symptoms  Surgery will need to be rescheduled in the [facility]  I will request primary care evaluation sometime within the next week

## 2021-05-21 NOTE — TELEPHONE ENCOUNTER
Called and left a message on nursing line with PCP office for coordination of care, reviewed patient situation and the need for appt, asked that they call the patient directly to schedule  Left my contact information to message on teams with any questions  Also left a message on the  voicemail as well at pcp office

## 2021-05-24 ENCOUNTER — TELEPHONE (OUTPATIENT)
Dept: FAMILY MEDICINE CLINIC | Facility: CLINIC | Age: 86
End: 2021-05-24

## 2021-05-24 DIAGNOSIS — D50.0 IRON DEFICIENCY ANEMIA DUE TO CHRONIC BLOOD LOSS: ICD-10-CM

## 2021-05-24 DIAGNOSIS — E11.65 UNCONTROLLED TYPE 2 DIABETES MELLITUS WITH HYPERGLYCEMIA (HCC): ICD-10-CM

## 2021-05-24 DIAGNOSIS — K21.9 GASTROESOPHAGEAL REFLUX DISEASE WITHOUT ESOPHAGITIS: Primary | ICD-10-CM

## 2021-05-24 RX ORDER — FERROUS SULFATE TAB EC 324 MG (65 MG FE EQUIVALENT) 324 (65 FE) MG
324 TABLET DELAYED RESPONSE ORAL
Qty: 60 TABLET | Refills: 3 | Status: SHIPPED | OUTPATIENT
Start: 2021-05-24 | End: 2021-10-11

## 2021-05-24 NOTE — TELEPHONE ENCOUNTER
Notify patient start taking iron tablets twice daily I sent this in to the pharmacy iron can cause constipation so she must increase fiber or add Colace a stool softener to her diet and repeat the blood work soon and of this week or next week

## 2021-05-24 NOTE — TELEPHONE ENCOUNTER
Lab test ordered for patient to have done  This week or next week ask her to start taking iron tablets twice daily

## 2021-05-24 NOTE — TELEPHONE ENCOUNTER
Pt called asking what she could eat or take (med wise) to help increase her hemoglobin level  Her surg was postponed due to the level being low

## 2021-05-25 NOTE — TELEPHONE ENCOUNTER
I spoke to patient, she was made aware made aware to get blood work done and to start taking Iron meds

## 2021-05-25 NOTE — TELEPHONE ENCOUNTER
Patient is now scheduled 6/7 with pcp  According to chart PCP ordered Iron and stool softeners with repeat blood work and FU thereafter

## 2021-05-26 ENCOUNTER — APPOINTMENT (OUTPATIENT)
Dept: LAB | Facility: CLINIC | Age: 86
End: 2021-05-26
Payer: COMMERCIAL

## 2021-05-26 DIAGNOSIS — C50.212 MALIGNANT NEOPLASM OF UPPER-INNER QUADRANT OF LEFT BREAST IN FEMALE, ESTROGEN RECEPTOR POSITIVE (HCC): ICD-10-CM

## 2021-05-26 DIAGNOSIS — Z01.818 PRE-OPERATIVE EXAMINATION: ICD-10-CM

## 2021-05-26 DIAGNOSIS — N32.89 BLADDER MASS: ICD-10-CM

## 2021-05-26 DIAGNOSIS — K21.9 GASTROESOPHAGEAL REFLUX DISEASE WITHOUT ESOPHAGITIS: ICD-10-CM

## 2021-05-26 DIAGNOSIS — R39.89 SUSPECTED UTI: ICD-10-CM

## 2021-05-26 DIAGNOSIS — R26.89 BALANCE PROBLEM: ICD-10-CM

## 2021-05-26 DIAGNOSIS — D50.0 IRON DEFICIENCY ANEMIA DUE TO CHRONIC BLOOD LOSS: ICD-10-CM

## 2021-05-26 DIAGNOSIS — E11.65 UNCONTROLLED TYPE 2 DIABETES MELLITUS WITH HYPERGLYCEMIA (HCC): ICD-10-CM

## 2021-05-26 DIAGNOSIS — I10 ESSENTIAL HYPERTENSION: ICD-10-CM

## 2021-05-26 DIAGNOSIS — Z17.0 MALIGNANT NEOPLASM OF UPPER-INNER QUADRANT OF LEFT BREAST IN FEMALE, ESTROGEN RECEPTOR POSITIVE (HCC): ICD-10-CM

## 2021-05-26 DIAGNOSIS — I49.5 SICK SINUS SYNDROME (HCC): Chronic | ICD-10-CM

## 2021-05-26 LAB
ALBUMIN SERPL BCP-MCNC: 3.7 G/DL (ref 3.5–5)
ALP SERPL-CCNC: 67 U/L (ref 46–116)
ALT SERPL W P-5'-P-CCNC: 14 U/L (ref 12–78)
ANION GAP SERPL CALCULATED.3IONS-SCNC: 4 MMOL/L (ref 4–13)
AST SERPL W P-5'-P-CCNC: 13 U/L (ref 5–45)
BACTERIA UR QL AUTO: ABNORMAL /HPF
BASOPHILS # BLD AUTO: 0.03 THOUSANDS/ΜL (ref 0–0.1)
BASOPHILS NFR BLD AUTO: 1 % (ref 0–1)
BILIRUB SERPL-MCNC: 0.39 MG/DL (ref 0.2–1)
BILIRUB UR QL STRIP: ABNORMAL
BUN SERPL-MCNC: 19 MG/DL (ref 5–25)
CALCIUM SERPL-MCNC: 11.5 MG/DL (ref 8.3–10.1)
CHLORIDE SERPL-SCNC: 101 MMOL/L (ref 100–108)
CLARITY UR: ABNORMAL
CO2 SERPL-SCNC: 32 MMOL/L (ref 21–32)
COLOR UR: ABNORMAL
CREAT SERPL-MCNC: 0.97 MG/DL (ref 0.6–1.3)
CREAT UR-MCNC: 33.9 MG/DL
EOSINOPHIL # BLD AUTO: 0.07 THOUSAND/ΜL (ref 0–0.61)
EOSINOPHIL NFR BLD AUTO: 1 % (ref 0–6)
ERYTHROCYTE [DISTWIDTH] IN BLOOD BY AUTOMATED COUNT: 15.6 % (ref 11.6–15.1)
EST. AVERAGE GLUCOSE BLD GHB EST-MCNC: 128 MG/DL
GFR SERPL CREATININE-BSD FRML MDRD: 54 ML/MIN/1.73SQ M
GLUCOSE SERPL-MCNC: 100 MG/DL (ref 65–140)
GLUCOSE UR STRIP-MCNC: ABNORMAL MG/DL
HBA1C MFR BLD: 6.1 %
HCT VFR BLD AUTO: 26.9 % (ref 34.8–46.1)
HGB BLD-MCNC: 8.1 G/DL (ref 11.5–15.4)
HGB UR QL STRIP.AUTO: ABNORMAL
IMM GRANULOCYTES # BLD AUTO: 0.03 THOUSAND/UL (ref 0–0.2)
IMM GRANULOCYTES NFR BLD AUTO: 1 % (ref 0–2)
IRON SERPL-MCNC: 14 UG/DL (ref 50–170)
KETONES UR STRIP-MCNC: ABNORMAL MG/DL
LEUKOCYTE ESTERASE UR QL STRIP: ABNORMAL
LYMPHOCYTES # BLD AUTO: 1.01 THOUSANDS/ΜL (ref 0.6–4.47)
LYMPHOCYTES NFR BLD AUTO: 16 % (ref 14–44)
MCH RBC QN AUTO: 24.2 PG (ref 26.8–34.3)
MCHC RBC AUTO-ENTMCNC: 30.1 G/DL (ref 31.4–37.4)
MCV RBC AUTO: 80 FL (ref 82–98)
MICROALBUMIN UR-MCNC: 7240 MG/L (ref 0–20)
MICROALBUMIN/CREAT 24H UR: ABNORMAL MG/G CREATININE (ref 0–30)
MONOCYTES # BLD AUTO: 0.47 THOUSAND/ΜL (ref 0.17–1.22)
MONOCYTES NFR BLD AUTO: 8 % (ref 4–12)
MUCOUS THREADS UR QL AUTO: ABNORMAL
NEUTROPHILS # BLD AUTO: 4.68 THOUSANDS/ΜL (ref 1.85–7.62)
NEUTS SEG NFR BLD AUTO: 73 % (ref 43–75)
NITRITE UR QL STRIP: ABNORMAL
NON-SQ EPI CELLS URNS QL MICRO: ABNORMAL /HPF
NRBC BLD AUTO-RTO: 0 /100 WBCS
PH UR STRIP.AUTO: ABNORMAL [PH]
PLATELET # BLD AUTO: 270 THOUSANDS/UL (ref 149–390)
PMV BLD AUTO: 9.3 FL (ref 8.9–12.7)
POTASSIUM SERPL-SCNC: 4.1 MMOL/L (ref 3.5–5.3)
PROT SERPL-MCNC: 7.2 G/DL (ref 6.4–8.2)
PROT UR STRIP-MCNC: ABNORMAL MG/DL
RBC # BLD AUTO: 3.35 MILLION/UL (ref 3.81–5.12)
RBC #/AREA URNS AUTO: ABNORMAL /HPF
SODIUM SERPL-SCNC: 137 MMOL/L (ref 136–145)
SP GR UR STRIP.AUTO: 1.02 (ref 1–1.03)
TSH SERPL DL<=0.05 MIU/L-ACNC: 2.32 UIU/ML (ref 0.36–3.74)
UROBILINOGEN UR QL STRIP.AUTO: ABNORMAL E.U./DL
WBC # BLD AUTO: 6.29 THOUSAND/UL (ref 4.31–10.16)
WBC #/AREA URNS AUTO: ABNORMAL /HPF

## 2021-05-26 PROCEDURE — 83540 ASSAY OF IRON: CPT

## 2021-05-26 PROCEDURE — 85025 COMPLETE CBC W/AUTO DIFF WBC: CPT

## 2021-05-26 PROCEDURE — 36415 COLL VENOUS BLD VENIPUNCTURE: CPT

## 2021-05-26 PROCEDURE — 81001 URINALYSIS AUTO W/SCOPE: CPT | Performed by: FAMILY MEDICINE

## 2021-05-26 PROCEDURE — 83036 HEMOGLOBIN GLYCOSYLATED A1C: CPT

## 2021-05-26 PROCEDURE — 82570 ASSAY OF URINE CREATININE: CPT | Performed by: FAMILY MEDICINE

## 2021-05-26 PROCEDURE — 82043 UR ALBUMIN QUANTITATIVE: CPT | Performed by: FAMILY MEDICINE

## 2021-05-26 PROCEDURE — 80053 COMPREHEN METABOLIC PANEL: CPT

## 2021-05-26 PROCEDURE — 84443 ASSAY THYROID STIM HORMONE: CPT

## 2021-05-26 NOTE — TELEPHONE ENCOUNTER
I called and spoke with patient to discuss new tentative surgery date on 7/2 with Dr Cuevas Level  Patient is aware that she will need a new ucx done 2 weeks prior to surgery  I will also create and send a new surgery packet to patient once surgery date is finalized

## 2021-05-27 DIAGNOSIS — R80.9 PROTEINURIA, UNSPECIFIED TYPE: Primary | ICD-10-CM

## 2021-05-28 ENCOUNTER — APPOINTMENT (OUTPATIENT)
Dept: LAB | Facility: CLINIC | Age: 86
End: 2021-05-28
Payer: COMMERCIAL

## 2021-05-28 DIAGNOSIS — R80.9 PROTEINURIA, UNSPECIFIED TYPE: ICD-10-CM

## 2021-05-28 LAB
BACTERIA UR QL AUTO: ABNORMAL /HPF
BILIRUB UR QL STRIP: NEGATIVE
CLARITY UR: ABNORMAL
COLOR UR: ABNORMAL
CREAT UR-MCNC: 60.7 MG/DL
GLUCOSE UR STRIP-MCNC: NEGATIVE MG/DL
HGB UR QL STRIP.AUTO: ABNORMAL
KETONES UR STRIP-MCNC: NEGATIVE MG/DL
LEUKOCYTE ESTERASE UR QL STRIP: ABNORMAL
MICROALBUMIN UR-MCNC: 918 MG/L (ref 0–20)
MICROALBUMIN/CREAT 24H UR: 1512 MG/G CREATININE (ref 0–30)
NITRITE UR QL STRIP: NEGATIVE
NON-SQ EPI CELLS URNS QL MICRO: ABNORMAL /HPF
PH UR STRIP.AUTO: 7 [PH]
PROT UR STRIP-MCNC: ABNORMAL MG/DL
RBC #/AREA URNS AUTO: ABNORMAL /HPF
SP GR UR STRIP.AUTO: 1.01 (ref 1–1.03)
UROBILINOGEN UR QL STRIP.AUTO: 0.2 E.U./DL
WBC #/AREA URNS AUTO: ABNORMAL /HPF

## 2021-05-28 PROCEDURE — 82570 ASSAY OF URINE CREATININE: CPT

## 2021-05-28 PROCEDURE — 82043 UR ALBUMIN QUANTITATIVE: CPT

## 2021-05-28 PROCEDURE — 81001 URINALYSIS AUTO W/SCOPE: CPT | Performed by: FAMILY MEDICINE

## 2021-06-02 ENCOUNTER — RA CDI HCC (OUTPATIENT)
Dept: OTHER | Facility: HOSPITAL | Age: 86
End: 2021-06-02

## 2021-06-02 NOTE — PROGRESS NOTES
[facility]  coding opportunities          Chart reviewed, no opportunity found: CHART REVIEWED, NO OPPORTUNITY FOUND              Patients insurance company: [facility] [address]  (Medicare Advantage and Commercial)

## 2021-06-03 DIAGNOSIS — E11.65 UNCONTROLLED TYPE 2 DIABETES MELLITUS WITH HYPERGLYCEMIA (HCC): ICD-10-CM

## 2021-06-07 ENCOUNTER — PREP FOR PROCEDURE (OUTPATIENT)
Dept: UROLOGY | Facility: AMBULATORY SURGERY CENTER | Age: 86
End: 2021-06-07

## 2021-06-07 DIAGNOSIS — R39.89 SUSPECTED UTI: ICD-10-CM

## 2021-06-07 DIAGNOSIS — Z01.818 PRE-OPERATIVE EXAMINATION: ICD-10-CM

## 2021-06-07 DIAGNOSIS — N32.89 BLADDER MASS: Primary | ICD-10-CM

## 2021-06-15 ENCOUNTER — TRANSCRIBE ORDERS (OUTPATIENT)
Dept: ADMINISTRATIVE | Facility: HOSPITAL | Age: 86
End: 2021-06-15

## 2021-06-15 ENCOUNTER — APPOINTMENT (OUTPATIENT)
Dept: LAB | Facility: CLINIC | Age: 86
End: 2021-06-15
Payer: COMMERCIAL

## 2021-06-15 DIAGNOSIS — Z01.818 OTHER SPECIFIED PRE-OPERATIVE EXAMINATION: ICD-10-CM

## 2021-06-15 DIAGNOSIS — N32.89 BLADDER MASS: ICD-10-CM

## 2021-06-15 DIAGNOSIS — R39.89 SUSPECTED UTI: ICD-10-CM

## 2021-06-15 DIAGNOSIS — N32.89 NONTRAUMATIC RUPTURE OF BLADDER: ICD-10-CM

## 2021-06-15 DIAGNOSIS — R39.89 BLADDER PAIN: ICD-10-CM

## 2021-06-15 DIAGNOSIS — N32.89 NONTRAUMATIC RUPTURE OF BLADDER: Primary | ICD-10-CM

## 2021-06-15 DIAGNOSIS — Z01.818 PRE-OPERATIVE EXAMINATION: ICD-10-CM

## 2021-06-15 PROCEDURE — 87086 URINE CULTURE/COLONY COUNT: CPT

## 2021-06-16 ENCOUNTER — RA CDI HCC (OUTPATIENT)
Dept: OTHER | Facility: HOSPITAL | Age: 86
End: 2021-06-16

## 2021-06-16 LAB — BACTERIA UR CULT: NORMAL

## 2021-06-16 NOTE — PROGRESS NOTES
[facility]  coding opportunities          Chart reviewed, no opportunity found: CHART REVIEWED, NO OPPORTUNITY FOUND                     Patients insurance company: [facility] [address]  (Medicare Advantage and Commercial)

## 2021-06-17 NOTE — TELEPHONE ENCOUNTER
Patient tentatively scheduled 7/23/2021 at 115pm with Dr Jose Coreas for TURBT follow up     Office to call and confirm appt time and date

## 2021-06-22 ENCOUNTER — OFFICE VISIT (OUTPATIENT)
Dept: FAMILY MEDICINE CLINIC | Facility: CLINIC | Age: 86
End: 2021-06-22
Payer: COMMERCIAL

## 2021-06-22 VITALS
HEART RATE: 71 BPM | OXYGEN SATURATION: 98 % | DIASTOLIC BLOOD PRESSURE: 70 MMHG | BODY MASS INDEX: 27.21 KG/M2 | SYSTOLIC BLOOD PRESSURE: 130 MMHG | TEMPERATURE: 98.6 F | WEIGHT: 153.6 LBS | HEIGHT: 63 IN

## 2021-06-22 DIAGNOSIS — E78.2 MIXED HYPERLIPIDEMIA: ICD-10-CM

## 2021-06-22 DIAGNOSIS — Z85.3 PERSONAL HISTORY OF BREAST CANCER: ICD-10-CM

## 2021-06-22 DIAGNOSIS — Z01.818 ENCOUNTER FOR PRE-OPERATIVE EXAMINATION: Primary | ICD-10-CM

## 2021-06-22 DIAGNOSIS — F32.A DEPRESSION, UNSPECIFIED DEPRESSION TYPE: ICD-10-CM

## 2021-06-22 DIAGNOSIS — R26.89 BALANCE PROBLEM: ICD-10-CM

## 2021-06-22 DIAGNOSIS — R41.3 MEMORY LOSS: ICD-10-CM

## 2021-06-22 DIAGNOSIS — I10 ESSENTIAL HYPERTENSION: ICD-10-CM

## 2021-06-22 DIAGNOSIS — I49.5 SICK SINUS SYNDROME (HCC): Chronic | ICD-10-CM

## 2021-06-22 DIAGNOSIS — R05.9 COUGH: ICD-10-CM

## 2021-06-22 DIAGNOSIS — C50.911 BREAST CARCINOMA, FEMALE, RIGHT (HCC): ICD-10-CM

## 2021-06-22 DIAGNOSIS — H35.30 MACULAR DEGENERATION, UNSPECIFIED LATERALITY, UNSPECIFIED TYPE: ICD-10-CM

## 2021-06-22 DIAGNOSIS — E87.1 HYPONATREMIA: ICD-10-CM

## 2021-06-22 DIAGNOSIS — C50.212 MALIGNANT NEOPLASM OF UPPER-INNER QUADRANT OF LEFT BREAST IN FEMALE, ESTROGEN RECEPTOR POSITIVE (HCC): ICD-10-CM

## 2021-06-22 DIAGNOSIS — F41.9 ANXIETY: ICD-10-CM

## 2021-06-22 DIAGNOSIS — R93.1 ABNORMAL ULTRASOUND CARDIOGRAM: ICD-10-CM

## 2021-06-22 DIAGNOSIS — R00.1 BRADYCARDIA: ICD-10-CM

## 2021-06-22 DIAGNOSIS — Z17.0 MALIGNANT NEOPLASM OF UPPER-INNER QUADRANT OF LEFT BREAST IN FEMALE, ESTROGEN RECEPTOR POSITIVE (HCC): ICD-10-CM

## 2021-06-22 DIAGNOSIS — R42 DIZZINESS: ICD-10-CM

## 2021-06-22 DIAGNOSIS — Z95.0 CARDIAC PACEMAKER IN SITU: ICD-10-CM

## 2021-06-22 DIAGNOSIS — Z90.710 S/P HYSTERECTOMY: ICD-10-CM

## 2021-06-22 DIAGNOSIS — R31.0 GROSS HEMATURIA: ICD-10-CM

## 2021-06-22 DIAGNOSIS — E87.6 HYPOKALEMIA: ICD-10-CM

## 2021-06-22 DIAGNOSIS — E83.52 HYPERCALCEMIA: ICD-10-CM

## 2021-06-22 PROCEDURE — 99215 OFFICE O/P EST HI 40 MIN: CPT | Performed by: FAMILY MEDICINE

## 2021-06-22 NOTE — PROGRESS NOTES
FAMILY MEDICINE PRE-OPERATIVE EVALUATION  [facility] - [facility] [address] [facility] PR[facility]    NAME: Liberty Roses Schoenberger  AGE: 82 y o  SEX: female  : 1935     DATE: 2021     Family Medicine Pre-Operative Evaluation:     Chief Complaint: Pre-operative Evaluation     Surgery:  Trans urethral resection of bladder tumor with instillation of mitomycin   Anticipated Date of Surgery:  2021  Referring Provider:  Dr Maria Isabel Scott         History of Present Illness:     Basia Frais is a 82 y o  female who presents to the office today for a preoperative consultation at the request of surgeon, Dr Maria Isabel Scott, who plans on performing trans urethral resection of bladder tumor with instillation of mitomycin on 2021  Planned anesthesia is general  Patient has a bleeding risk of: no recent abnormal bleeding however patient has had hematuria hemoglobin at 8 1 recently  Patient does not have objections to receiving blood products if needed  Current anti-platelet/anti-coagulation medications that the patient is prescribed includes: NONE  Review of Systems:     Review of Systems   Constitutional: Negative for chills, fatigue and fever  HENT: Negative for congestion, nosebleeds, rhinorrhea, sinus pressure and sore throat  Eyes: Negative for discharge and redness  Respiratory: Negative for cough and shortness of breath  Cardiovascular: Negative for chest pain, palpitations and leg swelling  Gastrointestinal: Negative for abdominal pain, blood in stool and nausea  Endocrine: Negative for cold intolerance, heat intolerance and polyuria  Genitourinary: Negative for dysuria and frequency  Musculoskeletal: Positive for back pain and myalgias  Negative for arthralgias  Skin: Negative for rash  Neurological: Negative for dizziness, weakness and headaches  Hematological: Negative for adenopathy     Psychiatric/Behavioral: Negative for behavioral problems and sleep disturbance  The patient is not nervous/anxious  Problem List:     Patient Active Problem List   Diagnosis    Un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diabetes mellitus with hyperglycemia ([facility] )    Essential hypertension    Hyperlipidemia    Left breast mass    Depression    Hyponatremia    Hypokalemia    Gross hematuria    Dizziness    Bradycardia    Sick sinus syndrome ([facility] )    Cardiac pacemaker in situ    Abnormal ultrasound cardiogram    Anxiety    BMI 33 0-33 9,adult    Breast carcinoma, female, right ([facility] )    GERD (gastroesophageal reflux disease)    Macular degeneration    Malignant neoplasm of upper-inner quadrant of left breast in female, estrogen receptor positive ([facility] )    Personal history of breast cancer    S/P hysterectomy    Memory loss    Medicare annual wellness visit, subsequent    Hypercalcemia    Balance problem    Cough    Encounter for pre-operative examination        Allergies:      Allergies   Allergen Reactions    Celecoxib Swelling     celebrex- swelling of throat    Loratadine Swelling     Jaw swelled,couldn't swallow; face swelling    Aspirin GI Intolerance     : nausea; okay with Ecotrin    Lisinopril Swelling        Current Medications:       Current Outpatient Medications:     anastrozole (ARIMIDEX) 1 mg tablet, Take 1 mg by mouth daily, Disp: , Rfl:     Apoaequorin (Prevagen) 10 MG CAPS, Take 10 mg by mouth, Disp: , Rfl:     atorvastatin (LIPITOR) 40 mg tablet, Take 1 tablet (40 mg total) by mouth daily, Disp: 90 tablet, Rfl: 1    DOCOSAHEXAENOIC ACID PO, Take 2 capsules by mouth daily, Disp: , Rfl:     ferrous sulfate 324 (65 Fe) mg, Take 1 tablet (324 mg total) by mouth 2 (two) times a day before meals, Disp: 60 tablet, Rfl: 3    metFORMIN (GLUCOPHAGE) 500 mg tablet, Take 1 tablet (500 mg total) by mouth daily, Disp: 90 tablet, Rfl: 3    polyethylene glycol-propylene glycol (SYSTANE) 0 4-0 3 %, Apply 4 drops to eye 2 (two) times a day, Disp: , Rfl:    potassium chloride (K-DUR,KLOR-CON) 10 mEq tablet, Take 2 tablets daily, Disp: 180 tablet, Rfl: 3    sertraline (ZOLOFT) 50 mg tablet, TAKE ONE TABLET BY MOUTH ONE TIME DAILY , Disp: 90 tablet, Rfl: 0    triamterene-hydrochlorothiazide (MAXZIDE-25) 37 5-25 mg per tablet, Take 1 tablet by mouth daily, Disp: 90 tablet, Rfl: 1     Past History:     Past Medical History:   Diagnosis Date    Bladder cancer ([facility] )     Breast cancer ([place] [address] )     right breast 1999    Breast cancer ([facility] )     left 2873-3231    Cardiac disease     Depression     Diabetes mellitus ([place] [address] )     GERD (gastroesophageal reflux disease)     History of chemotherapy     right breast 1996    History of echocardiogram 02/14/2018    EF 0 55 (55%), trace mitral regurgitation   Hx of radiation therapy     2017  left breast    Hyperlipidemia     Hypertension     Macular degeneration     Sick sinus syndrome [facility])         Past Surgical History:   Procedure Laterality Date    APPENDECTOMY      BREAST LUMPECTOMY Bilateral     C[facility] PACEMAK[facility]AC[facility]    HYSTERECTOMY          Family History   Problem Relation Age of Onset    Alzheimer's disease Father     Uterine cancer Daughter         Social History     Socioeconomic History    Marital status:       Spouse name: Not on file    Number of children: Not on file    Years of education: Not on file    Highest education level: Not on file   Occupational History    Not on file   Tobacco Use    Smoking status: Never Smoker    Smokeless tobacco: Never Used   Vaping Use    Vaping Use: Never used   Substance and Sexual Activity    Alcohol use: No    Drug use: No    Sexual activity: Not Currently   Other Topics Concern    Not on file   Social History Narrative    Not on file     Social Determinants of Health     Financial Resource Strain:     Difficulty of Paying Living Expenses:    Food Insecurity:     Worried About Running Out of Food in the Last Year:  Ran Out of Food in the Last Year:    Transportation Needs:     Lack of Transportation (Medical):  Lack of Transportation (Non-Medical):    Physical Activity:     Days of Exercise per Week:     Minutes of Exercise per Session:    Stress:     Feeling of Stress :    Social Connections:     Frequency of Communication with Friends and Family:     Frequency of Social Gatherings with Friends and Family:     Attends Mu-ism Services:     Active Member of Clubs or Organizations:     Attends Club or Organization Meetings:     Marital Status:    Intimate Partner Violence:     Fear of Current or Ex-Partner:     Emotionally Abused:     Physically Abused:     Sexually Abused:         Physical Exam:      /70 (BP Location: Left arm, Patient Position: Sitting, Cuff Size: Standard)   Pulse 71   Temp 98 6 °F (37 °C)   Ht 5' 3" (1 6 m)   Wt 69 7 kg (153 lb 9 6 oz)   SpO2 98%   BMI 27 21 kg/m²     Physical Exam  Vitals and nursing note reviewed  Constitutional:       General: She is not in acute distress  Appearance: Normal appearance  She is well-developed and normal weight  HENT:      Head: Normocephalic and atraumatic  Right Ear: Tympanic membrane, ear canal and external ear normal       Left Ear: Tympanic membrane, ear canal and external ear normal       Nose: Nose normal       Mouth/Throat:      Mouth: Mucous membranes are moist       Pharynx: Oropharynx is clear  No oropharyngeal exudate  Eyes:      General: No scleral icterus  Right eye: No discharge  Left eye: No discharge  Conjunctiva/sclera: Conjunctivae normal       Pupils: Pupils are equal, round, and reactive to light  Neck:      Thyroid: No thyromegaly  Vascular: No JVD  Cardiovascular:      Rate and Rhythm: Normal rate and regular rhythm  Pulses: Normal pulses  Heart sounds: Normal heart sounds  No murmur heard       Pulmonary:      Effort: Pulmonary effort is normal       Breath sounds: No wheezing or rales  Chest:      Chest wall: No tenderness  Abdominal:      General: Bowel sounds are normal  There is no distension  Palpations: Abdomen is soft  There is no mass  Tenderness: There is no abdominal tenderness  Musculoskeletal:         General: No tenderness or deformity  Normal range of motion  Cervical back: Normal range of motion  Lymphadenopathy:      Cervical: No cervical adenopathy  Skin:     General: Skin is warm and dry  Capillary Refill: Capillary refill takes less than 2 seconds  Findings: No rash  Neurological:      General: No focal deficit present  Mental Status: She is alert and oriented to person, place, and time  Mental status is at baseline  Cranial Nerves: No cranial nerve deficit  Coordination: Coordination normal       Deep Tendon Reflexes: Reflexes are normal and symmetric  Reflexes normal    Psychiatric:         Mood and Affect: Mood normal          Behavior: Behavior normal          Thought Content: Thought content normal          Judgment: Judgment normal            Data:     Pre-operative work-up    Laboratory Results: I have personally reviewed the pertinent laboratory results/reports     EKG: I have personally reviewed pertinent reports  Chest x-ray: I have personally reviewed pertinent reports  Assessment:     1  Encounter for pre-operative examination     2  Cough     3  Balance problem     4  Hypercalcemia     5  Memory loss     6  S/P hysterectomy     7  Personal history of breast cancer     8  Malignant neoplasm of upper-inner quadrant of left breast in female, estrogen receptor positive ([address] )     9  Macular degeneration, unspecified laterality, unspecified type     10  Breast carcinoma, female, right ([address] )     11  Anxiety     12  Abnormal ultrasound cardiogram     13  Cardiac pacemaker in situ     14  Bradycardia     15  Dizziness     16  Hypokalemia     17  Hyponatremia     18   Depression, unspecified depression type     19  Mixed hyperlipidemia     20  Gross hematuria     21  Essential hypertension     22  Sick sinus syndrome [facility])          Plan:     82 y o  female with planned surgery:  Trans urethral resection of bladder tumor with instillation of mitomycin          Clearance  Patient is CLEARED for surgery without any additional cardiac testing       DO MARI Bowers [place] [address]  [address] [facility] [address]  Phone#  [phone]  Fax#  [phone]

## 2021-06-22 NOTE — ASSESSMENT & PLAN NOTE
Medical clearance completed today for trans urethral resection of bladder tumor with instillation of mitomycin

## 2021-06-22 NOTE — LETTER
2021     Kylah Sigala [facility]  [address]  [facility]    Patient: Rishi Sharp   YOB: 1935   Date of Visit: 2021       Dear Dr Talon Coleman: Thank you for referring Vent[place] óGmez to me for evaluation  Below are my notes for this consultation  If you have questions, please do not hesitate to call me  I look forward to following your patient along with you  Sincerely,        Denisse Gregory DO        CC: No Recipients  Denisse Gregory DO  2021  1:45 PM  Incomplete  FAMILY MEDICINE PRE-OPERATIVE EVALUATION  [facility]    NAME: Vida Eves Schoenberger  AGE: 82 y o  SEX: female  : 1935     DATE: 2021     Family Medicine Pre-Operative Evaluation:     Chief Complaint: Pre-operative Evaluation     Surgery:  Trans urethral resection of bladder tumor with instillation of mitomycin   Anticipated Date of Surgery:  2021  Referring Provider:  Dr Talon Coleman         History of Present Illness:     Rishi Sharp is a 82 y o  female who presents to the office today for a preoperative consultation at the request of surgeon, Dr Talon Coleman, who plans on performing trans urethral resection of bladder tumor with instillation of mitomycin on 2021  Planned anesthesia is general  Patient has a bleeding risk of: no recent abnormal bleeding  Patient does not have objections to receiving blood products if needed  Current anti-platelet/anti-coagulation medications that the patient is prescribed includes: NONE  Review of Systems:     Review of Systems   Constitutional: Negative for chills, fatigue and fever  HENT: Negative for congestion, nosebleeds, rhinorrhea, sinus pressure and sore throat  Eyes: Negative for discharge and redness  Respiratory: Negative for cough and shortness of breath  Cardiovascular: Negative for chest pain, palpitations and leg swelling  Gastrointestinal: Negative for abdominal pain, blood in stool and nausea  Endocrine: Negative for cold intolerance, heat intolerance and polyuria  Genitourinary: Negative for dysuria and frequency  Musculoskeletal: Positive for back pain and myalgias  Negative for arthralgias  Skin: Negative for rash  Neurological: Negative for dizziness, weakness and headaches  Hematological: Negative for adenopathy  Psychiatric/Behavioral: Negative for behavioral problems and sleep disturbance  The patient is not nervous/anxious  Problem List:     Patient Active Problem List   Diagnosis    Un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diabetes mellitus with hyperglycemia ([facility] )    Essential hypertension    Hyperlipidemia    Left breast mass    Depression    Hyponatremia    Hypokalemia    Gross hematuria    Dizziness    Bradycardia    Sick sinus syndrome ([facility] )    Cardiac pacemaker in situ    Abnormal ultrasound cardiogram    Anxiety    BMI 33 0-33 9,adult    Breast carcinoma, female, right ([facility] )    GERD (gastroesophageal reflux disease)    Macular degeneration    Malignant neoplasm of upper-inner quadrant of left breast in female, estrogen receptor positive ([facility] )    Personal history of breast cancer    S/P hysterectomy    Memory loss    Medicare annual wellness visit, subsequent    Hypercalcemia    Balance problem    Cough    Encounter for pre-operative examination        Allergies:      Allergies   Allergen Reactions    Celecoxib Swelling     celebrex- swelling of throat    Loratadine Swelling     Jaw swelled,couldn't swallow; face swelling    Aspirin GI Intolerance     : nausea; okay with Ecotrin    Lisinopril Swelling        Current Medications:       Current Outpatient Medications:     anastrozole (ARIMIDEX) 1 mg tablet, Take 1 mg by mouth daily, Disp: , Rfl:     Apoaequorin (Prevagen) 10 MG CAPS, Take 10 mg by mouth, Disp: , Rfl:     atorvastatin (LIPITOR) 40 mg tablet, Take 1 tablet (40 mg total) by mouth daily, Disp: 90 tablet, Rfl: 1    DOCOSAHEXAENOIC ACID PO, Take 2 capsules by mouth daily, Disp: , Rfl:     ferrous sulfate 324 (65 Fe) mg, Take 1 tablet (324 mg total) by mouth 2 (two) times a day before meals, Disp: 60 tablet, Rfl: 3    metFORMIN (GLUCOPHAGE) 500 mg tablet, Take 1 tablet (500 mg total) by mouth daily, Disp: 90 tablet, Rfl: 3    polyethylene glycol-propylene glycol (SYSTANE) 0 4-0 3 %, Apply 4 drops to eye 2 (two) times a day, Disp: , Rfl:     potassium chloride (K-DUR,KLOR-CON) 10 mEq tablet, Take 2 tablets daily, Disp: 180 tablet, Rfl: 3    sertraline (ZOLOFT) 50 mg tablet, TAKE ONE TABLET BY MOUTH ONE TIME DAILY , Disp: 90 tablet, Rfl: 0    triamterene-hydrochlorothiazide (MAXZIDE-25) 37 5-25 mg per tablet, Take 1 tablet by mouth daily, Disp: 90 tablet, Rfl: 1     Past History:     Past Medical History:   Diagnosis Date    [facility]     Breast cancer ([facility] )     right breast 1999    Breast cancer ([facility] )     left 3552-4304    Cardiac disease     Depression     Diabetes mellitus (HCC)     GERD (gastroesophageal reflux disease)     History of chemotherapy     right breast 1996    History of echocardiogram 02/14/2018    EF 0 55 (55%), trace mitral regurgitation   Hx of radiation therapy     2017  left breast    Hyperlipidemia     Hypertension     Macular degeneration     Sick sinus syndrome [facility])         Past Surgical History:   Procedure Laterality Date    APPENDECTOMY      BREAST LUMPECTOMY Bilateral     CARDIAC PACEMAKER PLACEMENT [facility]5/2018    [facility]    HYSTERECTOMY          Family History   Problem Relation Age of Onset    Alzheimer's disease Father     Uterine cancer Daughter         Social History     Socioeconomic History    Marital status:       Spouse name: Not on file    Number of children: Not on file    Years of education: Not on file    Highest education level: Not on file   Occupational History    Not on file   Tobacco Use  Smoking status: Never Smoker    Smokeless tobacco: Never Used   Vaping Use    Vaping Use: Never used   Substance and Sexual Activity    Alcohol use: No    Drug use: No    Sexual activity: Not Currently   Other Topics Concern    Not on file   Social History Narrative    Not on file     Social Determinants of Health     Financial Resource Strain:     Difficulty of Paying Living Expenses:    Food Insecurity:     Worried About Running Out of Food in the Last Year:     Ran Out of Food in the Last Year:    Transportation Needs:     Lack of Transportation (Medical):  Lack of Transportation (Non-Medical):    Physical Activity:     Days of Exercise per Week:     Minutes of Exercise per Session:    Stress:     Feeling of Stress :    Social Connections:     Frequency of Communication with Friends and Family:     Frequency of Social Gatherings with Friends and Family:     Attends Temple Services:     Active Member of Clubs or Organizations:     Attends Club or Organization Meetings:     Marital Status:    Intimate Partner Violence:     Fear of Current or Ex-Partner:     Emotionally Abused:     Physically Abused:     Sexually Abused:         Physical Exam:      /70 (BP Location: Left arm, Patient Position: Sitting, Cuff Size: Standard)   Pulse 71   Temp 98 6 °F (37 °C)   Ht 5' 3" (1 6 m)   Wt 69 7 kg (153 lb 9 6 oz)   SpO2 98%   BMI 27 21 kg/m²     Physical Exam  Vitals and nursing note reviewed  Constitutional:       General: She is not in acute distress  Appearance: Normal appearance  She is well-developed and normal weight  HENT:      Head: Normocephalic and atraumatic  Right Ear: Tympanic membrane, ear canal and external ear normal       Left Ear: Tympanic membrane, ear canal and external ear normal       Nose: Nose normal       Mouth/Throat:      Mouth: Mucous membranes are moist       Pharynx: Oropharynx is clear  No oropharyngeal exudate     Eyes:      General: No scleral icterus  Right eye: No discharge  Left eye: No discharge  Conjunctiva/sclera: Conjunctivae normal       Pupils: Pupils are equal, round, and reactive to light  Neck:      Thyroid: No thyromegaly  Vascular: No JVD  Cardiovascular:      Rate and Rhythm: Normal rate and regular rhythm  Pulses: Normal pulses  Heart sounds: Normal heart sounds  No murmur heard  Pulmonary:      Effort: Pulmonary effort is normal       Breath sounds: No wheezing or rales  Chest:      Chest wall: No tenderness  Abdominal:      General: Bowel sounds are normal  There is no distension  Palpations: Abdomen is soft  There is no mass  Tenderness: There is no abdominal tenderness  Musculoskeletal:         General: No tenderness or deformity  Normal range of motion  Cervical back: Normal range of motion  Lymphadenopathy:      Cervical: No cervical adenopathy  Skin:     General: Skin is warm and dry  Capillary Refill: Capillary refill takes less than 2 seconds  Findings: No rash  Neurological:      General: No focal deficit present  Mental Status: She is alert and oriented to person, place, and time  Mental status is at baseline  Cranial Nerves: No cranial nerve deficit  Coordination: Coordination normal       Deep Tendon Reflexes: Reflexes are normal and symmetric  Reflexes normal    Psychiatric:         Mood and Affect: Mood normal          Behavior: Behavior normal          Thought Content: Thought content normal          Judgment: Judgment normal            Data:     Pre-operative work-up    Laboratory Results: I have personally reviewed the pertinent laboratory results/reports     EKG: I have personally reviewed pertinent reports  Chest x-ray: I have personally reviewed pertinent reports  Assessment:     1  Encounter for pre-operative examination     2  Cough     3  Balance problem     4  Hypercalcemia     5  Memory loss     6  S/P hysterectomy     7  Personal history of breast cancer     8  Malignant neoplasm of upper-inner quadrant of left breast in female, estrogen receptor positive ([facility] )     9  Macular degeneration, unspecified laterality, unspecified type     10  Breast carcinoma, female, right ([facility] )     11  Anxiety     12  Abnormal ultrasound cardiogram     13  Cardiac pacemaker in situ     14  Bradycardia     15  Dizziness     16  Hypokalemia     17  Hyponatremia     18  Depression, unspecified depression type     19  Mixed hyperlipidemia     20  Gross hematuria     21  Essential hypertension     22  Sick sinus syndrome [facility])          Plan:     82 y o  female with planned surgery:  Trans urethral resection of bladder tumor with instillation of mitomycin          Clearance  Patient is CLEARED for surgery without any additional cardiac testing       DO MARI Steve [facility]  [address] [facility] [place] [address]  Phone#  [phone]  Fax#  [phone]

## 2021-06-23 ENCOUNTER — ANESTHESIA EVENT (OUTPATIENT)
Dept: PERIOP | Facility: HOSPITAL | Age: 86
End: 2021-06-23
Payer: COMMERCIAL

## 2021-06-29 NOTE — PRE-PROCEDURE INSTRUCTIONS
Pre-Surgery Instructions:   Medication Instructions    anastrozole (ARIMIDEX) 1 mg tablet Instructed to take per normal schedule After she returns home from surgery    Apoaequorin (Prevagen) 10 MG CAPS Instructed to take per normal schedule including DOS with sips water    atorvastatin (LIPITOR) 40 mg tablet Instructed to take per normal schedule @ Bedtime    DOCOSAHEXAENOIC ACID PO Instructed patient per Anesthesia Guidelines   ferrous sulfate 324 (65 Fe) mg Instructed to take per normal schedule except DOS    metFORMIN (GLUCOPHAGE) 500 mg tablet Instructed to take per normal schedule except DOS    polyethylene glycol-propylene glycol (SYSTANE) 0 4-0 3 % Instructed to take per normal schedule including DOS    potassium chloride (K-DUR,KLOR-CON) 10 mEq tablet Instructed to take per normal schedule except DOS    sertraline (ZOLOFT) 50 mg tablet Instructed to take per normal schedule including DOS with sips water    triamterene-hydrochlorothiazide (MAXZIDE-25) 37 5-25 mg per tablet Instructed to take per normal schedule except DOS    Preop Instructions per My Surgical Experience booklet,medications per anesthesia guidelines and showering instructions per Rufina Milian protocol using an antibacterial soap reviewed  Instructed to avoid all ASA/NSAIDs and OTC Vit/Supp from now until after surgery  Tylenol ok prn  Pt  Verbalized an understanding of all instructions reviewed and offers no concerns at this time

## 2021-06-30 ENCOUNTER — APPOINTMENT (OUTPATIENT)
Dept: LAB | Facility: CLINIC | Age: 86
End: 2021-06-30
Payer: COMMERCIAL

## 2021-06-30 DIAGNOSIS — R41.3 MEMORY LOSS: ICD-10-CM

## 2021-06-30 DIAGNOSIS — I49.5 SICK SINUS SYNDROME (HCC): Chronic | ICD-10-CM

## 2021-06-30 DIAGNOSIS — F41.9 ANXIETY: ICD-10-CM

## 2021-06-30 DIAGNOSIS — R31.0 GROSS HEMATURIA: ICD-10-CM

## 2021-06-30 DIAGNOSIS — F32.A DEPRESSION, UNSPECIFIED DEPRESSION TYPE: ICD-10-CM

## 2021-06-30 DIAGNOSIS — E87.6 HYPOKALEMIA: ICD-10-CM

## 2021-06-30 DIAGNOSIS — R42 DIZZINESS: ICD-10-CM

## 2021-06-30 DIAGNOSIS — E78.2 MIXED HYPERLIPIDEMIA: ICD-10-CM

## 2021-06-30 DIAGNOSIS — Z17.0 MALIGNANT NEOPLASM OF UPPER-INNER QUADRANT OF LEFT BREAST IN FEMALE, ESTROGEN RECEPTOR POSITIVE (HCC): ICD-10-CM

## 2021-06-30 DIAGNOSIS — H35.30 MACULAR DEGENERATION, UNSPECIFIED LATERALITY, UNSPECIFIED TYPE: ICD-10-CM

## 2021-06-30 DIAGNOSIS — Z01.818 ENCOUNTER FOR PRE-OPERATIVE EXAMINATION: ICD-10-CM

## 2021-06-30 DIAGNOSIS — R05.9 COUGH: ICD-10-CM

## 2021-06-30 DIAGNOSIS — I10 ESSENTIAL HYPERTENSION: ICD-10-CM

## 2021-06-30 DIAGNOSIS — Z95.0 CARDIAC PACEMAKER IN SITU: ICD-10-CM

## 2021-06-30 DIAGNOSIS — R00.1 BRADYCARDIA: ICD-10-CM

## 2021-06-30 DIAGNOSIS — C50.911 BREAST CARCINOMA, FEMALE, RIGHT (HCC): ICD-10-CM

## 2021-06-30 DIAGNOSIS — R93.1 ABNORMAL ULTRASOUND CARDIOGRAM: ICD-10-CM

## 2021-06-30 DIAGNOSIS — E83.52 HYPERCALCEMIA: ICD-10-CM

## 2021-06-30 DIAGNOSIS — C50.212 MALIGNANT NEOPLASM OF UPPER-INNER QUADRANT OF LEFT BREAST IN FEMALE, ESTROGEN RECEPTOR POSITIVE (HCC): ICD-10-CM

## 2021-06-30 DIAGNOSIS — E87.1 HYPONATREMIA: ICD-10-CM

## 2021-06-30 DIAGNOSIS — Z90.710 S/P HYSTERECTOMY: ICD-10-CM

## 2021-06-30 DIAGNOSIS — R26.89 BALANCE PROBLEM: ICD-10-CM

## 2021-06-30 DIAGNOSIS — Z85.3 PERSONAL HISTORY OF BREAST CANCER: ICD-10-CM

## 2021-06-30 LAB
BASOPHILS # BLD AUTO: 0.02 THOUSANDS/ΜL (ref 0–0.1)
BASOPHILS NFR BLD AUTO: 0 % (ref 0–1)
EOSINOPHIL # BLD AUTO: 0.08 THOUSAND/ΜL (ref 0–0.61)
EOSINOPHIL NFR BLD AUTO: 1 % (ref 0–6)
ERYTHROCYTE [DISTWIDTH] IN BLOOD BY AUTOMATED COUNT: 19.9 % (ref 11.6–15.1)
HCT VFR BLD AUTO: 31.1 % (ref 34.8–46.1)
HGB BLD-MCNC: 9.2 G/DL (ref 11.5–15.4)
IMM GRANULOCYTES # BLD AUTO: 0.02 THOUSAND/UL (ref 0–0.2)
IMM GRANULOCYTES NFR BLD AUTO: 0 % (ref 0–2)
LYMPHOCYTES # BLD AUTO: 1.08 THOUSANDS/ΜL (ref 0.6–4.47)
LYMPHOCYTES NFR BLD AUTO: 16 % (ref 14–44)
MCH RBC QN AUTO: 23.7 PG (ref 26.8–34.3)
MCHC RBC AUTO-ENTMCNC: 29.6 G/DL (ref 31.4–37.4)
MCV RBC AUTO: 80 FL (ref 82–98)
MONOCYTES # BLD AUTO: 0.59 THOUSAND/ΜL (ref 0.17–1.22)
MONOCYTES NFR BLD AUTO: 9 % (ref 4–12)
NEUTROPHILS # BLD AUTO: 5.01 THOUSANDS/ΜL (ref 1.85–7.62)
NEUTS SEG NFR BLD AUTO: 74 % (ref 43–75)
NRBC BLD AUTO-RTO: 0 /100 WBCS
PLATELET # BLD AUTO: 234 THOUSANDS/UL (ref 149–390)
PMV BLD AUTO: 8.7 FL (ref 8.9–12.7)
RBC # BLD AUTO: 3.89 MILLION/UL (ref 3.81–5.12)
WBC # BLD AUTO: 6.8 THOUSAND/UL (ref 4.31–10.16)

## 2021-06-30 PROCEDURE — 85025 COMPLETE CBC W/AUTO DIFF WBC: CPT

## 2021-06-30 PROCEDURE — 36415 COLL VENOUS BLD VENIPUNCTURE: CPT

## 2021-07-02 ENCOUNTER — HOSPITAL ENCOUNTER (OUTPATIENT)
Facility: HOSPITAL | Age: 86
Setting detail: OUTPATIENT SURGERY
Discharge: HOME | End: 2021-07-02
Attending: UROLOGY | Admitting: UROLOGY
Payer: COMMERCIAL

## 2021-07-02 ENCOUNTER — TELEPHONE (OUTPATIENT)
Dept: UROLOGY | Facility: CLINIC | Age: 86
End: 2021-07-02

## 2021-07-02 ENCOUNTER — ANESTHESIA (OUTPATIENT)
Dept: PERIOP | Facility: HOSPITAL | Age: 86
End: 2021-07-02
Payer: COMMERCIAL

## 2021-07-02 VITALS
WEIGHT: 150 LBS | HEART RATE: 60 BPM | TEMPERATURE: 97.2 F | HEIGHT: 63 IN | SYSTOLIC BLOOD PRESSURE: 148 MMHG | RESPIRATION RATE: 20 BRPM | BODY MASS INDEX: 26.58 KG/M2 | DIASTOLIC BLOOD PRESSURE: 67 MMHG | OXYGEN SATURATION: 98 %

## 2021-07-02 DIAGNOSIS — R31.9 HEMATURIA, UNSPECIFIED TYPE: ICD-10-CM

## 2021-07-02 DIAGNOSIS — D49.4 BLADDER TUMOR: Primary | ICD-10-CM

## 2021-07-02 DIAGNOSIS — N32.89 BLADDER MASS: ICD-10-CM

## 2021-07-02 LAB
ABO GROUP BLD: NORMAL
ABO GROUP BLD: NORMAL
BLD GP AB SCN SERPL QL: NEGATIVE
GLUCOSE SERPL-MCNC: 107 MG/DL (ref 65–140)
RH BLD: POSITIVE
RH BLD: POSITIVE
SPECIMEN EXPIRATION DATE: NORMAL

## 2021-07-02 PROCEDURE — 82948 REAGENT STRIP/BLOOD GLUCOSE: CPT

## 2021-07-02 PROCEDURE — 52235 CYSTOSCOPY AND TREATMENT: CPT | Performed by: UROLOGY

## 2021-07-02 PROCEDURE — 88307 TISSUE EXAM BY PATHOLOGIST: CPT | Performed by: PATHOLOGY

## 2021-07-02 PROCEDURE — 86900 BLOOD TYPING SEROLOGIC ABO: CPT | Performed by: NURSE ANESTHETIST, CERTIFIED REGISTERED

## 2021-07-02 PROCEDURE — C1769 GUIDE WIRE: HCPCS | Performed by: UROLOGY

## 2021-07-02 PROCEDURE — 84295 ASSAY OF SERUM SODIUM: CPT

## 2021-07-02 PROCEDURE — 86901 BLOOD TYPING SEROLOGIC RH(D): CPT | Performed by: NURSE ANESTHETIST, CERTIFIED REGISTERED

## 2021-07-02 PROCEDURE — 86920 COMPATIBILITY TEST SPIN: CPT

## 2021-07-02 PROCEDURE — NC001 PR NO CHARGE: Performed by: UROLOGY

## 2021-07-02 PROCEDURE — 82947 ASSAY GLUCOSE BLOOD QUANT: CPT

## 2021-07-02 PROCEDURE — 85014 HEMATOCRIT: CPT

## 2021-07-02 PROCEDURE — C1758 CATHETER, URETERAL: HCPCS | Performed by: UROLOGY

## 2021-07-02 PROCEDURE — 82803 BLOOD GASES ANY COMBINATION: CPT

## 2021-07-02 PROCEDURE — 84132 ASSAY OF SERUM POTASSIUM: CPT

## 2021-07-02 PROCEDURE — 86850 RBC ANTIBODY SCREEN: CPT | Performed by: NURSE ANESTHETIST, CERTIFIED REGISTERED

## 2021-07-02 RX ORDER — DOCUSATE SODIUM 100 MG/1
100 CAPSULE, LIQUID FILLED ORAL 3 TIMES DAILY
Qty: 21 CAPSULE | Refills: 0 | Status: SHIPPED | OUTPATIENT
Start: 2021-07-02 | End: 2021-08-23

## 2021-07-02 RX ORDER — ACETAMINOPHEN 325 MG/1
650 TABLET ORAL EVERY 6 HOURS PRN
Status: DISCONTINUED | OUTPATIENT
Start: 2021-07-02 | End: 2021-07-02 | Stop reason: HOSPADM

## 2021-07-02 RX ORDER — ONDANSETRON 2 MG/ML
4 INJECTION INTRAMUSCULAR; INTRAVENOUS ONCE AS NEEDED
Status: DISCONTINUED | OUTPATIENT
Start: 2021-07-02 | End: 2021-07-02 | Stop reason: HOSPADM

## 2021-07-02 RX ORDER — MAGNESIUM HYDROXIDE 1200 MG/15ML
LIQUID ORAL AS NEEDED
Status: DISCONTINUED | OUTPATIENT
Start: 2021-07-02 | End: 2021-07-02 | Stop reason: HOSPADM

## 2021-07-02 RX ORDER — ONDANSETRON 2 MG/ML
INJECTION INTRAMUSCULAR; INTRAVENOUS AS NEEDED
Status: DISCONTINUED | OUTPATIENT
Start: 2021-07-02 | End: 2021-07-02

## 2021-07-02 RX ORDER — FENTANYL CITRATE/PF 50 MCG/ML
25 SYRINGE (ML) INJECTION
Status: DISCONTINUED | OUTPATIENT
Start: 2021-07-02 | End: 2021-07-02 | Stop reason: HOSPADM

## 2021-07-02 RX ORDER — FENTANYL CITRATE 50 UG/ML
INJECTION, SOLUTION INTRAMUSCULAR; INTRAVENOUS AS NEEDED
Status: DISCONTINUED | OUTPATIENT
Start: 2021-07-02 | End: 2021-07-02

## 2021-07-02 RX ORDER — CEPHALEXIN 500 MG/1
500 CAPSULE ORAL EVERY 6 HOURS SCHEDULED
Qty: 12 CAPSULE | Refills: 0 | Status: SHIPPED | OUTPATIENT
Start: 2021-07-02 | End: 2021-07-05

## 2021-07-02 RX ORDER — SODIUM CHLORIDE, SODIUM LACTATE, POTASSIUM CHLORIDE, CALCIUM CHLORIDE 600; 310; 30; 20 MG/100ML; MG/100ML; MG/100ML; MG/100ML
INJECTION, SOLUTION INTRAVENOUS CONTINUOUS PRN
Status: DISCONTINUED | OUTPATIENT
Start: 2021-07-02 | End: 2021-07-02

## 2021-07-02 RX ORDER — OXYCODONE HYDROCHLORIDE 5 MG/1
5 TABLET ORAL EVERY 4 HOURS PRN
Qty: 10 TABLET | Refills: 0 | Status: SHIPPED | OUTPATIENT
Start: 2021-07-02 | End: 2021-07-07

## 2021-07-02 RX ORDER — DEXAMETHASONE SODIUM PHOSPHATE 10 MG/ML
INJECTION, SOLUTION INTRAMUSCULAR; INTRAVENOUS AS NEEDED
Status: DISCONTINUED | OUTPATIENT
Start: 2021-07-02 | End: 2021-07-02

## 2021-07-02 RX ORDER — PHENAZOPYRIDINE HYDROCHLORIDE 100 MG/1
100 TABLET, FILM COATED ORAL
Status: DISCONTINUED | OUTPATIENT
Start: 2021-07-02 | End: 2021-07-02 | Stop reason: HOSPADM

## 2021-07-02 RX ORDER — LIDOCAINE HYDROCHLORIDE 10 MG/ML
INJECTION, SOLUTION EPIDURAL; INFILTRATION; INTRACAUDAL; PERINEURAL AS NEEDED
Status: DISCONTINUED | OUTPATIENT
Start: 2021-07-02 | End: 2021-07-02

## 2021-07-02 RX ORDER — HYDROMORPHONE HCL IN WATER/PF 6 MG/30 ML
0.2 PATIENT CONTROLLED ANALGESIA SYRINGE INTRAVENOUS
Status: DISCONTINUED | OUTPATIENT
Start: 2021-07-02 | End: 2021-07-02 | Stop reason: HOSPADM

## 2021-07-02 RX ORDER — PROPOFOL 10 MG/ML
INJECTION, EMULSION INTRAVENOUS AS NEEDED
Status: DISCONTINUED | OUTPATIENT
Start: 2021-07-02 | End: 2021-07-02

## 2021-07-02 RX ORDER — DIPHENHYDRAMINE HCL 25 MG
12.5 TABLET ORAL EVERY 6 HOURS PRN
Status: DISCONTINUED | OUTPATIENT
Start: 2021-07-02 | End: 2021-07-02 | Stop reason: HOSPADM

## 2021-07-02 RX ORDER — SUCCINYLCHOLINE/SOD CL,ISO/PF 100 MG/5ML
SYRINGE (ML) INTRAVENOUS AS NEEDED
Status: DISCONTINUED | OUTPATIENT
Start: 2021-07-02 | End: 2021-07-02

## 2021-07-02 RX ORDER — OXYCODONE HYDROCHLORIDE 5 MG/1
5 TABLET ORAL EVERY 4 HOURS PRN
Status: DISCONTINUED | OUTPATIENT
Start: 2021-07-02 | End: 2021-07-02 | Stop reason: HOSPADM

## 2021-07-02 RX ORDER — ONDANSETRON 2 MG/ML
4 INJECTION INTRAMUSCULAR; INTRAVENOUS EVERY 6 HOURS PRN
Status: DISCONTINUED | OUTPATIENT
Start: 2021-07-02 | End: 2021-07-02 | Stop reason: HOSPADM

## 2021-07-02 RX ORDER — ROCURONIUM BROMIDE 10 MG/ML
INJECTION, SOLUTION INTRAVENOUS AS NEEDED
Status: DISCONTINUED | OUTPATIENT
Start: 2021-07-02 | End: 2021-07-02

## 2021-07-02 RX ORDER — MORPHINE SULFATE 4 MG/ML
2 INJECTION, SOLUTION INTRAMUSCULAR; INTRAVENOUS EVERY 4 HOURS PRN
Status: DISCONTINUED | OUTPATIENT
Start: 2021-07-02 | End: 2021-07-02 | Stop reason: HOSPADM

## 2021-07-02 RX ORDER — CEFAZOLIN SODIUM 1 G/3ML
INJECTION, POWDER, FOR SOLUTION INTRAMUSCULAR; INTRAVENOUS AS NEEDED
Status: DISCONTINUED | OUTPATIENT
Start: 2021-07-02 | End: 2021-07-02

## 2021-07-02 RX ADMIN — PROPOFOL 50 MG: 10 INJECTION, EMULSION INTRAVENOUS at 15:20

## 2021-07-02 RX ADMIN — CEFAZOLIN SODIUM 1000 MG: 1 INJECTION, POWDER, FOR SOLUTION INTRAMUSCULAR; INTRAVENOUS at 15:15

## 2021-07-02 RX ADMIN — ONDANSETRON 4 MG: 2 INJECTION INTRAMUSCULAR; INTRAVENOUS at 15:15

## 2021-07-02 RX ADMIN — SUGAMMADEX 200 MG: 100 INJECTION, SOLUTION INTRAVENOUS at 16:05

## 2021-07-02 RX ADMIN — FENTANYL CITRATE 25 MCG: 50 INJECTION, SOLUTION INTRAMUSCULAR; INTRAVENOUS at 15:15

## 2021-07-02 RX ADMIN — Medication 80 MG: at 15:11

## 2021-07-02 RX ADMIN — ACETAMINOPHEN 650 MG: 325 TABLET, FILM COATED ORAL at 17:03

## 2021-07-02 RX ADMIN — FENTANYL CITRATE 25 MCG: 50 INJECTION, SOLUTION INTRAMUSCULAR; INTRAVENOUS at 15:19

## 2021-07-02 RX ADMIN — FENTANYL CITRATE 25 MCG: 50 INJECTION, SOLUTION INTRAMUSCULAR; INTRAVENOUS at 15:24

## 2021-07-02 RX ADMIN — ROCURONIUM BROMIDE 10 MG: 10 SOLUTION INTRAVENOUS at 15:51

## 2021-07-02 RX ADMIN — LIDOCAINE HYDROCHLORIDE 50 MG: 10 INJECTION, SOLUTION EPIDURAL; INFILTRATION; INTRACAUDAL at 15:11

## 2021-07-02 RX ADMIN — PROPOFOL 100 MG: 10 INJECTION, EMULSION INTRAVENOUS at 15:11

## 2021-07-02 RX ADMIN — FENTANYL CITRATE 25 MCG: 50 INJECTION, SOLUTION INTRAMUSCULAR; INTRAVENOUS at 15:11

## 2021-07-02 RX ADMIN — ROCURONIUM BROMIDE 30 MG: 10 SOLUTION INTRAVENOUS at 15:25

## 2021-07-02 RX ADMIN — DEXAMETHASONE SODIUM PHOSPHATE 4 MG: 10 INJECTION, SOLUTION INTRAMUSCULAR; INTRAVENOUS at 15:25

## 2021-07-02 RX ADMIN — SODIUM CHLORIDE, SODIUM LACTATE, POTASSIUM CHLORIDE, AND CALCIUM CHLORIDE: .6; .31; .03; .02 INJECTION, SOLUTION INTRAVENOUS at 15:04

## 2021-07-02 NOTE — TELEPHONE ENCOUNTER
The following message has been sent to our clinical team:    Patient status post TURBT, a Camacho catheter please arrange and 4 days    Needs a visit for pathology review and further treatment planning in 2 weeks, thank you

## 2021-07-02 NOTE — ANESTHESIA POSTPROCEDURE EVALUATION
Post-Op Assessment Note    CV Status:  Stable  Pain Score: 0    Pain management: adequate     Mental Status:  Alert and awake   Hydration Status:  Euvolemic   PONV Controlled:  Controlled   Airway Patency:  Patent and adequate      Post Op Vitals Reviewed: Yes      Staff: CRNA         No complications documented      BP   145/73   Temp  97 7   Pulse  69   Resp   20   SpO2   98

## 2021-07-02 NOTE — H&P
H&P Exam - Urology   Surya Cain 82 y o  female MRN: [id number]  Unit/Bed#: OR [facility] Encounter: [id number]    Assessment/Plan     Assessment:  82year old woman with a bladder tumor, here for TURBT, ready for surgery  Plan:  Proceed to TURBT    History of Present Illness   HPI:  Surya Cain is a 82 y o  female who presents with bladder tumor and hematuria, here for TURBT, ready for surgery today    The following portions of the patient's history were reviewed and updated as appropriate: allergies, current medications, past family history, past medical history, past social history, past surgical history and problem list        Review of Systems   Constitutional: Negative  HENT: Negative  Eyes: Negative  Respiratory: Negative  Cardiovascular: Negative  Gastrointestinal: Negative  Endocrine: Negative  Genitourinary: Positive for hematuria  Musculoskeletal: Negative  Skin: Negative  Allergic/Immunologic: Negative  Neurological: Negative  Hematological: Negative  Psychiatric/Behavioral: Negative  Historical Information   Past Medical History:   Diagnosis Date    [facility] ([facility] )     Breast cancer ([facility] )     right breast 1999    [facility] )     left [id number]    Cardiac disease     Depression     [facility] mellitus [facility] )     GERD (gastroesophageal reflux disease)     History of chemotherapy     right breast 1996    History of echocardiogram 02/14/2018    EF 0 55 (55%), trace mitral regurgitation      Hx of radiation therapy     2017  left breast    Hyperlipidemia     Hypertension     Macular degeneration     Sick sinus syndrome [facility])      Past Surgical History:   Procedure Laterality Date    APPENDECTOMY      BREAST LUMPECTOMY Bilateral     CARDIAC PACEMAKER PLACEMENT Left 7/5/2018    [facility]    HYSTERECTOMY       Social History   Social History     Substance and Sexual Activity   Alcohol Use No     Social History     Substance and Sexual Activity   Drug Use No     Social History     Tobacco Use   Smoking Status Never Smoker   Smokeless Tobacco Never Used     E-Cigarette/Vaping    E-Cigarette Use Never User      E-Cigarette/Vaping Substances    Nicotine No     THC No     CBD No     Flavoring No     Other No     Unknown No      Family History:   Family History   Problem Relation Age of Onset    Alzheimer's disease Father     Uterine cancer Daughter        Meds/Allergies   all medications and allergies reviewed  Allergies   Allergen Reactions    Celecoxib Swelling     celebrex- swelling of throat    Loratadine Swelling     Jaw swelled,couldn't swallow; face swelling    Aspirin GI Intolerance     : nausea; okay with Ecotrin    Lisinopril Swelling       Objective   Vitals: Blood pressure 154/70, pulse 60, temperature (!) 97 2 °F (36 2 °C), temperature source Temporal, resp  rate 18, height 5' 3" (1 6 m), weight 68 kg (150 lb), SpO2 99 %, not currently breastfeeding  No intake/output data recorded  Invasive Devices     Peripheral Intravenous Line            Peripheral IV 07/02/21 Left Antecubital <1 day                Physical Exam  Vitals reviewed  Constitutional:       General: She is not in acute distress  Appearance: Normal appearance  She is not ill-appearing, toxic-appearing or diaphoretic  HENT:      Head: Normocephalic and atraumatic  Eyes:      General: No scleral icterus  Right eye: No discharge  Left eye: No discharge  Cardiovascular:      Pulses: Normal pulses  Pulmonary:      Effort: Pulmonary effort is normal    Abdominal:      General: There is no distension  Palpations: There is no mass  Tenderness: There is no abdominal tenderness  Hernia: No hernia is present  Genitourinary:     Comments: Deferred for the OR  Musculoskeletal:         General: No swelling or tenderness  Skin:     General: Skin is warm  Neurological:      General: No focal deficit present  Mental Status: She is alert and oriented to person, place, and time  Cranial Nerves: No cranial nerve deficit  Psychiatric:         Mood and Affect: Mood normal          Behavior: Behavior normal          Thought Content: Thought content normal          Judgment: Judgment normal          Lab Results: I have personally reviewed pertinent reports  Imaging: I have personally reviewed pertinent reports  EKG, Pathology, and Other Studies: I have personally reviewed pertinent reports  VTE Prophylaxis: Sequential compression device [facility])     Code Status: Prior  Advance Directive and Living Will:      Power of : Yes  POLST:      Counseling / Coordination of Care  Total floor / unit time spent today 15 minutes  Greater than 50% of total time was spent with the patient and / or family counseling and / or coordination of care  A description of the counseling / coordination of care: review of today's case

## 2021-07-02 NOTE — DISCHARGE INSTRUCTIONS
Transurethral Resection of Bladder Tumors   WHAT YOU NEED TO KNOW:     Dipti Rizzo,    Today you had a transurethral resection of bladder tumor  This went well  Depending on your pathology you will either need some medicine in the bladder called BCG, surveillance cystoscopy in which we look for recurrence of your tumor, or potentially a repeat resection of your tumor bed depending on your pathology findings  You will need your Camacho catheter until Tuesday, I will arrange for the office to remove this  We will see each other or review your pathology by telephone in a number of weeks  It is normal to have urgency and frequency of urination and blood in the urine after surgery such as this  If you have questions or concerns as you recover, please let me know  Thank you for allowing me to take care of you today,  Dr Karen Burgos  Transurethral resection of bladder tumors (TURBT) is surgery to remove one or more tumors from your bladder  DISCHARGE INSTRUCTIONS:   Medicines:   · Medicines  help decrease pain or prevent vomiting  · Take your medicine as directed  Contact your healthcare provider if you think your medicine is not helping or if you have side effects  Tell him or her if you are allergic to any medicine  Keep a list of the medicines, vitamins, and herbs you take  Include the amounts, and when and why you take them  Bring the list or the pill bottles to follow-up visits  Carry your medicine list with you in case of an emergency  Follow up with your healthcare provider as directed:  Write down your questions so you remember to ask them during your visits  Care for your Camacho catheter:  Keep the bag below your waist  This will prevent urine from flowing back into your bladder and causing an infection or other problems  Also, keep the tube free of kinks so the urine will drain properly  Do not pull on the catheter  This can cause pain and bleeding and may cause the catheter to come out   Empty your urine drainage bag when it is ½ to ? full, or every 8 hours  If you have a smaller leg bag, empty it every 3 to 4 hours  Do the following when you empty your urine drainage bag:  · Hold the urine bag over the toilet or a large container  · Remove the drain spout from its sleeve at the bottom of the urine bag  Do not touch the tip of the drain spout  Open the slide valve on the spout  · Let the urine flow out of the urine bag into the toilet or container  Do not let the drainage tube touch anything  · Clean the end of the drain spout with alcohol when the bag is empty  Ask which cleaning solution is best to use  · Close the slide valve and put the drain spout into its sleeve at the bottom of the urine bag  Write down how much urine was in your bag if you were asked to keep a record  Contact your healthcare provider if:   · You have a fever or chills  · You have new or more blood in your urine  · You have nausea or are vomiting  · You have new or more pain when you urinate  · You are unable to control when you urinate  · You have questions or concerns about your condition or care  Seek care immediately or call 911 if:   · You have heavy bleeding from your urethra  · You start to urinate less often, very little, or not at all  · You have severe pain in your abdomen or pelvis  © Copyright [employer] 2018 Information is for End User's use only and may not be sold, redistributed or otherwise used for commercial purposes  All illustrations and images included in CareNotes® are the copyrighted property of A D A M , Inc  or [facility] [employer]yn [employer]   The above information is an  only  It is not intended as medical advice for individual conditions or treatments  Talk to your doctor, nurse or pharmacist before following any medical regimen to see if it is safe and effective for you

## 2021-07-02 NOTE — OP NOTE
OPERATIVE REPORT  PATIENT NAME: Danielle Fairbanks    :  1935  MRN: [id number]  Pt Location: AN OR ROOM 01    SURGERY DATE: 2021    Surgeon(s) and Role:     * Keith Dowling MD - Primary    Preop Diagnosis:  Hematuria, unspecified type [R31 9]  Bladder mass [N32 89]    Post-Op Diagnosis Codes:     * Hematuria, unspecified type [R31 9]     * Bladder mass [N32 89]    Procedure(s) (LRB):  TRANSURETHRAL RESECTION OF BLADDER TUMOR (TURBT) (N/A)  INSTILLATION MITOMYCIN (N/A)    Specimen(s):  ID Type Source Tests Collected by Time Destination   1 : right lateral bladder wall tumor Tissue Urinary Bladder TISSUE EXAM Keith Dowling MD 2021  4:00 PM        Estimated Blood Loss:   Minimal    Drains:  * No LDAs found *    Anesthesia Type:   General    Operative Indications:  Hematuria, unspecified type [R31 9]  Bladder mass [N32 89]      Operative Findings:  A 3 5 centimeter bladder tumor along the right lateral wall is noted, no satellite lesions are noted  The tumor was resected in to visible detrusor fibers  Hemostasis obtained    Complications:   None    Procedure and Technique:  PROCEDURE SUMMARY:    On an outpatient basis the patient's urological workup revealed a bladder tumor at the right side of the bladder  The management of bladder tumors including Transurethral resection of bladder tumor was discussed with the patient  All risks benefits and alternatives of this procedure were discussed with the patient as well  Witnessed informed consent for the proposed procedure was obtained and medical optimization was also obtained for this patient  The patient was brought to the operating room and placed in the supine position for the induction of general endotracheal anesthesia  A full time-out confirmed the proper patient, position, and procedure  The patient was then placed in the lithotomy position and prepped and draped using the standard sterile technique    All pressure points were carefully padded there was no undue pressure on any bony prominences, muscle bellies, or nervous structures     Prior to the procedure antibiotics were administered as per the guidelines, and thromboembolism prophylaxis was administered in the form of sequential compression devices     A 32 F resectoscope was inserted into the urethra and into the bladder    Bimanual exam under anesthesia findings:    Bowel pelvic structures with non palpable disease  Cystoscopic findings:    Urethra:  Normal  Bladder:  Lesion identified, characteristics below  Ureteral orifices: Normal orthotopic position with clear effluent   Urachus:   Normal    Lesion characteristics:  -Appearance:    Nodular and Papillary  -Number of foci:   1  -Aggregate tumor diameter:  3 5 cm  -Largest individual tumor:  3 5 cm  -Location:    Right lateral wall  -Clinical stage:   T1      Bladder tumor resection:    Using a resectoscope, all abnormal lesions noted above were resected and sent for pathology  Resection craters and surrounding urothelium were electrofulgurated and hemostasis was achieved  All instrument counts and sponge counts were correct  Immediate postoperative intravesical chemotherapy: This was not used given depth of resection    DISPOSITION:  PACU to home  Plan:  Patient will be discharged home with a Camacho catheter  Our office will arrange follow up with me in 2 weeks             I was present for the entire procedure and A qualified resident physician was not available    Patient Disposition:  PACU     SIGNATURE: Maria M Posey MD  DATE: July 2, 2021  TIME: 4:06 PM

## 2021-07-02 NOTE — ANESTHESIA PREPROCEDURE EVALUATION
Procedure:  TRANSURETHRAL RESECTION OF BLADDER TUMOR (TURBT) (N/A Bladder)  INSTILLATION MITOMYCIN (N/A Bladder)    Relevant Problems   CARDIO   (+) Cardiac pacemaker in situ   (+) Essential hypertension   (+) Hyperlipidemia   (+) Sick sinus syndrome (HCC)      GI/HEPATIC   (+) GERD (gastroesophageal reflux disease)      GYN   (+) Breast carcinoma, female, right (HCC)   (+) Malignant neoplasm of upper-inner quadrant of left breast in female, estrogen receptor positive (HCC)   (+) S/P hysterectomy      NEURO/PSYCH   (+) Anxiety   (+) Depression   (+) Personal history of breast cancer      Other   (+) Abnormal ultrasound cardiogram   (+) Balance problem   (+) Bradycardia   (+) Hypokalemia   (+) Memory loss   (+) [facility]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[facility] mellitus with [facility]      7/10/18 Device Report:     [facility]  DDD Mode  Non-dependent  Results for Leilani Davis (MRN [id number]) as of 7/2/2021 14:32   Ref  Range 6/30/2021 10:25   WBC Latest Ref Range: 4 31 - 10 16 Thousand/uL 6 80   Red Blood Cell Count Latest Ref Range: 3 81 - 5 12 Million/uL 3 89   Hemoglobin Latest Ref Range: 11 5 - 15 4 g/dL 9 2 (L)   HCT Latest Ref Range: 34 8 - 46 1 % 31 1 (L)   MCV Latest Ref Range: 82 - 98 fL 80 (L)   MCH Latest Ref Range: 26 8 - 34 3 pg 23 7 (L)   MCHC Latest Ref Range: 31 4 - 37 4 g/dL 29 6 (L)   RDW Latest Ref Range: 11 6 - 15 1 % 19 9 (H)   Platelet Count Latest Ref Range: 149 - 390 Thousands/uL 234   MPV Latest Ref Range: 8 9 - 12 7 fL 8 7 (L)   nRBC Latest Units: /100 WBCs 0       Physical Exam    Airway    Mallampati score: II  TM Distance: >3 FB  Neck ROM: full     Dental   lower dentures and upper dentures,     Cardiovascular  Cardiovascular exam normal    Pulmonary  Pulmonary exam normal     Other Findings        Anesthesia Plan  ASA Score- 3     Anesthesia Type- general with ASA Monitors  Additional Monitors:   Airway Plan: ETT            Plan Factors-Exercise tolerance (METS): <4 METS     Chart reviewed  EKG reviewed  Existing labs reviewed  Patient summary reviewed  Patient is not a current smoker  Patient did not smoke on day of surgery  Induction- intravenous  Postoperative Plan- Plan for postoperative opioid use  Informed Consent- Anesthetic plan and risks discussed with patient  I personally reviewed this patient with the CRNA  Discussed and agreed on the Anesthesia Plan with the CRNA  Johana Holt

## 2021-07-03 ENCOUNTER — NURSE TRIAGE (OUTPATIENT)
Dept: OTHER | Facility: OTHER | Age: 86
End: 2021-07-03

## 2021-07-03 LAB
ABO GROUP BLD BPU: NORMAL
BPU ID: NORMAL
CROSSMATCH: NORMAL
UNIT DISPENSE STATUS: NORMAL
UNIT PRODUCT CODE: NORMAL
UNIT RH: NORMAL

## 2021-07-06 NOTE — TELEPHONE ENCOUNTER
Post Op Note    Leodis Epley is a 82 y o  female s/p CYSTOSCOPY, TRANSURETHRAL RESECTION OF MEDIUM BLADDER TUMOR performed 07/02/2021  Leodis Epley is a patient of Dr Carlos Montgomery and is seen at the [facility] office  How would you rate your pain on a scale from 1 to 10, 10 being the worst pain ever? 0  Have you had a fever? No  Have your bowel movements been regular? Yes, "infact, I had a really good one this morning"  Do you have any difficulty urinating? No  If the patient has a keenan- are you comfortable caring for your keenan? Yes, "it is a big inconvenience though, I thought I could have it out already"  Made patient aware of upcomming appointment tomorrow  Is it draining urine? Yes   Reviewed AVS, medications, and upcomming appointments with patient  Do you have any other questions or concerns that I can address at this time? No, not at this time

## 2021-07-06 NOTE — TELEPHONE ENCOUNTER
Pt calling in regards to scheduled keenan catheter removal,I informed her of scheduled appointment 7/7 [facility] states this is to far to travel

## 2021-07-07 ENCOUNTER — PROCEDURE VISIT (OUTPATIENT)
Dept: UROLOGY | Facility: CLINIC | Age: 86
End: 2021-07-07
Payer: COMMERCIAL

## 2021-07-07 VITALS
BODY MASS INDEX: 27.29 KG/M2 | DIASTOLIC BLOOD PRESSURE: 62 MMHG | HEIGHT: 63 IN | SYSTOLIC BLOOD PRESSURE: 138 MMHG | HEART RATE: 62 BPM | WEIGHT: 154 LBS

## 2021-07-07 DIAGNOSIS — R31.9 HEMATURIA, UNSPECIFIED TYPE: Primary | ICD-10-CM

## 2021-07-07 PROCEDURE — 3075F SYST BP GE 130 - 139MM HG: CPT

## 2021-07-07 PROCEDURE — 1036F TOBACCO NON-USER: CPT

## 2021-07-07 PROCEDURE — 99211 OFF/OP EST MAY X REQ PHY/QHP: CPT

## 2021-07-07 PROCEDURE — 1160F RVW MEDS BY RX/DR IN RCRD: CPT

## 2021-07-07 PROCEDURE — 3078F DIAST BP <80 MM HG: CPT

## 2021-07-07 NOTE — PROGRESS NOTES
7/7/2021    Yesenia Buchanan is a 82 y o  female  [id number]    Diagnosis:  Chief Complaint     Hematuria, unspecified type          Patient presents for keenan catheter removal s/p CYSTOSCOPY, TRANSURETHRAL RESECTION OF MEDIUM BLADDER TUMOR  on 07/02/2021 with Dr Marc Saenz:  Plan is to follow up as scheduled with appointment with Dr Angela Kamara  Procedure: Keenan catheter removed after deflation of an intact balloon  Patient tolerated well  Encouraged patient to hydrate well  ER precautions left with patient  Patient aware to call office with any questions/concerns       Vitals:    07/07/21 0903   BP: 138/62   BP Location: Left arm   Patient Position: Sitting   Cuff Size: Standard   Pulse: 62   Weight: 69 9 kg (154 lb)   Height: 5' 3" (1 6 m)         Anuja Felder RN

## 2021-07-15 LAB
BASE EXCESS BLDA CALC-SCNC: 9 MMOL/L (ref -2–3)
GLUCOSE SERPL-MCNC: 88 MG/DL (ref 65–140)
HCO3 BLDA-SCNC: 33.2 MMOL/L (ref 24–30)
HCT VFR BLD CALC: 28 % (ref 34.8–46.1)
HGB BLDA-MCNC: 9.5 G/DL (ref 11.5–15.4)
PCO2 BLD: 35 MMOL/L (ref 21–32)
PCO2 BLD: 44.6 MM HG (ref 42–50)
PH BLD: 7.48 [PH] (ref 7.3–7.4)
PO2 BLD: 52 MM HG (ref 35–45)
POTASSIUM BLD-SCNC: 4.1 MMOL/L (ref 3.5–5.3)
SAO2 % BLD FROM PO2: 89 % (ref 60–85)
SODIUM BLD-SCNC: 137 MMOL/L (ref 136–145)
SPECIMEN SOURCE: ABNORMAL

## 2021-07-21 ENCOUNTER — TELEPHONE (OUTPATIENT)
Dept: UROLOGY | Facility: CLINIC | Age: 86
End: 2021-07-21

## 2021-07-21 PROBLEM — Z71.2 ENCOUNTER TO DISCUSS TEST RESULTS: Status: ACTIVE | Noted: 2021-07-21

## 2021-07-21 PROBLEM — C67.8 MALIGNANT NEOPLASM OF OVERLAPPING SITES OF BLADDER (HCC): Status: ACTIVE | Noted: 2021-07-21

## 2021-07-21 NOTE — PROGRESS NOTES
Problem List Items Addressed This Visit        Genitourinary    Malignant neoplasm of overlapping sites of bladder [facility]) - Primary       Other    Encounter to discuss test results            Discussion:      Leyda Coronel and I had a productive visit today  She does appear to have some cognitive impairment as she did not remember and introduced herself to me today  I reminded her that we saw each other in the holding area and discussed her presentation prior to surgery, she did appear to remember this after some review of our previous encounter  She has recovered well from her Transurethral resection of bladder tumor, this shows high-grade bladder cancer which is invasive into the lamina propria, but not into the muscularis layer  I outlined to her the recommendation for BCG therapy, I previously have sent a message to our BCG committee to arrange for this for the future  We talked about the risks and benefits and alternatives of this therapy and the need for surveillance cystoscopies in the future  She does provide informed consent for this treatment  She will proceed to BCG therapy for her high risk bladder cancer, to be followed by surveillance cystoscopy    Assessment and plan:       Please see problem oriented charting for the assessment plan of today's urological complaints      Lizandro Knight MD      Chief Complaint      chief complaints as above      History of Present Illness     Phoebe Salter is a 82 y o  Woman with a history of hematuria, workup showed a sizable bladder tumor, she is status post TURBT, no postoperative issues  Pathology shows a high-grade bladder tumor invasive into the lamina propria, no invasion into the muscle  The recommendation is for BCG, I discussed this with the patient, she does appear to be amenable to this treatment approach after a discussion of the risks and benefits and alternatives  No new urologic complaints       The following portions of the patient's history were reviewed and updated as appropriate: allergies, current medications, past family history, past medical history, past social history, past surgical history and problem list     Detailed Urologic History     - please refer to HPI    Review of Systems     Review of Systems   Constitutional: Negative  HENT: Negative  Eyes: Negative  Respiratory: Negative  Cardiovascular: Negative  Gastrointestinal: Negative  Endocrine: Negative  Genitourinary: Negative  Musculoskeletal: Negative  Skin: Negative  Allergic/Immunologic: Negative  Neurological: Negative  Hematological: Negative  Psychiatric/Behavioral: Negative  Allergies     Allergies   Allergen Reactions    Celecoxib Swelling     celebrex- swelling of throat    Loratadine Swelling     Jaw swelled,couldn't swallow; face swelling    Aspirin GI Intolerance     : nausea; okay with Ecotrin    Lisinopril Swelling       Physical Exam     Physical Exam  Vitals reviewed  Constitutional:       General: She is not in acute distress  Appearance: Normal appearance  She is not ill-appearing, toxic-appearing or diaphoretic  HENT:      Head: Normocephalic and atraumatic  Eyes:      General: No scleral icterus  Right eye: No discharge  Left eye: No discharge  Cardiovascular:      Pulses: Normal pulses  Pulmonary:      Effort: Pulmonary effort is normal    Abdominal:      General: There is no distension  Palpations: There is no mass  Tenderness: There is no abdominal tenderness  Hernia: No hernia is present  Musculoskeletal:         General: No swelling  Skin:     General: Skin is warm  Neurological:      General: No focal deficit present  Mental Status: She is alert and oriented to person, place, and time  Cranial Nerves: No cranial nerve deficit        Comments: Walks with a cane, some unsteadiness on her feet, cognitive impairment at baseline Psychiatric:         Mood and Affect: Mood normal          Behavior: Behavior normal          Thought Content:  Thought content normal          Judgment: Judgment normal              Vital Signs  Vitals:    07/23/21 1306   BP: 118/56   BP Location: Left arm   Patient Position: Sitting   Cuff Size: Adult   Pulse: 77   Weight: 69 9 kg (154 lb)   Height: 5' 3" (1 6 m)         Current Medications       Current Outpatient Medications:     anastrozole (ARIMIDEX) 1 mg tablet, Take 1 mg by mouth daily with lunch , Disp: , Rfl:     Apoaequorin (Prevagen) 10 MG CAPS, Take 10 mg by mouth daily in the early morning , Disp: , Rfl:     Cholecalciferol 25 MCG (1000 UT) tablet, Take 1,000 Units by mouth daily, Disp: , Rfl:     DOCOSAHEXAENOIC ACID PO, Take 2 capsules by mouth daily, Disp: , Rfl:     ferrous sulfate 324 (65 Fe) mg, Take 1 tablet (324 mg total) by mouth 2 (two) times a day before meals, Disp: 60 tablet, Rfl: 3    metFORMIN (GLUCOPHAGE) 500 mg tablet, Take 1 tablet (500 mg total) by mouth daily, Disp: 90 tablet, Rfl: 3    polyethylene glycol-propylene glycol (SYSTANE) 0 4-0 3 %, Apply 4 drops to eye 2 (two) times a day, Disp: , Rfl:     potassium chloride (K-DUR,KLOR-CON) 10 mEq tablet, Take 2 tablets daily, Disp: 180 tablet, Rfl: 3    sertraline (ZOLOFT) 50 mg tablet, TAKE ONE TABLET BY MOUTH ONE TIME DAILY  (Patient taking differently: Take 50 mg by mouth daily with breakfast ), Disp: 90 tablet, Rfl: 0    triamterene-hydrochlorothiazide (MAXZIDE-25) 37 5-25 mg per tablet, Take 1 tablet by mouth daily (Patient taking differently: Take 1 tablet by mouth daily in the early morning ), Disp: 90 tablet, Rfl: 1    atorvastatin (LIPITOR) 40 mg tablet, Take 1 tablet (40 mg total) by mouth daily (Patient taking differently: Take 40 mg by mouth daily at bedtime ), Disp: 90 tablet, Rfl: 1    docusate sodium (COLACE) 100 mg capsule, Take 1 capsule (100 mg total) by mouth 3 (three) times a day for 7 days, Disp: 21 capsule, Rfl: 0      Active Problems     Patient Active Problem List   Diagnosis    Un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diabetes mellitus with hyperglycemia ([place] [address] )    Essential hypertension    Hyperlipidemia    Left breast mass    Depression    Hyponatremia    Hypokalemia    Gross hematuria    Dizziness    Bradycardia    Sick sinus syndrome ([facility] )    Cardiac pacemaker in situ    Abnormal ultrasound cardiogram    Anxiety    BMI 33 0-33 9,adult    Breast carcinoma, female, right ([facility] )    GERD (gastroesophageal reflux disease)    Macular degeneration    Malignant neoplasm of upper-inner quadrant of left breast in female, estrogen receptor positive ([place] [address] )    Personal history of breast cancer    S/P hysterectomy    Memory loss    Medicare annual wellness visit, subsequent    Hypercalcemia    Balance problem    Cough    Encounter for pre-operative examination    Malignant neoplasm of overlapping sites of bladder ([place] [address] )    Encounter to discuss test results         Past Medical History     Past Medical History:   Diagnosis Date    Bladder cancer ([place] [address] )     Depression     GERD (gastroesophageal reflux disease)     History of chemotherapy     right breast 1996    History of echocardiogram 02/14/2018    EF 0 55 (55%), trace mitral regurgitation   Hx of radiation therapy     2017  left breast    Hyperlipidemia     Macular degeneration     Sick sinus syndrome [facility])          Surgical History     Past Surgical History:   Procedure Laterality Date    APPENDECTOMY      BREAST LUMPECTOMY Bilateral     CARDIAC PACEMAKER PLACEMENT Left 7/5/2018    [facility]    HYSTERECTOMY      ID CYSTOURETHROSCOPY,FULGUR <0 5 CM LESN N/A 7/2/2021    Procedure: CYSTOSCOPY, TRANSURETHRAL RESECTION OF MEDIUM BLADDER TUMOR (TURBT);   Surgeon: Angel Sparrow MD;  Location: [facility];  Service: Urology         Family History     Family History   Problem Relation Age of Onset    Alzheimer's disease Father     Uterine cancer Daughter Social History     Social History     Social History     Tobacco Use   Smoking Status Never Smoker   Smokeless Tobacco Never Used         Pertinent Lab Values     Lab Results   Component Value Date    CREATININE 0 97 05/26/2021         Final Diagnosis   A  Bladder, right lateral wall, transurethral resection of tumor:  - Papillary urothelial carcinoma, high grade, with focal invasion of superficial lamina propria - see Note  - Muscularis propria is present and appears uninvolved by neoplasia     Electronically signed by Yanni Euceda MD on 7/9/2021 at  1:44 PM             Pertinent Imaging       No new imaging for my review

## 2021-07-21 NOTE — PATIENT INSTRUCTIONS
Bladder Cancer   WHAT YOU NEED TO KNOW:   What is bladder cancer? Bladder cancer starts in the cells that line your bladder  What increases my risk for bladder cancer? · Smoking cigarettes    · Age older than 60    · Exposure to certain chemicals found in paint, dyes, rubber, plastic, metal, and automobile exhaust    · Exposure to arsenic in drinking water or food    · A family history of bladder cancer    · Chronic bladder irritation or inflammation from urinary catheters or frequent urinary tract infections    · Eating large amounts of high-fat foods or red meats    What are the signs and symptoms of bladder cancer? · Blood in your urine or urine that is pink or orange    · A sudden need to urinate, or urinating more often than usual    · Trouble starting the stream of urine or urinating very little    · Pain or burning when you urinate    · Pain in your abdomen or pelvis     · Unexplained weight loss    · Feeling tired or weak    How is bladder cancer diagnosed? Your healthcare provider will examine you  He or she may insert a gloved finger into your rectum and feel your bladder  You may need any of the following:  · A urine sample  is checked for blood, an infection, or abnormal cells  · X-ray, ultrasound, CT, or MRI  pictures may show the tumor size and location  The pictures may also show if the cancer has spread to other places in your body  You may be given contrast liquid to help your bladder, kidneys, and ureters show up better in pictures  Tell the healthcare provider if you have ever had an allergic reaction to contrast liquid  Do not enter the MRI room with anything metal  Metal can cause serious injury  Tell the healthcare provider if you have any metal in or on your body  · Cystoscopy  is a procedure used to look inside the bladder  · A biopsy  is a procedure to remove a small piece of tissue from your bladder  The tissue is sent to the lab and tested for cancer      How is bladder cancer treated? Your healthcare provider will help you create a treatment plan  He or she will talk to you about the benefits and risks of each treatment  Some treatments may cause incontinence (leaking urine) or bowel movement problems  You may also develop problems with having sex or being able to have children  Talk to your provider about these and other problems that may develop after treatment  This will help you feel comfortable with your treatment plan  You may need more than one of the following:  · Transurethral resection of bladder tumor (TURBT)  is a procedure used to remove the tumor  Bladder muscle near the tumor may also be removed  This procedure is done by inserting tools through your urethra and into your bladder  TURBT may be done if the cancer has not spread to the muscle layer of the bladder  · Immunotherapy  is medicine given to help your immune system kill cancer cells  It is injected into your vein or directly into your bladder  · Chemotherapy  is medicine given to kill cancer cells  It may be given to you as a pill or an injection into your vein or muscle  It may also be injected directly into your bladder  This is called intravesical chemo  Intravesical chemo is placed into the bladder through a catheter  The chemo usually stays in the bladder for 2 hours  Then chemo is drained from the bladder and the catheter is removed  · Radiation therapy  uses high-energy x-ray beams to kill cancer cells  · Surgery  may be needed to remove your bladder  Surrounding organs and lymph nodes may also be removed  Surgery may be needed if the cancer has spread to the muscle layer of the bladder  What can I do to care for myself? · Do not smoke  Nicotine can damage blood vessels and make it hard to manage your bladder cancer  Smoking also increases your risk for new or returning cancer  Ask your healthcare provider for information if you currently smoke and need help to quit   E-cigarettes or smokeless tobacco still contain nicotine  Talk to your healthcare provider before you use these products  · Limit or do not drink alcohol  Alcohol may cause you to become dehydrated  Ask your oncologist if it is safe for you to drink alcohol, and how much is safe to drink  · Eat healthy foods  Healthy foods include fruit, vegetables, whole-grain breads, low-fat dairy products, beans, lean meats, and fish  Your healthcare provider may recommend that you eat less red meat  You need to eat enough calories to help prevent weight loss and increase your energy level  You also need protein to give you strength  If you do not feel hungry, eat small amounts often instead of large meals  · Drink liquids as directed  You may need to drink more liquids than usual to prevent dehydration  Ask how much liquid to drink each day and which liquids are best for you  · Exercise as directed  Exercise may help increase your energy level and appetite  Ask your healthcare provider how much exercise you need and which exercises are best for you  Where can I find more information and support? It may be difficult for you and your family to go through cancer and cancer treatments  Join a support group or talk with others who have gone through treatment  · [address] [place] [address]  Phone: 5- 428 [phone]  Web Address: http://[employer]/  [employer]  · [address] [place] [address], [address] [place] [address]  Phone: 9- 308 - [phone]  Web Address: http://[employer]/  gov  Call 911 for any of the following:   · You suddenly feel lightheaded and short of breath  · You cough up blood  When should I seek immediate care? · Your arm or leg feels warm, tender, and painful  It may look swollen and red  · You are unable to urinate  When should I contact my healthcare provider? · You have a fever       · You vomit and cannot keep any liquids or food down     · You have new or worsening pain  · Your pain gets worse or does not go away after you take pain medicine  · You have questions or concerns about your condition or care  CARE AGREEMENT:   You have the right to help plan your care  Learn about your health condition and how it may be treated  Discuss treatment options with your healthcare providers to decide what care you want to receive  You always have the right to refuse treatment  The above information is an  only  It is not intended as medical advice for individual conditions or treatments  Talk to your doctor, nurse or pharmacist before following any medical regimen to see if it is safe and effective for you  © Copyright [employer] 2021 Information is for End User's use only and may not be sold, redistributed or otherwise used for commercial purposes  All illustrations and images included in CareNotes® are the copyrighted property of [employer]  or [place] ([facility]a Rem[facility] BCG) - (Inside the bladder)   Why this medicine is used:   Treats or prevents bladder cancer, and used to prevent certain bladder tumors  Also called Bacillus Calmette-Pedro (or BCG)  Contact a nurse or doctor right away if you have:  · Blood in your urine, or burning or pain when you urinate  · Urinating more than usual, or feeling like you have to urinate more often  · Fever (103 degrees F or higher), chills, or extreme tiredness  · Severe skin rash  · Severe shaking or trembling     Common side effects:  · Cough, nausea, or vomiting  · Mild skin rash  · Pain in your joints  © Copyright [employer] 2021 Information is for End User's use only and may not be sold, redistributed or otherwise used for commercial purposes

## 2021-07-21 NOTE — TELEPHONE ENCOUNTER
The following message has been sent to our BCG therapy committee:    Good morning, please arrange for BCG therapy for this patient, 3 5 centimeter tumor, high grade, invasive into lamina propria, muscle is negative, thank you

## 2021-07-23 ENCOUNTER — OFFICE VISIT (OUTPATIENT)
Dept: UROLOGY | Facility: CLINIC | Age: 86
End: 2021-07-23
Payer: COMMERCIAL

## 2021-07-23 ENCOUNTER — TELEPHONE (OUTPATIENT)
Dept: UROLOGY | Facility: CLINIC | Age: 86
End: 2021-07-23

## 2021-07-23 VITALS
HEIGHT: 63 IN | WEIGHT: 154 LBS | BODY MASS INDEX: 27.29 KG/M2 | SYSTOLIC BLOOD PRESSURE: 118 MMHG | DIASTOLIC BLOOD PRESSURE: 56 MMHG | HEART RATE: 77 BPM

## 2021-07-23 DIAGNOSIS — Z71.2 ENCOUNTER TO DISCUSS TEST RESULTS: ICD-10-CM

## 2021-07-23 DIAGNOSIS — C67.8 MALIGNANT NEOPLASM OF OVERLAPPING SITES OF BLADDER (HCC): Primary | ICD-10-CM

## 2021-07-23 PROCEDURE — 1036F TOBACCO NON-USER: CPT | Performed by: UROLOGY

## 2021-07-23 PROCEDURE — 3078F DIAST BP <80 MM HG: CPT | Performed by: UROLOGY

## 2021-07-23 PROCEDURE — 3074F SYST BP LT 130 MM HG: CPT | Performed by: UROLOGY

## 2021-07-23 PROCEDURE — 99214 OFFICE O/P EST MOD 30 MIN: CPT | Performed by: UROLOGY

## 2021-07-23 PROCEDURE — 1160F RVW MEDS BY RX/DR IN RCRD: CPT | Performed by: UROLOGY

## 2021-07-27 NOTE — TELEPHONE ENCOUNTER
Patient managed by Dr Brandon Mercado in both the [facility] and [facility] offices  Patient with recent TURBT, Mitomycin instillation done on 7/2/21  3 5 cm bladder mass showing HGT1  Patient seen in follow up on 7/23/21, Dr Brandon Mercado recommending BCG induction x 6, followed by surveillance cystoscopy  According to [facility] risk stratification for non-muscle invasive bladder cancer, this patient has been classified as high risk  Therefore, in keeping with [facility]UA guidelines regarding current worldwide BCG shortage, it has been recommended that this patient proceed with BCG induction x 6 weeks  Dosing is available  Patient due to start BCG, weekly x 6 weeks in mid-August      Please contact patient's grandson to schedule

## 2021-07-27 NOTE — TELEPHONE ENCOUNTER
Deanna Detweiler  You 4 days ago   DD  Please call tommie Turner to schedule apts, he is the main number highlighted  Routing comment    Brenda Mccarty 4 days ago   DD     Return for needs bcg therapy, to be followed by cystoscopy

## 2021-07-28 ENCOUNTER — REMOTE DEVICE CLINIC VISIT (OUTPATIENT)
Dept: CARDIOLOGY CLINIC | Facility: CLINIC | Age: 86
End: 2021-07-28
Payer: COMMERCIAL

## 2021-07-28 DIAGNOSIS — E11.65 UNCONTROLLED TYPE 2 DIABETES MELLITUS WITH HYPERGLYCEMIA (HCC): ICD-10-CM

## 2021-07-28 DIAGNOSIS — I49.5 SICK SINUS SYNDROME (HCC): Primary | ICD-10-CM

## 2021-07-28 DIAGNOSIS — Z45.010 ENCOUNTER FOR CHECKING AND TESTING OF CARDIAC PACEMAKER PULSE GENERATOR (BATTERY): ICD-10-CM

## 2021-07-28 PROCEDURE — 93294 REM INTERROG EVL PM/LDLS PM: CPT | Performed by: INTERNAL MEDICINE

## 2021-07-28 PROCEDURE — 93296 REM INTERROG EVL PM/IDS: CPT | Performed by: INTERNAL MEDICINE

## 2021-07-28 RX ORDER — ATORVASTATIN CALCIUM 40 MG/1
40 TABLET, FILM COATED ORAL
Qty: 90 TABLET | Refills: 0 | Status: SHIPPED | OUTPATIENT
Start: 2021-07-28 | End: 2021-10-21

## 2021-07-28 NOTE — TELEPHONE ENCOUNTER
Call placed to tommie Ybarra  Left message to call office back  Call also placed to patient's home number  Office number provided on both voicemails

## 2021-07-29 NOTE — TELEPHONE ENCOUNTER
Called and spoke to patients grandemiliana Wright, informed him of Dr Esperanza Boyd recommendation for BCG therapy  Informed Stewart of BCG therapy and answered questions  Patint is scheduled for 6 weeks of therapy starting 8/18 running through 9/22  Patient scheduled for 6 week surveillance cystoscopy on 11/10  All at [facility]

## 2021-08-09 ENCOUNTER — APPOINTMENT (EMERGENCY)
Dept: CT IMAGING | Facility: HOSPITAL | Age: 86
DRG: 329 | End: 2021-08-09
Payer: COMMERCIAL

## 2021-08-09 ENCOUNTER — HOSPITAL ENCOUNTER (INPATIENT)
Facility: HOSPITAL | Age: 86
LOS: 6 days | Discharge: HOME HEALTH | DRG: 329 | End: 2021-08-16
Attending: EMERGENCY MEDICINE | Admitting: SURGERY
Payer: COMMERCIAL

## 2021-08-09 DIAGNOSIS — K56.2 VOLVULUS OF SMALL INTESTINE (HCC): Primary | ICD-10-CM

## 2021-08-09 DIAGNOSIS — K56.2 SMALL BOWEL VOLVULUS (HCC): ICD-10-CM

## 2021-08-09 DIAGNOSIS — K56.609 SMALL BOWEL OBSTRUCTION (HCC): ICD-10-CM

## 2021-08-09 LAB
ALBUMIN SERPL BCP-MCNC: 4.6 G/DL (ref 3.5–5.7)
ALP SERPL-CCNC: 63 U/L (ref 55–165)
ALT SERPL W P-5'-P-CCNC: 8 U/L (ref 7–52)
ANION GAP SERPL CALCULATED.3IONS-SCNC: 11 MMOL/L (ref 4–13)
ANISOCYTOSIS BLD QL SMEAR: PRESENT
AST SERPL W P-5'-P-CCNC: 14 U/L (ref 13–39)
BILIRUB SERPL-MCNC: 0.5 MG/DL (ref 0.2–1)
BUN SERPL-MCNC: 21 MG/DL (ref 7–25)
CALCIUM SERPL-MCNC: 11.8 MG/DL (ref 8.6–10.5)
CHLORIDE SERPL-SCNC: 94 MMOL/L (ref 98–107)
CO2 SERPL-SCNC: 29 MMOL/L (ref 21–31)
CREAT SERPL-MCNC: 1.19 MG/DL (ref 0.6–1.2)
ERYTHROCYTE [DISTWIDTH] IN BLOOD BY AUTOMATED COUNT: 20.5 % (ref 11.5–14.5)
GFR SERPL CREATININE-BSD FRML MDRD: 42 ML/MIN/1.73SQ M
GLUCOSE SERPL-MCNC: 218 MG/DL (ref 65–99)
HCT VFR BLD AUTO: 36 % (ref 42–47)
HGB BLD-MCNC: 11.4 G/DL (ref 12–16)
HYPERCHROMIA BLD QL SMEAR: PRESENT
LACTATE SERPL-SCNC: 1.8 MMOL/L (ref 0.5–2)
LIPASE SERPL-CCNC: 18 U/L (ref 11–82)
LYMPHOCYTES # BLD AUTO: 0.45 THOUSAND/UL (ref 0.6–4.47)
LYMPHOCYTES # BLD AUTO: 4 % (ref 20–51)
MCH RBC QN AUTO: 25.8 PG (ref 26–34)
MCHC RBC AUTO-ENTMCNC: 31.8 G/DL (ref 31–37)
MCV RBC AUTO: 81 FL (ref 81–99)
MONOCYTES # BLD AUTO: 0.45 THOUSAND/UL (ref 0–1.22)
MONOCYTES NFR BLD AUTO: 4 % (ref 4–12)
NEUTS BAND NFR BLD MANUAL: 2 % (ref 0–8)
NEUTS SEG # BLD: 10.3 THOUSAND/UL (ref 1.81–6.82)
NEUTS SEG NFR BLD AUTO: 90 % (ref 42–75)
OVALOCYTES BLD QL SMEAR: PRESENT
PLATELET # BLD AUTO: 252 THOUSANDS/UL (ref 149–390)
PLATELET BLD QL SMEAR: ADEQUATE
PMV BLD AUTO: 6.8 FL (ref 8.6–11.7)
POIKILOCYTOSIS BLD QL SMEAR: PRESENT
POTASSIUM SERPL-SCNC: 3.3 MMOL/L (ref 3.5–5.5)
PROT SERPL-MCNC: 7.1 G/DL (ref 6.4–8.9)
RBC # BLD AUTO: 4.44 MILLION/UL (ref 3.9–5.2)
SODIUM SERPL-SCNC: 134 MMOL/L (ref 134–143)
TOTAL CELLS COUNTED SPEC: 100
TROPONIN I SERPL-MCNC: <0.03 NG/ML
WBC # BLD AUTO: 11.2 THOUSAND/UL (ref 4.8–10.8)

## 2021-08-09 PROCEDURE — 99285 EMERGENCY DEPT VISIT HI MDM: CPT | Performed by: EMERGENCY MEDICINE

## 2021-08-09 PROCEDURE — 80053 COMPREHEN METABOLIC PANEL: CPT | Performed by: EMERGENCY MEDICINE

## 2021-08-09 PROCEDURE — G1004 CDSM NDSC: HCPCS

## 2021-08-09 PROCEDURE — 74177 CT ABD & PELVIS W/CONTRAST: CPT

## 2021-08-09 PROCEDURE — 36415 COLL VENOUS BLD VENIPUNCTURE: CPT | Performed by: EMERGENCY MEDICINE

## 2021-08-09 PROCEDURE — 83605 ASSAY OF LACTIC ACID: CPT | Performed by: EMERGENCY MEDICINE

## 2021-08-09 PROCEDURE — 96375 TX/PRO/DX INJ NEW DRUG ADDON: CPT

## 2021-08-09 PROCEDURE — 85027 COMPLETE CBC AUTOMATED: CPT | Performed by: EMERGENCY MEDICINE

## 2021-08-09 PROCEDURE — 99285 EMERGENCY DEPT VISIT HI MDM: CPT

## 2021-08-09 PROCEDURE — 93005 ELECTROCARDIOGRAM TRACING: CPT

## 2021-08-09 PROCEDURE — 84484 ASSAY OF TROPONIN QUANT: CPT | Performed by: EMERGENCY MEDICINE

## 2021-08-09 PROCEDURE — 85007 BL SMEAR W/DIFF WBC COUNT: CPT | Performed by: EMERGENCY MEDICINE

## 2021-08-09 PROCEDURE — 83690 ASSAY OF LIPASE: CPT | Performed by: EMERGENCY MEDICINE

## 2021-08-09 RX ORDER — MORPHINE SULFATE 4 MG/ML
4 INJECTION, SOLUTION INTRAMUSCULAR; INTRAVENOUS ONCE
Status: COMPLETED | OUTPATIENT
Start: 2021-08-09 | End: 2021-08-09

## 2021-08-09 RX ORDER — ONDANSETRON 2 MG/ML
4 INJECTION INTRAMUSCULAR; INTRAVENOUS ONCE
Status: COMPLETED | OUTPATIENT
Start: 2021-08-09 | End: 2021-08-09

## 2021-08-09 RX ADMIN — ONDANSETRON 4 MG: 2 INJECTION INTRAMUSCULAR; INTRAVENOUS at 23:31

## 2021-08-09 RX ADMIN — MORPHINE SULFATE 4 MG: 4 INJECTION INTRAVENOUS at 23:15

## 2021-08-10 ENCOUNTER — ANESTHESIA EVENT (INPATIENT)
Dept: PERIOP | Facility: HOSPITAL | Age: 86
DRG: 329 | End: 2021-08-10
Payer: COMMERCIAL

## 2021-08-10 ENCOUNTER — ANESTHESIA (INPATIENT)
Dept: PERIOP | Facility: HOSPITAL | Age: 86
DRG: 329 | End: 2021-08-10
Payer: COMMERCIAL

## 2021-08-10 PROBLEM — Z87.898 HISTORY OF ANESTHESIA COMPLICATIONS: Status: ACTIVE | Noted: 2021-08-10

## 2021-08-10 PROBLEM — K56.609 SMALL BOWEL OBSTRUCTION (HCC): Status: ACTIVE | Noted: 2021-08-10

## 2021-08-10 PROBLEM — R11.2 PONV (POSTOPERATIVE NAUSEA AND VOMITING): Status: ACTIVE | Noted: 2021-08-10

## 2021-08-10 PROBLEM — K56.2 SMALL BOWEL VOLVULUS (HCC): Status: ACTIVE | Noted: 2021-08-10

## 2021-08-10 PROBLEM — Z98.890 PONV (POSTOPERATIVE NAUSEA AND VOMITING): Status: ACTIVE | Noted: 2021-08-10

## 2021-08-10 PROBLEM — N17.9 ACUTE KIDNEY INJURY (HCC): Status: ACTIVE | Noted: 2021-08-10

## 2021-08-10 LAB
ABO GROUP BLD: NORMAL
ANION GAP SERPL CALCULATED.3IONS-SCNC: 7 MMOL/L (ref 4–13)
ANISOCYTOSIS BLD QL SMEAR: PRESENT
APTT PPP: 22 SECONDS (ref 23–37)
BACTERIA UR QL AUTO: ABNORMAL /HPF
BILIRUB UR QL STRIP: NEGATIVE
BLD GP AB SCN SERPL QL: NEGATIVE
BUN SERPL-MCNC: 18 MG/DL (ref 7–25)
CALCIUM SERPL-MCNC: 9.7 MG/DL (ref 8.6–10.5)
CHLORIDE SERPL-SCNC: 103 MMOL/L (ref 98–107)
CLARITY UR: ABNORMAL
CO2 SERPL-SCNC: 29 MMOL/L (ref 21–31)
COLOR UR: ABNORMAL
CREAT SERPL-MCNC: 1.04 MG/DL (ref 0.6–1.2)
ERYTHROCYTE [DISTWIDTH] IN BLOOD BY AUTOMATED COUNT: 20.2 % (ref 11.5–14.5)
GFR SERPL CREATININE-BSD FRML MDRD: 50 ML/MIN/1.73SQ M
GLUCOSE SERPL-MCNC: 146 MG/DL (ref 65–140)
GLUCOSE SERPL-MCNC: 147 MG/DL (ref 65–140)
GLUCOSE SERPL-MCNC: 156 MG/DL (ref 65–99)
GLUCOSE SERPL-MCNC: 168 MG/DL (ref 65–140)
GLUCOSE UR STRIP-MCNC: NEGATIVE MG/DL
HCT VFR BLD AUTO: 29.5 % (ref 42–47)
HGB BLD-MCNC: 9.2 G/DL (ref 12–16)
HGB UR QL STRIP.AUTO: ABNORMAL
HYPERCHROMIA BLD QL SMEAR: PRESENT
INR PPP: 0.95 (ref 0.84–1.19)
KETONES UR STRIP-MCNC: ABNORMAL MG/DL
LEUKOCYTE ESTERASE UR QL STRIP: ABNORMAL
LYMPHOCYTES # BLD AUTO: 0.49 THOUSAND/UL (ref 0.6–4.47)
LYMPHOCYTES # BLD AUTO: 4 % (ref 20–51)
MAGNESIUM SERPL-MCNC: 1.6 MG/DL (ref 1.9–2.7)
MCH RBC QN AUTO: 25.8 PG (ref 26–34)
MCHC RBC AUTO-ENTMCNC: 31.4 G/DL (ref 31–37)
MCV RBC AUTO: 82 FL (ref 81–99)
MONOCYTES # BLD AUTO: 0.49 THOUSAND/UL (ref 0–1.22)
MONOCYTES NFR BLD AUTO: 4 % (ref 4–12)
NEUTS BAND NFR BLD MANUAL: 2 % (ref 0–8)
NEUTS SEG # BLD: 11.22 THOUSAND/UL (ref 1.81–6.82)
NEUTS SEG NFR BLD AUTO: 90 % (ref 42–75)
NITRITE UR QL STRIP: NEGATIVE
NON-SQ EPI CELLS URNS QL MICRO: ABNORMAL /HPF
OVALOCYTES BLD QL SMEAR: PRESENT
PH UR STRIP.AUTO: 7 [PH]
PHOSPHATE SERPL-MCNC: 2.3 MG/DL (ref 3–5.5)
PLATELET # BLD AUTO: 223 THOUSANDS/UL (ref 149–390)
PLATELET BLD QL SMEAR: ADEQUATE
PMV BLD AUTO: 7.1 FL (ref 8.6–11.7)
POTASSIUM SERPL-SCNC: 3.9 MMOL/L (ref 3.5–5.5)
PROT UR STRIP-MCNC: ABNORMAL MG/DL
PROTHROMBIN TIME: 12.6 SECONDS (ref 11.6–14.5)
RBC # BLD AUTO: 3.58 MILLION/UL (ref 3.9–5.2)
RBC #/AREA URNS AUTO: ABNORMAL /HPF
RBC MORPH BLD: ABNORMAL
RH BLD: POSITIVE
SODIUM SERPL-SCNC: 139 MMOL/L (ref 134–143)
SP GR UR STRIP.AUTO: 1.01 (ref 1–1.03)
SPECIMEN EXPIRATION DATE: NORMAL
TOTAL CELLS COUNTED SPEC: 100
UROBILINOGEN UR QL STRIP.AUTO: 0.2 E.U./DL
WBC # BLD AUTO: 12.2 THOUSAND/UL (ref 4.8–10.8)
WBC #/AREA URNS AUTO: ABNORMAL /HPF

## 2021-08-10 PROCEDURE — 88307 TISSUE EXAM BY PATHOLOGIST: CPT | Performed by: PATHOLOGY

## 2021-08-10 PROCEDURE — 36415 COLL VENOUS BLD VENIPUNCTURE: CPT | Performed by: EMERGENCY MEDICINE

## 2021-08-10 PROCEDURE — 85730 THROMBOPLASTIN TIME PARTIAL: CPT | Performed by: EMERGENCY MEDICINE

## 2021-08-10 PROCEDURE — 82948 REAGENT STRIP/BLOOD GLUCOSE: CPT

## 2021-08-10 PROCEDURE — 86900 BLOOD TYPING SEROLOGIC ABO: CPT | Performed by: EMERGENCY MEDICINE

## 2021-08-10 PROCEDURE — 96376 TX/PRO/DX INJ SAME DRUG ADON: CPT

## 2021-08-10 PROCEDURE — 96375 TX/PRO/DX INJ NEW DRUG ADDON: CPT

## 2021-08-10 PROCEDURE — 84100 ASSAY OF PHOSPHORUS: CPT | Performed by: SURGERY

## 2021-08-10 PROCEDURE — 94664 DEMO&/EVAL PT USE INHALER: CPT

## 2021-08-10 PROCEDURE — 97163 PT EVAL HIGH COMPLEX 45 MIN: CPT

## 2021-08-10 PROCEDURE — 86850 RBC ANTIBODY SCREEN: CPT | Performed by: EMERGENCY MEDICINE

## 2021-08-10 PROCEDURE — 44120 REMOVAL OF SMALL INTESTINE: CPT | Performed by: SURGERY

## 2021-08-10 PROCEDURE — 96366 THER/PROPH/DIAG IV INF ADDON: CPT

## 2021-08-10 PROCEDURE — 80048 BASIC METABOLIC PNL TOTAL CA: CPT | Performed by: SURGERY

## 2021-08-10 PROCEDURE — 85007 BL SMEAR W/DIFF WBC COUNT: CPT | Performed by: SURGERY

## 2021-08-10 PROCEDURE — C9290 INJ, BUPIVACAINE LIPOSOME: HCPCS | Performed by: ANESTHESIOLOGY

## 2021-08-10 PROCEDURE — 0DT80ZZ RESECTION OF SMALL INTESTINE, OPEN APPROACH: ICD-10-PCS | Performed by: SURGERY

## 2021-08-10 PROCEDURE — 97166 OT EVAL MOD COMPLEX 45 MIN: CPT

## 2021-08-10 PROCEDURE — 96365 THER/PROPH/DIAG IV INF INIT: CPT

## 2021-08-10 PROCEDURE — 99223 1ST HOSP IP/OBS HIGH 75: CPT | Performed by: SURGERY

## 2021-08-10 PROCEDURE — 85027 COMPLETE CBC AUTOMATED: CPT | Performed by: SURGERY

## 2021-08-10 PROCEDURE — 0T9B70Z DRAINAGE OF BLADDER WITH DRAINAGE DEVICE, VIA NATURAL OR ARTIFICIAL OPENING: ICD-10-PCS | Performed by: EMERGENCY MEDICINE

## 2021-08-10 PROCEDURE — 86901 BLOOD TYPING SEROLOGIC RH(D): CPT | Performed by: EMERGENCY MEDICINE

## 2021-08-10 PROCEDURE — C9113 INJ PANTOPRAZOLE SODIUM, VIA: HCPCS | Performed by: SURGERY

## 2021-08-10 PROCEDURE — 83735 ASSAY OF MAGNESIUM: CPT | Performed by: SURGERY

## 2021-08-10 PROCEDURE — 96367 TX/PROPH/DG ADDL SEQ IV INF: CPT

## 2021-08-10 PROCEDURE — 85610 PROTHROMBIN TIME: CPT | Performed by: EMERGENCY MEDICINE

## 2021-08-10 PROCEDURE — NC001 PR NO CHARGE: Performed by: PHYSICIAN ASSISTANT

## 2021-08-10 PROCEDURE — 81001 URINALYSIS AUTO W/SCOPE: CPT | Performed by: EMERGENCY MEDICINE

## 2021-08-10 RX ORDER — ONDANSETRON 2 MG/ML
INJECTION INTRAMUSCULAR; INTRAVENOUS AS NEEDED
Status: DISCONTINUED | OUTPATIENT
Start: 2021-08-10 | End: 2021-08-10

## 2021-08-10 RX ORDER — HYDROMORPHONE HCL/PF 1 MG/ML
0.2 SYRINGE (ML) INJECTION EVERY 4 HOURS PRN
Status: DISCONTINUED | OUTPATIENT
Start: 2021-08-10 | End: 2021-08-16

## 2021-08-10 RX ORDER — ONDANSETRON 2 MG/ML
4 INJECTION INTRAMUSCULAR; INTRAVENOUS ONCE
Status: COMPLETED | OUTPATIENT
Start: 2021-08-10 | End: 2021-08-10

## 2021-08-10 RX ORDER — FENTANYL CITRATE/PF 50 MCG/ML
25 SYRINGE (ML) INJECTION
Status: DISCONTINUED | OUTPATIENT
Start: 2021-08-10 | End: 2021-08-11

## 2021-08-10 RX ORDER — PROPOFOL 10 MG/ML
INJECTION, EMULSION INTRAVENOUS CONTINUOUS PRN
Status: DISCONTINUED | OUTPATIENT
Start: 2021-08-10 | End: 2021-08-10

## 2021-08-10 RX ORDER — HYDROMORPHONE HCL/PF 1 MG/ML
0.5 SYRINGE (ML) INJECTION ONCE
Status: COMPLETED | OUTPATIENT
Start: 2021-08-10 | End: 2021-08-10

## 2021-08-10 RX ORDER — CEFAZOLIN SODIUM 1 G/50ML
SOLUTION INTRAVENOUS AS NEEDED
Status: DISCONTINUED | OUTPATIENT
Start: 2021-08-10 | End: 2021-08-10

## 2021-08-10 RX ORDER — PANTOPRAZOLE SODIUM 40 MG/1
40 INJECTION, POWDER, FOR SOLUTION INTRAVENOUS
Status: DISCONTINUED | OUTPATIENT
Start: 2021-08-10 | End: 2021-08-15

## 2021-08-10 RX ORDER — MAGNESIUM SULFATE HEPTAHYDRATE 40 MG/ML
4 INJECTION, SOLUTION INTRAVENOUS ONCE
Status: COMPLETED | OUTPATIENT
Start: 2021-08-10 | End: 2021-08-10

## 2021-08-10 RX ORDER — PANTOPRAZOLE SODIUM 40 MG/1
40 TABLET, DELAYED RELEASE ORAL
Status: DISCONTINUED | OUTPATIENT
Start: 2021-08-10 | End: 2021-08-10

## 2021-08-10 RX ORDER — GLYCOPYRROLATE 0.2 MG/ML
INJECTION INTRAMUSCULAR; INTRAVENOUS AS NEEDED
Status: DISCONTINUED | OUTPATIENT
Start: 2021-08-10 | End: 2021-08-10

## 2021-08-10 RX ORDER — LIDOCAINE HYDROCHLORIDE 10 MG/ML
INJECTION, SOLUTION EPIDURAL; INFILTRATION; INTRACAUDAL; PERINEURAL AS NEEDED
Status: DISCONTINUED | OUTPATIENT
Start: 2021-08-10 | End: 2021-08-10

## 2021-08-10 RX ORDER — ONDANSETRON 2 MG/ML
4 INJECTION INTRAMUSCULAR; INTRAVENOUS EVERY 6 HOURS PRN
Status: DISCONTINUED | OUTPATIENT
Start: 2021-08-10 | End: 2021-08-16 | Stop reason: HOSPADM

## 2021-08-10 RX ORDER — CEFTRIAXONE 1 G/50ML
1000 INJECTION, SOLUTION INTRAVENOUS EVERY 24 HOURS
Status: COMPLETED | OUTPATIENT
Start: 2021-08-11 | End: 2021-08-14

## 2021-08-10 RX ORDER — ONDANSETRON 2 MG/ML
4 INJECTION INTRAMUSCULAR; INTRAVENOUS ONCE AS NEEDED
Status: DISCONTINUED | OUTPATIENT
Start: 2021-08-10 | End: 2021-08-10

## 2021-08-10 RX ORDER — HEPARIN SODIUM 5000 [USP'U]/ML
5000 INJECTION, SOLUTION INTRAVENOUS; SUBCUTANEOUS ONCE
Status: DISCONTINUED | OUTPATIENT
Start: 2021-08-10 | End: 2021-08-10

## 2021-08-10 RX ORDER — HEPARIN SODIUM 5000 [USP'U]/ML
5000 INJECTION, SOLUTION INTRAVENOUS; SUBCUTANEOUS EVERY 8 HOURS SCHEDULED
Status: DISCONTINUED | OUTPATIENT
Start: 2021-08-10 | End: 2021-08-16 | Stop reason: HOSPADM

## 2021-08-10 RX ORDER — ALBUMIN, HUMAN INJ 5% 5 %
SOLUTION INTRAVENOUS CONTINUOUS PRN
Status: DISCONTINUED | OUTPATIENT
Start: 2021-08-10 | End: 2021-08-10

## 2021-08-10 RX ORDER — SODIUM CHLORIDE 9 MG/ML
INJECTION, SOLUTION INTRAVENOUS CONTINUOUS PRN
Status: DISCONTINUED | OUTPATIENT
Start: 2021-08-10 | End: 2021-08-10

## 2021-08-10 RX ORDER — POTASSIUM CHLORIDE 14.9 MG/ML
20 INJECTION INTRAVENOUS ONCE
Status: COMPLETED | OUTPATIENT
Start: 2021-08-10 | End: 2021-08-10

## 2021-08-10 RX ORDER — LIDOCAINE HYDROCHLORIDE 20 MG/ML
1 JELLY TOPICAL ONCE
Status: DISCONTINUED | OUTPATIENT
Start: 2021-08-10 | End: 2021-08-10

## 2021-08-10 RX ORDER — SUCCINYLCHOLINE/SOD CL,ISO/PF 100 MG/5ML
SYRINGE (ML) INTRAVENOUS AS NEEDED
Status: DISCONTINUED | OUTPATIENT
Start: 2021-08-10 | End: 2021-08-10

## 2021-08-10 RX ORDER — DEXAMETHASONE SODIUM PHOSPHATE 10 MG/ML
INJECTION, SOLUTION INTRAMUSCULAR; INTRAVENOUS AS NEEDED
Status: DISCONTINUED | OUTPATIENT
Start: 2021-08-10 | End: 2021-08-10

## 2021-08-10 RX ORDER — PROPOFOL 10 MG/ML
INJECTION, EMULSION INTRAVENOUS AS NEEDED
Status: DISCONTINUED | OUTPATIENT
Start: 2021-08-10 | End: 2021-08-10

## 2021-08-10 RX ORDER — SODIUM CHLORIDE 9 MG/ML
125 INJECTION, SOLUTION INTRAVENOUS CONTINUOUS
Status: CANCELLED | OUTPATIENT
Start: 2021-08-10 | End: 2021-08-10

## 2021-08-10 RX ORDER — FENTANYL CITRATE 50 UG/ML
INJECTION, SOLUTION INTRAMUSCULAR; INTRAVENOUS AS NEEDED
Status: DISCONTINUED | OUTPATIENT
Start: 2021-08-10 | End: 2021-08-10

## 2021-08-10 RX ORDER — HEPARIN SODIUM 5000 [USP'U]/ML
INJECTION, SOLUTION INTRAVENOUS; SUBCUTANEOUS AS NEEDED
Status: DISCONTINUED | OUTPATIENT
Start: 2021-08-10 | End: 2021-08-10

## 2021-08-10 RX ORDER — ROCURONIUM BROMIDE 10 MG/ML
INJECTION, SOLUTION INTRAVENOUS AS NEEDED
Status: DISCONTINUED | OUTPATIENT
Start: 2021-08-10 | End: 2021-08-10

## 2021-08-10 RX ORDER — NEOSTIGMINE METHYLSULFATE 1 MG/ML
INJECTION INTRAVENOUS AS NEEDED
Status: DISCONTINUED | OUTPATIENT
Start: 2021-08-10 | End: 2021-08-10

## 2021-08-10 RX ORDER — DEXMEDETOMIDINE HYDROCHLORIDE 100 UG/ML
INJECTION, SOLUTION INTRAVENOUS AS NEEDED
Status: DISCONTINUED | OUTPATIENT
Start: 2021-08-10 | End: 2021-08-10

## 2021-08-10 RX ORDER — CEFAZOLIN SODIUM 2 G/50ML
2000 SOLUTION INTRAVENOUS ONCE
Status: DISCONTINUED | OUTPATIENT
Start: 2021-08-10 | End: 2021-08-10

## 2021-08-10 RX ORDER — DEXTROSE, SODIUM CHLORIDE, SODIUM LACTATE, POTASSIUM CHLORIDE, AND CALCIUM CHLORIDE 5; .6; .31; .03; .02 G/100ML; G/100ML; G/100ML; G/100ML; G/100ML
75 INJECTION, SOLUTION INTRAVENOUS CONTINUOUS
Status: DISCONTINUED | OUTPATIENT
Start: 2021-08-10 | End: 2021-08-11

## 2021-08-10 RX ORDER — HYDROMORPHONE HCL IN WATER/PF 6 MG/30 ML
0.2 PATIENT CONTROLLED ANALGESIA SYRINGE INTRAVENOUS
Status: DISCONTINUED | OUTPATIENT
Start: 2021-08-10 | End: 2021-08-10

## 2021-08-10 RX ORDER — BUPIVACAINE HYDROCHLORIDE 2.5 MG/ML
INJECTION, SOLUTION EPIDURAL; INFILTRATION; INTRACAUDAL
Status: DISCONTINUED | OUTPATIENT
Start: 2021-08-10 | End: 2021-08-10

## 2021-08-10 RX ORDER — SODIUM CHLORIDE 9 MG/ML
125 INJECTION, SOLUTION INTRAVENOUS CONTINUOUS
Status: DISCONTINUED | OUTPATIENT
Start: 2021-08-10 | End: 2021-08-10

## 2021-08-10 RX ORDER — CEFTRIAXONE 1 G/50ML
1000 INJECTION, SOLUTION INTRAVENOUS ONCE
Status: COMPLETED | OUTPATIENT
Start: 2021-08-10 | End: 2021-08-10

## 2021-08-10 RX ORDER — LIDOCAINE HYDROCHLORIDE 20 MG/ML
1 JELLY TOPICAL ONCE
Status: COMPLETED | OUTPATIENT
Start: 2021-08-10 | End: 2021-08-10

## 2021-08-10 RX ADMIN — LIDOCAINE HYDROCHLORIDE 50 MG: 10 INJECTION, SOLUTION EPIDURAL; INFILTRATION; INTRACAUDAL; PERINEURAL at 05:37

## 2021-08-10 RX ADMIN — HYDROMORPHONE HYDROCHLORIDE 0.5 MG: 1 INJECTION, SOLUTION INTRAMUSCULAR; INTRAVENOUS; SUBCUTANEOUS at 02:28

## 2021-08-10 RX ADMIN — SODIUM CHLORIDE: 0.9 INJECTION, SOLUTION INTRAVENOUS at 06:08

## 2021-08-10 RX ADMIN — FENTANYL CITRATE 50 MCG: 50 INJECTION INTRAMUSCULAR; INTRAVENOUS at 05:34

## 2021-08-10 RX ADMIN — METRONIDAZOLE 500 MG: 500 INJECTION, SOLUTION INTRAVENOUS at 11:52

## 2021-08-10 RX ADMIN — Medication 100 MG: at 05:37

## 2021-08-10 RX ADMIN — DEXMEDETOMIDINE HYDROCHLORIDE 4 MCG: 100 INJECTION, SOLUTION INTRAVENOUS at 07:45

## 2021-08-10 RX ADMIN — PROPOFOL 100 MCG/KG/MIN: 10 INJECTION, EMULSION INTRAVENOUS at 05:39

## 2021-08-10 RX ADMIN — CEFAZOLIN SODIUM 1000 MG: 1 SOLUTION INTRAVENOUS at 05:45

## 2021-08-10 RX ADMIN — PHENYLEPHRINE HYDROCHLORIDE 50 MCG/MIN: 10 INJECTION INTRAVENOUS at 05:39

## 2021-08-10 RX ADMIN — HYDROMORPHONE HYDROCHLORIDE 0.5 MG: 1 INJECTION, SOLUTION INTRAMUSCULAR; INTRAVENOUS; SUBCUTANEOUS at 00:18

## 2021-08-10 RX ADMIN — DEXAMETHASONE SODIUM PHOSPHATE 8 MG: 10 INJECTION, SOLUTION INTRAMUSCULAR; INTRAVENOUS at 05:57

## 2021-08-10 RX ADMIN — PANTOPRAZOLE SODIUM 40 MG: 40 INJECTION, POWDER, FOR SOLUTION INTRAVENOUS at 21:15

## 2021-08-10 RX ADMIN — GLYCOPYRROLATE 0.4 MG: 0.2 INJECTION, SOLUTION INTRAMUSCULAR; INTRAVENOUS at 07:47

## 2021-08-10 RX ADMIN — FENTANYL CITRATE 50 MCG: 50 INJECTION INTRAMUSCULAR; INTRAVENOUS at 05:58

## 2021-08-10 RX ADMIN — POTASSIUM PHOSPHATE, MONOBASIC AND POTASSIUM PHOSPHATE, DIBASIC 30 MMOL: 224; 236 INJECTION, SOLUTION, CONCENTRATE INTRAVENOUS at 14:37

## 2021-08-10 RX ADMIN — HEPARIN SODIUM 5000 UNITS: 5000 INJECTION INTRAVENOUS; SUBCUTANEOUS at 21:14

## 2021-08-10 RX ADMIN — DEXMEDETOMIDINE HYDROCHLORIDE 8 MCG: 100 INJECTION, SOLUTION INTRAVENOUS at 06:51

## 2021-08-10 RX ADMIN — ONDANSETRON 4 MG: 2 INJECTION INTRAMUSCULAR; INTRAVENOUS at 00:18

## 2021-08-10 RX ADMIN — MAGNESIUM SULFATE IN WATER 4 G: 40 INJECTION, SOLUTION INTRAVENOUS at 14:39

## 2021-08-10 RX ADMIN — PROPOFOL 100 MG: 10 INJECTION, EMULSION INTRAVENOUS at 05:36

## 2021-08-10 RX ADMIN — SODIUM CHLORIDE: 0.9 INJECTION, SOLUTION INTRAVENOUS at 05:40

## 2021-08-10 RX ADMIN — ONDANSETRON 4 MG: 2 INJECTION INTRAMUSCULAR; INTRAVENOUS at 07:29

## 2021-08-10 RX ADMIN — METRONIDAZOLE 500 MG: 500 INJECTION, SOLUTION INTRAVENOUS at 04:10

## 2021-08-10 RX ADMIN — DEXMEDETOMIDINE HYDROCHLORIDE 8 MCG: 100 INJECTION, SOLUTION INTRAVENOUS at 05:53

## 2021-08-10 RX ADMIN — METRONIDAZOLE 500 MG: 500 INJECTION, SOLUTION INTRAVENOUS at 21:00

## 2021-08-10 RX ADMIN — ROCURONIUM BROMIDE 20 MG: 10 INJECTION INTRAVENOUS at 06:13

## 2021-08-10 RX ADMIN — HEPARIN SODIUM 5000 UNITS: 5000 INJECTION INTRAVENOUS; SUBCUTANEOUS at 05:40

## 2021-08-10 RX ADMIN — LIDOCAINE HYDROCHLORIDE 1 APPLICATION: 20 JELLY TOPICAL at 01:39

## 2021-08-10 RX ADMIN — FENTANYL CITRATE 50 MCG: 50 INJECTION INTRAMUSCULAR; INTRAVENOUS at 05:46

## 2021-08-10 RX ADMIN — FENTANYL CITRATE 50 MCG: 50 INJECTION INTRAMUSCULAR; INTRAVENOUS at 06:02

## 2021-08-10 RX ADMIN — CEFTRIAXONE 1000 MG: 1 INJECTION, SOLUTION INTRAVENOUS at 01:00

## 2021-08-10 RX ADMIN — ROCURONIUM BROMIDE 30 MG: 10 INJECTION INTRAVENOUS at 05:40

## 2021-08-10 RX ADMIN — NEOSTIGMINE METHYLSULFATE 3 MG: 1 INJECTION, SOLUTION INTRAVENOUS at 07:47

## 2021-08-10 RX ADMIN — IOHEXOL 100 ML: 350 INJECTION, SOLUTION INTRAVENOUS at 00:03

## 2021-08-10 RX ADMIN — DEXTROSE, SODIUM CHLORIDE, SODIUM LACTATE, POTASSIUM CHLORIDE, AND CALCIUM CHLORIDE 75 ML/HR: 5; .6; .31; .03; .02 INJECTION, SOLUTION INTRAVENOUS at 09:00

## 2021-08-10 RX ADMIN — HEPARIN SODIUM 5000 UNITS: 5000 INJECTION INTRAVENOUS; SUBCUTANEOUS at 14:42

## 2021-08-10 RX ADMIN — INSULIN LISPRO 1 UNITS: 100 INJECTION, SOLUTION INTRAVENOUS; SUBCUTANEOUS at 09:00

## 2021-08-10 RX ADMIN — ALBUMIN (HUMAN): 12.5 INJECTION, SOLUTION INTRAVENOUS at 06:37

## 2021-08-10 RX ADMIN — BUPIVACAINE HYDROCHLORIDE 20 ML: 2.5 INJECTION, SOLUTION EPIDURAL; INFILTRATION; INTRACAUDAL; PERINEURAL at 08:07

## 2021-08-10 RX ADMIN — POTASSIUM CHLORIDE 20 MEQ: 14.9 INJECTION, SOLUTION INTRAVENOUS at 01:40

## 2021-08-10 RX ADMIN — SODIUM CHLORIDE 125 ML/HR: 0.9 INJECTION, SOLUTION INTRAVENOUS at 01:48

## 2021-08-10 RX ADMIN — HEPARIN SODIUM 5000 UNITS: 5000 INJECTION INTRAVENOUS; SUBCUTANEOUS at 08:59

## 2021-08-10 NOTE — OP NOTE
OPERATIVE REPORT  PATIENT NAME: Paul Dubin    :  1935  MRN: [id number]  Pt Location: [facility] OR ROOM 02    SURGERY DATE: 8/10/2021    Surgeon(s) and Role:     * Jason Bunn MD - Primary     * Madisyn Mcgregor MD - Assisting     * Arash Lomeli PA-C - Assisting    Preop Diagnosis:  Volvulus of small intestine ([address] ) [K56 2]    Post-Op Diagnosis Codes:     * Volvulus of small intestine ([address] ) [K56 2]    Procedure(s) (LRB):  LAPAROTOMY EXPLORATORY,  SMALL BOWEL RESECTION (N/A)  Reduction of intestinal volvulus    Specimen(s):  ID Type Source Tests Collected by Time Destination   1 :  Tissue Small Bowel, NOS TISSUE EXAM Jason Bunn MD 8/10/2021 0055      Estimated Blood Loss:   150 mL    Drains:  NG/OG/Enteral Tube Nasogastric 16 Fr Left nares (Active)   Placement Reverification Auscultation 08/10/21 0830   Site Assessment Clean;Dry; Intact 08/10/21 0830   Status Suction-low continuous 08/10/21 0830   Drainage Appearance Brown 08/10/21 0830   Number of days: 0       Urethral Catheter Straight-tip 16 Fr  (Active)   Reasons to continue Urinary Catheter  Accurate I&O assessment in critically ill patients (48 hr  max) 08/10/21 0900   Goal for Removal No longer needed- Will place order to discontinue 08/10/21 0900   Site Assessment Clean;Skin intact 08/10/21 0900   Keenan Care Done 08/10/21 0900   Collection Container Standard drainage bag 08/10/21 0900   Securement Method Securing device (Describe) 08/10/21 0900   Number of days: 0       [REMOVED] Urethral Catheter (Removed)   Number of days:        Anesthesia Type:   General    Operative Indications:  Volvulus of small intestine ([address] ) [K56 2]  Ms Miesha Pfeiffer is an 82year old female with 1-2 days of worsening abdominal pain, nausea, vomiting  She was found to have a small bowel obstruction secondary to an intestinal volvulus on CT scan  NGT and keenan were placed in the ED  IVF resuscitation was administered  Empiric antibiotics given   Informed consent obtained for urgent laparotomy and exploration  Operative Findings:  Acute volvulus with the rectosigmoid junction and mid small bowel torsed and volvulized around each other at the level of the pelvic brim with hemorrhagic ascites and clearly compromised and necrotic small bowel segment without evidence of perforation or contamination  Volvulus was manually reduced and small bowel resection was performed with primary anastomosis  Complications:   None    Procedure and Technique:  The patient was taken to the operating room and placed supine on the operating room table  Pneumoboots were placed and heparin subcutaneous injection was given for DVT prophylaxis  Antibiotic prophylaxis was administered perioperatively  General endotracheal anesthesia was induced  The abdomen was prepped and draped in the usual sterile fashion and the colorectal bundle was utilized throughout the case  A timeout was performed  A midline laparotomy was made from the mid upper abdomen to the umbilicus to start with a knife  Dissection was carefully carried down through the subcutaneous tissues and the midline fascia was identified  The posterior sheath was elevated and incised with a Metzenbaum scissors  There were no adhesions to the midline here and the fascial opening was extended superiorly and inferiorly under direct palpation and visualization  A large wound protector was placed  We immediately encountered high volume hemorrhagic ascites in the pelvis and a large segment of stacked necrotic small bowel loops was identified with hemorrhagic changes in the mesentery  We ran the bowel from the ligament of treitz distally and when we arrived at the mid small bowel there was an abrupt transition from normal proximal small bowel to dilated, distended, and necrotic small bowel   When we ran the bowel from the ileocecal valve back proximally, we similarly transitioned from a normal bowel caliber to a tightly distended, dilated, necrotic, and hemorrhagic series of small bowel loops  This picture was consistent with an acute small bowel volvulus with closed loop bowel obstruction  There was no sign of perforation or contamination  We ran the colon and evaluated it as well as it was contributing to the volvulus  The small bowel had herniated underneath the rectosigmoid junction  There was no evidence of colonic ischemia  With manual evaluation and manipulation, the volvulus was reduced and the small bowel was freed  We were then able to run the bowel proximally and distally without issue and identify the approximately 50-60cm of small bowel that was not viable  We made a window in the mesentery at the base of the small bowel at each end of the non-viable segment and divided the small intestine with a 55mm blue load stapler  We then controlled the intervening mesentery with the Enseal device  The specimen was disconnected and passed off of the table for permanent pathology evaluation  There was a bounding mesenteric pulse remaining and perfusing the residual small bowel  We turned our attention to the anastomosis  The antimesenteric ends of the small bowel were opened on the corner of each small bowel limb and a 55mm blue load stapler was passed and aligned in preparation for creation of the anastomosis  Care was taken to make sure that the mesentery was free and that the two limbs were well aligned  After the stapler was fired, a crotch suture was placed in order to remove tension from the anastomosis  There was some oozing from the staples lines, some of this was self resolved, other locations we placed figure of eight sutures  The staple lines were oversewn with U stitches  The common channel staple lines were  misaligned and intermittent silk sutures were placed to line things up in that way  A TA 60mm load stapler was taken and the suture line was resected and the common channel sealed   The mesenteric defect was closed with a running 3-0 vicryl suture and this was continued onto the small bowel anastomosis and the TA staple line was oversewn and imbricated  The anastomosis was widely patent  The site of the volvulus in the pelvis was examined and we confirmed that there as no mesenteric defect or adhesive band to address  We confirmed the NGT position  We irrigated the abdomen copiously until the effluent was clear  We changed our gloves and gowns and transitioned to a fresh closing table  The fascia was closed with a running #1 PDS suture, one from each direction and tied in the middle  The subcutaneous tissues were irrigated and closed with skin staples  A sterile dressing was applied  All needle, sponge, and instrument counts were correct  The patient was extubated and taken to the ICU in stable condition   I was present for the entire procedure    Patient Disposition:  Critical Care Unit    SIGNATURE: Eleanor Carpio MD  DATE: August 10, 2021  TIME: 12:38 PM

## 2021-08-10 NOTE — ANESTHESIA PREPROCEDURE EVALUATION
Procedure:  LAPAROTOMY EXPLORATORY, POSSIBLE SMALL BOWEL RESECTION, POSSIBLE OSTOMY (N/A Abdomen)    Relevant Problems   ANESTHESIA   (+) PONV (postoperative nausea and vomiting)      CARDIO   (+) Cardiac pacemaker in situ PATIENTS [facility] PPM, set at DDD HR , per report in 2018 patient is 0% paced)   (+) Essential hypertension   (+) Hyperlipidemia   (+) Sick sinus syndrome (HCC)      GI/HEPATIC   (+) GERD (gastroesophageal reflux disease)   (+) Small bowel volvulus (HCC)      /RENAL   (+) Acute kidney injury ([address] )      GYN   (+) Breast carcinoma, female, right (HCC)   (+) Malignant neoplasm of upper-inner quadrant of left breast in female, estrogen receptor positive (HCC)   (+) S/P hysterectomy      NEURO/PSYCH   (+) Anxiety   (+) Depression   (+) Personal history of breast cancer      Other   (+) Malignant neoplasm of overlapping sites of bladder ([facility] [address] ) (s/p TURBT 7/2021)   (+) Memory loss        Physical Exam    Airway    Mallampati score: II  TM Distance: >3 FB  Neck ROM: full     Dental   lower dentures and upper dentures,     Cardiovascular  Rhythm: regular, Rate: normal,     Pulmonary  Breath sounds clear to auscultation,     Other Findings        Anesthesia Plan  ASA Score- 3 Emergent    Anesthesia Type- general with ASA Monitors  Additional Monitors: arterial line  Airway Plan: ETT      Comment: Recent labs personally reviewed:  Lab Results       Component                Value               Date                       WBC                      11 20 (H)           08/09/2021                 HGB                      11 4 (L)            08/09/2021                 PLT                      252                 08/09/2021            Lab Results       Component                Value               Date                       K                        3 3 (L)             08/09/2021                 BUN                      21                  08/09/2021                 CREATININE               1 19 08/09/2021                 GLUCOSE                  88                  07/02/2021            Lab Results       Component                Value               Date                       PTT                      22 (L)              08/10/2021             Lab Results       Component                Value               Date                       INR                      0 95                08/10/2021              Blood type Devan Dallas I, Sabi Harris MD, have personally seen and evaluated the patient prior to anesthetic care  I have reviewed the pre-anesthetic record, medical history, allergies, medications and any other medical records if appropriate to the anesthetic care  If a CRNA is involved in the case, I have reviewed the CRNA assessment, if present, and agree  I consented the patient for general anesthesia with appropriate airway support as indicated  We reviewed the risks associated including PONV, sore throat, allergic reaction to anesthetics and management plan to address these issues  We discussed the indication and risks associated with any invasive monitors that would be placed  We discussed post op pain control and expectations  We discussed rare complications including hypoxia, perioperative cardiac and neurologic events, and death based on the patient's baseline risk  All questions and concerns were addressed          Plan Factors-Exercise tolerance (METS): <4 METS  Chart reviewed  EKG reviewed  Imaging results reviewed  Existing labs reviewed  Patient summary reviewed  Patient is not a current smoker  Patient did not smoke on day of surgery  Obstructive sleep apnea risk education given perioperatively  Induction- intravenous and rapid sequence induction  Postoperative Plan- Plan for postoperative opioid use  Planned trial extubation    Informed Consent- Anesthetic plan and risks discussed with patient and son  I personally reviewed this patient with the CRNA   Discussed and agreed on the Anesthesia Plan with the ANDRZEJ Dupree

## 2021-08-10 NOTE — ANESTHESIA POSTPROCEDURE EVALUATION
Post-Op Assessment Note    CV Status:  Stable  Pain Score: 0    Pain management: adequate     Mental Status:  Arousable   Hydration Status:  Stable   PONV Controlled:  None   Airway Patency:  Patent      Post Op Vitals Reviewed: Yes      Staff: CRNA         No complications documented      BP  141/62   Temp  97   Pulse  60   Resp   18   SpO2   100

## 2021-08-10 NOTE — PLAN OF CARE
Problem: PHYSICAL THERAPY ADULT  Goal: Performs mobility at highest level of function for planned discharge setting  See evaluation for individualized goals  Description: Treatment/Interventions: Functional transfer training, Elevations, Therapeutic exercise, Endurance training, Bed mobility, Gait training  Equipment Recommended: Lisset Redbrandi       See flowsheet documentation for full assessment, interventions and recommendations  Outcome: Progressing  Note: Prognosis: Good  Problem List: Decreased endurance, Decreased mobility, Decreased safety awareness, Impaired balance (decreased focus)  Assessment: Pt is 82 y o  female seen for PT evaluation s/p admit to [address] [place] [address] on 8/9/2021 w/ Small bowel obstruction ([facility] [address] )  PT consulted to assess pt's functional mobility and d/c needs  Order placed for PT eval and tx, w/ ambulate patient order  Comorbidities affecting pt's physical performance at time of assessment include: LAPAROTOMY EXPLORATORY,  SMALL BOWEL RESECTION, macular degeneration and decreased EF and depression   PTA, pt was ambulates community distances and elevations, lives w/ son in 2 level home and retired  Personal factors affecting pt at time of IE include: lives in 1 5 story house, ambulating w/ assistive device, inability to ambulate household distances and visual impairments  Please find objective findings from PT assessment regarding body systems outlined above with impairments and limitations including impaired balance, decreased endurance, decreased activity tolerance, decreased functional mobility tolerance, decreased safety awareness and fall risk  Pt's clinical presentation is currently unstable/unpredictable seen in pt's presentation of recent surgery, low Mg and PO levels, and decline in functional mobility  Pt to benefit from continued PT tx to address deficits as defined above and maximize level of functional independent mobility and consistency   From PT/mobility standpoint, recommendation at time of d/c would be home with home health rehabilitation pending progress in order to facilitate return to PLOF  Barriers to Discharge: None        PT Discharge Recommendation: Home with home health rehabilitation     PT - OK to Discharge: No    See flowsheet documentation for full assessment   Julio Caldwell, PT

## 2021-08-10 NOTE — UTILIZATION REVIEW
Initial Clinical Review    Admission: Date/Time/Statement:   Admission Orders (From admission, onward)     Ordered        08/10/21 0354  Inpatient Admission  Once                   Orders Placed This Encounter   Procedures    Inpatient Admission     Standing Status:   Standing     Number of Occurrences:   1     Order Specific Question:   Level of Care     Answer:   Med Surg [16]     Order Specific Question:   Estimated length of stay     Answer:   More than 2 Midnights     Order Specific Question:   Certification     Answer:   I certify that inpatient services are medically necessary for this patient for a duration of greater than two midnights  See H&P and MD Progress Notes for additional information about the patient's course of treatment  ED Arrival Information     Expected Arrival Acuity    - 8/9/2021 22:44 Urgent         Means of arrival Escorted by Service Admission type    Ambulance [facility] Urgent         Arrival complaint    abd pain         Chief Complaint   Patient presents with    Abdominal Pain     pt presents with lower abdominal pain since yesterday  pt denies N/V/D     Initial Presentation:   82 y o  female who presents with 1-2 days of worsening abdominal pain, worst in the left mid abdomen  Pain worsened late Monday night [place], she reports some recent diarrhea and has been passing gas  Has had prior open olvin, lap appy, hysterectomy, TURBT  Untreated R renal pelvis mass on recent imaging including tonight  BCG treatment pending for bladder cancer that was just resected  Presents hypertensive with upper abdomen soft, left mid and lower abdomen firm and exquisitely tender with rebound and guarding, mild diffuse tenderness, well healed right subcostal incision, right lower quadrant McBurney incision, N&V  Admission work-up showing acute small bowel volvulus and concern for evolving small bowel ischemia  Admitted to inpatient status for SBO    To OR for:  LAPAROTOMY EXPLORATORY,  SMALL BOWEL RESECTION   Reduction of small bowel volvulus  Anesthesia Type: General   Findings: Intestinal volvulus, small and large bowel torsed and twisted around each other with segment of necrotic small intestine and hemorrhagic ascites and mesentery, small bowel resection performed, primary stapled anastomosis performed, TAP block performed by anesthesia    Date: 8/11/21 Day 2:   POD1   status post exploratory laparotomy, reduction of small bowel volvulus, small bowel resection, primary anastomosis  Abd soft, non-distended, incision/dressing c,d,&i  No flatus or BS yet  Remains NPO, NGT, IVF maintained       ED Triage Vitals   Temperature Pulse Respirations Blood Pressure SpO2   08/09/21 2251 08/09/21 2251 08/09/21 2251 08/09/21 2251 08/09/21 2251   (!) 97 1 °F (36 2 °C) 60 18 (!) 207/88 98 %      Temp Source Heart Rate Source Patient Position - Orthostatic VS BP Location FiO2 (%)   08/09/21 2251 08/09/21 2251 08/10/21 0900 08/09/21 2251 --   Tympanic Monitor Lying Left arm       Pain Score       08/09/21 2251       Worst Possible Pain          Wt Readings from Last 1 Encounters:   07/23/21 69 9 kg (154 lb)     Additional Vital Signs:   Date/Time  Temp  Pulse  Resp  BP  MAP (mmHg)  SpO2  Calculated FIO2 (%) - Nasal Cannula  Nasal Cannula O2 Flow Rate (L/min)  O2 Device   08/10/21 0831  97 °F (36 1 °C)Abnormal   --  14  --  --  --  --  --  --   08/10/21 0500  --  66  22  188/68Abnormal   --  99 %  36  4 L/min  --   08/10/21 0430  --  65  19  177/72Abnormal   104  99 %  36  4 L/min  --   08/10/21 0330  --  61  29Abnormal   162/70  100  100 %  --  --  --   08/10/21 0315  --  60  34Abnormal   172/72Abnormal   104  99 %  36  4 L/min  --   08/10/21 0230  --  60  49Abnormal   159/65  94  100 %  --  --  --   08/10/21 0200  --  61  31Abnormal   143/65  93  100 %  --  --  --   08/10/21 0145  --  60  30Abnormal   139/63  90  99 %  --  --  --   08/10/21 0130  --  60  31Abnormal   163/65 94  99 %  --  --  --   08/10/21 0118  --  --  25Abnormal   --  --  99 %  --  --  --   08/10/21 0100  --  60  31Abnormal   168/72  103  99 %  36  4 L/min  Nasal cannula   08/10/21 0043  --  78  24Abnormal   159/67  97  0 %Abnormal   36  4 L/min  Nasal cannula   08/09/21 2315  --  89  36Abnormal   177/84Abnormal   118  97 %  --  --  --   08/09/21 2251  97 1 °F (36 2 °C)Abnormal   60  18  207/88Abnormal   --  98 %  --  --  None (Room air)     Pertinent Labs/Diagnostic Test Results:   Results from last 7 days   Lab Units 08/11/21  0603 08/10/21  1208 08/09/21  2313   WBC Thousand/uL 8 60 12 20* 11 20*   HEMOGLOBIN g/dL 8 0* 9 2* 11 4*   HEMATOCRIT % 24 5* 29 5* 36 0*   PLATELETS Thousands/uL 210 223 252   NEUTROS ABS Thousands/µL 6 50  --   --    TOTAL NEUT ABS Thousand/uL  --  11 22* 10 30*   BANDS PCT %  --  2 2     Results from last 7 days   Lab Units 08/11/21  0603 08/10/21  1208 08/09/21  2313   SODIUM mmol/L 140 139 134   POTASSIUM mmol/L 3 7 3 9 3 3*   CHLORIDE mmol/L 105 103 94*   CO2 mmol/L 30 29 29   ANION GAP mmol/L 5 7 11   BUN mg/dL 21 18 21   CREATININE mg/dL 0 93 1 04 1 19   EGFR ml/min/1 73sq m 57 50 42   CALCIUM mg/dL 9 9 9 7 11 8*   MAGNESIUM mg/dL 2 4 1 6*  --    PHOSPHORUS mg/dL 2 6* 2 3*  --      Results from last 7 days   Lab Units 08/09/21  2313   AST U/L 14   ALT U/L 8   ALK PHOS U/L 63   TOTAL PROTEIN g/dL 7 1   ALBUMIN g/dL 4 6   TOTAL BILIRUBIN mg/dL 0 50     Results from last 7 days   Lab Units 08/11/21  0600 08/11/21  0032 08/10/21  1702 08/10/21  1140 08/10/21  0850   POC GLUCOSE mg/dl 133 159* 146* 147* 168*     Results from last 7 days   Lab Units 08/11/21  0603 08/10/21  1208 08/09/21  2313   GLUCOSE RANDOM mg/dL 126* 156* 218*     Results from last 7 days   Lab Units 08/09/21  2313   TROPONIN I ng/mL <0 03     Results from last 7 days   Lab Units 08/10/21  0146   PROTIME seconds 12 6   INR  0 95   PTT seconds 22*     Results from last 7 days   Lab Units 08/09/21  2313   LACTIC ACID mmol/L 1 8     Results from last 7 days   Lab Units 08/09/21  2313   LIPASE u/L 18     Results from last 7 days   Lab Units 08/10/21  0038   CLARITY UA  Cloudy*   COLOR UA  Red*   SPEC GRAV UA  1 010   PH UA  7 0   GLUCOSE UA mg/dl Negative   KETONES UA mg/dl Trace*   BLOOD UA  3+*   PROTEIN UA mg/dl 2+*   NITRITE UA  Negative   BILIRUBIN UA  Negative   UROBILINOGEN UA E U /dl 0 2   LEUKOCYTES UA  Trace*   WBC UA /hpf 4-10*   RBC UA /hpf Innumerable*   BACTERIA UA /hpf Occasional   EPITHELIAL CELLS WET PREP /hpf Occasional     Results from last 7 days   Lab Units 08/10/21  1208 08/09/21  2313   TOTAL COUNTED  100 100     8/10  Ct a/p=  1  Distended fluid-filled small bowel loops in the lower abdomen and pelvis with findings concerning for an obstructing small bowel volvulus  Recommend surgical consultation  2   Enhancing urothelial mass in the right renal pelvis most compatible with urothelial malignancy  Recommend urology consultation      ED Treatment:   Medication Administration from 08/09/2021 2243 to 08/10/2021 0534       Date/Time Order Dose Route Action     08/09/2021 2315 morphine (PF) 4 mg/mL injection 4 mg 4 mg Intravenous Given     08/09/2021 2331 ondansetron (ZOFRAN) injection 4 mg 4 mg Intravenous Given     08/10/2021 0003 iohexol (OMNIPAQUE) 350 MG/ML injection (SINGLE-DOSE) 100 mL 100 mL Intravenous Given     08/10/2021 0018 HYDROmorphone (DILAUDID) injection 0 5 mg 0 5 mg Intravenous Given     08/10/2021 0018 ondansetron (ZOFRAN) injection 4 mg 4 mg Intravenous Given     08/10/2021 0100 cefTRIAXone (ROCEPHIN) IVPB (premix in dextrose) 1,000 mg 50 mL 1,000 mg Intravenous New Bag     08/10/2021 0140 potassium chloride 20 mEq IVPB (premix) 20 mEq Intravenous New Bag     08/10/2021 0139 lidocaine (XYLOCAINE) 2 % topical gel 1 application 1 application Urethral Given     08/10/2021 0531 sodium chloride 0 9 % infusion   Intravenous Continue from Pre-op     08/10/2021 0148 sodium chloride 0 9 % infusion 125 mL/hr Intravenous New Bag     08/10/2021 0228 HYDROmorphone (DILAUDID) injection 0 5 mg 0 5 mg Intravenous Given     08/10/2021 0410 metroNIDAZOLE (FLAGYL) IVPB (premix) 500 mg 100 mL 500 mg Intravenous New Bag        Past Medical History:   Diagnosis Date    Bladder cancer ([facility] [address] )     Depression     GERD (gastroesophageal reflux disease)     History of chemotherapy     right breast 1996    History of echocardiogram 02/14/2018    EF 0 55 (55%), trace mitral regurgitation   Hx of radiation therapy     2017  left breast    Hyperlipidemia     Macular degeneration     Sick sinus syndrome [facility])      Admitting Diagnosis: Small bowel obstruction (HCC) [K56 609]  Abdominal pain [R10 9]  Volvulus of small intestine (HCC) [K56 2]  Small bowel volvulus ([facility] [address] ) [K56 2]  Age/Sex: 82 y o  female  Admission Orders:  Cont pulse ox  NGT flush qs prn  accuchecks  Camacho cath  Pt/ot eval & tx    Scheduled Medications:  cefTRIAXone, 1,000 mg, Intravenous, Q24H  heparin (porcine), 5,000 Units, Subcutaneous, Q8H [facility]  insulin lispro, 1-5 Units, Subcutaneous, Q6H BUD  metroNIDAZOLE, 500 mg, Intravenous, Q8H  pantoprazole, 40 mg, Intravenous, HS  potassium phosphate, 21 mmol, Intravenous, Once    Continuous IV Infusions:  dextrose 5 % and sodium chloride 0 45 %, 75 mL/hr, Intravenous, Continuous    PRN Meds:  fentaNYL, 25 mcg, Intravenous, Q5 Min PRN  HYDROmorphone, 0 2 mg, Intravenous, Q4H PRN  ondansetron, 4 mg, Intravenous, Q6H PRN    Network Utilization Review Department  ATTENTION: Please call with any questions or concerns to [phone] and carefully listen to the prompts so that you are directed to the right person  All voicemails are confidential   Tamiko List all requests for admission clinical reviews, approved or denied determinations and any other requests to dedicated fax number below belonging to the campus where the patient is receiving treatment   List of dedicated fax numbers for the Facilities:  Eliceo E Mariajose NUMBER   ADMISSION DENIALS (Administrative/Medical Necessity) [phone]   [address] (Maternity/NICU/Pediatrics) [address] [facility] [place] [address] [id number] [place] [address] Kylah Caro [address] [facility] [address] [facility] [phone]

## 2021-08-10 NOTE — PHYSICAL THERAPY NOTE
Physical Therapy Evaluation     Patient's Name: Danielle Fairbanks    Admitting Diagnosis  Small bowel obstruction ([facility] [address] ) [K56 609]  Abdominal pain [R10 9]  Volvulus of small intestine ([address] ) [K56 2]  Small bowel volvulus ([facility] [address] ) [K56 2]    Problem List  Patient Active Problem List   Diagnosis    Un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diabetes mellitus with hyperglycemia ([facility] [address] )    Essential hypertension    Hyperlipidemia    Left breast mass    Depression    Hyponatremia    Hypokalemia    Gross hematuria    Dizziness    Bradycardia    Sick sinus syndrome ([facility] [address] )    Cardiac pacemaker in situ    Abnormal ultrasound cardiogram    Anxiety    BMI 33 0-33 9,adult    Breast carcinoma, female, right ([facility] [address] )    GERD (gastroesophageal reflux disease)    Macular degeneration    Malignant neoplasm of upper-inner quadrant of left breast in female, estrogen receptor positive ([facility] [address] [facility] )    Personal history of breast cancer    S/P hysterectomy    Memory loss    Medicare annual wellness visit, subsequent    Hypercalcemia    Balance problem    Cough    Encounter for pre-operative examination    Malignant neoplasm of overlapping sites of bladder ([facility] [address] )    Encounter to discuss test results    Acute kidney injury ([facility] [address] [facility] )    Small bowel obstruction ([facility] [address] [facility] )    PONV (postoperative nausea and vomiting)       Past Medical History  Past Medical History:   Diagnosis Date    Bladder cancer ([facility] [address] )     Depression     GERD (gastroesophageal reflux disease)     History of chemotherapy     right breast 1996    History of echocardiogram 02/14/2018    EF 0 55 (55%), trace mitral regurgitation      Hx of radiation therapy     2017  left breast    Hyperlipidemia     Macular degeneration     Sick sinus syndrome [facility])        Past Surgical History  Past Surgical History:   Procedure Laterality Date    APPENDECTOMY      BREAST LUMPECTOMY Bilateral     CARDIAC PACEMAKER PLACEMENT Left 7/5/2018    [facility]    HYSTERECTOMY      LAPAROTOMY N/A 8/10/2021 Procedure: LAPAROTOMY EXPLORATORY,  SMALL BOWEL RESECTION;  Surgeon: Arlin Mcclure MD;  Location: [facility] MAIN OR;  Service: General    AK CYSTOURETHROSCOPY,FULGUR <0 5 CM LESN N/A 7/2/2021    Procedure: CYSTOSCOPY, TRANSURETHRAL RESECTION OF MEDIUM BLADDER TUMOR (TURBT); Surgeon: Zeyad Montalvo MD;  Location: [facility] Main OR;  Service: Urology          08/10/21 1512   PT Last Visit   PT Visit Date 08/10/21   Note Type   Note type Evaluation   Pain Assessment   Pain Assessment Tool Pain Assessment not indicated - pt denies pain   Home Living   Type of [address] Two level;Performs ADLs on one level; Able to live on main level with bedroom/bathroom  (1 5 story home, enters basement, FF to main floor)   Bathroom Shower/Tub Walk-in shower   Bathroom Toilet Standard  (raised toilet downstairs)   Bathroom Equipment   (none reported)   [address]  (used at baseline in community)   Additional Comments pt enjoys embroidary and baking   Prior Function   Level of [place] Independent with ADLs and functional mobility   Lives With Son  (lives in basement)   Andrew [address] 34 in the last 6 months 1 to 4   Vocational Retired   Comments -    Restrictions/Precautions   [place] Bearing Precautions Per Order No   Other Precautions Bed Alarm; Fall Risk;O2;Multiple lines;Telemetry  ([place], NG tube, NPO)   General   Family/Caregiver Present No   Cognition   Overall Cognitive Status WFL   Arousal/Participation Alert   Attention Within functional limits   Orientation Level Oriented X4   Memory Within functional limits   Following Commands Follows one step commands without difficulty   Comments pt agreeable to PT session   RLE Assessment   RLE Assessment WFL   LLE Assessment   LLE Assessment WFL   Coordination   Movements are Fluid and Coordinated 1   Sensation WFL  (pt reports at times her feet are numb)   Bed Mobility   Supine to Sit 4  Minimal assistance   Additional items Assist x 1;HOB elevated; Increased time required;Verbal cues   Additional Comments pt able to sit EOB with Supervision   Transfers   Sit to Stand 4  Minimal assistance   Additional items Assist x 1; Armrests; Increased time required;Verbal cues   Stand to Sit 4  Minimal assistance   Additional items Assist x 1; Armrests; Increased time required;Verbal cues   Stand pivot 4  Minimal assistance   Additional items Assist x 1; Increased time required;Verbal cues   Additional Comments pt used Rw for transfer; cues for slow transitions and to use UEs to lower self in chair   Balance   Static Sitting Good   Dynamic Sitting Fair +   Static Standing Fair   Dynamic Standing Fair -   Endurance Deficit   Endurance Deficit Yes   Endurance Deficit Description pt was tired post short amount of movementhowever stated that it did feel good to sit up   Activity Tolerance   Activity Tolerance Patient limited by fatigue   Medical Staff Made Aware Pt seen as a co-eval with OT due to the patient's co-morbidities, clinically unstable presentation, and present impairments which are a regression from the patient's baseline  Nurse Made Aware TARUN Rogers made aware of findings and verebalized pt approriate for PT session   Assessment   Prognosis Good   Problem List Decreased endurance;Decreased mobility; Decreased safety awareness; Impaired balance  (decreased focus)   Assessment Pt is 82 y o  female seen for PT evaluation s/p admit [facility] on 8/9/2021 w/ Small bowel obstruction ([facility] [address] )  PT consulted to assess pt's functional mobility and d/c needs  Order placed for PT eval and tx, w/ ambulate patient order  Comorbidities affecting pt's physical performance at time of assessment include: LAPAROTOMY EXPLORATORY,  SMALL BOWEL RESECTION, macular degeneration and decreased EF and depression    PTA, pt was ambulates community distances and elevations, lives w/ son in 2 level home and retired  Personal factors affecting pt at time of IE include: lives in 1 5 story house, ambulating w/ assistive device, inability to ambulate household distances and visual impairments  Please find objective findings from PT assessment regarding body systems outlined above with impairments and limitations including impaired balance, decreased endurance, decreased activity tolerance, decreased functional mobility tolerance, decreased safety awareness and fall risk  Pt's clinical presentation is currently unstable/unpredictable seen in pt's presentation of recent surgery, low Mg and PO levels, and decline in functional mobility  Pt to benefit from continued PT tx to address deficits as defined above and maximize level of functional independent mobility and consistency  From PT/mobility standpoint, recommendation at time of d/c would be home with home health rehabilitation pending progress in order to facilitate return to PLOF  Barriers to Discharge None   Goals   Patient Goals to get OOB   LTG Expiration Date 08/20/21   Long Term Goal #1 Pt will: Perform bed mobility tasks to modified I to improve activity tolerance  Perform transfers to modified I to improve ease of transfers  Perform ambulation with MI and RW for 250 ft to   increase Indep in home environment  Increase dynamic standing balance to F+ to decrease fall risk  Increase OOB activity tolerance to 10 minutes without s/s of exertion to decrease fall risk  Navigate up and down 8 steps with MI so patient can enter and exit home  PT Treatment Day 0   Plan   Treatment/Interventions Functional transfer training;Elevations; Therapeutic exercise; Endurance training;Bed mobility;Gait training   PT Frequency   (3-5x/wk)   Recommendation   PT Discharge Recommendation Home with home health rehabilitation   Equipment Recommended [facility] Recommended Wheeled walker   Change/add to [place]?  No   PT - OK to Discharge No Additional Comments upon conclusion pt seated in chair with SCDs intact and all needs in reach   Additional Comments 2 The patient's AM-PAC Basic Mobility Inpatient Short Form Raw Score is 17, Standardized Score is 39 67  A standardized score less than 42 9 suggests the patient may benefit from discharge to post-acute rehabilitation services  Please also refer to the recommendation of the Physical Therapist for safe discharge planning     AM-PAC Basic Mobility Inpatient   Turning in Bed Without Bedrails 3   Lying on Back to Sitting on Edge of Flat Bed 3   Moving Bed to Chair 3   Standing Up From Chair 3   Walk in Room 3   Climb 3-5 Stairs 2   Basic Mobility Inpatient Raw Score 17   Basic Mobility Standardized Score 39 67   As long as pt progresses as expected she will be safe to return home with [address], PT

## 2021-08-10 NOTE — NURSING NOTE
Received patient from OR; S/P exploratory Lap with SBO  Vital signs stable; awake; responds appropriately;  NG intact to Lt nares; LCWS; Paced Rhythm on cardiac monitor; Lungs clear, diminished bilaterally; Abdomen soft, with mild tenderness; Midline abdominal dressing CDI; Abdominal binder remains intact; Indwelling catheter patent for yellow urine  Bilateral soft wrist restraints applied for safety and injury prevention

## 2021-08-10 NOTE — PLAN OF CARE
Problem: Potential for Falls  Goal: Patient will remain free of falls  Description: INTERVENTIONS:  - Educate patient/family on patient safety including physical limitations  - Instruct patient to call for assistance with activity   - Consult OT/PT to assist with strengthening/mobility   - Keep Call bell within reach  - Keep bed low and locked with side rails adjusted as appropriate  - Keep care items and personal belongings within reach  - Initiate and maintain comfort rounds  - Make Fall Risk Sign visible to staff  - Offer Toileting every 2 Hours, in advance of need  - Initiate/Maintain  bed   Problem: PAIN - ADULT  Goal: Verbalizes/displays adequate comfort level or baseline comfort level  Description: Interventions:  - Encourage patient to monitor pain and request assistance  - Assess pain using appropriate pain scale  - Administer analgesics based on type and severity of pain and evaluate response  - Implement non-pharmacological measures as appropriate and evaluate response  - Consider cultural and social influences on pain and pain management  - Notify physician/advanced practitioner if interventions unsuccessful or patient reports new pain  Outcome: Progressing     Problem: INFECTION - ADULT  Goal: Absence or prevention of progression during hospitalization  Description: INTERVENTIONS:  - Assess and monitor for signs and symptoms of infection  - Monitor lab/diagnostic results  - Monitor all insertion sites, i e  indwelling lines, tubes, and drains  - Monitor endotracheal if appropriate and nasal secretions for changes in amount and color  - [place] appropriate cooling/warming therapies per order  - Administer medications as ordered  - Instruct and encourage patient and family to use good hand hygiene technique  - Identify and instruct in appropriate isolation precautions for identified infection/condition  Outcome: Progressing  Goal: Absence of fever/infection during neutropenic period  Description: INTERVENTIONS:  - Monitor WBC    Outcome: Progressing   alarm  - Obtain necessary fall risk management equipment:   - Apply yellow socks and bracelet for high fall risk patients  - Consider moving patient to room near nurses station  Outcome: Progressing

## 2021-08-10 NOTE — ED PROVIDER NOTES
History  Chief Complaint   Patient presents with    Abdominal Pain     pt presents with lower abdominal pain since yesterday  pt denies N/V/D       82YEAR-OLD FEMALE    PATIENT IS HERE FOR SUPRAPUBIC AND LEFT LOWER QUADRANT ABDOMINAL PAIN    STATES THIS STARTED YESTERDAY, CAME ON GRADUALLY    IT HAS BEEN COMING AND GOING, BUT IT HAS BEEN CONSTANT FOR THE LAST COUPLE OF HOURS  IT IS NOW  RATED 8/10    DESCRIBED AS SHARP      ASSOCIATED SYMPTOMS:  URINARY  SYMPTOMS: THERE IS NO DYSURIA, NO HEMATURIA, NO FREQUENCY  SHE DENIES NAUSEA, DENIES ANY VOMITING  DENIES FEVERS, CHILLS    DENIES LOOSE STOOLS, NO DIARRHEA, NO BLOODY STOOLS - NOT BLACK OR BLOODY      ALLEVIATING OR EXACERBATING FACTORS:  UNCERTAIN       INTERVENTIONS: NONE        History provided by:  Patient  Abdominal Pain  Pain location:  Suprapubic and LLQ  Pain quality: sharp    Pain severity:  Moderate  Onset quality:  Gradual  Timing:  Constant  Progression:  Worsening  Relieved by:  Nothing  Worsened by:  Nothing  Ineffective treatments:  None tried  Associated symptoms: nausea and vomiting    Associated symptoms: no chest pain, no chills, no constipation, no cough, no dysuria, no fatigue, no fever, no hematemesis, no hematochezia, no hematuria, no melena and no shortness of breath        Prior to Admission Medications   Prescriptions Last Dose Informant Patient Reported? Taking?    Apoaequorin (Prevagen) 10 MG CAPS  Self Yes No   Sig: Take 10 mg by mouth daily in the early morning    Cholecalciferol 25 MCG (1000 UT) tablet  Self Yes No   Sig: Take 1,000 Units by mouth daily   DOCOSAHEXAENOIC ACID PO  Self Yes No   Sig: Take 2 capsules by mouth daily   anastrozole (ARIMIDEX) 1 mg tablet  Self Yes No   Sig: Take 1 mg by mouth daily with lunch    atorvastatin (LIPITOR) 40 mg tablet   No No   Sig: Take 1 tablet (40 mg total) by mouth daily at bedtime   docusate sodium (COLACE) 100 mg capsule  Self No No   Sig: Take 1 capsule (100 mg total) by mouth 3 (three) times a day for 7 days   ferrous sulfate 324 (65 Fe) mg  Self No No   Sig: Take 1 tablet (324 mg total) by mouth 2 (two) times a day before meals   metFORMIN (GLUCOPHAGE) 500 mg tablet  Self No No   Sig: Take 1 tablet (500 mg total) by mouth daily   polyethylene glycol-propylene glycol (SYSTANE) 0 4-0 3 %  Self Yes No   Sig: Apply 4 drops to eye 2 (two) times a day   potassium chloride (K-DUR,KLOR-CON) 10 mEq tablet  Self No No   Sig: Take 2 tablets daily   sertraline (ZOLOFT) 50 mg tablet  Self No No   Sig: TAKE ONE TABLET BY MOUTH ONE TIME DAILY    Patient taking differently: Take 50 mg by mouth daily with breakfast    triamterene-hydrochlorothiazide (MAXZIDE-25) 37 5-25 mg per tablet   No No   Sig: TAKE ONE TABLET BY MOUTH ONE TIME DAILY       Facility-Administered Medications: None       Past Medical History:   Diagnosis Date    Bladder cancer ([facility] [address] )     Depression     GERD (gastroesophageal reflux disease)     History of chemotherapy     right breast 1996    History of echocardiogram 02/14/2018    EF 0 55 (55%), trace mitral regurgitation   Hx of radiation therapy     2017  left breast    Hyperlipidemia     Macular degeneration     Sick sinus syndrome [facility])        Past Surgical History:   Procedure Laterality Date    APPENDECTOMY      BREAST LUMPECTOMY Bilateral     CARDIAC PACEMAKER PLACEMENT [facility] 7/5/2018    [facility]    HYSTERECTOMY      MI CYSTOURETHROSCOPY,FULGUR <0 5 CM LESN N/A 7/2/2021    Procedure: CYSTOSCOPY, TRANSURETHRAL RESECTION OF MEDIUM BLADDER TUMOR (TURBT); Surgeon: Maria M Posey MD;  Location: [facility];  Service: Urology       Family History   Problem Relation Age of Onset    Alzheimer's disease Father     Uterine cancer Daughter      I have reviewed and agree with the history as documented      E-Cigarette/Vaping    E-Cigarette Use Never User      E-Cigarette/Vaping Substances    Nicotine No     THC No     CBD No     Flavoring No     Other No     Unknown No Social History     Tobacco Use    Smoking status: Never Smoker    Smokeless tobacco: Never Used   Vaping Use    Vaping Use: Never used   Substance Use Topics    Alcohol use: No    Drug use: No       Review of Systems   Constitutional: Negative for chills, diaphoresis, fatigue and fever  Respiratory: Negative for cough, shortness of breath, wheezing and stridor  Cardiovascular: Negative for chest pain, palpitations and leg swelling  Gastrointestinal: Positive for abdominal pain, nausea and vomiting  Negative for blood in stool, constipation, hematemesis, hematochezia and melena  Genitourinary: Negative for difficulty urinating, dysuria, flank pain, frequency and hematuria  Musculoskeletal: Negative for arthralgias, back pain, gait problem, joint swelling, myalgias, neck pain and neck stiffness  Skin: Negative for rash and wound  Neurological: Negative for dizziness, light-headedness and headaches  All other systems reviewed and are negative  Physical Exam  Physical Exam  Constitutional:       General: She is not in acute distress  Appearance: She is well-developed  She is not ill-appearing, toxic-appearing or diaphoretic  HENT:      Head: Normocephalic and atraumatic  Nose: Nose normal       Mouth/Throat:      Pharynx: No oropharyngeal exudate  Eyes:      General: No scleral icterus  Right eye: No discharge  Left eye: No discharge  Conjunctiva/sclera: Conjunctivae normal       Pupils: Pupils are equal, round, and reactive to light  Neck:      Vascular: No JVD  Trachea: No tracheal deviation  Cardiovascular:      Rate and Rhythm: Normal rate and regular rhythm  Heart sounds: Normal heart sounds  No murmur heard  No friction rub  No gallop  Pulmonary:      Effort: Pulmonary effort is normal  No respiratory distress  Breath sounds: Normal breath sounds  No stridor  No wheezing, rhonchi or rales     Chest:      Chest wall: No tenderness  Abdominal:      General: Bowel sounds are normal  There is no distension  Palpations: Abdomen is soft  There is no mass  Tenderness: There is abdominal tenderness in the periumbilical area, suprapubic area and left lower quadrant  There is no right CVA tenderness, left CVA tenderness, guarding or rebound  Negative signs include Estrada's sign, McBurney's sign and psoas sign  Hernia: No hernia is present  Musculoskeletal:         General: No tenderness or deformity  Normal range of motion  Cervical back: Normal range of motion and neck supple  Lymphadenopathy:      Cervical: No cervical adenopathy  Skin:     General: Skin is warm  Capillary Refill: Capillary refill takes less than 2 seconds  Coloration: Skin is not pale  Findings: No erythema or rash  Neurological:      General: No focal deficit present  Mental Status: She is alert and oriented to person, place, and time  Cranial Nerves: No cranial nerve deficit  Sensory: No sensory deficit  Motor: No abnormal muscle tone  Coordination: Coordination normal    Psychiatric:         Mood and Affect: Mood normal          Behavior: Behavior normal          Thought Content:  Thought content normal          Judgment: Judgment normal          Vital Signs  ED Triage Vitals [08/09/21 2251]   Temperature Pulse Respirations Blood Pressure SpO2   (!) 97 1 °F (36 2 °C) 60 18 (!) 207/88 98 %      Temp Source Heart Rate Source Patient Position - Orthostatic VS BP Location FiO2 (%)   Tympanic Monitor -- Left arm --      Pain Score       Worst Possible Pain           Vitals:    08/10/21 0200 08/10/21 0230 08/10/21 0315 08/10/21 0330   BP: 143/65 159/65 (!) 172/72 162/70   Pulse: 61 60 60 61         Visual Acuity      ED Medications  Medications   sodium chloride 0 9 % infusion (125 mL/hr Intravenous New Bag 8/10/21 0148)   metroNIDAZOLE (FLAGYL) IVPB (premix) 500 mg 100 mL (500 mg Intravenous New Bag 8/10/21 0410)   morphine (PF) 4 mg/mL injection 4 mg (4 mg Intravenous Given 8/9/21 2315)   ondansetron (ZOFRAN) injection 4 mg (4 mg Intravenous Given 8/9/21 2331)   iohexol (OMNIPAQUE) 350 MG/ML injection (SINGLE-DOSE) 100 mL (100 mL Intravenous Given 8/10/21 0003)   HYDROmorphone (DILAUDID) injection 0 5 mg (0 5 mg Intravenous Given 8/10/21 0018)   ondansetron (ZOFRAN) injection 4 mg (4 mg Intravenous Given 8/10/21 0018)   cefTRIAXone (ROCEPHIN) IVPB (premix in dextrose) 1,000 mg 50 mL (0 mg Intravenous Stopped 8/10/21 0139)   potassium chloride 20 mEq IVPB (premix) (0 mEq Intravenous Stopped 8/10/21 0350)   lidocaine (XYLOCAINE) 2 % topical gel 1 application (1 application Urethral Given 8/10/21 0139)   HYDROmorphone (DILAUDID) injection 0 5 mg (0 5 mg Intravenous Given 8/10/21 0228)       Diagnostic Studies  Results Reviewed     Procedure Component Value Units Date/Time    Protime-INR [[id number]]  (Normal) Collected: 08/10/21 0146    Lab Status: Final result Specimen: Blood from Arm, Left Updated: 08/10/21 0203     Protime 12 6 seconds      INR 0 95    APTT [[id number]]  (Abnormal) Collected: 08/10/21 0146    Lab Status: Final result Specimen: Blood from Arm, Left Updated: 08/10/21 0203     PTT 22 seconds     Urine Microscopic [[id number]]  (Abnormal) Collected: 08/10/21 0038    Lab Status: Final result Specimen: Urine, Indwelling Camacho Catheter Updated: 08/10/21 0050     RBC, UA Innumerable /hpf      WBC, UA 4-10 /hpf      Epithelial Cells Occasional /hpf      Bacteria, UA Occasional /hpf     UA w Reflex to Microscopic w Reflex to Culture [[id number]]  (Abnormal) Collected: 08/10/21 0038    Lab Status: Final result Specimen: Urine, Indwelling Camacho Catheter Updated: 08/10/21 0045     Color, UA Red     Clarity, UA Cloudy     Specific [place], UA 1 010     pH, UA 7 0     Leukocytes, UA Trace     Nitrite, UA Negative     Protein, UA 2+ mg/dl      Glucose, UA Negative mg/dl      Ketones, UA Trace mg/dl Urobilinogen, UA 0 2 E U /dl      Bilirubin, UA Negative     Blood, UA 3+    CMP [[id number]]  (Abnormal) Collected: 08/09/21 2313    Lab Status: Final result Specimen: Blood from Arm, Left Updated: 08/09/21 2338     Sodium 134 mmol/L      Potassium 3 3 mmol/L      Chloride 94 mmol/L      CO2 29 mmol/L      ANION GAP 11 mmol/L      BUN 21 mg/dL      Creatinine 1 19 mg/dL      Glucose 218 mg/dL      Calcium 11 8 mg/dL      AST 14 U/L      ALT 8 U/L      Alkaline Phosphatase 63 U/L      Total Protein 7 1 g/dL      Albumin 4 6 g/dL      Total Bilirubin 0 50 mg/dL      eGFR 42 ml/min/1 73sq m     Narrative:      [place] guidelines for Chronic Kidney Disease (CKD):     Stage 1 with normal or high GFR (GFR > 90 mL/min/1 73 square meters)    Stage 2 Mild CKD (GFR = 60-89 mL/min/1 73 square meters)    Stage 3A Moderate CKD (GFR = 45-59 mL/min/1 73 square meters)    Stage 3B Moderate CKD (GFR = 30-44 mL/min/1 73 square meters)    Stage 4 Severe CKD (GFR = 15-29 mL/min/1 73 square meters)    Stage 5 End Stage CKD (GFR <15 mL/min/1 73 square meters)  Note: GFR calculation is accurate only with a steady state creatinine    Lactic acid [[id number]]  (Normal) Collected: 08/09/21 2313    Lab Status: Final result Specimen: Blood from Arm, Left Updated: 08/09/21 2338     LACTIC ACID 1 8 mmol/L     Narrative:      Result may be elevated if tourniquet was used during collection      Lipase [[id number]]  (Normal) Collected: 08/09/21 2313    Lab Status: Final result Specimen: Blood from Arm, Left Updated: 08/09/21 2338     Lipase 18 u/L     Troponin I [[id number]]  (Normal) Collected: 08/09/21 2313    Lab Status: Final result Specimen: Blood from Arm, Left Updated: 08/09/21 2337     Troponin I <0 03 ng/mL     Manual Differential (Non Wam) [[id number]]  (Abnormal) Collected: 08/09/21 2313    Lab Status: Final result Specimen: Blood from Arm, Left Updated: 08/09/21 2332     Segmented % 90 %      Bands % 2 % Lymphocytes % 4 %      Monocytes % 4 %      Neutrophils Absolute 10 30 Thousand/uL      Lymphocytes Absolute 0 45 Thousand/uL      Monocytes Absolute 0 45 Thousand/uL      Total Counted 100     RBC Morphology --     Anisocytosis Present     Hypochromia Present     Ovalocytes Present     Poikilocytes Present     Platelet Estimate Adequate    CBC and differential [[id number]]  (Abnormal) Collected: 08/09/21 2313    Lab Status: Final result Specimen: Blood from Arm, Left Updated: 08/09/21 2321     WBC 11 20 Thousand/uL      RBC 4 44 Million/uL      Hemoglobin 11 4 g/dL      Hematocrit 36 0 %      MCV 81 fL      MCH 25 8 pg      MCHC 31 8 g/dL      RDW 20 5 %      MPV 6 8 fL      Platelets 366 Thousands/uL                  CT abdomen pelvis with contrast   Final Result by Olegario Homans, MD (08/10 0114)         1  Distended fluid-filled small bowel loops in the lower abdomen and pelvis with findings concerning for an obstructing small bowel volvulus  Recommend surgical consultation  2   Enhancing urothelial mass in the right renal pelvis most compatible with urothelial malignancy  Recommend urology consultation               I personally discussed this study with Igor Maradiaga on 8/10/2021 at 1:04 AM                   Workstation performed: [id number]                    Procedures  Procedures         ED Course  ED Course as of Aug 10 0442   [facility] Aug 09, 2021   2335 Manual Differential (Non Wam)(!)   2335 CBC and differentialRboi Mg Aug 10, 2021   0008 Pt requesting something more for pain       0008 CMP(!)   0030 Pt stable appearing  She has some oxygen desaturation, NC ordered     Camacho ordered for urinary retention in the setting of what looks like acute SBO      0110 Tiger text received from radiologist Aurelio  SB Volvulus with obstruction       0112 Dw Dr Sherren Cue the case   She will review the CT scan and call back  She recommends NGT      0115 Pt is feeling much better after Camacho was inserted  She has 400 cc of bloody urine  Vss       0142  [facility] has reviewed the CT scan  She is coming in to see the pt       0231 Ankit Correia  ICU has room for this pt, if needed      [facility] is here to see the pt       [phone] [facility] to admit  OR team being called in                                 SBIRT 22yo+      Most Recent Value   SBIRT (23 yo +)   In order to provide better care to our patients, we are screening all of our patients for alcohol and drug use  Would it be okay to ask you these screening questions? Yes Filed at: 08/09/2021 2334   Initial Alcohol Screen: US AUDIT-C    1  How often do you have a drink containing alcohol?  0 Filed at: 08/09/2021 2334   2  How many drinks containing alcohol do you have on a typical day you are drinking? 0 Filed at: 08/09/2021 2334   3a  Male UNDER 65: How often do you have five or more drinks on one occasion? 0 Filed at: 08/09/2021 2334   3b  FEMALE Any Age, or MALE 65+: How often do you have 4 or more drinks on one occassion? 0 Filed at: 08/09/2021 2334   Audit-C Score  0 Filed at: 08/09/2021 2334   SETH: How many times in the past year have you    Used an illegal drug or used a prescription medication for non-medical reasons?   Never Filed at: 08/09/2021 2334                    MDM    Disposition  Final diagnoses:   Small bowel obstruction ([address] )   Volvulus of small intestine ([address] )     Time reflects when diagnosis was documented in both MDM as applicable and the Disposition within this note     Time User Action Codes Description Comment    8/10/2021  1:19 AM Riya Cárdenas Add [K56 609] Small bowel obstruction ([address] )     8/10/2021  3:27 AM Shanell Butts Add [K56 2] Small bowel volvulus ([address] )     8/10/2021  3:27 AM Shanell Butts Add [K56 2] Volvulus of small intestine ([address] )     8/10/2021  3:53 AM Riya Cárdenas Modify [K56 2] Volvulus of small intestine [facility])       ED Disposition     ED Disposition Condition Date/Time Comment    Admit Stable Tue Aug 10, 2021 3:53 AM Case was discussed with Dr Jermaine Lugo and the patient's admission status was agreed to be Admission Status: inpatient status to the service of Dr Jermaine Lugo   Follow-up Information    None         Patient's Medications   Discharge Prescriptions    No medications on file     No discharge procedures on file      PDMP Review       Value Time User    PDMP Reviewed  Yes 7/2/2021  2:57 PM Allen Edwards MD          ED Provider  Electronically Signed by           Hector Allison MD  08/10/21 12451 MD Fabian  08/10/21 1081

## 2021-08-10 NOTE — ANESTHESIA PROCEDURE NOTES
Peripheral Block    Patient location during procedure: OR  Start time: 8/10/2021 8:07 AM  Reason for block: at surgeon's request and post-op pain management  Staffing  Performed: anesthesiologist   Anesthesiologist: Torrie Myers MD  Preanesthetic Checklist  Completed: patient identified, IV checked, site marked, risks and benefits discussed, surgical consent, monitors and equipment checked, pre-op evaluation and timeout performed  Peripheral Block  Patient position: supine  Prep: ChloraPrep  Patient monitoring: continuous pulse ox, frequent blood pressure checks, cardiac monitor and heart rate  Block type: TAP  Laterality: bilateral  Injection technique: single-shot  Procedures: ultrasound guided, Ultrasound guidance required for the procedure to increase accuracy and safety of medication placement and decrease risk of complications    Ultrasound permanent image savedbupivacaine (MARCAINE) 0 25 % perineural infiltration, 20 mL (Exparel 20ml)  Needle  Needle type: Stimuplex   Needle localization: ultrasound guidance  Test dose: negative  Assessment  Injection assessment: incremental injection, local visualized surrounding nerve on ultrasound, negative aspiration for CSF, negative aspiration for heme and no paresthesia on injection  Paresthesia pain: none  Heart rate change: no  Slow fractionated injection: yes  Post-procedure:  site cleaned  patient tolerated the procedure well with no immediate complications

## 2021-08-10 NOTE — INTERIM OP NOTE
LAPAROTOMY EXPLORATORY,  SMALL BOWEL RESECTION  Postoperative Note  PATIENT NAME: Horacio Reynoso  : 1935  MRN: [id number]  [facility] OR ROOM 02    Surgery Date: 8/10/2021    Preop Diagnosis:  Volvulus of small intestine ([facility] [address] ) [K56 2]    Post-Op Diagnosis Codes:  Volvulus of small intestine ([address] ) [K56 2]    Procedure(s) (LRB):  LAPAROTOMY EXPLORATORY,  SMALL BOWEL RESECTION (N/A)   Reduction of small bowel volvulus    Surgeon(s) and Role:     * Jluis Alamo MD - Primary     * Ana Laura Humphries MD - Assisting     * Bhumika Baird PA-C - Assisting    Specimens:  ID Type Source Tests Collected by Time Destination   1 :  Tissue Small Bowel, NOS TISSUE EXAM Jluis Alamo MD 8/10/2021 5786        Estimated Blood Loss:   150 mL    Anesthesia Type:   General     Findings: Intestinal volvulus, small and large bowel torsed and twisted around each other with segment of necrotic small intestine and hemorrhagic ascites and mesentery, small bowel resection performed, primary stapled anastomosis performed, TAP block performed by anesthesia    Complications:   None    SIGNATURE: Jluis Alamo MD   DATE: August 10, 2021   TIME: 8:07 AM

## 2021-08-10 NOTE — H&P
H&P Exam - General Surgery   Leodis Epley 82 y o  female MRN: [id number]  Unit/Bed#: ED 03 Encounter: [id number]    Assessment/Plan     85F with multiple prior operations, now with acute small bowel volvulus and concern for evolving small bowel ischemia  She is NPO with IVF, ceftriaxone was given and we will add flagyl  NGT and keenan are in place  Discussed with patient's daughter and the patient and informed consent was obtained for exploratory laparotomy, possible small bowel resection, possible stoma, possible second look operation  ICU bed arranged  History of Present Illness     HPI:  Leodis Epley is a 82 y o  female who presents with 1-2 days of worsening abdominal pain, worst in the left mid abdomen  Pain worsened late Monday night with nausea[place]omiting [place] the [place], she reports some recent diarrhea and has been passing gas  Has had prior open olvin, lap appy, hysterectomy, TURBT  Untreated R renal pelvis mass on recent imaging including tonight  BCG treatment pending for bladder cancer that was just resected  Ca 11 8, lactate 1 8, K 3 3, WBC 11 with 2 bands  UA positive, Ceftriaxone given  Patient with ongoing abdominal pain despite multiple doses of narcotics  NGT and keenan placed in the ED  Review of Systems   Constitutional: Negative for appetite change  Gastrointestinal: Positive for abdominal distention, abdominal pain, diarrhea, nausea and vomiting  Negative for constipation  Genitourinary:        Decreasing urine output   All other systems reviewed and are negative  Historical Information   Past Medical History:   Diagnosis Date    Bladder cancer ([facility] [address] )     Depression     GERD (gastroesophageal reflux disease)     History of chemotherapy     right breast 1996    History of echocardiogram 02/14/2018    EF 0 55 (55%), trace mitral regurgitation      Hx of radiation therapy     2017  left breast    Hyperlipidemia     Macular degeneration     Sick sinus syndrome ([address] ) Past Surgical History:   Procedure Laterality Date    APPENDECTOMY      BREAST LUMPECTOMY Bilateral     CARDIAC PACEMAKER PLACEMENT Left 7/5/2018    [facility]    HYSTERECTOMY      [place] CYSTOURETHROSCOPY,FULGUR <0 5 CM LESN N/A 7/2/2021    Procedure: CYSTOSCOPY, TRANSURETHRAL RESECTION OF MEDIUM BLADDER TUMOR (TURBT); Surgeon: Tresa Franco MD;  [facility];  Service: Urology     Social History   Social History     Substance and Sexual Activity   Alcohol Use No     Social History     Substance and Sexual Activity   Drug Use No     Social History     Tobacco Use   Smoking Status Never Smoker   Smokeless Tobacco Never Used     Family History: non-contributory    Meds/Allergies   all medications and allergies reviewed  Allergies   Allergen Reactions    Celecoxib Swelling     celebrex- swelling of throat    Loratadine Swelling     Jaw swelled,couldn't swallow; face swelling    Aspirin GI Intolerance     : nausea; okay with Ecotrin    Lisinopril Swelling       Objective   First Vitals:   Blood Pressure: (!) 207/88 (08/09/21 2251)  Pulse: 60 (08/09/21 2251)  Temperature: (!) 97 1 °F (36 2 °C) (08/09/21 2251)  Temp Source: Tympanic (08/09/21 2251)  Respirations: 18 (08/09/21 2251)  SpO2: 98 % (08/09/21 2251)    Current Vitals:   Blood Pressure: 162/70 (08/10/21 0330)  Pulse: 61 (08/10/21 0330)  Temperature: (!) 97 1 °F (36 2 °C) (08/09/21 2251)  Temp Source: Tympanic (08/09/21 2251)  Respirations: (!) 29 (08/10/21 0330)  SpO2: 100 % (08/10/21 0330)    No intake or output data in the 24 hours ending 08/10/21 0351    Invasive Devices     Peripheral Intravenous Line            Peripheral IV 08/09/21 Left Antecubital <1 day          Drain            NG/OG/Enteral Tube Nasogastric 16 Fr Left nares <1 day    Urethral Catheter Straight-tip 16 Fr  <1 day                Physical Exam  Vitals reviewed  Constitutional:       General: She is not in acute distress  Appearance: She is not toxic-appearing  HENT:      Mouth/Throat:      Mouth: Mucous membranes are dry  Cardiovascular:      Rate and Rhythm: Normal rate  Comments: Pacemaker  Pulmonary:      Effort: Pulmonary effort is normal  No respiratory distress  Abdominal:      Comments: Upper abdomen soft, left mid and lower abdomen firm and exquisitely tender with rebound and guarding, mild diffuse tenderness, well healed right subcostal incision, right lower quadrant McBurney incision   Skin:     General: Skin is warm and dry  Psychiatric:         Mood and Affect: Mood normal          Behavior: Behavior normal          Lab Results:   I have personally reviewed pertinent lab results  , CBC:   Lab Results   Component Value Date    WBC 11 20 (H) 08/09/2021    HGB 11 4 (L) 08/09/2021    HCT 36 0 (L) 08/09/2021    MCV 81 08/09/2021     08/09/2021    MCH 25 8 (L) 08/09/2021    MCHC 31 8 08/09/2021    RDW 20 5 (H) 08/09/2021    MPV 6 8 (L) 08/09/2021   , CMP:   Lab Results   Component Value Date    SODIUM 134 08/09/2021    K 3 3 (L) 08/09/2021    CL 94 (L) 08/09/2021    CO2 29 08/09/2021    BUN 21 08/09/2021    CREATININE 1 19 08/09/2021    CALCIUM 11 8 (H) 08/09/2021    AST 14 08/09/2021    ALT 8 08/09/2021    ALKPHOS 63 08/09/2021    EGFR 42 08/09/2021   , Coagulation:   Lab Results   Component Value Date    INR 0 95 08/10/2021   , Urinalysis:   Lab Results   Component Value Date    COLORU Red (A) 08/10/2021    CLARITYU Cloudy (A) 08/10/2021    SPECGRAV 1 010 08/10/2021    PHUR 7 0 08/10/2021    LEUKOCYTESUR Trace (A) 08/10/2021    NITRITE Negative 08/10/2021    GLUCOSEU Negative 08/10/2021    KETONESU Trace (A) 08/10/2021    BILIRUBINUR Negative 08/10/2021    BLOODU 3+ (A) 08/10/2021   , Amylase: No results found for: AMYLASE, Lipase:   Lab Results   Component Value Date    LIPASE 18 08/09/2021     Imaging: I have personally reviewed pertinent reports  and I have personally reviewed pertinent films in PACS      Signature:   Dequan Aguero, MD  Date: 8/10/2021 Time: 3:51 AM

## 2021-08-10 NOTE — PLAN OF CARE
Problem: OCCUPATIONAL THERAPY ADULT  Goal: Performs self-care activities at highest level of function for planned discharge setting  See evaluation for individualized goals  Description: Treatment Interventions: ADL retraining, Functional transfer training, Endurance training, Equipment evaluation/education, Neuromuscular reeducation, Energy conservation, Activityengagement    See flowsheet documentation for full assessment, interventions and recommendations  Note: Limitation: Decreased ADL status, Decreased Safe judgement during ADL, Decreased endurance, Decreased self-care trans, Decreased high-level ADLs  Prognosis: Good  Assessment: Pt is a 82 y o  female seen for OT evaluation s/p admit to [place] [address] on 8/9/2021 w/ Small bowel obstruction ([facility] [address] )  Comorbidities affecting pt's functional performance at time of assessment include: Small bowel obstruction, TILA  Personal factors affecting pt at time of IE include:steps to enter environment, difficulty performing ADLS and difficulty performing IADLS   Prior to admission, pt was Mod I with ADLs  Upon evaluation: the following deficits impact occupational performance: decreased balance, decreased tolerance and impaired attention  Pt to benefit from continued skilled OT tx while in the hospital to address deficits as defined above and maximize level of functional independence w ADL's and functional mobility  Occupational Performance areas to address include: bathing/shower, toilet hygiene, dressing, functional mobility and clothing management  From OT standpoint, recommendation at time of d/c would be Home OT       OT Discharge Recommendation: Home with home health rehabilitation  OT - OK to Discharge: Yes (when medically stable)    Leonid Singletary MS, OTR/L

## 2021-08-10 NOTE — OCCUPATIONAL THERAPY NOTE
Occupational Therapy Evaluation      Elisabet Laradeepak    8/10/2021    Principal Problem:    Small bowel obstruction ([facility] [address] )  Active Problems:    Acute kidney injury ([facility] [address] )    PONV (postoperative nausea and vomiting)      Past Medical History:   Diagnosis Date    Bladder cancer ([facility] [address] )     Depression     GERD (gastroesophageal reflux disease)     History of chemotherapy     right breast 1996    History of echocardiogram 02/14/2018    EF 0 55 (55%), trace mitral regurgitation   Hx of radiation therapy     2017  left breast    Hyperlipidemia     Macular degeneration     Sick sinus syndrome [facility])        Past Surgical History:   Procedure Laterality Date    APPENDECTOMY      BREAST LUMPECTOMY Bilateral     CARDIAC PACEMAKER PLACEMENT Left 7/5/2018    [facility]    HYSTERECTOMY      LAPAROTOMY N/A 8/10/2021    Procedure: LAPAROTOMY EXPLORATORY,  SMALL BOWEL RESECTION;  Surgeon: Erinn Fonseca MD;  Location: [address] [place] [address] MAIN OR;  Service: General    [place] CYSTOURETHROSCOPY,FULGUR <0 5 CM LESN N/A 7/2/2021    Procedure: CYSTOSCOPY, TRANSURETHRAL RESECTION OF MEDIUM BLADDER TUMOR (TURBT); Surgeon: Adilene Carson MD;  [facility];  Service: Urology        08/10/21 1450   OT Last Visit   OT Visit Date 08/10/21   Note Type   Note type Evaluation   Restrictions/Precautions   Weight Bearing Precautions Per Order No   Other Precautions Bed Alarm;Multiple lines;O2;Fall Risk  (NG tube, 1L of O2)   Pain Assessment   Pain Assessment Tool Pain Assessment not indicated - pt denies pain   Pain Score No Pain   Home Living   Type of Home House   Home Layout Two level;Performs ADLs on one level; Able to live on main level with bedroom/bathroom  (1 5 SH, FFOS to enter)   Bathroom Shower/Tub Walk-in shower   Bathroom Toilet Standard  (during the day Pt uses raised toilet)   Home Equipment Yoselin Fabian  (in community, transport w/c for distances)   Prior Function   Level of [place] Independent with ADLs and functional mobility   Lives With Son   Receives Help From Family   ADL Assistance Independent   IADLs Independent   Falls in the last 6 months 1 to 4  (1)   Vocational Retired   [address] 5  Supervision/Setup   LB [address] 5  Supervision/Setup   [address] 4  Minimal Assistance    [address] 4  Minimal Assistance   Bed Mobility   Supine to Sit 4  Minimal Assistance   Additional items Assist x 1;HOB elevated; Increased time required;Verbal cues   Additional Comments Pt remained OOB upcon conclusion   Transfers   Sit to Stand 4  Minimal assistance   Additional items Assist x 1; Armrests; Increased time required;Verbal cues   Stand to Sit 4  Minimal assistance   Additional items Assist x 1; Armrests; Increased time required;Verbal cues   Stand pivot 4  Minimal assistance   Additional items Assist x 1; Increased time required;Verbal cues   Balance   Static Sitting Good   Dynamic Sitting Fair +   Static Standing Fair   Dynamic Standing Fair -   Activity Tolerance   Activity Tolerance Patient limited by fatigue   Nurse Made Aware TARUN Stoner   RUE Assessment   RUE Assessment WFL   LUE Assessment   LUE Assessment WFL   Hand Function   Gross Motor Coordination Functional   Fine Motor Coordination Functional   Cognition   Overall Cognitive Status WFL   Arousal/Participation Alert; Cooperative   Attention Attends with cues to redirect   Orientation Level Oriented X4   Memory Within functional limits   Following Commands Follows one step commands without difficulty   Assessment   Limitation Decreased ADL status; Decreased Safe judgement during ADL;Decreased endurance;Decreased self-care trans;Decreased high-level ADLs   Prognosis Good   Assessment Pt is a 82 y o  female seen for OT evaluation s/p admit to [address] on 8/9/2021 w/ Small bowel obstruction ([facility] [address] )  Comorbidities affecting pt's functional performance at time of assessment include: Small bowel obstruction, TILA   Personal factors affecting pt at time of IE include:steps to enter environment, difficulty performing ADLS and difficulty performing IADLS   Prior to admission, pt was Mod I with ADLs  Upon evaluation: the following deficits impact occupational performance: decreased balance, decreased tolerance and impaired attention  Pt to benefit from continued skilled OT tx while in the hospital to address deficits as defined above and maximize level of functional independence w ADL's and functional mobility  Occupational Performance areas to address include: bathing/shower, toilet hygiene, dressing, functional mobility and clothing management  From OT standpoint, recommendation at time of d/c would be Home OT  Goals   Patient Goals To sit up OOB   Plan   Treatment Interventions ADL retraining;Functional transfer training; Endurance training;Equipment evaluation/education; Neuromuscular reeducation; Energy conservation; Activityengagement   Goal Expiration Date 08/20/21   OT Treatment Day 0   OT Frequency 3-5x/wk   Recommendation   OT Discharge Recommendation Home with home health rehabilitation   OT - OK to Discharge Yes  (when medically stable)   Additional Comments  Co eval with PT completed secondary to complex medical condition of pt  Additional Comments 2 The patient's raw score on the AM-PAC Daily Activity inpatient short form is 19, standardized score is 40 22, greater than 39 4  Patients at this level are likely to benefit from discharge to home  Please refer to the recommendation of the Occupational Therapist for safe discharge planning     AM-PAC Daily Activity Inpatient   Lower Body Dressing 2   Bathing 3   Toileting 3   Upper Body Dressing 3   Grooming 4   Eating 4   Daily Activity Raw Score 19   Daily Activity Standardized Score (Calc for Raw Score >=11) 40 22   AM-PAC Applied Cognition Inpatient   Following a Speech/Presentation 4   Understanding Ordinary Conversation 4   Taking Medications 3   Remembering Where Things Are Placed or Put Away 3   Remembering List of 4-5 Errands 3   Taking Care of Complicated Tasks 3   Applied Cognition Raw Score 20   Applied Cognition Standardized Score 41 76     GOALS:    Pt will achieve the following within specified time frame: STG  Pt will achieve the following goals within 5 days    *ADL transfers with CGA for inc'd independence with ADLs/purposeful tasks    *UB ADL with (S) for inc'd independence with self cares    *LB ADL with Min (A) using AE prn for inc'd independence with self cares    *Toileting with CGA for clothing management and hygiene for return to PLOF with personal care    *Increase static stand balance to F+ and dyn stand balance to F for inc'd safety with standing purposeful tasks    *Increase stand tolerance x3 m for inc'd tolerance with standing purposeful tasks    *Participate in 10m UE therex to increase overall stamina/activity tolerance for purposeful tasks    *Bed mobility- (I) for inc'd independence to manage own comfort and initiate EOB & OOB purposeful tasks    Pt will achieve the following within specified time frame: LTG  Pt will achieve the following goals within 10 days    *ADL transfers with (S) for inc'd independence with ADLs/purposeful tasks    *UB ADL with (I) for inc'd independence with self cares    *LB ADL with CGA using AE prn for inc'd independence with self cares    *Toileting with (S) for clothing management and hygiene for return to PLOF with personal care    *Increase static stand balance to G- and dyn stand balance to F+ for inc'd safety with standing purposeful tasks    *Increase stand tolerance x5 m for inc'd tolerance with standing purposeful tasks      Leonid Sanchez MS, OTR/L

## 2021-08-11 LAB
ANION GAP SERPL CALCULATED.3IONS-SCNC: 5 MMOL/L (ref 4–13)
BASOPHILS # BLD AUTO: 0 THOUSANDS/ΜL (ref 0–0.1)
BASOPHILS NFR BLD AUTO: 0 % (ref 0–2)
BUN SERPL-MCNC: 21 MG/DL (ref 7–25)
CALCIUM SERPL-MCNC: 9.9 MG/DL (ref 8.6–10.5)
CHLORIDE SERPL-SCNC: 105 MMOL/L (ref 98–107)
CO2 SERPL-SCNC: 30 MMOL/L (ref 21–31)
CREAT SERPL-MCNC: 0.93 MG/DL (ref 0.6–1.2)
EOSINOPHIL # BLD AUTO: 0 THOUSAND/ΜL (ref 0–0.61)
EOSINOPHIL NFR BLD AUTO: 0 % (ref 0–5)
ERYTHROCYTE [DISTWIDTH] IN BLOOD BY AUTOMATED COUNT: 20.2 % (ref 11.5–14.5)
GFR SERPL CREATININE-BSD FRML MDRD: 57 ML/MIN/1.73SQ M
GLUCOSE SERPL-MCNC: 126 MG/DL (ref 65–140)
GLUCOSE SERPL-MCNC: 126 MG/DL (ref 65–99)
GLUCOSE SERPL-MCNC: 133 MG/DL (ref 65–140)
GLUCOSE SERPL-MCNC: 136 MG/DL (ref 65–140)
GLUCOSE SERPL-MCNC: 159 MG/DL (ref 65–140)
HCT VFR BLD AUTO: 24.5 % (ref 42–47)
HGB BLD-MCNC: 8 G/DL (ref 12–16)
LYMPHOCYTES # BLD AUTO: 1.1 THOUSANDS/ΜL (ref 0.6–4.47)
LYMPHOCYTES NFR BLD AUTO: 13 % (ref 21–51)
MAGNESIUM SERPL-MCNC: 2.4 MG/DL (ref 1.9–2.7)
MCH RBC QN AUTO: 26.8 PG (ref 26–34)
MCHC RBC AUTO-ENTMCNC: 32.6 G/DL (ref 31–37)
MCV RBC AUTO: 82 FL (ref 81–99)
MONOCYTES # BLD AUTO: 1 THOUSAND/ΜL (ref 0.17–1.22)
MONOCYTES NFR BLD AUTO: 11 % (ref 2–12)
NEUTROPHILS # BLD AUTO: 6.5 THOUSANDS/ΜL (ref 1.4–6.5)
NEUTS SEG NFR BLD AUTO: 75 % (ref 42–75)
PHOSPHATE SERPL-MCNC: 2.6 MG/DL (ref 3–5.5)
PLATELET # BLD AUTO: 210 THOUSANDS/UL (ref 149–390)
PMV BLD AUTO: 7.2 FL (ref 8.6–11.7)
POTASSIUM SERPL-SCNC: 3.7 MMOL/L (ref 3.5–5.5)
RBC # BLD AUTO: 2.98 MILLION/UL (ref 3.9–5.2)
SODIUM SERPL-SCNC: 140 MMOL/L (ref 134–143)
WBC # BLD AUTO: 8.6 THOUSAND/UL (ref 4.8–10.8)

## 2021-08-11 PROCEDURE — 83735 ASSAY OF MAGNESIUM: CPT | Performed by: SURGERY

## 2021-08-11 PROCEDURE — 80048 BASIC METABOLIC PNL TOTAL CA: CPT | Performed by: SURGERY

## 2021-08-11 PROCEDURE — 82948 REAGENT STRIP/BLOOD GLUCOSE: CPT

## 2021-08-11 PROCEDURE — 97530 THERAPEUTIC ACTIVITIES: CPT

## 2021-08-11 PROCEDURE — 97110 THERAPEUTIC EXERCISES: CPT

## 2021-08-11 PROCEDURE — 85025 COMPLETE CBC W/AUTO DIFF WBC: CPT | Performed by: SURGERY

## 2021-08-11 PROCEDURE — C9113 INJ PANTOPRAZOLE SODIUM, VIA: HCPCS | Performed by: SURGERY

## 2021-08-11 PROCEDURE — 84100 ASSAY OF PHOSPHORUS: CPT | Performed by: SURGERY

## 2021-08-11 PROCEDURE — 99024 POSTOP FOLLOW-UP VISIT: CPT | Performed by: SURGERY

## 2021-08-11 RX ORDER — DEXTROSE AND SODIUM CHLORIDE 5; .45 G/100ML; G/100ML
50 INJECTION, SOLUTION INTRAVENOUS CONTINUOUS
Status: DISCONTINUED | OUTPATIENT
Start: 2021-08-11 | End: 2021-08-14

## 2021-08-11 RX ADMIN — PANTOPRAZOLE SODIUM 40 MG: 40 INJECTION, POWDER, FOR SOLUTION INTRAVENOUS at 21:56

## 2021-08-11 RX ADMIN — HEPARIN SODIUM 5000 UNITS: 5000 INJECTION INTRAVENOUS; SUBCUTANEOUS at 05:34

## 2021-08-11 RX ADMIN — HEPARIN SODIUM 5000 UNITS: 5000 INJECTION INTRAVENOUS; SUBCUTANEOUS at 21:56

## 2021-08-11 RX ADMIN — HEPARIN SODIUM 5000 UNITS: 5000 INJECTION INTRAVENOUS; SUBCUTANEOUS at 14:38

## 2021-08-11 RX ADMIN — METRONIDAZOLE 500 MG: 500 INJECTION, SOLUTION INTRAVENOUS at 04:40

## 2021-08-11 RX ADMIN — DEXTROSE AND SODIUM CHLORIDE 75 ML/HR: 5; .45 INJECTION, SOLUTION INTRAVENOUS at 09:48

## 2021-08-11 RX ADMIN — CEFTRIAXONE 1000 MG: 1 INJECTION, SOLUTION INTRAVENOUS at 01:48

## 2021-08-11 RX ADMIN — METRONIDAZOLE 500 MG: 500 INJECTION, SOLUTION INTRAVENOUS at 20:30

## 2021-08-11 RX ADMIN — DEXTROSE, SODIUM CHLORIDE, SODIUM LACTATE, POTASSIUM CHLORIDE, AND CALCIUM CHLORIDE 75 ML/HR: 5; .6; .31; .03; .02 INJECTION, SOLUTION INTRAVENOUS at 06:40

## 2021-08-11 RX ADMIN — INSULIN LISPRO 1 UNITS: 100 INJECTION, SOLUTION INTRAVENOUS; SUBCUTANEOUS at 00:40

## 2021-08-11 RX ADMIN — POTASSIUM PHOSPHATE, MONOBASIC AND POTASSIUM PHOSPHATE, DIBASIC 21 MMOL: 224; 236 INJECTION, SOLUTION, CONCENTRATE INTRAVENOUS at 09:49

## 2021-08-11 RX ADMIN — METRONIDAZOLE 500 MG: 500 INJECTION, SOLUTION INTRAVENOUS at 11:20

## 2021-08-11 NOTE — PHYSICAL THERAPY NOTE
08/11/21 0907   PT Last Visit   PT Visit Date 08/11/21   Note Type   Note Type Treatment   Pain Assessment   Pain Assessment Tool Pain Assessment not indicated - pt denies pain   Pain Score No Pain   Pain Location/Orientation Location: Abdomen   Restrictions/Precautions   Weight Bearing Precautions Per Order No   Other Precautions Bed Alarm;Multiple lines;Telemetry;O2;Fall Risk  ([place], NG tube , O2)   General   Chart Reviewed Yes   Response to Previous Treatment Patient with no complaints from previous session  Family/Caregiver Present No   Cognition   Overall Cognitive Status WFL   Arousal/Participation Alert; Cooperative   Attention Within functional limits   Orientation Level Oriented X4   Memory Within functional limits   Following Commands Follows one step commands without difficulty   Comments pt agreeable to PT session   Bed Mobility   Supine to Sit 4  Minimal assistance   Additional items Assist x 1;HOB elevated; Bedrails; Increased time required;Verbal cues;LE management   Sit to Supine 4  Minimal assistance   Additional items Assist x 1;Bedrails;HOB elevated; Increased time required;Verbal cues;LE management   Additional Comments pt able to sit at EOBx30 minutes to complete BLE ther ex   Balance   Static Sitting Good   Dynamic Sitting Fair +   Static Standing Fair   Dynamic Standing Fair -   Endurance Deficit   Endurance Deficit Yes   Endurance Deficit Description pt declined getting OOB to chair   Activity Tolerance   Activity Tolerance Patient limited by fatigue   Nurse [facility] made aware of outcomes   Exercises   Quad Sets Sitting;20 reps;AROM; Bilateral   Heelslides Sitting;20 reps;AROM; Bilateral   Glute Sets Sitting;20 reps;AROM; Bilateral   Hip Abduction Sitting;20 reps;AROM; Bilateral   Hip Adduction Sitting;20 reps;AROM; Bilateral   Knee AROM Long Arc Quad Sitting;20 reps;AROM; Bilateral   Ankle Pumps Sitting;20 reps;AROM; Bilateral   Marching Sitting;20 reps;AROM; Bilateral   Assessment Prognosis Good   Problem List Decreased endurance; Impaired balance;Decreased safety awareness;Decreased mobility   Assessment Pt seen for PT treatment session this date with interventions consisting of Therapeutic exercise consisting of: AROM 20 reps B LE in seated at EOB position and therapeutic activity consisting of training: bed mobility, supine<>sit transfers, static sitting tolerance at EOB for 30 minutes w/ min UE support and vc and tactile cues for static sitting posture faciliation  Pt agreeable to PT treatment session upon arrival, pt found supine in bed w/ HOB elevated, in no apparent distress and responsive  In comparison to previous session, pt with improvements in functional activity tolerance  Post session: pt returned BTB, bed alarm engaged, all needs in reach and RN notified of session findings/recommendations  Continue to recommend home with home health rehabilitation at time of d/c in order to maximize pt's functional independence and safety w/ mobility  Pt continues to be functioning below baseline level, and remains limited 2* factors listed above and including impaired balance, decreased strength and inability to ambulate household distances  PT will continue to see pt during current hospitalization in order to address the deficits listed above and provide interventions consistent w/ POC in effort to achieve STGs  Barriers to Discharge None   Goals   PT Treatment Day 1   Plan   Treatment/Interventions Functional transfer training;LE strengthening/ROM; Therapeutic exercise; Endurance training;Bed mobility;Gait training   Progress Progressing toward goals   PT Frequency Other (Comment)  (3-5x/wk)   Recommendation   PT Discharge Recommendation Home with home health rehabilitation   Equipment Recommended [facility] Recommended Wheeled walker   Change/add to [employer] [place]?  No   PT - OK to Discharge No   Additional Comments upon conclusion, pt returned to supine in bed with all needs in reach   Additional Comments 2 Pt's raw score on the AM-PAC Basic Mobility inpatient short form is 17, standardized score is 39 67  Patients at this level are likely to benefit from DC to [address], however, please refer to therapist recommendation for safe DC planning     AM-PAC Basic Mobility Inpatient   Turning in Bed Without Bedrails 3   Lying on Back to Sitting on Edge of Flat Bed 3   Moving Bed to Chair 3   Standing Up From Chair 3   Walk in Room 3   Climb 3-5 Stairs 2   Basic Mobility Inpatient Raw Score 17   Basic Mobility Standardized Score 39 67

## 2021-08-11 NOTE — PROGRESS NOTES
Progress Note - General Surgery   Donny Ferreira 82 y o  female MRN: [id number]  Unit/Bed#: ICU 01 Encounter: [id number]    Assessment/Plan     85F POD1 status post exploratory laparotomy, reduction of small bowel volvulus, small bowel resection, primary anastomosis  Doing well  Awaiting bowel function  Will keep NPO with IVF, NGT and keenan  Will plan to discontinue keenan tomorrow  Continue OOB/ambulation with PT/OT  DVT prophylaxis with boots and heparin prophylaxis  Antibiotics continue for now  Anemia on labs is chronic and this is her baseline, will continue to follow  Subjective/Objective     Subjective: minimal abdominal pain, has been moving around with assistance, no nausea, no flatus yet, good UOP, VSS    Objective:     Blood pressure 138/61, pulse 71, temperature 97 5 °F (36 4 °C), temperature source Temporal, resp  rate 16, SpO2 93 %, not currently breastfeeding  ,There is no height or weight on file to calculate BMI  Intake/Output Summary (Last 24 hours) at 8/11/2021 1359  Last data filed at 8/11/2021 8368  Gross per 24 hour   Intake 1958 75 ml   Output 775 ml   Net 1183 75 ml       Invasive Devices     Peripheral Intravenous Line            Peripheral IV 08/09/21 Left Antecubital 1 day    Peripheral IV 08/10/21 Left Forearm 1 day          Line            Peripheral Nerve Catheter 1 day          Drain            NG/OG/Enteral Tube Nasogastric 16 Fr Left nares 1 day    Urethral Catheter Straight-tip 16 Fr  1 day                Physical Exam  Vitals reviewed  Constitutional:       Appearance: She is well-developed  She is not ill-appearing  Cardiovascular:      Rate and Rhythm: Normal rate  Pulmonary:      Effort: Pulmonary effort is normal  No respiratory distress  Abdominal:      General: A surgical scar is present  Comments: Soft nondistended nontender incision with dressing in place   Skin:     General: Skin is warm and dry  Neurological:      Mental Status: She is alert  Lab, Imaging and other studies:  I have personally reviewed pertinent lab results    , CBC:   Lab Results   Component Value Date    WBC 8 60 08/11/2021    HGB 8 0 (L) 08/11/2021    HCT 24 5 (L) 08/11/2021    MCV 82 08/11/2021     08/11/2021    MCH 26 8 08/11/2021    MCHC 32 6 08/11/2021    RDW 20 2 (H) 08/11/2021    MPV 7 2 (L) 08/11/2021   , CMP:   Lab Results   Component Value Date    SODIUM 140 08/11/2021    K 3 7 08/11/2021     08/11/2021    CO2 30 08/11/2021    BUN 21 08/11/2021    CREATININE 0 93 08/11/2021    CALCIUM 9 9 08/11/2021    EGFR 57 08/11/2021     VTE Pharmacologic Prophylaxis: Heparin  VTE Mechanical Prophylaxis: sequential compression device

## 2021-08-11 NOTE — CASE MANAGEMENT
Case Management Assessment & Discharge Planning Note    Patient name Bismark Sample  Location ICU 01/ICU 01 MRN [id number]  : 1935 Date 2021       Current Admission Date: 2021  Current Admission Diagnosis:  Small bowel obstruction [facility])   Patient Active Problem List   Diagnosis    Un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diabetes mellitus with hyperglycemia ([facility] )    Essential hypertension    Hyperlipidemia    Left breast mass    Depression    Hyponatremia    Hypokalemia    Gross hematuria    Dizziness    Bradycardia    Sick sinus syndrome (HCC)    Cardiac pacemaker in situ    Abnormal ultrasound cardiogram    Anxiety    BMI 33 0-33 9,adult    Breast carcinoma, female, right ([facility] )    GERD (gastroesophageal reflux disease)    Macular degeneration    Malignant neoplasm of upper-inner quadrant of left breast in female, estrogen receptor positive ([facility] )    Personal history of breast cancer    S/P hysterectomy    Memory loss    Medicare annual wellness visit, subsequent    Hypercalcemia    Balance problem    Cough    Encounter for pre-operative examination    Malignant neoplasm of overlapping sites of bladder ([facility] )    Encounter to discuss test results    Acute kidney injury ([facility] )    Small bowel obstruction ([facility] )    PONV (postoperative nausea and vomiting)    Previous Admission - Discharge Date:21   LOS (days): 1  Geometric Mean LOS (GMLOS) (days): 10 20  Days to GMLOS:8 8 Previous Discharge Diagnosis:  There are no discharge diagnoses documented for the most recent discharge         Risk of Unplanned Readmission Score  Predictive Model Details          18 (Low)  Factor Value    Calculated 2021 12:07 14% Number of active Rx orders 20    Risk of Unplanned Readmission Model 12% Number of ED visits in last six months 2     11% Diagnosis of cancer present     10% ECG/EKG order present in last 6 months     8% Diagnosis of electrolyte disorder present     8% Restraint order present in last 6 months     7% Imaging order present in last 6 months     7% Age 82     6% Latest hemoglobin low (8 0 g/dL)     6% Phosphorous result present     5% Active anticoagulant Rx order present     4% Charlson Comorbidity Index 3     2% Future appointment scheduled     1% Current length of stay 1 342 days     1% Active ulcer medication Rx order present         BUNDLE:      Reason for Referral:    OBJECTIVE:  Pt is a 82y o  year old , white or  [1], female with Voodoo preference of Sabianist admitted on 8/9/2021 10:44 PM  Pt is admitted to ICU 01 at [facility] with complaints of Small bowel obstruction ([facility] )   Current admission status: Inpatient  Referral Reason:  (Discharge planning)    Preferred Pharmacy:   [facility] [id number] [place] [address], [address] [address]  Phone: [phone] Fax: [phone]    Primary Care Provider: Fadia Hood DO    Primary Insurance: Betty Hurley [facility]  Secondary Insurance:     ASSESSMENT:  Bere Ramirez, [facility] Representative - Daughter   Primary Phone: [phone] [facility])                         Reyna Woodard [address] of Residence: [place]    Readmission Root Cause  30 Day Readmission: No    Patient Information  Mental Status: Alert  During Assessment patient was accompanied by: Not accompanied during assessment  Assessment information provided by[de-identified] Patient  Primary Caregiver: Self  Support Systems: Son  Home entry access options   Select all that apply : Stairs  Number of steps to enter home : One Flight  Type of Current Residence: 2 story home  Upon entering residence, is there a bedroom on the main floor (no further steps)?: Yes  Upon entering residence, is there a bathroom on the main floor (no further steps)?: Yes  Living Arrangements: Lives w/ Son  Is patient a ?: No    Activities of Daily Living Prior to Admission  Functional Status: Independent  Completes ADLs independently?: Yes  Ambulates independently?: Yes  Does patient use assisted devices?: Yes  Assisted Devices (DME) used: Straight Cane  Does patient currently own DME?: Yes  What DME does the patient currently own?: Straight Cane, Wheelchair  Does patient have a history of Outpatient Therapy (PT/OT)?: No  Does the patient have a history of Short-Term Rehab?: No  Does patient currently have [address]?: No         Patient Information Continued  Income Source: Employed  Does patient have prescription coverage?: Yes  Does patient receive dialysis treatments?: No  Does patient have a history of substance abuse?: No  Does patient have a history of Mental Health Diagnosis?: No    PHQ 2/9 Screening   Reviewed PHQ 2/9 Depression Screening Score?: No    Means of Transportation  Means of Transport to Appts[de-identified] Family transport  In the past 12 months, has lack of transportation kept you from medical appointments or from getting medications?: No  In the past 12 months, has lack of transportation kept you from meetings, work, or from getting things needed for daily living?: No  Was application for public transport provided?: No    DISCHARGE DETAILS:    [address]         Is the patient interested in [address] at discharge?: No      We would like to be able to fill any required prescriptions on discharge at our [facility] and have them delivered to you at discharge in your room  Would you like to participate in this program? : No - Declined    Discharge Destination Plan[de-identified] Home  Transportation at Discharge?: No   PT recommending HHC, pt declines same  Pt grandson will transport home

## 2021-08-11 NOTE — PLAN OF CARE
Problem: PHYSICAL THERAPY ADULT  Goal: Performs mobility at highest level of function for planned discharge setting  See evaluation for individualized goals  Description: Treatment/Interventions: Functional transfer training, Elevations, Therapeutic exercise, Endurance training, Bed mobility, Gait training  Equipment Recommended: Hassan Shone       See flowsheet documentation for full assessment, interventions and recommendations  Outcome: Progressing  Note: Prognosis: Good  Problem List: Decreased endurance, Impaired balance, Decreased safety awareness, Decreased mobility  Assessment: Pt seen for PT treatment session this date with interventions consisting of Therapeutic exercise consisting of: AROM 20 reps B LE in seated at EOB position and therapeutic activity consisting of training: bed mobility, supine<>sit transfers, static sitting tolerance at EOB for 30 minutes w/ min UE support and vc and tactile cues for static sitting posture faciliation  Pt agreeable to PT treatment session upon arrival, pt found supine in bed w/ HOB elevated, in no apparent distress and responsive  In comparison to previous session, pt with improvements in functional activity tolerance  Post session: pt returned BTB, bed alarm engaged, all needs in reach and RN notified of session findings/recommendations  Continue to recommend home with home health rehabilitation at time of d/c in order to maximize pt's functional independence and safety w/ mobility  Pt continues to be functioning below baseline level, and remains limited 2* factors listed above and including impaired balance, decreased strength and inability to ambulate household distances  PT will continue to see pt during current hospitalization in order to address the deficits listed above and provide interventions consistent w/ POC in effort to achieve STGs    Barriers to Discharge: None        PT Discharge Recommendation: Home with home health rehabilitation     PT - OK to Discharge: No    See flowsheet documentation for full assessment

## 2021-08-12 LAB
ANION GAP SERPL CALCULATED.3IONS-SCNC: 2 MMOL/L (ref 4–13)
ANION GAP SERPL CALCULATED.3IONS-SCNC: 5 MMOL/L (ref 4–13)
BASOPHILS # BLD AUTO: 0 THOUSANDS/ΜL (ref 0–0.1)
BASOPHILS # BLD AUTO: 0 THOUSANDS/ΜL (ref 0–0.1)
BASOPHILS NFR BLD AUTO: 1 % (ref 0–2)
BASOPHILS NFR BLD AUTO: 1 % (ref 0–2)
BUN SERPL-MCNC: 16 MG/DL (ref 7–25)
BUN SERPL-MCNC: 17 MG/DL (ref 7–25)
CALCIUM SERPL-MCNC: 10.1 MG/DL (ref 8.6–10.5)
CALCIUM SERPL-MCNC: 9.7 MG/DL (ref 8.6–10.5)
CHLORIDE SERPL-SCNC: 106 MMOL/L (ref 98–107)
CHLORIDE SERPL-SCNC: 107 MMOL/L (ref 98–107)
CO2 SERPL-SCNC: 29 MMOL/L (ref 21–31)
CO2 SERPL-SCNC: 31 MMOL/L (ref 21–31)
CREAT SERPL-MCNC: 0.74 MG/DL (ref 0.6–1.2)
CREAT SERPL-MCNC: 0.78 MG/DL (ref 0.6–1.2)
EOSINOPHIL # BLD AUTO: 0.1 THOUSAND/ΜL (ref 0–0.61)
EOSINOPHIL # BLD AUTO: 0.1 THOUSAND/ΜL (ref 0–0.61)
EOSINOPHIL NFR BLD AUTO: 2 % (ref 0–5)
EOSINOPHIL NFR BLD AUTO: 2 % (ref 0–5)
ERYTHROCYTE [DISTWIDTH] IN BLOOD BY AUTOMATED COUNT: 19.6 % (ref 11.5–14.5)
ERYTHROCYTE [DISTWIDTH] IN BLOOD BY AUTOMATED COUNT: 20 % (ref 11.5–14.5)
GFR SERPL CREATININE-BSD FRML MDRD: 71 ML/MIN/1.73SQ M
GFR SERPL CREATININE-BSD FRML MDRD: 75 ML/MIN/1.73SQ M
GLUCOSE SERPL-MCNC: 107 MG/DL (ref 65–99)
GLUCOSE SERPL-MCNC: 126 MG/DL (ref 65–140)
GLUCOSE SERPL-MCNC: 129 MG/DL (ref 65–99)
GLUCOSE SERPL-MCNC: 136 MG/DL (ref 65–140)
HCT VFR BLD AUTO: 22.9 % (ref 42–47)
HCT VFR BLD AUTO: 23.9 % (ref 42–47)
HGB BLD-MCNC: 7.3 G/DL (ref 12–16)
HGB BLD-MCNC: 7.7 G/DL (ref 12–16)
LYMPHOCYTES # BLD AUTO: 0.9 THOUSANDS/ΜL (ref 0.6–4.47)
LYMPHOCYTES # BLD AUTO: 1.1 THOUSANDS/ΜL (ref 0.6–4.47)
LYMPHOCYTES NFR BLD AUTO: 15 % (ref 21–51)
LYMPHOCYTES NFR BLD AUTO: 16 % (ref 21–51)
MAGNESIUM SERPL-MCNC: 1.8 MG/DL (ref 1.9–2.7)
MAGNESIUM SERPL-MCNC: 1.9 MG/DL (ref 1.9–2.7)
MCH RBC QN AUTO: 26.3 PG (ref 26–34)
MCH RBC QN AUTO: 26.7 PG (ref 26–34)
MCHC RBC AUTO-ENTMCNC: 32 G/DL (ref 31–37)
MCHC RBC AUTO-ENTMCNC: 32.2 G/DL (ref 31–37)
MCV RBC AUTO: 82 FL (ref 81–99)
MCV RBC AUTO: 83 FL (ref 81–99)
MONOCYTES # BLD AUTO: 0.5 THOUSAND/ΜL (ref 0.17–1.22)
MONOCYTES # BLD AUTO: 0.5 THOUSAND/ΜL (ref 0.17–1.22)
MONOCYTES NFR BLD AUTO: 8 % (ref 2–12)
MONOCYTES NFR BLD AUTO: 8 % (ref 2–12)
NEUTROPHILS # BLD AUTO: 4.8 THOUSANDS/ΜL (ref 1.4–6.5)
NEUTROPHILS # BLD AUTO: 5.2 THOUSANDS/ΜL (ref 1.4–6.5)
NEUTS SEG NFR BLD AUTO: 74 % (ref 42–75)
NEUTS SEG NFR BLD AUTO: 75 % (ref 42–75)
PHOSPHATE SERPL-MCNC: 1.6 MG/DL (ref 3–5.5)
PHOSPHATE SERPL-MCNC: 2.3 MG/DL (ref 3–5.5)
PLATELET # BLD AUTO: 186 THOUSANDS/UL (ref 149–390)
PLATELET # BLD AUTO: 216 THOUSANDS/UL (ref 149–390)
PMV BLD AUTO: 7 FL (ref 8.6–11.7)
PMV BLD AUTO: 7.2 FL (ref 8.6–11.7)
POTASSIUM SERPL-SCNC: 3.1 MMOL/L (ref 3.5–5.5)
POTASSIUM SERPL-SCNC: 3.4 MMOL/L (ref 3.5–5.5)
RBC # BLD AUTO: 2.78 MILLION/UL (ref 3.9–5.2)
RBC # BLD AUTO: 2.89 MILLION/UL (ref 3.9–5.2)
SODIUM SERPL-SCNC: 140 MMOL/L (ref 134–143)
SODIUM SERPL-SCNC: 140 MMOL/L (ref 134–143)
WBC # BLD AUTO: 6.3 THOUSAND/UL (ref 4.8–10.8)
WBC # BLD AUTO: 7.1 THOUSAND/UL (ref 4.8–10.8)

## 2021-08-12 PROCEDURE — 97116 GAIT TRAINING THERAPY: CPT

## 2021-08-12 PROCEDURE — 83735 ASSAY OF MAGNESIUM: CPT | Performed by: SURGERY

## 2021-08-12 PROCEDURE — 82948 REAGENT STRIP/BLOOD GLUCOSE: CPT

## 2021-08-12 PROCEDURE — 80048 BASIC METABOLIC PNL TOTAL CA: CPT | Performed by: SURGERY

## 2021-08-12 PROCEDURE — 84100 ASSAY OF PHOSPHORUS: CPT | Performed by: SURGERY

## 2021-08-12 PROCEDURE — 97530 THERAPEUTIC ACTIVITIES: CPT

## 2021-08-12 PROCEDURE — 99024 POSTOP FOLLOW-UP VISIT: CPT | Performed by: SURGERY

## 2021-08-12 PROCEDURE — C9113 INJ PANTOPRAZOLE SODIUM, VIA: HCPCS | Performed by: SURGERY

## 2021-08-12 PROCEDURE — 97110 THERAPEUTIC EXERCISES: CPT

## 2021-08-12 PROCEDURE — 85025 COMPLETE CBC W/AUTO DIFF WBC: CPT | Performed by: SURGERY

## 2021-08-12 RX ORDER — POTASSIUM CHLORIDE 29.8 MG/ML
40 INJECTION INTRAVENOUS ONCE
Status: DISCONTINUED | OUTPATIENT
Start: 2021-08-12 | End: 2021-08-12

## 2021-08-12 RX ORDER — POTASSIUM CHLORIDE 14.9 MG/ML
20 INJECTION INTRAVENOUS
Status: COMPLETED | OUTPATIENT
Start: 2021-08-12 | End: 2021-08-12

## 2021-08-12 RX ORDER — POTASSIUM CHLORIDE 14.9 MG/ML
20 INJECTION INTRAVENOUS ONCE
Status: COMPLETED | OUTPATIENT
Start: 2021-08-12 | End: 2021-08-12

## 2021-08-12 RX ORDER — POTASSIUM CHLORIDE 14.9 MG/ML
20 INJECTION INTRAVENOUS ONCE
Status: COMPLETED | OUTPATIENT
Start: 2021-08-12 | End: 2021-08-13

## 2021-08-12 RX ADMIN — POTASSIUM PHOSPHATE, MONOBASIC AND POTASSIUM PHOSPHATE, DIBASIC 30 MMOL: 224; 236 INJECTION, SOLUTION, CONCENTRATE INTRAVENOUS at 09:49

## 2021-08-12 RX ADMIN — POTASSIUM CHLORIDE 20 MEQ: 14.9 INJECTION, SOLUTION INTRAVENOUS at 23:22

## 2021-08-12 RX ADMIN — DEXTROSE AND SODIUM CHLORIDE 75 ML/HR: 5; .45 INJECTION, SOLUTION INTRAVENOUS at 00:00

## 2021-08-12 RX ADMIN — METRONIDAZOLE 500 MG: 500 INJECTION, SOLUTION INTRAVENOUS at 05:00

## 2021-08-12 RX ADMIN — HEPARIN SODIUM 5000 UNITS: 5000 INJECTION INTRAVENOUS; SUBCUTANEOUS at 21:12

## 2021-08-12 RX ADMIN — POTASSIUM CHLORIDE 20 MEQ: 14.9 INJECTION, SOLUTION INTRAVENOUS at 11:00

## 2021-08-12 RX ADMIN — CEFTRIAXONE 1000 MG: 1 INJECTION, SOLUTION INTRAVENOUS at 01:18

## 2021-08-12 RX ADMIN — HEPARIN SODIUM 5000 UNITS: 5000 INJECTION INTRAVENOUS; SUBCUTANEOUS at 05:33

## 2021-08-12 RX ADMIN — PANTOPRAZOLE SODIUM 40 MG: 40 INJECTION, POWDER, FOR SOLUTION INTRAVENOUS at 21:12

## 2021-08-12 RX ADMIN — DEXTROSE AND SODIUM CHLORIDE 50 ML/HR: 5; .45 INJECTION, SOLUTION INTRAVENOUS at 12:45

## 2021-08-12 RX ADMIN — POTASSIUM CHLORIDE 20 MEQ: 14.9 INJECTION, SOLUTION INTRAVENOUS at 21:13

## 2021-08-12 RX ADMIN — METRONIDAZOLE 500 MG: 500 INJECTION, SOLUTION INTRAVENOUS at 20:03

## 2021-08-12 RX ADMIN — POTASSIUM PHOSPHATE, MONOBASIC AND POTASSIUM PHOSPHATE, DIBASIC 30 MMOL: 224; 236 INJECTION, SOLUTION, CONCENTRATE INTRAVENOUS at 21:13

## 2021-08-12 RX ADMIN — POTASSIUM CHLORIDE 20 MEQ: 14.9 INJECTION, SOLUTION INTRAVENOUS at 09:49

## 2021-08-12 RX ADMIN — METRONIDAZOLE 500 MG: 500 INJECTION, SOLUTION INTRAVENOUS at 12:37

## 2021-08-12 RX ADMIN — HEPARIN SODIUM 5000 UNITS: 5000 INJECTION INTRAVENOUS; SUBCUTANEOUS at 14:00

## 2021-08-12 NOTE — PLAN OF CARE
Problem: PHYSICAL THERAPY ADULT  Goal: Performs mobility at highest level of function for planned discharge setting  See evaluation for individualized goals  Description: Treatment/Interventions: Functional transfer training, Elevations, Therapeutic exercise, Endurance training, Bed mobility, Gait training  Equipment Recommended: Linard Bernheim       See flowsheet documentation for full assessment, interventions and recommendations  Outcome: Progressing  Note: Prognosis: Good  Problem List: Decreased strength, Decreased endurance, Impaired balance, Decreased mobility  Assessment: Pt seen for PT treatment session this date with interventions consisting of bed mobility tasks, transfer training, gait training, TE, and endurance training   Pt agreeable to PT treatment session upon arrival, pt found resting in bed  At end of session, pt left sitting OOB in stationary chair with pure wic in place, SCD's applied, all telemetry reconnected, and all needs in reach  In comparison to previous session, pt with improvements in amount of assistance required, ability to ambulate, and amount of activity tolerated  Continue to recommend Home PT at time of d/c in order to maximize pt's functional independence and safety w/ mobility  Pt continues to be functioning below baseline level  PT will continue to see pt while here in order to address the deficits listed above and provide interventions consistent w/ POC in effort to achieve STGs  Barriers to Discharge: None        PT Discharge Recommendation: Home with home health rehabilitation     PT - OK to Discharge: Yes    See flowsheet documentation for full assessment

## 2021-08-12 NOTE — NURSING NOTE
Received call from Lab; unable to release O+ or O- blood unless patient's HGB is <7 0   Hospitalist and surgeon made aware of same

## 2021-08-12 NOTE — PROGRESS NOTES
Progress Note - General Surgery   Alfonso Loop 82 y o  female MRN: [id number]  Unit/Bed#: ICU 01 Encounter: [id number]    Assessment/Plan     85F POD2 status post exploratory laparotomy, reduction of small bowel volvulus, small bowel resection, primary anastomosis  Doing well  Awaiting bowel function  NGT removed  Will start sips/chips/popsicles, IVF down to 50/hr  Camacho discontinued, will follow up void and maintain strict I/O's  Continue OOB/ambulation with PT/OT  DVT prophylaxis with boots and heparin prophylaxis  Antibiotics continue for now  Anemia on labs is chronic and this is her baseline but she is now down to Hb of 7 3  Would like to transfuse 1UPRBC today but in light of blood shortage will continue to monitor and reevaluate on a daily basis  She is not clinically bleeding  Subjective/Objective     Subjective: no pain, no nausea, feels thirsty, no flatus, doing well with PT/OT who recommends home with services    Objective:     Blood pressure 138/64, pulse 61, temperature 97 6 °F (36 4 °C), temperature source Temporal, resp  rate 18, height 5' 3" (1 6 m), weight 67 kg (147 lb 11 3 oz), SpO2 99 %, not currently breastfeeding  ,Body mass index is 26 17 kg/m²  Intake/Output Summary (Last 24 hours) at 8/12/2021 0855  Last data filed at 8/12/2021 0600  Gross per 24 hour   Intake 1567 5 ml   Output 1325 ml   Net 242 5 ml       Physical Exam  Vitals reviewed  Constitutional:       General: She is not in acute distress  Appearance: Normal appearance  She is not ill-appearing  Cardiovascular:      Rate and Rhythm: Normal rate  Pulmonary:      Effort: Pulmonary effort is normal  No respiratory distress  Abdominal:      Comments: Dressing taken down, abdomen soft nondistended nontender, incision clean dry intact with staples, dry dressing replaced and abdominal binder replaced   Skin:     General: Skin is warm and dry  Neurological:      General: No focal deficit present        Mental Status: She is alert  Mental status is at baseline  Lab, Imaging and other studies:  I have personally reviewed pertinent lab results    , CBC:   Lab Results   Component Value Date    WBC 6 30 08/12/2021    HGB 7 3 (L) 08/12/2021    HCT 22 9 (L) 08/12/2021    MCV 82 08/12/2021     08/12/2021    MCH 26 3 08/12/2021    MCHC 32 0 08/12/2021    RDW 20 0 (H) 08/12/2021    MPV 7 2 (L) 08/12/2021   , CMP:   Lab Results   Component Value Date    SODIUM 140 08/12/2021    K 3 1 (L) 08/12/2021     08/12/2021    CO2 31 08/12/2021    BUN 17 08/12/2021    CREATININE 0 78 08/12/2021    CALCIUM 9 7 08/12/2021    EGFR 71 08/12/2021     VTE Pharmacologic Prophylaxis: Heparin  VTE Mechanical Prophylaxis: sequential compression device

## 2021-08-12 NOTE — NURSING NOTE
Pt stated she "felt wet"  Pure wick in place and scant amt of urine in canister  Bladder scan done and showed 458 cc in bladder  Assisted pt oob to bsc and she was able to void 400 cc on her own  Will cont to monitor

## 2021-08-12 NOTE — PHYSICAL THERAPY NOTE
PHYSICAL THERAPY TREATMENT NOTE  NAME:  Raquel Groom Schoenberger  DATE: 08/12/21    Length Of Stay: 2  Performed at least 2 patient identifiers during session: Name and ID bracelet    TREATMENT FLOWSHEET:    08/12/21 1453   PT Last Visit   PT Visit Date 08/12/21   Note Type   Note Type Treatment   Pain Assessment   Pain Assessment Tool 0-10   Pain Score No Pain   Restrictions/Precautions   Weight Bearing Precautions Per Order No   Other Precautions Telemetry;Multiple lines; Fall Risk   General   Chart Reviewed Yes   Response to Previous Treatment Patient with no complaints from previous session  Family/Caregiver Present No   Cognition   Overall Cognitive Status WFL   Arousal/Participation Alert; Cooperative   Attention Within functional limits   Orientation Level Oriented X4   Memory Within functional limits   Following Commands Follows one step commands without difficulty   Subjective   Subjective " I want get moving so I can get out of here "   Bed Mobility   Rolling R 5  Supervision   Additional items Assist x 1;HOB elevated; Bedrails; Increased time required;Verbal cues   Rolling L 5  Supervision   Additional items Assist x 1;HOB elevated; Bedrails; Increased time required;Verbal cues;LE management   Supine to Sit 4  Minimal assistance   Additional items Assist x 1;HOB elevated; Bedrails; Increased time required;Verbal cues   Transfers   Sit to Stand   (CG)   Additional items Assist x 1; Increased time required;Verbal cues   Stand to Sit   (CG)   Additional items Assist x 1; Armrests; Increased time required;Verbal cues   Stand pivot   (CG)   Additional items Assist x 1; Armrests;Trapeze bar;Verbal cues   Ambulation/Elevation   Gait pattern Improper Weight shift; Forward Flexion;Decreased foot clearance; Short stride; Excessively slow; Foward flexed   Gait Assistance   (CG)   Additional items Assist x 1;Verbal cues   Assistive Device Rolling walker   Distance 10 ft   Balance   Static Sitting Good   Dynamic Sitting Fair + Static Standing Fair   Dynamic Standing Fair -   Ambulatory Fair -   Endurance Deficit   Endurance Deficit Yes   Activity Tolerance   Activity Tolerance Patient limited by fatigue   Exercises   Quad Sets Sitting;20 reps;AROM; Bilateral   Heelslides Sitting;20 reps;AROM; Bilateral   Glute Sets Sitting;20 reps;AROM; Bilateral   Hip Flexion Sitting;20 reps;AROM; Bilateral   Hip Abduction Sitting;20 reps;AROM; Bilateral   Hip Adduction Sitting;20 reps;AROM; Bilateral   Knee AROM Long Arc Quad Sitting;20 reps;AROM; Bilateral   Ankle Pumps Sitting;20 reps;AROM; Bilateral   Marching Sitting;20 reps;AROM; Bilateral   Assessment   Prognosis Good   Problem List Decreased strength;Decreased endurance; Impaired balance;Decreased mobility   Goals   Patient Goals to get better and get home   PT Treatment Day 2   Plan   Treatment/Interventions Functional transfer training;LE strengthening/ROM; Therapeutic exercise; Endurance training;Patient/family training;Equipment eval/education; Bed mobility;Gait training   Progress Progressing toward goals   Modality/Parameters   [employer]   (3-5x/wk)   Recommendation   PT Discharge Recommendation Home with home health rehabilitation   Equipment Recommended [address] [facility] Recommended Wheeled walker   PT - OK to Discharge Yes   Additional Comments when medically stable   AM-PAC Basic Mobility Inpatient   Turning in Bed Without Bedrails 3   Lying on Back to Sitting on Edge of Flat Bed 3   Moving Bed to Chair 3   Standing Up From Chair 3   Walk in Room 3   Climb 3-5 Stairs 3   Basic Mobility Inpatient Raw Score 18   Basic Mobility Standardized Score 41 05       The patient's AM-PAC Basic Mobility Inpatient Short Form Raw Score is 18, Standardized Score is 41 05  A standardized score less than 42 9 suggests the patient may benefit from discharge to post-acute rehabilitation services  Please also refer to the recommendation of the Physical Therapist for safe discharge planning      Pt seen for PT treatment session this date with interventions consisting of bed mobility tasks, transfer training, gait training, TE, and endurance training   Pt agreeable to PT treatment session upon arrival, pt found resting in bed  At end of session, pt left sitting OOB in stationary chair with pure wic in place, SCD's applied, all telemetry reconnected, and all needs in reach  In comparison to previous session, pt with improvements in amount of assistance required, ability to ambulate, and amount of activity tolerated  Continue to recommend Home PT at time of d/c in order to maximize pt's functional independence and safety w/ mobility  Pt continues to be functioning below baseline level  PT will continue to see pt while here in order to address the deficits listed above and provide interventions consistent w/ POC in effort to achieve STGs      Rocio Sims PTA

## 2021-08-13 LAB
ANION GAP SERPL CALCULATED.3IONS-SCNC: 5 MMOL/L (ref 4–13)
BASOPHILS # BLD AUTO: 0 THOUSANDS/ΜL (ref 0–0.1)
BASOPHILS NFR BLD AUTO: 1 % (ref 0–2)
BUN SERPL-MCNC: 15 MG/DL (ref 7–25)
CALCIUM SERPL-MCNC: 9.9 MG/DL (ref 8.6–10.5)
CHLORIDE SERPL-SCNC: 108 MMOL/L (ref 98–107)
CO2 SERPL-SCNC: 28 MMOL/L (ref 21–31)
CREAT SERPL-MCNC: 0.81 MG/DL (ref 0.6–1.2)
EOSINOPHIL # BLD AUTO: 0.1 THOUSAND/ΜL (ref 0–0.61)
EOSINOPHIL NFR BLD AUTO: 2 % (ref 0–5)
ERYTHROCYTE [DISTWIDTH] IN BLOOD BY AUTOMATED COUNT: 19.9 % (ref 11.5–14.5)
GFR SERPL CREATININE-BSD FRML MDRD: 67 ML/MIN/1.73SQ M
GLUCOSE SERPL-MCNC: 106 MG/DL (ref 65–99)
HCT VFR BLD AUTO: 23.6 % (ref 42–47)
HGB BLD-MCNC: 7.8 G/DL (ref 12–16)
LYMPHOCYTES # BLD AUTO: 1.1 THOUSANDS/ΜL (ref 0.6–4.47)
LYMPHOCYTES NFR BLD AUTO: 18 % (ref 21–51)
MAGNESIUM SERPL-MCNC: 1.8 MG/DL (ref 1.9–2.7)
MCH RBC QN AUTO: 27.2 PG (ref 26–34)
MCHC RBC AUTO-ENTMCNC: 32.9 G/DL (ref 31–37)
MCV RBC AUTO: 83 FL (ref 81–99)
MONOCYTES # BLD AUTO: 0.5 THOUSAND/ΜL (ref 0.17–1.22)
MONOCYTES NFR BLD AUTO: 8 % (ref 2–12)
NEUTROPHILS # BLD AUTO: 4.6 THOUSANDS/ΜL (ref 1.4–6.5)
NEUTS SEG NFR BLD AUTO: 72 % (ref 42–75)
PHOSPHATE SERPL-MCNC: 2.8 MG/DL (ref 3–5.5)
PLATELET # BLD AUTO: 226 THOUSANDS/UL (ref 149–390)
PMV BLD AUTO: 6.7 FL (ref 8.6–11.7)
POTASSIUM SERPL-SCNC: 4.2 MMOL/L (ref 3.5–5.5)
RBC # BLD AUTO: 2.86 MILLION/UL (ref 3.9–5.2)
SODIUM SERPL-SCNC: 141 MMOL/L (ref 134–143)
WBC # BLD AUTO: 6.4 THOUSAND/UL (ref 4.8–10.8)

## 2021-08-13 PROCEDURE — 99024 POSTOP FOLLOW-UP VISIT: CPT | Performed by: SURGERY

## 2021-08-13 PROCEDURE — 80048 BASIC METABOLIC PNL TOTAL CA: CPT | Performed by: SURGERY

## 2021-08-13 PROCEDURE — C9113 INJ PANTOPRAZOLE SODIUM, VIA: HCPCS | Performed by: SURGERY

## 2021-08-13 PROCEDURE — 97116 GAIT TRAINING THERAPY: CPT

## 2021-08-13 PROCEDURE — 84100 ASSAY OF PHOSPHORUS: CPT | Performed by: SURGERY

## 2021-08-13 PROCEDURE — 83735 ASSAY OF MAGNESIUM: CPT | Performed by: SURGERY

## 2021-08-13 PROCEDURE — 85025 COMPLETE CBC W/AUTO DIFF WBC: CPT | Performed by: SURGERY

## 2021-08-13 PROCEDURE — 97110 THERAPEUTIC EXERCISES: CPT

## 2021-08-13 RX ORDER — IBUPROFEN 400 MG/1
400 TABLET ORAL EVERY 6 HOURS PRN
Status: DISCONTINUED | OUTPATIENT
Start: 2021-08-13 | End: 2021-08-16 | Stop reason: HOSPADM

## 2021-08-13 RX ORDER — ACETAMINOPHEN 325 MG/1
650 TABLET ORAL EVERY 8 HOURS SCHEDULED
Status: DISCONTINUED | OUTPATIENT
Start: 2021-08-13 | End: 2021-08-16 | Stop reason: HOSPADM

## 2021-08-13 RX ADMIN — HEPARIN SODIUM 5000 UNITS: 5000 INJECTION INTRAVENOUS; SUBCUTANEOUS at 21:13

## 2021-08-13 RX ADMIN — ACETAMINOPHEN 650 MG: 325 TABLET ORAL at 21:13

## 2021-08-13 RX ADMIN — METRONIDAZOLE 500 MG: 500 INJECTION, SOLUTION INTRAVENOUS at 21:00

## 2021-08-13 RX ADMIN — METRONIDAZOLE 500 MG: 500 INJECTION, SOLUTION INTRAVENOUS at 12:37

## 2021-08-13 RX ADMIN — HEPARIN SODIUM 5000 UNITS: 5000 INJECTION INTRAVENOUS; SUBCUTANEOUS at 14:51

## 2021-08-13 RX ADMIN — METRONIDAZOLE 500 MG: 500 INJECTION, SOLUTION INTRAVENOUS at 04:13

## 2021-08-13 RX ADMIN — HEPARIN SODIUM 5000 UNITS: 5000 INJECTION INTRAVENOUS; SUBCUTANEOUS at 05:25

## 2021-08-13 RX ADMIN — PANTOPRAZOLE SODIUM 40 MG: 40 INJECTION, POWDER, FOR SOLUTION INTRAVENOUS at 21:09

## 2021-08-13 RX ADMIN — ACETAMINOPHEN 650 MG: 325 TABLET ORAL at 14:51

## 2021-08-13 RX ADMIN — CEFTRIAXONE 1000 MG: 1 INJECTION, SOLUTION INTRAVENOUS at 01:24

## 2021-08-13 NOTE — PLAN OF CARE
Problem: MOBILITY - ADULT  Goal: Maintain or return to baseline ADL function  Description: INTERVENTIONS:  -  Assess patient's ability to carry out ADLs; assess patient's baseline for ADL function and identify physical deficits which impact ability to perform ADLs (bathing, care of mouth/teeth, toileting, grooming, dressing, etc )  - Assess/evaluate cause of self-care deficits   - Assess range of motion  - Assess patient's mobility; develop plan if impaired  - Assess patient's need for assistive devices and provide as appropriate  - Encourage maximum independence but intervene and supervise when necessary  - Involve family in performance of ADLs  - Assess for home care needs following discharge   - Consider OT consult to assist with ADL evaluation and planning for discharge  - Provide patient education as appropriate  Outcome: Progressing     Problem: PAIN - ADULT  Goal: Verbalizes/displays adequate comfort level or baseline comfort level  Description: Interventions:  - Encourage patient to monitor pain and request assistance  - Assess pain using appropriate pain scale  - Administer analgesics based on type and severity of pain and evaluate response  - Implement non-pharmacological measures as appropriate and evaluate response  - Consider cultural and social influences on pain and pain management  - Notify physician/advanced practitioner if interventions unsuccessful or patient reports new pain  Outcome: Progressing     Problem: INFECTION - ADULT  Goal: Absence or prevention of progression during hospitalization  Description: INTERVENTIONS:  - Assess and monitor for signs and symptoms of infection  - Monitor lab/diagnostic results  - Monitor all insertion sites, i e  indwelling lines, tubes, and drains  - Monitor endotracheal if appropriate and nasal secretions for changes in amount and color  - [place] appropriate cooling/warming therapies per order  - Administer medications as ordered  - Instruct and encourage patient and family to use good hand hygiene technique  - Identify and instruct in appropriate isolation precautions for identified infection/condition  Outcome: Progressing

## 2021-08-13 NOTE — PHYSICAL THERAPY NOTE
PHYSICAL THERAPY TREATMENT NOTE  NAME:  Philip Damme Schoenberger  DATE: 08/13/21    Length Of Stay: 3  Performed at least 2 patient identifiers during session: Name and ID bracelet    TREATMENT FLOWSHEET:    08/13/21 1206   PT Last Visit   PT Visit Date 08/13/21   Note Type   Note Type Treatment   Pain Assessment   Pain Assessment Tool 0-10   Pain Score No Pain   Restrictions/Precautions   Weight Bearing Precautions Per Order No   Other Precautions Telemetry;Multiple lines; Fall Risk   General   Chart Reviewed Yes   Response to Previous Treatment Patient with no complaints from previous session  Family/Caregiver Present No   Cognition   Overall Cognitive Status WFL   Arousal/Participation Alert; Cooperative   Attention Within functional limits   Orientation Level Oriented X4   Memory Within functional limits   Following Commands Follows all commands and directions without difficulty   Subjective   Subjective "I would love to walk with you "   Transfers   Sit to Stand 5  Supervision   Additional items Assist x 1; Armrests; Increased time required;Verbal cues   Stand to Sit 5  Supervision   Additional items Assist x 1; Armrests; Increased time required;Verbal cues   Ambulation/Elevation   Gait pattern Improper Weight shift;Decreased foot clearance; Short stride; Foward flexed; Excessively slow   Gait Assistance   (CG)   Additional items Assist x 1;Verbal cues   Assistive Device   (HHA)   Distance 100 ft x2   Balance   Static Sitting Good   Dynamic Sitting Fair +   Static Standing Fair   Dynamic Standing Fair -   Ambulatory Fair -   Endurance Deficit   Endurance Deficit Yes   Activity Tolerance   Activity Tolerance Patient limited by fatigue   Nurse Made Aware RN aware   Exercises   Quad Sets Sitting;25 reps;AROM; Bilateral   Heelslides Sitting;25 reps;AROM; Bilateral   Glute Sets Sitting;25 reps;AROM; Bilateral   Hip Flexion Sitting;25 reps;AROM; Bilateral   Hip Abduction Sitting;25 reps;AROM; Bilateral   Hip Adduction Sitting;25 reps;AROM; Bilateral   Knee AROM Long Arc Quad Sitting;25 reps;AROM; Bilateral   Ankle Pumps Sitting;25 reps;AROM; Bilateral   Marching Sitting;25 reps;AROM; Bilateral   Assessment   Prognosis Good   Problem List Decreased strength;Decreased endurance; Impaired balance;Decreased mobility   Goals   Patient Goals to get home   PT Treatment Day 3   Plan   Treatment/Interventions Functional transfer training;LE strengthening/ROM; Therapeutic exercise; Endurance training;Patient/family training;Equipment eval/education; Bed mobility;Gait training;Spoke to nursing   Progress Progressing toward goals   PT Frequency   (3-5x/wk)   Recommendation   PT Discharge Recommendation Home with home health rehabilitation   Equipment Recommended [facility] Recommended Wheeled walker   PT - OK to Discharge Yes   Additional Comments when medically stable   AM-PAC Basic Mobility Inpatient   Turning in Bed Without Bedrails 3   Lying on Back to Sitting on Edge of Flat Bed 3   Moving Bed to Chair 3   Standing Up From Chair 3   Walk in Room 3   Climb 3-5 Stairs 3   Basic Mobility Inpatient Raw Score 18   Basic Mobility Standardized Score 41 05       The patient's AM-PAC Basic Mobility Inpatient Short Form Raw Score is 18, Standardized Score is 41 05  A standardized score less than 42 9 suggests the patient may benefit from discharge to post-acute rehabilitation services  Please also refer to the recommendation of the Physical Therapist for safe discharge planning  Pt seen for PT treatment session this date with interventions consisting of transfer training, gait training, TE  Pt agreeable to PT treatment session upon arrival, pt found sitting OOB in stationary chair  At end of session, pt left sitting OOB in stationary chair with SCD's applied, all telemetry reconnected, and all needs in reach   In comparison to previous session, pt with improvements in ambulation distance   Continue to recommend Home PT at time of d/c in order to maximize pt's functional independence and safety w/ mobility  Pt continues to be functioning below baseline level  PT will continue to see pt while here in order to address the deficits listed above and provide interventions consistent w/ POC in effort to achieve STGs      Sean Ku PTA

## 2021-08-13 NOTE — CASE MANAGEMENT
Case Management Discharge Planning Note    Patient name Blank Like  Location ICU 01/ICU 01 MRN [id number]  : 1935 Date 2021       Current Admission Date: 2021  Current Admission Diagnosis:  Small bowel obstruction [facility])   Patient Active Problem List   Diagnosis    Un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diabetes mellitus with hyperglycemia ([facility] )    Essential hypertension    Hyperlipidemia    Left breast mass    Depression    Hyponatremia    Hypokalemia    Gross hematuria    Dizziness    Bradycardia    Sick sinus syndrome (HCC)    Cardiac pacemaker in situ    Abnormal ultrasound cardiogram    Anxiety    BMI 33 0-33 9,adult    Breast carcinoma, female, right ([facility] )    GERD (gastroesophageal reflux disease)    Macular degeneration    Malignant neoplasm of upper-inner quadrant of left breast in female, estrogen receptor positive ([facility] )    Personal history of breast cancer    S/P hysterectomy    Memory loss    Medicare annual wellness visit, subsequent    Hypercalcemia    Balance problem    Cough    Encounter for pre-operative examination    Malignant neoplasm of overlapping sites of bladder ([facility] )    Encounter to discuss test results    Acute kidney injury ([facility] )    Small bowel obstruction ([facility] )    PONV (postoperative nausea and vomiting)    Previous Admission - Discharge Date:21   LOS (days): 3  Geometric Mean LOS (GMLOS) (days): 10 20  Days to GMLOS:6 8 Previous Discharge Diagnosis:  There are no discharge diagnoses documented for the most recent discharge       Risk of Unplanned Readmission Score  Predictive Model Details          14 (Low)  Factor Value    Calculated 2021 12:04 17% Active NSAID Rx order present    Risk of Unplanned Readmission Model 12% Number of active Rx orders 20     9% Number of ED visits in last six months 2     9% Diagnosis of cancer present     8% ECG/EKG order present in last 6 months     6% Diagnosis of electrolyte disorder present     6% Restraint order present in last 6 months     6% Imaging order present in last 6 months     6% Age 82     5% Latest hemoglobin low (7 8 g/dL)     5% Phosphorous result present     4% Active anticoagulant Rx order present     3% Charlson Comorbidity Index 3     3% Current length of stay 3 34 days     2% Future appointment scheduled     1% Active ulcer medication Rx order present         BUNDLE:      OBJECTIVE:  Pt is a 82y o  year old , white or  [1], female with Buddhist preference of Taoist admitted on 8/9/2021 10:44 PM  Pt is admitted to ICU 01 at [facility] with complaints of Small bowel obstruction ([address] )   Current admission status: Inpatient  Preferred Pharmacy:   [address], [address] [address] [place] [address]  Phone: [phone] Fax: [phone]    Primary Care Provider: Ashwin Rojo DO    Primary Insurance: Michel Aguilar [facility]  Secondary Insurance:     DISCHARGE DETAILS:    Discharge planning discussed with[de-identified] Pt and daughter Kirsten Rockwell of Choice: Yes    Contacts  Patient Contacts: Lou Kellertionship to Patient[de-identified] Family  Contact Method: Phone  Phone Number: [phone]  Reason/Outcome: Discharge Planning      DME Referral Provided  Referral made for DME?: No    We would like to be able to fill any required prescriptions on discharge at our [facility] and have them delivered to you at discharge in your room  Would you like to participate in this program? : No - Declined    Discharge Destination Plan[de-identified] Home  Transportation at Discharge?: No     A post acute care recommendation was made by your care team for [facility]  Discussed [place] of Choice with patient  List of agencies given to patient via in person  patient aware the list is custom filtered for them by preference  and that [facility] post acute providers are designated  Pt chose [facility]  A referral was made for same  Tricia Enciso has accepted the pt  Family or friend will transport home

## 2021-08-13 NOTE — PROGRESS NOTES
Progress Note - General Surgery   Blank Nair 82 y o  female MRN: [id number]  Unit/Bed#: ICU 01 Encounter: [id number]    Assessment/Plan     85F POD3 status post exploratory laparotomy, reduction of small bowel volvulus, small bowel resection, primary anastomosis  Doing well  Passing flatus, no BM  Tolerating sips/chips, will advance to clears, fluids at 50cc/hr, will HLIV when adequate PO intake and reliable ongoing bowel function  Patient voiding, continuing strict I/O's  PO tylenol and ibuprofen started for pain  Continue OOB/ambulation with PT/OT  DVT prophylaxis with boots and heparin prophylaxis  Antibiotics will complete in next 24 hours  Anemia on labs is chronic and this is her baseline  She is now at a Hb of 7 8  In light of blood shortage will continue to monitor and reevaluate on a daily basis  She is not clinically bleeding  Goal to advance diet over the weekend and plan for discharge home with services on Monday if doing well  Subjective/Objective     Subjective: passing flatus, no nausea, tolerating slow sips and ice chips, walking, voiding, minimal pain    Objective:     Blood pressure 118/68, pulse 78, temperature (!) 97 3 °F (36 3 °C), temperature source Temporal, resp  rate 12, height 5' 3" (1 6 m), weight 68 7 kg (151 lb 7 3 oz), SpO2 93 %, not currently breastfeeding  ,Body mass index is 26 83 kg/m²  Intake/Output Summary (Last 24 hours) at 8/13/2021 1129  Last data filed at 8/13/2021 0753  Gross per 24 hour   Intake 2095 ml   Output 1350 ml   Net 745 ml       Physical Exam  Vitals reviewed  Constitutional:       General: She is not in acute distress  Appearance: She is not toxic-appearing  HENT:      Mouth/Throat:      Mouth: Mucous membranes are moist    Cardiovascular:      Rate and Rhythm: Normal rate     Abdominal:      Comments: Soft, nondistended, minimal periincisional tenderness, incision c/d/i with staples, dry dressing and binder in place   Neurological: Mental Status: She is alert  Lab, Imaging and other studies:  I have personally reviewed pertinent lab results    , CBC:   Lab Results   Component Value Date    WBC 6 40 08/13/2021    HGB 7 8 (L) 08/13/2021    HCT 23 6 (L) 08/13/2021    MCV 83 08/13/2021     08/13/2021    MCH 27 2 08/13/2021    MCHC 32 9 08/13/2021    RDW 19 9 (H) 08/13/2021    MPV 6 7 (L) 08/13/2021   , CMP:   Lab Results   Component Value Date    SODIUM 141 08/13/2021    K 4 2 08/13/2021     (H) 08/13/2021    CO2 28 08/13/2021    BUN 15 08/13/2021    CREATININE 0 81 08/13/2021    CALCIUM 9 9 08/13/2021    EGFR 67 08/13/2021     VTE Pharmacologic Prophylaxis: Heparin  VTE Mechanical Prophylaxis: sequential compression device

## 2021-08-13 NOTE — PLAN OF CARE
Problem: PHYSICAL THERAPY ADULT  Goal: Performs mobility at highest level of function for planned discharge setting  See evaluation for individualized goals  Description: Treatment/Interventions: Functional transfer training, Elevations, Therapeutic exercise, Endurance training, Bed mobility, Gait training  Equipment Recommended: Obi Velarde       See flowsheet documentation for full assessment, interventions and recommendations  Outcome: Progressing  Note: Prognosis: Good  Problem List: Decreased strength, Decreased endurance, Impaired balance, Decreased mobility  Assessment: Pt seen for PT treatment session this date with interventions consisting of transfer training, gait training, TE  Pt agreeable to PT treatment session upon arrival, pt found sitting OOB in stationary chair  At end of session, pt left sitting OOB in stationary chair with SCD's applied, all telemetry reconnected, and all needs in reach   In comparison to previous session, pt with improvements in ambulation distance  Continue to recommend Home PT at time of d/c in order to maximize pt's functional independence and safety w/ mobility  Pt continues to be functioning below baseline level  PT will continue to see pt while here in order to address the deficits listed above and provide interventions consistent w/ POC in effort to achieve STGs  Barriers to Discharge: None        PT Discharge Recommendation: Home with home health rehabilitation     PT - OK to Discharge: Yes    See flowsheet documentation for full assessment

## 2021-08-13 NOTE — QUICK NOTE
Spoke with patient's daughter El Ness re: clinical progress and discharge planning for early next week  She expresses concern regarding the patient's long term oral intake, hydration, and nutrition and is in agreement with nutritional supplements and ongoing encouragement  PT recommending home d/c with home services, patient initially declined these  Family strongly feels she needs the home services and we will work with case management and the patient to enroll her in home RN/home PT/OT services for the early weeks after discharge

## 2021-08-14 LAB
ANION GAP SERPL CALCULATED.3IONS-SCNC: 6 MMOL/L (ref 4–13)
BASOPHILS # BLD AUTO: 0 THOUSANDS/ΜL (ref 0–0.1)
BASOPHILS NFR BLD AUTO: 1 % (ref 0–2)
BUN SERPL-MCNC: 11 MG/DL (ref 7–25)
CALCIUM SERPL-MCNC: 10.3 MG/DL (ref 8.6–10.5)
CHLORIDE SERPL-SCNC: 107 MMOL/L (ref 98–107)
CO2 SERPL-SCNC: 27 MMOL/L (ref 21–31)
CREAT SERPL-MCNC: 0.82 MG/DL (ref 0.6–1.2)
EOSINOPHIL # BLD AUTO: 0.2 THOUSAND/ΜL (ref 0–0.61)
EOSINOPHIL NFR BLD AUTO: 3 % (ref 0–5)
ERYTHROCYTE [DISTWIDTH] IN BLOOD BY AUTOMATED COUNT: 19.3 % (ref 11.5–14.5)
GFR SERPL CREATININE-BSD FRML MDRD: 66 ML/MIN/1.73SQ M
GLUCOSE SERPL-MCNC: 102 MG/DL (ref 65–99)
HCT VFR BLD AUTO: 23.9 % (ref 42–47)
HGB BLD-MCNC: 7.7 G/DL (ref 12–16)
LYMPHOCYTES # BLD AUTO: 1 THOUSANDS/ΜL (ref 0.6–4.47)
LYMPHOCYTES NFR BLD AUTO: 19 % (ref 21–51)
MAGNESIUM SERPL-MCNC: 1.7 MG/DL (ref 1.9–2.7)
MCH RBC QN AUTO: 26.9 PG (ref 26–34)
MCHC RBC AUTO-ENTMCNC: 32.4 G/DL (ref 31–37)
MCV RBC AUTO: 83 FL (ref 81–99)
MONOCYTES # BLD AUTO: 0.4 THOUSAND/ΜL (ref 0.17–1.22)
MONOCYTES NFR BLD AUTO: 8 % (ref 2–12)
NEUTROPHILS # BLD AUTO: 3.6 THOUSANDS/ΜL (ref 1.4–6.5)
NEUTS SEG NFR BLD AUTO: 68 % (ref 42–75)
PHOSPHATE SERPL-MCNC: 2 MG/DL (ref 3–5.5)
PLATELET # BLD AUTO: 247 THOUSANDS/UL (ref 149–390)
PMV BLD AUTO: 7.2 FL (ref 8.6–11.7)
POTASSIUM SERPL-SCNC: 3.3 MMOL/L (ref 3.5–5.5)
RBC # BLD AUTO: 2.88 MILLION/UL (ref 3.9–5.2)
SODIUM SERPL-SCNC: 140 MMOL/L (ref 134–143)
WBC # BLD AUTO: 5.3 THOUSAND/UL (ref 4.8–10.8)

## 2021-08-14 PROCEDURE — 85025 COMPLETE CBC W/AUTO DIFF WBC: CPT | Performed by: SURGERY

## 2021-08-14 PROCEDURE — 99024 POSTOP FOLLOW-UP VISIT: CPT | Performed by: SPECIALIST

## 2021-08-14 PROCEDURE — 80048 BASIC METABOLIC PNL TOTAL CA: CPT | Performed by: SURGERY

## 2021-08-14 PROCEDURE — 84100 ASSAY OF PHOSPHORUS: CPT | Performed by: SURGERY

## 2021-08-14 PROCEDURE — 83735 ASSAY OF MAGNESIUM: CPT | Performed by: SURGERY

## 2021-08-14 PROCEDURE — C9113 INJ PANTOPRAZOLE SODIUM, VIA: HCPCS | Performed by: SURGERY

## 2021-08-14 RX ORDER — ANASTROZOLE 1 MG/1
1 TABLET ORAL
Status: DISCONTINUED | OUTPATIENT
Start: 2021-08-14 | End: 2021-08-16 | Stop reason: HOSPADM

## 2021-08-14 RX ORDER — MELATONIN
1000 DAILY
Status: DISCONTINUED | OUTPATIENT
Start: 2021-08-14 | End: 2021-08-16 | Stop reason: HOSPADM

## 2021-08-14 RX ORDER — POTASSIUM CHLORIDE 20 MEQ/1
20 TABLET, EXTENDED RELEASE ORAL DAILY
Status: DISCONTINUED | OUTPATIENT
Start: 2021-08-14 | End: 2021-08-16 | Stop reason: HOSPADM

## 2021-08-14 RX ORDER — FERROUS SULFATE 325(65) MG
325 TABLET ORAL
Status: DISCONTINUED | OUTPATIENT
Start: 2021-08-14 | End: 2021-08-16 | Stop reason: HOSPADM

## 2021-08-14 RX ORDER — ATORVASTATIN CALCIUM 40 MG/1
40 TABLET, FILM COATED ORAL
Status: DISCONTINUED | OUTPATIENT
Start: 2021-08-14 | End: 2021-08-16 | Stop reason: HOSPADM

## 2021-08-14 RX ORDER — DEXTROSE AND SODIUM CHLORIDE 5; .45 G/100ML; G/100ML
50 INJECTION, SOLUTION INTRAVENOUS CONTINUOUS
Status: DISCONTINUED | OUTPATIENT
Start: 2021-08-14 | End: 2021-08-15

## 2021-08-14 RX ADMIN — FERROUS SULFATE TAB 325 MG (65 MG ELEMENTAL FE) 325 MG: 325 (65 FE) TAB at 16:34

## 2021-08-14 RX ADMIN — FERROUS SULFATE TAB 325 MG (65 MG ELEMENTAL FE) 325 MG: 325 (65 FE) TAB at 14:39

## 2021-08-14 RX ADMIN — ACETAMINOPHEN 650 MG: 325 TABLET ORAL at 22:15

## 2021-08-14 RX ADMIN — ATORVASTATIN CALCIUM 40 MG: 40 TABLET, FILM COATED ORAL at 22:15

## 2021-08-14 RX ADMIN — CEFTRIAXONE 1000 MG: 1 INJECTION, SOLUTION INTRAVENOUS at 00:32

## 2021-08-14 RX ADMIN — POTASSIUM CHLORIDE 20 MEQ: 1500 TABLET, EXTENDED RELEASE ORAL at 14:39

## 2021-08-14 RX ADMIN — Medication 1000 UNITS: at 14:39

## 2021-08-14 RX ADMIN — METRONIDAZOLE 500 MG: 500 INJECTION, SOLUTION INTRAVENOUS at 11:49

## 2021-08-14 RX ADMIN — ACETAMINOPHEN 650 MG: 325 TABLET ORAL at 14:39

## 2021-08-14 RX ADMIN — HEPARIN SODIUM 5000 UNITS: 5000 INJECTION INTRAVENOUS; SUBCUTANEOUS at 14:39

## 2021-08-14 RX ADMIN — HEPARIN SODIUM 5000 UNITS: 5000 INJECTION INTRAVENOUS; SUBCUTANEOUS at 22:15

## 2021-08-14 RX ADMIN — ACETAMINOPHEN 650 MG: 325 TABLET ORAL at 05:11

## 2021-08-14 RX ADMIN — HEPARIN SODIUM 5000 UNITS: 5000 INJECTION INTRAVENOUS; SUBCUTANEOUS at 05:11

## 2021-08-14 RX ADMIN — METRONIDAZOLE 500 MG: 500 INJECTION, SOLUTION INTRAVENOUS at 04:54

## 2021-08-14 RX ADMIN — PANTOPRAZOLE SODIUM 40 MG: 40 INJECTION, POWDER, FOR SOLUTION INTRAVENOUS at 22:15

## 2021-08-14 RX ADMIN — ANASTROZOLE 1 MG: 1 TABLET ORAL at 14:38

## 2021-08-14 NOTE — PROGRESS NOTES
Progress Note - General Surgery   Aristides Millan 82 y o  female MRN: [id number]  Unit/Bed#: -02 Encounter: [id number]    Assessment:  POD 4 Exploratory Laparotomy and SB resection  Tolerating full liquids, with an appetite for regular diet  With flatus  Surgical Pathology -> ischemic bowel  VSS AF    Plan:  Advance diet to regular for dinner  PT, OT    Subjective/Objective   Chief Complaint: "I saw my room mate had some meat on her lunch tray, that looked pretty good"    Subjective: OOB, doing needle point    Objective:     Blood pressure 129/59, pulse 64, temperature (!) 96 2 °F (35 7 °C), temperature source Tympanic, resp  rate 20, height 5' 3" (1 6 m), weight 68 7 kg (151 lb 7 3 oz), SpO2 96 %, not currently breastfeeding  ,Body mass index is 26 83 kg/m²  Intake/Output Summary (Last 24 hours) at 8/14/2021 1306  Last data filed at 8/14/2021 0844  Gross per 24 hour   Intake 830 ml   Output 1850 ml   Net -1020 ml       Invasive Devices     Peripheral Intravenous Line            Peripheral IV 08/09/21 Left Antecubital 4 days          Line            Peripheral Nerve Catheter 4 days                Physical Exam:   Abd:  Protuberant, soft with audible bowel sounds  Incision clean and dry  Abdominal Binder replaced      Lab, Imaging and other studies:  CBC:   Lab Results   Component Value Date    WBC 5 30 08/14/2021    HGB 7 7 (L) 08/14/2021    HCT 23 9 (L) 08/14/2021    MCV 83 08/14/2021     08/14/2021    MCH 26 9 08/14/2021    MCHC 32 4 08/14/2021    RDW 19 3 (H) 08/14/2021    MPV 7 2 (L) 08/14/2021   , CMP:   Lab Results   Component Value Date    SODIUM 140 08/14/2021    K 3 3 (L) 08/14/2021     08/14/2021    CO2 27 08/14/2021    BUN 11 08/14/2021    CREATININE 0 82 08/14/2021    CALCIUM 10 3 08/14/2021    EGFR 66 08/14/2021     VTE Pharmacologic Prophylaxis: Heparin  VTE Mechanical Prophylaxis: sequential compression device

## 2021-08-14 NOTE — NURSING NOTE
Patient received from ICU to m/s 108-2  Oriented to room/ call bell system  Patient OOB in bedside chair per pt request  Offers no complaints at this time  Incision to abdomen well approximated  Call bell and belonging within reach, will continue to monitor

## 2021-08-15 LAB
ANION GAP SERPL CALCULATED.3IONS-SCNC: 7 MMOL/L (ref 4–13)
ATRIAL RATE: 60 BPM
BASOPHILS # BLD AUTO: 0.1 THOUSANDS/ΜL (ref 0–0.1)
BASOPHILS NFR BLD AUTO: 1 % (ref 0–2)
BUN SERPL-MCNC: 10 MG/DL (ref 7–25)
CALCIUM SERPL-MCNC: 10.3 MG/DL (ref 8.6–10.5)
CHLORIDE SERPL-SCNC: 107 MMOL/L (ref 98–107)
CO2 SERPL-SCNC: 28 MMOL/L (ref 21–31)
CREAT SERPL-MCNC: 0.83 MG/DL (ref 0.6–1.2)
EOSINOPHIL # BLD AUTO: 0.1 THOUSAND/ΜL (ref 0–0.61)
EOSINOPHIL NFR BLD AUTO: 3 % (ref 0–5)
ERYTHROCYTE [DISTWIDTH] IN BLOOD BY AUTOMATED COUNT: 19.7 % (ref 11.5–14.5)
GFR SERPL CREATININE-BSD FRML MDRD: 65 ML/MIN/1.73SQ M
GLUCOSE SERPL-MCNC: 104 MG/DL (ref 65–99)
HCT VFR BLD AUTO: 24 % (ref 42–47)
HGB BLD-MCNC: 8 G/DL (ref 12–16)
LYMPHOCYTES # BLD AUTO: 0.9 THOUSANDS/ΜL (ref 0.6–4.47)
LYMPHOCYTES NFR BLD AUTO: 18 % (ref 21–51)
MAGNESIUM SERPL-MCNC: 1.6 MG/DL (ref 1.9–2.7)
MCH RBC QN AUTO: 27.4 PG (ref 26–34)
MCHC RBC AUTO-ENTMCNC: 33.3 G/DL (ref 31–37)
MCV RBC AUTO: 82 FL (ref 81–99)
MONOCYTES # BLD AUTO: 0.4 THOUSAND/ΜL (ref 0.17–1.22)
MONOCYTES NFR BLD AUTO: 8 % (ref 2–12)
NEUTROPHILS # BLD AUTO: 3.5 THOUSANDS/ΜL (ref 1.4–6.5)
NEUTS SEG NFR BLD AUTO: 70 % (ref 42–75)
PHOSPHATE SERPL-MCNC: 2 MG/DL (ref 3–5.5)
PLATELET # BLD AUTO: 277 THOUSANDS/UL (ref 149–390)
PMV BLD AUTO: 7.3 FL (ref 8.6–11.7)
POTASSIUM SERPL-SCNC: 3.4 MMOL/L (ref 3.5–5.5)
PR INTERVAL: 218 MS
QRS AXIS: 65 DEGREES
QRSD INTERVAL: 134 MS
QT INTERVAL: 448 MS
QTC INTERVAL: 448 MS
RBC # BLD AUTO: 2.92 MILLION/UL (ref 3.9–5.2)
SODIUM SERPL-SCNC: 142 MMOL/L (ref 134–143)
T WAVE AXIS: 31 DEGREES
VENTRICULAR RATE: 60 BPM
WBC # BLD AUTO: 5 THOUSAND/UL (ref 4.8–10.8)

## 2021-08-15 PROCEDURE — 80048 BASIC METABOLIC PNL TOTAL CA: CPT | Performed by: INTERNAL MEDICINE

## 2021-08-15 PROCEDURE — 93010 ELECTROCARDIOGRAM REPORT: CPT | Performed by: INTERNAL MEDICINE

## 2021-08-15 PROCEDURE — 85025 COMPLETE CBC W/AUTO DIFF WBC: CPT | Performed by: INTERNAL MEDICINE

## 2021-08-15 PROCEDURE — 99024 POSTOP FOLLOW-UP VISIT: CPT | Performed by: SPECIALIST

## 2021-08-15 PROCEDURE — 84100 ASSAY OF PHOSPHORUS: CPT | Performed by: INTERNAL MEDICINE

## 2021-08-15 PROCEDURE — 83735 ASSAY OF MAGNESIUM: CPT | Performed by: INTERNAL MEDICINE

## 2021-08-15 RX ORDER — DOCUSATE SODIUM 100 MG/1
100 CAPSULE, LIQUID FILLED ORAL 3 TIMES DAILY
Status: DISCONTINUED | OUTPATIENT
Start: 2021-08-15 | End: 2021-08-16 | Stop reason: HOSPADM

## 2021-08-15 RX ORDER — POLYETHYLENE GLYCOL 3350 17 G/17G
17 POWDER, FOR SOLUTION ORAL DAILY PRN
Status: DISCONTINUED | OUTPATIENT
Start: 2021-08-15 | End: 2021-08-16 | Stop reason: HOSPADM

## 2021-08-15 RX ORDER — MAGNESIUM SULFATE HEPTAHYDRATE 40 MG/ML
2 INJECTION, SOLUTION INTRAVENOUS ONCE
Status: COMPLETED | OUTPATIENT
Start: 2021-08-15 | End: 2021-08-15

## 2021-08-15 RX ADMIN — FERROUS SULFATE TAB 325 MG (65 MG ELEMENTAL FE) 325 MG: 325 (65 FE) TAB at 09:34

## 2021-08-15 RX ADMIN — POTASSIUM PHOSPHATE, MONOBASIC AND POTASSIUM PHOSPHATE, DIBASIC 12 MMOL: 224; 236 INJECTION, SOLUTION, CONCENTRATE INTRAVENOUS at 11:21

## 2021-08-15 RX ADMIN — DOCUSATE SODIUM 100 MG: 100 CAPSULE, LIQUID FILLED ORAL at 09:34

## 2021-08-15 RX ADMIN — ANASTROZOLE 1 MG: 1 TABLET ORAL at 11:21

## 2021-08-15 RX ADMIN — ATORVASTATIN CALCIUM 40 MG: 40 TABLET, FILM COATED ORAL at 22:54

## 2021-08-15 RX ADMIN — ACETAMINOPHEN 650 MG: 325 TABLET ORAL at 22:54

## 2021-08-15 RX ADMIN — Medication 1000 UNITS: at 09:34

## 2021-08-15 RX ADMIN — ACETAMINOPHEN 650 MG: 325 TABLET ORAL at 14:50

## 2021-08-15 RX ADMIN — FERROUS SULFATE TAB 325 MG (65 MG ELEMENTAL FE) 325 MG: 325 (65 FE) TAB at 16:32

## 2021-08-15 RX ADMIN — ACETAMINOPHEN 650 MG: 325 TABLET ORAL at 05:36

## 2021-08-15 RX ADMIN — HEPARIN SODIUM 5000 UNITS: 5000 INJECTION INTRAVENOUS; SUBCUTANEOUS at 14:50

## 2021-08-15 RX ADMIN — HEPARIN SODIUM 5000 UNITS: 5000 INJECTION INTRAVENOUS; SUBCUTANEOUS at 05:36

## 2021-08-15 RX ADMIN — HEPARIN SODIUM 5000 UNITS: 5000 INJECTION INTRAVENOUS; SUBCUTANEOUS at 22:54

## 2021-08-15 RX ADMIN — DOCUSATE SODIUM 100 MG: 100 CAPSULE, LIQUID FILLED ORAL at 22:54

## 2021-08-15 RX ADMIN — FERROUS SULFATE TAB 325 MG (65 MG ELEMENTAL FE) 325 MG: 325 (65 FE) TAB at 11:21

## 2021-08-15 RX ADMIN — DOCUSATE SODIUM 100 MG: 100 CAPSULE, LIQUID FILLED ORAL at 16:32

## 2021-08-15 RX ADMIN — MAGNESIUM SULFATE HEPTAHYDRATE 2 G: 40 INJECTION, SOLUTION INTRAVENOUS at 09:34

## 2021-08-15 RX ADMIN — POTASSIUM CHLORIDE 20 MEQ: 1500 TABLET, EXTENDED RELEASE ORAL at 09:34

## 2021-08-15 NOTE — PROGRESS NOTES
Pt  Had uneventful night with no complaints  She is OOB to the BR with supervision  New IV placed this am, old one removed

## 2021-08-15 NOTE — PROGRESS NOTES
Progress Note - General Surgery   Caraannmichele Quiet 82 y o  female MRN: [id number]  Unit/Bed#: -02 Encounter: [id number]    Assessment:  POD 5 Exploratory Laparotomy and SB resection  Tolerated Regular Diet this AM  Had the urge for BM, sat on the toilet, but only passed flatus  Mg 1 7, PO4 2 0, K 3 4    Plan:  Mag, K Phos supplementation  Add back Colace with MiraLax PRN    Subjective/Objective   Chief Complaint: "Must this Girdle be so tight"    Subjective: Alert, comfortable    Objective:     Blood pressure 144/63, pulse 77, temperature 97 5 °F (36 4 °C), temperature source Temporal, resp  rate 18, height 5' 3" (1 6 m), weight 68 7 kg (151 lb 7 3 oz), SpO2 96 %, not currently breastfeeding  ,Body mass index is 26 83 kg/m²        Intake/Output Summary (Last 24 hours) at 8/15/2021 0922  Last data filed at 8/15/2021 0501  Gross per 24 hour   Intake 420 ml   Output --   Net 420 ml       Invasive Devices     Peripheral Intravenous Line            Peripheral IV 08/15/21 Distal;Left;Ventral (anterior) Forearm <1 day          Line            Peripheral Nerve Catheter 5 days                Physical Exam: Midline abdominal incision clean and dry, abdomen soft with active bowel sounds    Lab, Imaging and other studies:  CBC:   Lab Results   Component Value Date    WBC 5 00 08/15/2021    HGB 8 0 (L) 08/15/2021    HCT 24 0 (L) 08/15/2021    MCV 82 08/15/2021     08/15/2021    MCH 27 4 08/15/2021    MCHC 33 3 08/15/2021    RDW 19 7 (H) 08/15/2021    MPV 7 3 (L) 08/15/2021   , CMP:   Lab Results   Component Value Date    SODIUM 142 08/15/2021    K 3 4 (L) 08/15/2021     08/15/2021    CO2 28 08/15/2021    BUN 10 08/15/2021    CREATININE 0 83 08/15/2021    CALCIUM 10 3 08/15/2021    EGFR 65 08/15/2021     VTE Pharmacologic Prophylaxis: Heparin  VTE Mechanical Prophylaxis: sequential compression device

## 2021-08-15 NOTE — NURSING NOTE
Patient resting in bed at present  Tolerated breakfast well  Offers no complaints of pain at this time  Call bell and belongings within reach, will continue to monitor

## 2021-08-16 VITALS
OXYGEN SATURATION: 96 % | WEIGHT: 151.46 LBS | RESPIRATION RATE: 18 BRPM | HEIGHT: 63 IN | SYSTOLIC BLOOD PRESSURE: 126 MMHG | TEMPERATURE: 98.8 F | HEART RATE: 66 BPM | DIASTOLIC BLOOD PRESSURE: 61 MMHG | BODY MASS INDEX: 26.84 KG/M2

## 2021-08-16 PROBLEM — N17.9 ACUTE KIDNEY INJURY (HCC): Status: RESOLVED | Noted: 2021-08-10 | Resolved: 2021-08-16

## 2021-08-16 PROBLEM — K56.2 SMALL BOWEL VOLVULUS (HCC): Status: RESOLVED | Noted: 2021-08-16 | Resolved: 2021-08-16

## 2021-08-16 PROBLEM — K56.609 SMALL BOWEL OBSTRUCTION (HCC): Status: RESOLVED | Noted: 2021-08-10 | Resolved: 2021-08-16

## 2021-08-16 PROBLEM — R11.2 PONV (POSTOPERATIVE NAUSEA AND VOMITING): Status: RESOLVED | Noted: 2021-08-10 | Resolved: 2021-08-16

## 2021-08-16 PROBLEM — K56.2 VOLVULUS OF SMALL INTESTINE (HCC): Status: ACTIVE | Noted: 2021-08-16

## 2021-08-16 PROBLEM — Z98.890 PONV (POSTOPERATIVE NAUSEA AND VOMITING): Status: RESOLVED | Noted: 2021-08-10 | Resolved: 2021-08-16

## 2021-08-16 LAB
ANION GAP SERPL CALCULATED.3IONS-SCNC: 6 MMOL/L (ref 4–13)
BASOPHILS # BLD AUTO: 0.1 THOUSANDS/ΜL (ref 0–0.1)
BASOPHILS NFR BLD AUTO: 1 % (ref 0–2)
BUN SERPL-MCNC: 10 MG/DL (ref 7–25)
CALCIUM SERPL-MCNC: 10.6 MG/DL (ref 8.6–10.5)
CHLORIDE SERPL-SCNC: 106 MMOL/L (ref 98–107)
CO2 SERPL-SCNC: 28 MMOL/L (ref 21–31)
CREAT SERPL-MCNC: 0.93 MG/DL (ref 0.6–1.2)
EOSINOPHIL # BLD AUTO: 0.2 THOUSAND/ΜL (ref 0–0.61)
EOSINOPHIL NFR BLD AUTO: 4 % (ref 0–5)
ERYTHROCYTE [DISTWIDTH] IN BLOOD BY AUTOMATED COUNT: 19.8 % (ref 11.5–14.5)
GFR SERPL CREATININE-BSD FRML MDRD: 57 ML/MIN/1.73SQ M
GLUCOSE SERPL-MCNC: 108 MG/DL (ref 65–99)
HCT VFR BLD AUTO: 27.5 % (ref 42–47)
HGB BLD-MCNC: 9 G/DL (ref 12–16)
LYMPHOCYTES # BLD AUTO: 1.1 THOUSANDS/ΜL (ref 0.6–4.47)
LYMPHOCYTES NFR BLD AUTO: 20 % (ref 21–51)
MAGNESIUM SERPL-MCNC: 2 MG/DL (ref 1.9–2.7)
MCH RBC QN AUTO: 27.3 PG (ref 26–34)
MCHC RBC AUTO-ENTMCNC: 32.7 G/DL (ref 31–37)
MCV RBC AUTO: 83 FL (ref 81–99)
MONOCYTES # BLD AUTO: 0.4 THOUSAND/ΜL (ref 0.17–1.22)
MONOCYTES NFR BLD AUTO: 8 % (ref 2–12)
NEUTROPHILS # BLD AUTO: 3.6 THOUSANDS/ΜL (ref 1.4–6.5)
NEUTS SEG NFR BLD AUTO: 67 % (ref 42–75)
PHOSPHATE SERPL-MCNC: 2.2 MG/DL (ref 3–5.5)
PLATELET # BLD AUTO: 327 THOUSANDS/UL (ref 149–390)
PMV BLD AUTO: 7.4 FL (ref 8.6–11.7)
POTASSIUM SERPL-SCNC: 3.4 MMOL/L (ref 3.5–5.5)
RBC # BLD AUTO: 3.29 MILLION/UL (ref 3.9–5.2)
SODIUM SERPL-SCNC: 140 MMOL/L (ref 134–143)
WBC # BLD AUTO: 5.3 THOUSAND/UL (ref 4.8–10.8)

## 2021-08-16 PROCEDURE — 84100 ASSAY OF PHOSPHORUS: CPT | Performed by: SURGERY

## 2021-08-16 PROCEDURE — 99024 POSTOP FOLLOW-UP VISIT: CPT | Performed by: SURGERY

## 2021-08-16 PROCEDURE — NC001 PR NO CHARGE: Performed by: SURGERY

## 2021-08-16 PROCEDURE — 85025 COMPLETE CBC W/AUTO DIFF WBC: CPT | Performed by: SURGERY

## 2021-08-16 PROCEDURE — 83735 ASSAY OF MAGNESIUM: CPT | Performed by: SURGERY

## 2021-08-16 PROCEDURE — 80048 BASIC METABOLIC PNL TOTAL CA: CPT | Performed by: SURGERY

## 2021-08-16 RX ORDER — ACETAMINOPHEN 325 MG/1
650 TABLET ORAL EVERY 6 HOURS PRN
Refills: 0
Start: 2021-08-16 | End: 2021-11-10

## 2021-08-16 RX ORDER — BISACODYL 10 MG
10 SUPPOSITORY, RECTAL RECTAL DAILY PRN
Qty: 12 SUPPOSITORY | Refills: 0
Start: 2021-08-16

## 2021-08-16 RX ORDER — POLYETHYLENE GLYCOL 3350 17 G/17G
17 POWDER, FOR SOLUTION ORAL DAILY PRN
Refills: 0
Start: 2021-08-16

## 2021-08-16 RX ORDER — BISACODYL 10 MG
10 SUPPOSITORY, RECTAL RECTAL ONCE
Status: DISCONTINUED | OUTPATIENT
Start: 2021-08-16 | End: 2021-08-16 | Stop reason: HOSPADM

## 2021-08-16 RX ORDER — IBUPROFEN 400 MG/1
400 TABLET ORAL EVERY 6 HOURS PRN
Refills: 0
Start: 2021-08-16 | End: 2022-04-06 | Stop reason: HOSPADM

## 2021-08-16 RX ADMIN — DOCUSATE SODIUM 100 MG: 100 CAPSULE, LIQUID FILLED ORAL at 09:34

## 2021-08-16 RX ADMIN — HEPARIN SODIUM 5000 UNITS: 5000 INJECTION INTRAVENOUS; SUBCUTANEOUS at 05:53

## 2021-08-16 RX ADMIN — ACETAMINOPHEN 650 MG: 325 TABLET ORAL at 05:53

## 2021-08-16 RX ADMIN — POTASSIUM CHLORIDE 20 MEQ: 1500 TABLET, EXTENDED RELEASE ORAL at 09:34

## 2021-08-16 RX ADMIN — ANASTROZOLE 1 MG: 1 TABLET ORAL at 12:24

## 2021-08-16 RX ADMIN — FERROUS SULFATE TAB 325 MG (65 MG ELEMENTAL FE) 325 MG: 325 (65 FE) TAB at 09:37

## 2021-08-16 RX ADMIN — ACETAMINOPHEN 650 MG: 325 TABLET ORAL at 15:44

## 2021-08-16 RX ADMIN — Medication 1000 UNITS: at 09:34

## 2021-08-16 RX ADMIN — FERROUS SULFATE TAB 325 MG (65 MG ELEMENTAL FE) 325 MG: 325 (65 FE) TAB at 12:24

## 2021-08-16 RX ADMIN — POLYETHYLENE GLYCOL 3350 17 G: 17 POWDER, FOR SOLUTION ORAL at 06:01

## 2021-08-16 RX ADMIN — POTASSIUM PHOSPHATE, MONOBASIC AND POTASSIUM PHOSPHATE, DIBASIC 30 MMOL: 224; 236 INJECTION, SOLUTION, CONCENTRATE INTRAVENOUS at 11:23

## 2021-08-16 NOTE — NURSING NOTE
Patient discharged to home in stable condition with Home Health care from [facility]  All discharge instructions explained to patient's daughter who verbally stated understanding of same  Patient then transported to the hospital exit via wheelchair and was escorted there by the PCA

## 2021-08-16 NOTE — ASSESSMENT & PLAN NOTE
81 yo F now POD#6 s/p ex lap SBR for volvulus  Reports feeling well, minimal pain LLQ, no N/V on regular diet, awaiting for BM post-op  Abdomen soft, non-distended, mild incisional TTP, incision C/D/I staples  /68 otherwise VSS on room air  Hgb 9 0, K 3 4  Assessment:  Small bowel volvulus    Plan:  Stable surgically  Continue colace/Miralax for post-op constipation  Electrolytes replaced  Discussed with Dr Maryelizabeth Mcardle, defer to her regarding discharge possibly today

## 2021-08-16 NOTE — DISCHARGE SUMMARY
Discharge Summary - Blank Nair 82 y o  female MRN: [id number]    Unit/Bed#: -02 Encounter: [id number]    Admission Date:   Admission Orders (From admission, onward)     Ordered        08/10/21 0354  Inpatient Admission  Once                     Admitting Diagnosis: Small bowel obstruction ([address] ) [K56 609]  Abdominal pain [R10 9]  Volvulus of small intestine ([address] ) [K56 2]  Small bowel volvulus ([address] ) [K56 2]    HPI:     Ms Jamee Gonzalez presented with 1 day of abdominal pain, nausea, vomiting  Was found to have a closed loop small bowel obstruction secondary to a small bowel volvulus  She was taken to the OR emergently for exploratory laparotomy  Procedures Performed: Exploratory laparotomy, small bowel resection, primary anastomosis    Summary of Hospital Course: The patient was taken to the OR emergently after receiving IVF and IV antibiotics  She was found to have a necrotic segment of small intestine  The volvulus was reduced and the necrotic small bowel was resected  See operative note for details  She was admitted to the ICU postoperatively and an NGT and keenan catheter remained in place  She had return of bowel function, NGT was removed, she tolerated clear liquids, full liquids, and soft foods and IVF were stopped over a few days  Keenan was removed, she voided  Pain was minimal and controlled with tylenol  Antibiotics were stopped after 4 days of perioperative coverage  Home meds were restarted  PT/OT recommended discharge home with home health care  She was cleared for discharge home on 8/16  She continued to pass gas and had not yet had a BM prior to discharge  Instructions were given regarding management of constipation at home   She will return to the clinic for a follow up and staple removal     Complications: None    Discharge Diagnosis:   Small bowel volvulus  Small bowel obstruction    Condition at Discharge: good     Discharge Exam:  Vitals:    08/15/21 0743 08/15/21 1446 08/15/21 2300 08/16/21 0805   BP: 144/63 142/63 136/63 (!) 186/86   BP Location: Right arm Left arm Left arm Left arm   Pulse: 77 68 84 79   Resp: 18 18 18 18   Temp: 97 5 °F (36 4 °C) 98 6 °F (37 °C) 98 2 °F (36 8 °C) 98 2 °F (36 8 °C)   TempSrc: Temporal Temporal Temporal Temporal   SpO2: 96% 96% 93% 96%   Weight:       Height:       nontoxic, well appearing  No respiratory distress  Regular rate  Abdomen soft nontender nondistended  Incision healing well with staples, no erythema or drainage  No peripheral edema    Discharge instructions/Information to patient and family:   See after visit summary for information provided to patient and family  Provisions for Follow-Up Care:  See after visit summary for information related to follow-up care and any pertinent home health orders  PCP: Efe Ahumada DO    Disposition: Home    Planned Readmission: No    Discharge Medications:  See after visit summary for reconciled discharge medications provided to patient and family

## 2021-08-16 NOTE — PROGRESS NOTES
Pt  Rested well overnight with no complaints  She was OOB to the BR multiple times  She was steady and careful but wanted to do it all herself  She is looking forward to going home today, however, she has not had a bowel movement

## 2021-08-16 NOTE — DISCHARGE INSTR - AVS FIRST PAGE
-Take daily showers, no baths or swimming  -Use binder as needed for support  -Take tylenol as needed for pain control  -Use colace, miralax, dulcolax as needed for constipation  -Stay hydrated, eat small frequent meals, take nutritional supplements  -Work with home health care for nursing and therapy visits  -Stay active at home  -Continue incentive spirometry exercises  -Return to see Dr Natalie Meadows in 1-2 weeks for a postop visit and staple removal  -We will discuss the timing for your upcoming bladder cancer treatment after your postoperative checkup  -Schedule a visit with primary care provider as well shortly after discharge

## 2021-08-16 NOTE — PROGRESS NOTES
[facility]  Progress Note - Surya Cain 1935, 82 y o  female MRN: [id number]  Unit/Bed#: -02 Encounter: [id number]  Primary Care Provider: Catie Garcia DO   Date and time admitted to hospital: 8/9/2021 10:44 PM    * Small bowel volvulus (HCC)-resolved as of 8/16/2021  Assessment & Plan  83 yo F now POD#6 s/p ex lap SBR for volvulus  Reports feeling well, minimal pain LLQ, no N/V on regular diet, awaiting for BM post-op  Abdomen soft, non-distended, mild incisional TTP, incision C/D/I staples  /68 otherwise VSS on room air  Hgb 9 0, K 3 4  Assessment:  Small bowel volvulus    Plan:  Stable surgically  Continue colace/Miralax for post-op constipation  Electrolytes replaced  Discussed with Dr Jaswinder Santoyo, defer to her regarding discharge possibly today  Subjective/Objective   Chief Complaint: "fine"    Subjective: No complaints  Minimal left sided pain  NO n/v with regualr diet  No BM post-op  Objective: no overnight events  Blood pressure (!) 186/86, pulse 79, temperature 98 2 °F (36 8 °C), temperature source Temporal, resp  rate 18, height 5' 3" (1 6 m), weight 68 7 kg (151 lb 7 3 oz), SpO2 96 %, not currently breastfeeding  ,Body mass index is 26 83 kg/m²  Intake/Output Summary (Last 24 hours) at 8/16/2021 1256  Last data filed at 8/16/2021 8045  Gross per 24 hour   Intake 660 ml   Output --   Net 660 ml       Invasive Devices     Peripheral Intravenous Line            Peripheral IV 08/15/21 Distal;Left;Ventral (anterior) Forearm 1 day          Line            Peripheral Nerve Catheter 6 days                Physical Exam  Vitals and nursing note reviewed  Constitutional:       General: She is not in acute distress  Appearance: She is well-developed  She is not diaphoretic  HENT:      Head: Normocephalic and atraumatic     Eyes:      Conjunctiva/sclera: Conjunctivae normal       Pupils: Pupils are equal, round, and reactive to light  Pulmonary:      Effort: No respiratory distress  Abdominal:      General: Abdomen is flat  Bowel sounds are normal  There is no distension  Palpations: Abdomen is soft  Tenderness: There is abdominal tenderness  Comments: Nondistended, mild incisional TTP, good bowel sounds, incision C/D/I  Musculoskeletal:         General: Normal range of motion  Cervical back: Normal range of motion  Skin:     General: Skin is warm and dry  Capillary Refill: Capillary refill takes less than 2 seconds  Neurological:      Mental Status: She is alert and oriented to person, place, and time  Psychiatric:         Behavior: Behavior normal            Lab, Imaging and other studies:  I have personally reviewed pertinent lab results    , CBC:   Lab Results   Component Value Date    WBC 5 30 08/16/2021    HGB 9 0 (L) 08/16/2021    HCT 27 5 (L) 08/16/2021    MCV 83 08/16/2021     08/16/2021    MCH 27 3 08/16/2021    MCHC 32 7 08/16/2021    RDW 19 8 (H) 08/16/2021    MPV 7 4 (L) 08/16/2021   , CMP:   Lab Results   Component Value Date    SODIUM 140 08/16/2021    K 3 4 (L) 08/16/2021     08/16/2021    CO2 28 08/16/2021    BUN 10 08/16/2021    CREATININE 0 93 08/16/2021    CALCIUM 10 6 (H) 08/16/2021    EGFR 57 08/16/2021     VTE Pharmacologic Prophylaxis: Heparin  VTE Mechanical Prophylaxis: sequential compression device

## 2021-08-17 ENCOUNTER — TRANSITIONAL CARE MANAGEMENT (OUTPATIENT)
Dept: FAMILY MEDICINE CLINIC | Facility: CLINIC | Age: 86
End: 2021-08-17

## 2021-08-17 NOTE — CASE MANAGEMENT
Case Management Discharge Planning Note    Patient name Mohsen Sandoval  Location /-40 MRN [id number]  : 1935 Date 2021       Current Admission Date: 2021  Current Admission Diagnosis:  Small bowel obstruction ([facility] ), Abdominal pain, Volvulus of small intestine (HCC), Small bowel [facility]vulus ([facility] )   Patient Active Problem List   Diagnosis    Un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diabetes mellitus with hyperglycemia ([facility] )    Essential hypertension    Hyperlipidemia    Left breast mass    Depression    Hyponatremia    Hypokalemia    Gross hematuria    Dizziness    Bradycardia    Sick sinus syndrome (HCC)    Cardiac pacemaker in situ    Abnormal ultrasound cardiogram    Anxiety    BMI 33 0-33 9,adult    Breast carcinoma, female, right (HCC)    GERD (gastroesophageal reflux disease)    Macular degeneration    Malignant neoplasm of upper-inner quadrant of left breast in female, estrogen receptor positive ([place] [address] )    Personal history of breast cancer    S/P hysterectomy    Memory loss    Medicare annual wellness visit, subsequent    Hypercalcemia    Balance problem    Cough    Encounter for pre-operative examination    Malignant neoplasm of overlapping sites of bladder ([place] [address] )    Encounter to discuss test results    Previous Admission - Discharge Date:21   LOS (days): 6  Geometric Mean LOS (GMLOS) (days): 10 20  Days to GMLOS:3 7 Previous Discharge Diagnosis:  There are no discharge diagnoses documented for the most recent discharge       Risk of Unplanned Readmission Score  Predictive Model Details          15 (Low)  Factor Value    Calculated 2021 16:18 16% Active NSAID Rx order present    Risk of Unplanned Readmission Model 15% Number of active Rx orders 27    *Archived Data 9% Number of ED visits in last six months 2     8% Diagnosis of cancer present     8% ECG/EKG order present in last 6 months     6% Diagnosis of electrolyte disorder present     6% Restraint order present in last 6 months     5% Imaging order present in last 6 months     5% Age 82     5% Current length of stay 6 517 days     5% Latest hemoglobin low (9 0 g/dL)     5% Phosphorous result present     4% Active anticoagulant Rx order present     3% Charlson Comorbidity Index 3     2% Future appointment scheduled         BUNDLE:      OBJECTIVE:  Pt is a 82y o  year old , white or  [1], female with Mandaen preference of [facility] admitted on 8/9/2021 10:44 PM  Pt is admitted to [place] [address]- at [facility] with complaints of Small bowel obstruction [facility]), Abdominal pain, Volvulus of small intestine ([facility] ), Small bowel volvulus ([facility] )   Current admission status: Inpatient  Preferred Pharmacy:   [facility],[address] Floor, [address] [facility] [address] [place] [address]  Phone: [phone] Fax: [phone]    Primary Care Provider: Janie Patton DO    Primary Insurance: Arun Bonilla [facility]  Secondary Insurance:     DISCHARGE DETAILS:    Discharge planning discussed with[de-identified] Pt and daughter Jt Fontaine of Choice: Yes    Contacts  Patient Contacts:  Lou Schmitz  Realtionship to Patient[de-identified] Family  Contact Method: Phone  Phone Number: [phone]  Reason/Outcome: Discharge [address]         Is the patient interested in [facility] at discharge?: Yes  [address] requested[de-identified] Nursing, Physical [facility] Name[de-identified] [facility] Provider[de-identified] PCP  Home Health Services Needed[de-identified] Strengthening/Theraputic Exercises to Improve Function, Post-Op Care and Assessment, Evaluate Functional Status and Safety  Homebound Criteria Met[de-identified] Uses an Assist Device (i e  cane, walker, etc), Requires the Assistance of Another Person for Safe Ambulation or to Leave the Home  Supporting Clincal Findings[de-identified] Limited Endurance, Fatigues Easliy in [employer]    DME Referral Provided  Referral made for DME?: No    We would like to be able to fill any required prescriptions on discharge at our [address] [place] [address] and have them delivered to you at discharge in your room    Would you like to participate in this program? : No - Declined    Discharge Destination Plan[de-identified] Home  Transportation at Discharge?: No     IMM Given (Date):: 08/14/21  IMM Given to[de-identified] Patient     Faxed AVS to [facility] at [phone]

## 2021-08-17 NOTE — TELEPHONE ENCOUNTER
Pt called stating she needs to put BCG treatments on hold due to recent hospitalization/surgery she was just discharged last night,please review she can be contacted at home,states her grandson is working and not to bother

## 2021-08-17 NOTE — TELEPHONE ENCOUNTER
Called and spoke to Angel, reports recent d/c from hospital yesterday s/p small bowel bowel resection  Wishes to post pone appointment for tomorrow as she will be "healing"  BCG scheduled pushed back one week, first instilment will be on 8/25 at 10am  BCG treatment will be weekly through 9/29  Reiterated reason and explanation of BCG treatment to patient and what she can expect  Angel verbalized understanding, states "I will write this down so I dont forget "  Per Mami's request, will call tommie Turner and make him aware around lunch time

## 2021-08-19 NOTE — PROGRESS NOTES
Merlin remote check pacer  PMT has occured  Normal battery function        Current Outpatient Medications:     anastrozole (ARIMIDEX) 1 mg tablet, Take 1 mg by mouth daily, Disp: , Rfl:     atorvastatin (LIPITOR) 40 mg tablet, Take 1 tablet (40 mg total) by mouth daily, Disp: 90 tablet, Rfl: 1    DOCOSAHEXAENOIC ACID PO, Take 2 capsules by mouth daily, Disp: , Rfl:     metFORMIN (GLUCOPHAGE) 500 mg tablet, Take 1 tablet (500 mg total) by mouth daily, Disp: 90 tablet, Rfl: 3    polyethylene glycol-propylene glycol (SYSTANE) 0 4-0 3 %, Apply 4 drops to eye 2 (two) times a day, Disp: , Rfl:     potassium chloride (K-DUR,KLOR-CON) 10 mEq tablet, Take 2 tablets daily, Disp: 180 tablet, Rfl: 3    sertraline (ZOLOFT) 50 mg tablet, TAKE ONE TABLET BY MOUTH ONE TIME DAILY , Disp: 90 tablet, Rfl: 0    triamterene-hydrochlorothiazide (MAXZIDE-25) 37 5-25 mg per tablet, Take 1 tablet by mouth daily, Disp: 90 tablet, Rfl: 1

## 2021-08-23 ENCOUNTER — CONSULT (OUTPATIENT)
Dept: SURGERY | Facility: CLINIC | Age: 86
End: 2021-08-23

## 2021-08-23 VITALS
BODY MASS INDEX: 26.15 KG/M2 | HEART RATE: 92 BPM | TEMPERATURE: 98.2 F | WEIGHT: 147.6 LBS | RESPIRATION RATE: 16 BRPM | HEIGHT: 63 IN | SYSTOLIC BLOOD PRESSURE: 124 MMHG | DIASTOLIC BLOOD PRESSURE: 70 MMHG

## 2021-08-23 DIAGNOSIS — Z90.49 STATUS POST SMALL BOWEL RESECTION: ICD-10-CM

## 2021-08-23 PROCEDURE — 99024 POSTOP FOLLOW-UP VISIT: CPT | Performed by: SURGERY

## 2021-08-23 RX ORDER — POTASSIUM CHLORIDE 750 MG/1
TABLET, FILM COATED, EXTENDED RELEASE ORAL
COMMUNITY
Start: 2021-08-12 | End: 2022-02-16

## 2021-08-24 NOTE — PROGRESS NOTES
Post-Op Note - General Surgery   Araceli Laradeepak 82 y o  female MRN: [id number]  Encounter: [id number]    Assessment/Plan     Ms Mary Mccormick is an 82year old female who is two weeks out from an emergent exploratory laparotomy, reduction of small bowel volvulus, small bowel resection with primary anastomosis  Pathology consistent with necrotic bowel, no mass lesion  She is recovering very well  She is tolerating a diet without nausea or vomiting, passing gas and stool, and voiding without issue  Her wound is healing well and her staples were removed today  There are no signs of infection and she is clinically improving  She continues to wear her binder for support  She is receiving home services and she is working on increasing her oral intake as her appetite is still limited  She will be starting intravesical BCG chemotherapy in the coming weeks for her bladder cancer  She can follow up with our clinic on an as needed basis  Subjective      Chief Complaint   Patient presents with    Suture / Staple Removal     Eating and drinking without issue, walking with a cane, feeling more confident, now cooking and doing laundry again at home  Has some home services which are working out well  Having normal BMs and passing gas, no issues voiding  No abdominal pain, no wound healing concerns  Suture / Staple Removal       Review of Systems   Constitutional: Negative for chills and fever  Cardiovascular: Negative for leg swelling  Gastrointestinal: Negative for abdominal distention, abdominal pain, constipation, diarrhea, nausea and vomiting  Genitourinary: Negative for difficulty urinating  All other systems reviewed and are negative        The following portions of the patient's history were reviewed and updated as appropriate: allergies, current medications, past family history, past medical history, past social history, past surgical history and problem list     Objective      Blood pressure 124/70, pulse 92, temperature 98 2 °F (36 8 °C), temperature source Tympanic, resp  rate 16, height 5' 3" (1 6 m), weight 67 kg (147 lb 9 6 oz), not currently breastfeeding  Physical Exam  Vitals reviewed  Constitutional:       Appearance: Normal appearance  She is not ill-appearing  HENT:      Mouth/Throat:      Mouth: Mucous membranes are moist    Cardiovascular:      Rate and Rhythm: Normal rate  Pulmonary:      Effort: Pulmonary effort is normal  No respiratory distress  Abdominal:      General: Abdomen is flat  There is no distension  Palpations: Abdomen is soft  Tenderness: There is no abdominal tenderness  Comments: Wound healing well with staples, removed without incident, no erythema, separation, drainage   Neurological:      Mental Status: She is alert  Signature:   Larry Naranjo MD  Date: 8/23/2021 Time: 8:09 PM

## 2021-09-02 ENCOUNTER — PROCEDURE VISIT (OUTPATIENT)
Dept: UROLOGY | Facility: CLINIC | Age: 86
End: 2021-09-02
Payer: COMMERCIAL

## 2021-09-02 VITALS
WEIGHT: 145 LBS | HEART RATE: 86 BPM | BODY MASS INDEX: 25.69 KG/M2 | DIASTOLIC BLOOD PRESSURE: 70 MMHG | HEIGHT: 63 IN | SYSTOLIC BLOOD PRESSURE: 122 MMHG

## 2021-09-02 DIAGNOSIS — C67.8 MALIGNANT NEOPLASM OF OVERLAPPING SITES OF BLADDER (HCC): Primary | ICD-10-CM

## 2021-09-02 LAB
SL AMB  POCT GLUCOSE, UA: NORMAL
SL AMB LEUKOCYTE ESTERASE,UA: NORMAL
SL AMB POCT BILIRUBIN,UA: NORMAL
SL AMB POCT BLOOD,UA: NORMAL
SL AMB POCT CLARITY,UA: CLEAR
SL AMB POCT COLOR,UA: YELLOW
SL AMB POCT KETONES,UA: NORMAL
SL AMB POCT NITRITE,UA: NORMAL
SL AMB POCT PH,UA: 6.5
SL AMB POCT SPECIFIC GRAVITY,UA: 1.01
SL AMB POCT URINE PROTEIN: NORMAL
SL AMB POCT UROBILINOGEN: 0.2

## 2021-09-02 PROCEDURE — 81002 URINALYSIS NONAUTO W/O SCOPE: CPT

## 2021-09-02 PROCEDURE — 51720 TREATMENT OF BLADDER LESION: CPT

## 2021-09-02 NOTE — PROGRESS NOTES
9/2/2021    Beverlie Beam Schoenberger  1935  [id number]    Diagnosis  Chief Complaint     Malignant neoplasm of overlapping sites of bladder [facility])          Patient presents for BCG 1 of 6 managed by Dr Jorge Reyes This Encounter   Procedures    POCT urine dip     Plan is to follow up next Thursday for second dose of BCG therapy  Patient instructed to call with persistent hematuria, fever, or other concerns  Procedure BCG treatment 1 of 6  Vitals:    09/02/21 0947   BP: 122/70   BP Location: Right arm   Patient Position: Sitting   Cuff Size: Standard   Pulse: 86   Weight: 65 8 kg (145 lb)   Height: 5' 3" (1 6 m)       Recent Results (from the past 4 hour(s))   POCT urine dip    Collection Time: 09/02/21  9:49 AM   Result Value Ref Range    LEUKOCYTE ESTERASE,UA trace     NITRITE,UA neg     SL AMB POCT UROBILINOGEN 0 2     POCT URINE PROTEIN neg      PH,UA 6 5     BLOOD,UA trance     SPECIFIC GRAVITY,UA 1 015     KETONES,UA neg     BILIRUBIN,UA neg     GLUCOSE, UA neg      COLOR,UA yellow     CLARITY,UA clear                  Bladder instillation     Date/Time 9/2/2021 10:40 AM     Performed by  Nori Welch RN     Authorized by Lizett Solares MD      Universal Protocol   Consent: Verbal consent obtained  Consent given by: patient  Patient identity confirmed: verbally with patient                 Sterile technique employed  Patient prepped and identified  16F Straight catheter placed  Bladder drained, residual approximately 75mL  The following solution was instilled directly into the bladder via catheter: 1 Vial BCG  Catheter removed  Patient discharged  Patient tolerated procedure       Administrations This Visit     BCG (KATI BCG) intravesical suspension 50 mg     Admin Date  09/02/2021 Action  Given Dose  50 mg Route  Intravesical Administered By  TARUN Cao RN

## 2021-09-09 ENCOUNTER — PROCEDURE VISIT (OUTPATIENT)
Dept: UROLOGY | Facility: CLINIC | Age: 86
End: 2021-09-09
Payer: COMMERCIAL

## 2021-09-09 VITALS
SYSTOLIC BLOOD PRESSURE: 122 MMHG | HEART RATE: 74 BPM | WEIGHT: 145 LBS | HEIGHT: 63 IN | DIASTOLIC BLOOD PRESSURE: 70 MMHG | BODY MASS INDEX: 25.69 KG/M2

## 2021-09-09 DIAGNOSIS — C67.8 MALIGNANT NEOPLASM OF OVERLAPPING SITES OF BLADDER (HCC): Primary | ICD-10-CM

## 2021-09-09 LAB
SL AMB  POCT GLUCOSE, UA: NORMAL
SL AMB LEUKOCYTE ESTERASE,UA: NORMAL
SL AMB POCT BILIRUBIN,UA: NORMAL
SL AMB POCT BLOOD,UA: NORMAL
SL AMB POCT CLARITY,UA: CLEAR
SL AMB POCT COLOR,UA: YELLOW
SL AMB POCT KETONES,UA: NORMAL
SL AMB POCT NITRITE,UA: NORMAL
SL AMB POCT PH,UA: 1.02
SL AMB POCT SPECIFIC GRAVITY,UA: 6.5
SL AMB POCT URINE PROTEIN: NORMAL
SL AMB POCT UROBILINOGEN: 0.2

## 2021-09-09 PROCEDURE — 81002 URINALYSIS NONAUTO W/O SCOPE: CPT | Performed by: UROLOGY

## 2021-09-09 PROCEDURE — 51720 TREATMENT OF BLADDER LESION: CPT

## 2021-09-09 NOTE — PROGRESS NOTES
9/9/2021    Loris Minks Schoenberger  1935  [id number]    Diagnosis  Chief Complaint     Malignant neoplasm of overlapping sites of bladder [facility])          Patient presents for BCG 2 of 6 managed by Dr Adair Rios This Encounter   Procedures    POCT urine dip     Plan is to follow up as scheduled next week for next treatment of BCG  Patient instructed to call with persistent hematuria, fever, or other concerns  Procedure BCG treatment 2 of 6  Vitals:    09/09/21 1006   BP: 122/70   BP Location: Right arm   Patient Position: Sitting   Cuff Size: Standard   Pulse: 74   Weight: 65 8 kg (145 lb)   Height: 5' 3" (1 6 m)       Recent Results (from the past 4 hour(s))   POCT urine dip    Collection Time: 09/09/21 10:09 AM   Result Value Ref Range    LEUKOCYTE ESTERASE,UA trace     NITRITE,UA neg     SL AMB POCT UROBILINOGEN 0 2     POCT URINE PROTEIN neg      PH,UA 1 020     BLOOD,UA trace     SPECIFIC GRAVITY,UA 6 5     KETONES,UA neg     BILIRUBIN,UA neg     GLUCOSE, UA neg      COLOR,UA yellow     CLARITY,UA clear                  Bladder instillation     Date/Time 9/9/2021 10:30 AM     Performed by  Nathan Mireles RN     Authorized by Jennifer Foreman MD      Universal Protocol   Consent: Verbal consent obtained  Consent given by: patient  Patient identity confirmed: verbally with patient                 Sterile technique employed  Patient prepped and identified  14F Straight catheter placed  Bladder drained, residual approximately 150mL  The following solution was instilled directly into the bladder via catheter: 1 Vial BCG  Catheter removed  Patient discharged  Patient tolerated procedure       Administrations This Visit     BCG (KATI BCG) intravesical suspension 50 mg     Admin Date  09/09/2021 Action  Given Dose  50 mg Route  Intravesical Administered By  TARUN Granda RN

## 2021-09-14 ENCOUNTER — TELEPHONE (OUTPATIENT)
Dept: UROLOGY | Facility: CLINIC | Age: 86
End: 2021-09-14

## 2021-09-14 NOTE — TELEPHONE ENCOUNTER
Pt lvm on the voicemail line  Pt called to confirm upcoming appt  I called pt back and LVM notifying of appt tomorrow 9/15/21 at 10 AM in the [facility] location with the nurse  Advised pt to cb if any additional questions

## 2021-09-15 ENCOUNTER — PROCEDURE VISIT (OUTPATIENT)
Dept: UROLOGY | Facility: CLINIC | Age: 86
End: 2021-09-15
Payer: COMMERCIAL

## 2021-09-15 VITALS
BODY MASS INDEX: 25.52 KG/M2 | DIASTOLIC BLOOD PRESSURE: 78 MMHG | HEART RATE: 68 BPM | WEIGHT: 144 LBS | HEIGHT: 63 IN | SYSTOLIC BLOOD PRESSURE: 124 MMHG

## 2021-09-15 DIAGNOSIS — C67.8 MALIGNANT NEOPLASM OF OVERLAPPING SITES OF BLADDER (HCC): Primary | ICD-10-CM

## 2021-09-15 DIAGNOSIS — E11.65 UNCONTROLLED TYPE 2 DIABETES MELLITUS WITH HYPERGLYCEMIA (HCC): ICD-10-CM

## 2021-09-15 LAB
SL AMB  POCT GLUCOSE, UA: NORMAL
SL AMB LEUKOCYTE ESTERASE,UA: NORMAL
SL AMB POCT BILIRUBIN,UA: NORMAL
SL AMB POCT BLOOD,UA: NORMAL
SL AMB POCT CLARITY,UA: NORMAL
SL AMB POCT COLOR,UA: YELLOW
SL AMB POCT KETONES,UA: NORMAL
SL AMB POCT NITRITE,UA: NORMAL
SL AMB POCT PH,UA: 7
SL AMB POCT SPECIFIC GRAVITY,UA: 1
SL AMB POCT URINE PROTEIN: 30
SL AMB POCT UROBILINOGEN: 0.2

## 2021-09-15 PROCEDURE — 81002 URINALYSIS NONAUTO W/O SCOPE: CPT | Performed by: UROLOGY

## 2021-09-15 NOTE — PROGRESS NOTES
9/15/2021    Harman Gandy Schoenberger  1935  [id number]    Diagnosis  Chief Complaint     Bladder Cancer          Patient presents for BCG 3 of 6 managed by Dr Tj Lyman This Encounter   Procedures    POCT urine dip     Patient reports doing well with past 2 treatments, able to hold for entire 2 hours   Patient instructed to call with persistent hematuria, fever, or other concerns  Procedure BCG treatment 3 of 6  Vitals:    09/15/21 1005   BP: 124/78   Pulse: 68   Weight: 65 3 kg (144 lb)   Height: 5' 3" (1 6 m)       Recent Results (from the past 4 hour(s))   POCT urine dip    Collection Time: 09/15/21 10:08 AM   Result Value Ref Range    LEUKOCYTE ESTERASE,UA trace     NITRITE,UA -     SL AMB POCT UROBILINOGEN 0 2     POCT URINE PROTEIN 30      PH,UA 7 0     BLOOD,UA non hem moderate     SPECIFIC GRAVITY,UA 1 005     KETONES,UA -     BILIRUBIN,UA -     GLUCOSE, UA -      COLOR,UA yellow     CLARITY,UA transparent                  Bladder instillation     Date/Time 9/15/2021 10:37 AM     Performed by  Betty Mejia RN     Authorized by Angel Sparrow MD      Universal Protocol   Consent: Verbal consent obtained  Risks and benefits: risks, benefits and alternatives were discussed  Consent given by: patient               Sterile technique employed  Patient prepped and identified  16F Straight catheter placed  Bladder drained, residual approximately 150 mL  The following solution was instilled directly into the bladder via catheter: 1 Vial BCG  Catheter removed  Patient discharged  Patient tolerated procedure       Administrations This Visit     BCG (KATI BCG) intravesical suspension 50 mg     Admin Date  09/15/2021 Action  Given Dose  50 mg Route  Intravesical Administered By  TARUN Adhikari, RN

## 2021-09-16 DIAGNOSIS — E11.65 UNCONTROLLED TYPE 2 DIABETES MELLITUS WITH HYPERGLYCEMIA (HCC): ICD-10-CM

## 2021-09-22 ENCOUNTER — PROCEDURE VISIT (OUTPATIENT)
Dept: UROLOGY | Facility: CLINIC | Age: 86
End: 2021-09-22
Payer: COMMERCIAL

## 2021-09-22 ENCOUNTER — TELEPHONE (OUTPATIENT)
Dept: UROLOGY | Facility: CLINIC | Age: 86
End: 2021-09-22

## 2021-09-22 VITALS
SYSTOLIC BLOOD PRESSURE: 122 MMHG | HEIGHT: 63 IN | WEIGHT: 145 LBS | HEART RATE: 60 BPM | DIASTOLIC BLOOD PRESSURE: 78 MMHG | BODY MASS INDEX: 25.69 KG/M2

## 2021-09-22 DIAGNOSIS — C67.8 MALIGNANT NEOPLASM OF OVERLAPPING SITES OF BLADDER (HCC): Primary | ICD-10-CM

## 2021-09-22 LAB
SL AMB  POCT GLUCOSE, UA: NORMAL
SL AMB LEUKOCYTE ESTERASE,UA: NORMAL
SL AMB POCT BILIRUBIN,UA: NORMAL
SL AMB POCT BLOOD,UA: NORMAL
SL AMB POCT CLARITY,UA: NORMAL
SL AMB POCT COLOR,UA: NORMAL
SL AMB POCT KETONES,UA: NORMAL
SL AMB POCT NITRITE,UA: NORMAL
SL AMB POCT PH,UA: 6.5
SL AMB POCT SPECIFIC GRAVITY,UA: 1.01
SL AMB POCT URINE PROTEIN: NORMAL
SL AMB POCT UROBILINOGEN: 0.2

## 2021-09-22 PROCEDURE — 87186 SC STD MICRODIL/AGAR DIL: CPT | Performed by: PHYSICIAN ASSISTANT

## 2021-09-22 PROCEDURE — 81002 URINALYSIS NONAUTO W/O SCOPE: CPT

## 2021-09-22 PROCEDURE — 1036F TOBACCO NON-USER: CPT

## 2021-09-22 PROCEDURE — 99211 OFF/OP EST MAY X REQ PHY/QHP: CPT

## 2021-09-22 PROCEDURE — 87086 URINE CULTURE/COLONY COUNT: CPT | Performed by: PHYSICIAN ASSISTANT

## 2021-09-22 PROCEDURE — 1160F RVW MEDS BY RX/DR IN RCRD: CPT

## 2021-09-22 NOTE — PROGRESS NOTES
Patient presented to office for BCG 4/6  Unfortunately urine dip nitrite positive  Patient with reports of decrease in appetite, no other symptoms or fever  Discussed with Caroline Loyola PA-C, who advised holding BCG this week and sending urine for culture  Discussed with patient, advised against of treatment this week  Urine sent out for culture  Advised office will call with results once available  Will plan to keep appointment as scheduled next week based on results  Patient reports [place] a far distance for her to travel  Patient lives in [facility]  Discussed with patient other options, including our two [facility] offices and the [place] office  Patient reports [place] be even further  [facility]  Patient will discuss with her brother, who provides transportation  Patient will contact office if she wishes to transition to another office for her remaining treatments, as well has follow up cysto

## 2021-09-22 NOTE — TELEPHONE ENCOUNTER
Patient presented to office today for BCG 4/6  Unfortunately urine dip positive for infection  Urine culture sent  To monitor for results

## 2021-09-23 DIAGNOSIS — N30.00 ACUTE CYSTITIS WITHOUT HEMATURIA: Primary | ICD-10-CM

## 2021-09-23 RX ORDER — CEPHALEXIN 500 MG/1
500 CAPSULE ORAL EVERY 12 HOURS SCHEDULED
Qty: 10 CAPSULE | Refills: 0 | Status: SHIPPED | OUTPATIENT
Start: 2021-09-23 | End: 2021-09-28

## 2021-09-23 NOTE — TELEPHONE ENCOUNTER
Patient called and stated that her prescription was not sent to [address] [place] [address]  Patient is requesting a call back

## 2021-09-23 NOTE — TELEPHONE ENCOUNTER
Urine culture results are still pending    Sent in prescription for Keflex x5 days, however may need to adjust based on urine cutlure results

## 2021-09-24 ENCOUNTER — TELEPHONE (OUTPATIENT)
Dept: UROLOGY | Facility: CLINIC | Age: 86
End: 2021-09-24

## 2021-09-24 DIAGNOSIS — N30.00 ACUTE CYSTITIS WITHOUT HEMATURIA: Primary | ICD-10-CM

## 2021-09-24 LAB — BACTERIA UR CULT: ABNORMAL

## 2021-09-24 RX ORDER — NITROFURANTOIN 25; 75 MG/1; MG/1
100 CAPSULE ORAL 2 TIMES DAILY
Qty: 10 CAPSULE | Refills: 0 | Status: SHIPPED | OUTPATIENT
Start: 2021-09-24 | End: 2021-09-29

## 2021-09-24 NOTE — TELEPHONE ENCOUNTER
Called and left message for patient  Informed patient to call our office back  When patient calls please inform her to stop taking Keflex because it is not susceptible to bacteria in urine  Our PA prescribed macrobid to preferred pharmacy and patient should start that for UTI

## 2021-09-29 ENCOUNTER — PROCEDURE VISIT (OUTPATIENT)
Dept: UROLOGY | Facility: CLINIC | Age: 86
End: 2021-09-29
Payer: COMMERCIAL

## 2021-09-29 VITALS
SYSTOLIC BLOOD PRESSURE: 130 MMHG | DIASTOLIC BLOOD PRESSURE: 80 MMHG | WEIGHT: 145 LBS | HEART RATE: 80 BPM | RESPIRATION RATE: 20 BRPM | BODY MASS INDEX: 25.69 KG/M2 | HEIGHT: 63 IN

## 2021-09-29 DIAGNOSIS — C67.8 MALIGNANT NEOPLASM OF OVERLAPPING SITES OF BLADDER (HCC): Primary | ICD-10-CM

## 2021-09-29 LAB
SL AMB  POCT GLUCOSE, UA: NORMAL
SL AMB LEUKOCYTE ESTERASE,UA: NORMAL
SL AMB POCT BILIRUBIN,UA: NORMAL
SL AMB POCT BLOOD,UA: NORMAL
SL AMB POCT CLARITY,UA: CLEAR
SL AMB POCT COLOR,UA: YELLOW
SL AMB POCT KETONES,UA: NORMAL
SL AMB POCT NITRITE,UA: NORMAL
SL AMB POCT PH,UA: 6
SL AMB POCT SPECIFIC GRAVITY,UA: 1.02
SL AMB POCT URINE PROTEIN: 30
SL AMB POCT UROBILINOGEN: NORMAL

## 2021-09-29 PROCEDURE — 51720 TREATMENT OF BLADDER LESION: CPT

## 2021-09-29 PROCEDURE — 81002 URINALYSIS NONAUTO W/O SCOPE: CPT

## 2021-09-29 NOTE — PROGRESS NOTES
9/29/2021    Atha Boring Schoenberger  1935  [id number]    Diagnosis  Chief Complaint     Bladder Cancer; BCG 4/6          Patient presents for BCG 4 of 6 managed by Dr Aguilar Velasquez  No orders of the defined types were placed in this encounter  Patient to return in one week for BCG 5/6  Patient instructed to call with persistent hematuria, fever, or other concerns  Procedure BCG treatment 4 of 6  Vitals:    09/29/21 1006   BP: 130/80   Pulse: 80   Resp: 20   Weight: 65 8 kg (145 lb)   Height: 5' 3" (1 6 m)       Recent Results (from the past 1 hour(s))   POCT urine dip    Collection Time: 09/29/21 10:50 AM   Result Value Ref Range    LEUKOCYTE ESTERASE,UA neg     NITRITE,UA neg     SL AMB POCT UROBILINOGEN neg     POCT URINE PROTEIN 30      PH,UA 6     BLOOD,UA large     SPECIFIC GRAVITY,UA 1 020     KETONES,UA neg     BILIRUBIN,UA neg     GLUCOSE, UA neg      COLOR,UA yellow     CLARITY,UA clear          Procedures    Sterile technique employed  Patient prepped and identified  16F Straight catheter placed  Bladder drained, residual approximately 90mL  The following solution was instilled directly into the bladder via catheter: 1 Vial BCG  Catheter removed  Patient discharged  Patient tolerated procedure             Christina Philippe RN

## 2021-10-05 ENCOUNTER — PROCEDURE VISIT (OUTPATIENT)
Dept: UROLOGY | Facility: CLINIC | Age: 86
End: 2021-10-05
Payer: COMMERCIAL

## 2021-10-05 VITALS
WEIGHT: 146 LBS | DIASTOLIC BLOOD PRESSURE: 80 MMHG | HEART RATE: 78 BPM | SYSTOLIC BLOOD PRESSURE: 132 MMHG | BODY MASS INDEX: 25.87 KG/M2 | HEIGHT: 63 IN

## 2021-10-05 DIAGNOSIS — C67.8 MALIGNANT NEOPLASM OF OVERLAPPING SITES OF BLADDER (HCC): Primary | ICD-10-CM

## 2021-10-05 LAB
SL AMB  POCT GLUCOSE, UA: NORMAL
SL AMB LEUKOCYTE ESTERASE,UA: NORMAL
SL AMB POCT BILIRUBIN,UA: NORMAL
SL AMB POCT BLOOD,UA: NORMAL
SL AMB POCT CLARITY,UA: NORMAL
SL AMB POCT COLOR,UA: YELLOW
SL AMB POCT KETONES,UA: NORMAL
SL AMB POCT NITRITE,UA: NORMAL
SL AMB POCT PH,UA: 6.5
SL AMB POCT SPECIFIC GRAVITY,UA: 1.01
SL AMB POCT URINE PROTEIN: NORMAL
SL AMB POCT UROBILINOGEN: 0.2

## 2021-10-05 PROCEDURE — 81002 URINALYSIS NONAUTO W/O SCOPE: CPT

## 2021-10-05 PROCEDURE — 51720 TREATMENT OF BLADDER LESION: CPT

## 2021-10-09 DIAGNOSIS — D50.0 IRON DEFICIENCY ANEMIA DUE TO CHRONIC BLOOD LOSS: ICD-10-CM

## 2021-10-09 DIAGNOSIS — E11.65 UNCONTROLLED TYPE 2 DIABETES MELLITUS WITH HYPERGLYCEMIA (HCC): ICD-10-CM

## 2021-10-09 DIAGNOSIS — K21.9 GASTROESOPHAGEAL REFLUX DISEASE WITHOUT ESOPHAGITIS: ICD-10-CM

## 2021-10-11 RX ORDER — FERROUS SULFATE TAB EC 324 MG (65 MG FE EQUIVALENT) 324 (65 FE) MG
TABLET DELAYED RESPONSE ORAL
Qty: 60 TABLET | Refills: 0 | Status: SHIPPED | OUTPATIENT
Start: 2021-10-11 | End: 2022-01-11 | Stop reason: SDUPTHER

## 2021-10-12 ENCOUNTER — PROCEDURE VISIT (OUTPATIENT)
Dept: UROLOGY | Facility: CLINIC | Age: 86
End: 2021-10-12
Payer: COMMERCIAL

## 2021-10-12 VITALS
SYSTOLIC BLOOD PRESSURE: 130 MMHG | WEIGHT: 145 LBS | DIASTOLIC BLOOD PRESSURE: 70 MMHG | BODY MASS INDEX: 25.69 KG/M2 | HEIGHT: 63 IN

## 2021-10-12 DIAGNOSIS — C67.8 MALIGNANT NEOPLASM OF OVERLAPPING SITES OF BLADDER (HCC): Primary | ICD-10-CM

## 2021-10-12 LAB
SL AMB  POCT GLUCOSE, UA: NORMAL
SL AMB LEUKOCYTE ESTERASE,UA: NORMAL
SL AMB POCT BILIRUBIN,UA: NORMAL
SL AMB POCT BLOOD,UA: NORMAL
SL AMB POCT CLARITY,UA: CLEAR
SL AMB POCT COLOR,UA: YELLOW
SL AMB POCT KETONES,UA: NORMAL
SL AMB POCT NITRITE,UA: NORMAL
SL AMB POCT PH,UA: 7
SL AMB POCT SPECIFIC GRAVITY,UA: 1.01
SL AMB POCT URINE PROTEIN: NORMAL
SL AMB POCT UROBILINOGEN: 0.2

## 2021-10-12 PROCEDURE — 51720 TREATMENT OF BLADDER LESION: CPT

## 2021-10-12 PROCEDURE — 81002 URINALYSIS NONAUTO W/O SCOPE: CPT

## 2021-10-20 ENCOUNTER — RA CDI HCC (OUTPATIENT)
Dept: OTHER | Facility: HOSPITAL | Age: 86
End: 2021-10-20

## 2021-10-21 DIAGNOSIS — E11.65 UNCONTROLLED TYPE 2 DIABETES MELLITUS WITH HYPERGLYCEMIA (HCC): ICD-10-CM

## 2021-10-21 RX ORDER — ATORVASTATIN CALCIUM 40 MG/1
TABLET, FILM COATED ORAL
Qty: 90 TABLET | Refills: 0 | Status: SHIPPED | OUTPATIENT
Start: 2021-10-21 | End: 2022-02-07

## 2021-10-26 ENCOUNTER — OFFICE VISIT (OUTPATIENT)
Dept: FAMILY MEDICINE CLINIC | Facility: CLINIC | Age: 86
End: 2021-10-26
Payer: COMMERCIAL

## 2021-10-26 VITALS
SYSTOLIC BLOOD PRESSURE: 118 MMHG | RESPIRATION RATE: 18 BRPM | HEART RATE: 77 BPM | WEIGHT: 145 LBS | HEIGHT: 63 IN | DIASTOLIC BLOOD PRESSURE: 78 MMHG | BODY MASS INDEX: 25.69 KG/M2 | OXYGEN SATURATION: 97 % | TEMPERATURE: 97.4 F

## 2021-10-26 DIAGNOSIS — E11.65 UNCONTROLLED TYPE 2 DIABETES MELLITUS WITH HYPERGLYCEMIA (HCC): Primary | ICD-10-CM

## 2021-10-26 DIAGNOSIS — K21.9 GASTROESOPHAGEAL REFLUX DISEASE WITHOUT ESOPHAGITIS: ICD-10-CM

## 2021-10-26 DIAGNOSIS — Z00.00 MEDICARE ANNUAL WELLNESS VISIT, SUBSEQUENT: ICD-10-CM

## 2021-10-26 DIAGNOSIS — H61.22 HEARING LOSS OF LEFT EAR DUE TO CERUMEN IMPACTION: ICD-10-CM

## 2021-10-26 DIAGNOSIS — I49.5 SICK SINUS SYNDROME (HCC): Chronic | ICD-10-CM

## 2021-10-26 DIAGNOSIS — I10 ESSENTIAL HYPERTENSION: ICD-10-CM

## 2021-10-26 DIAGNOSIS — Z85.3 PERSONAL HISTORY OF BREAST CANCER: ICD-10-CM

## 2021-10-26 DIAGNOSIS — C67.8 MALIGNANT NEOPLASM OF OVERLAPPING SITES OF BLADDER (HCC): ICD-10-CM

## 2021-10-26 LAB — SL AMB POCT HEMOGLOBIN AIC: 5.6 (ref ?–6.5)

## 2021-10-26 PROCEDURE — 99214 OFFICE O/P EST MOD 30 MIN: CPT | Performed by: FAMILY MEDICINE

## 2021-10-26 PROCEDURE — 83036 HEMOGLOBIN GLYCOSYLATED A1C: CPT | Performed by: FAMILY MEDICINE

## 2021-10-26 PROCEDURE — 1036F TOBACCO NON-USER: CPT | Performed by: FAMILY MEDICINE

## 2021-10-26 PROCEDURE — 3725F SCREEN DEPRESSION PERFORMED: CPT | Performed by: FAMILY MEDICINE

## 2021-10-26 PROCEDURE — G0439 PPPS, SUBSEQ VISIT: HCPCS | Performed by: FAMILY MEDICINE

## 2021-10-26 PROCEDURE — 69209 REMOVE IMPACTED EAR WAX UNI: CPT | Performed by: FAMILY MEDICINE

## 2021-10-26 PROCEDURE — 1160F RVW MEDS BY RX/DR IN RCRD: CPT | Performed by: FAMILY MEDICINE

## 2021-10-26 PROCEDURE — 1101F PT FALLS ASSESS-DOCD LE1/YR: CPT | Performed by: FAMILY MEDICINE

## 2021-10-26 PROCEDURE — 1170F FXNL STATUS ASSESSED: CPT | Performed by: FAMILY MEDICINE

## 2021-10-26 PROCEDURE — 3288F FALL RISK ASSESSMENT DOCD: CPT | Performed by: FAMILY MEDICINE

## 2021-10-26 PROCEDURE — 1125F AMNT PAIN NOTED PAIN PRSNT: CPT | Performed by: FAMILY MEDICINE

## 2021-11-05 DIAGNOSIS — E11.65 UNCONTROLLED TYPE 2 DIABETES MELLITUS WITH HYPERGLYCEMIA (HCC): ICD-10-CM

## 2021-11-05 RX ORDER — TRIAMTERENE AND HYDROCHLOROTHIAZIDE 37.5; 25 MG/1; MG/1
1 TABLET ORAL DAILY
Qty: 90 TABLET | Refills: 0 | Status: SHIPPED | OUTPATIENT
Start: 2021-11-05 | End: 2022-02-03

## 2021-11-10 ENCOUNTER — PROCEDURE VISIT (OUTPATIENT)
Dept: UROLOGY | Facility: CLINIC | Age: 86
End: 2021-11-10
Payer: COMMERCIAL

## 2021-11-10 VITALS
HEIGHT: 63 IN | BODY MASS INDEX: 25.69 KG/M2 | DIASTOLIC BLOOD PRESSURE: 78 MMHG | SYSTOLIC BLOOD PRESSURE: 142 MMHG | HEART RATE: 63 BPM | WEIGHT: 145 LBS

## 2021-11-10 DIAGNOSIS — Z71.2 PERSON CONSULTING FOR EXPLANATION OF EXAMINATION OR TEST FINDING: ICD-10-CM

## 2021-11-10 DIAGNOSIS — N28.89 RENAL MASS, RIGHT: ICD-10-CM

## 2021-11-10 DIAGNOSIS — C67.8 MALIGNANT NEOPLASM OF OVERLAPPING SITES OF BLADDER (HCC): Primary | ICD-10-CM

## 2021-11-10 PROCEDURE — 52000 CYSTOURETHROSCOPY: CPT | Performed by: UROLOGY

## 2021-11-10 PROCEDURE — 1036F TOBACCO NON-USER: CPT | Performed by: UROLOGY

## 2021-11-10 PROCEDURE — 3078F DIAST BP <80 MM HG: CPT | Performed by: UROLOGY

## 2021-11-10 PROCEDURE — 1160F RVW MEDS BY RX/DR IN RCRD: CPT | Performed by: UROLOGY

## 2021-11-10 PROCEDURE — 3077F SYST BP >= 140 MM HG: CPT | Performed by: UROLOGY

## 2021-11-10 PROCEDURE — 99214 OFFICE O/P EST MOD 30 MIN: CPT | Performed by: UROLOGY

## 2021-12-10 DIAGNOSIS — E11.65 UNCONTROLLED TYPE 2 DIABETES MELLITUS WITH HYPERGLYCEMIA (HCC): ICD-10-CM

## 2021-12-12 DIAGNOSIS — E11.65 UNCONTROLLED TYPE 2 DIABETES MELLITUS WITH HYPERGLYCEMIA (HCC): ICD-10-CM

## 2021-12-13 ENCOUNTER — IN-CLINIC DEVICE VISIT (OUTPATIENT)
Dept: CARDIOLOGY CLINIC | Facility: CLINIC | Age: 86
End: 2021-12-13
Payer: COMMERCIAL

## 2021-12-13 DIAGNOSIS — Z45.010 ENCOUNTER FOR CHECKING AND TESTING OF CARDIAC PACEMAKER PULSE GENERATOR (BATTERY): ICD-10-CM

## 2021-12-13 DIAGNOSIS — I49.5 SICK SINUS SYNDROME (HCC): Primary | ICD-10-CM

## 2021-12-13 PROCEDURE — 93280 PM DEVICE PROGR EVAL DUAL: CPT | Performed by: INTERNAL MEDICINE

## 2022-01-06 NOTE — PROGRESS NOTES
Device check in person pacer  1 HVR short PAT  PMT has occurred  Normal battery function        Current Outpatient Medications:     anastrozole (ARIMIDEX) 1 mg tablet, Take 1 mg by mouth daily with lunch , Disp: , Rfl:     Apoaequorin (Prevagen) 10 MG CAPS, Take 10 mg by mouth daily in the early morning , Disp: , Rfl:     atorvastatin (LIPITOR) 40 mg tablet, TAKE ONE TABLET BY MOUTH AT BEDTIME , Disp: 90 tablet, Rfl: 0    bisacodyl (DULCOLAX) 10 mg suppository, Insert 1 suppository (10 mg total) into the rectum daily as needed for constipation, Disp: 12 suppository, Rfl: 0    Cholecalciferol 25 MCG (1000 UT) tablet, Take 1,000 Units by mouth daily, Disp: , Rfl:     DOCOSAHEXAENOIC ACID PO, Take 2 capsules by mouth daily, Disp: , Rfl:     docusate sodium (COLACE) 100 mg capsule, Take 1 capsule (100 mg total) by mouth 3 (three) times a day for 7 days, Disp: 21 capsule, Rfl: 0    ferrous sulfate 324 (65 Fe) mg, TAKE ONE TABLET BY MOUTH TWICE DAILY BEFORE MEALS , Disp: 60 tablet, Rfl: 0    ibuprofen (MOTRIN) 400 mg tablet, Take 1 tablet (400 mg total) by mouth every 6 (six) hours as needed for moderate pain, Disp: , Rfl: 0    metFORMIN (GLUCOPHAGE) 500 mg tablet, TAKE ONE TABLET BY MOUTH ONE TIME DAILY , Disp: 90 tablet, Rfl: 0    polyethylene glycol (MIRALAX) 17 g packet, Take 17 g by mouth daily as needed (constipation), Disp: , Rfl: 0    polyethylene glycol-propylene glycol (SYSTANE) 0 4-0 3 %, Apply 4 drops to eye 2 (two) times a day, Disp: , Rfl:     potassium chloride (K-DUR,KLOR-CON) 10 mEq tablet, Take 2 tablets daily, Disp: 180 tablet, Rfl: 3    potassium chloride (Klor-Con) 10 mEq tablet, , Disp: , Rfl:     sertraline (ZOLOFT) 50 mg tablet, TAKE ONE TABLET BY MOUTH ONE TIME DAILY, Disp: 90 tablet, Rfl: 0    triamterene-hydrochlorothiazide (MAXZIDE-25) 37 5-25 mg per tablet, Take 1 tablet by mouth daily, Disp: 90 tablet, Rfl: 0

## 2022-01-07 ENCOUNTER — TELEPHONE (OUTPATIENT)
Dept: FAMILY MEDICINE CLINIC | Facility: CLINIC | Age: 87
End: 2022-01-07

## 2022-01-07 NOTE — TELEPHONE ENCOUNTER
I would like the patient to start taking Pepto-Bismol 2 tsp twice daily this can be increased up to 3 times daily watching to be sure she does not become constipated  She can use Metamucil as well as this will not constipated her but may help with solid bowel movements  Her fatigue may improve with vitamin supplementation she can try drinking Powerade which has electrolytes and vitamins and gives energy    Or can add boost pudding or Ensure drinks

## 2022-01-07 NOTE — TELEPHONE ENCOUNTER
Pts daughter called stating that patient has diarrhea and is feeling more tired  Pts daughter is aware and know that's patient is feeling the way she feels because of her current health issue  She wouldl rashi to know if there is something that would help control patients diarrhea  She also asked if theres something that would help with her tiredness

## 2022-01-11 DIAGNOSIS — D50.8 OTHER IRON DEFICIENCY ANEMIA: Primary | ICD-10-CM

## 2022-01-11 DIAGNOSIS — K21.9 GASTROESOPHAGEAL REFLUX DISEASE WITHOUT ESOPHAGITIS: ICD-10-CM

## 2022-01-11 DIAGNOSIS — D50.0 IRON DEFICIENCY ANEMIA DUE TO CHRONIC BLOOD LOSS: ICD-10-CM

## 2022-01-11 DIAGNOSIS — E11.65 UNCONTROLLED TYPE 2 DIABETES MELLITUS WITH HYPERGLYCEMIA (HCC): ICD-10-CM

## 2022-01-11 RX ORDER — FERROUS SULFATE TAB EC 324 MG (65 MG FE EQUIVALENT) 324 (65 FE) MG
TABLET DELAYED RESPONSE ORAL
Qty: 60 TABLET | Refills: 5 | Status: SHIPPED | OUTPATIENT
Start: 2022-01-11

## 2022-01-13 DIAGNOSIS — E11.65 UNCONTROLLED TYPE 2 DIABETES MELLITUS WITH HYPERGLYCEMIA (HCC): ICD-10-CM

## 2022-01-17 DIAGNOSIS — E11.65 UNCONTROLLED TYPE 2 DIABETES MELLITUS WITH HYPERGLYCEMIA (HCC): ICD-10-CM

## 2022-02-03 DIAGNOSIS — E11.65 UNCONTROLLED TYPE 2 DIABETES MELLITUS WITH HYPERGLYCEMIA (HCC): ICD-10-CM

## 2022-02-03 RX ORDER — TRIAMTERENE AND HYDROCHLOROTHIAZIDE 37.5; 25 MG/1; MG/1
TABLET ORAL
Qty: 90 TABLET | Refills: 0 | Status: SHIPPED | OUTPATIENT
Start: 2022-02-03 | End: 2022-02-07

## 2022-02-07 DIAGNOSIS — E11.65 UNCONTROLLED TYPE 2 DIABETES MELLITUS WITH HYPERGLYCEMIA (HCC): ICD-10-CM

## 2022-02-07 RX ORDER — TRIAMTERENE AND HYDROCHLOROTHIAZIDE 37.5; 25 MG/1; MG/1
TABLET ORAL
Qty: 90 TABLET | Refills: 0 | Status: SHIPPED | OUTPATIENT
Start: 2022-02-07 | End: 2022-04-06 | Stop reason: HOSPADM

## 2022-02-07 RX ORDER — ATORVASTATIN CALCIUM 40 MG/1
TABLET, FILM COATED ORAL
Qty: 90 TABLET | Refills: 0 | Status: SHIPPED | OUTPATIENT
Start: 2022-02-07

## 2022-02-11 ENCOUNTER — TELEPHONE (OUTPATIENT)
Dept: UROLOGY | Facility: MEDICAL CENTER | Age: 87
End: 2022-02-11

## 2022-02-11 NOTE — TELEPHONE ENCOUNTER
Patient of Dr Amena Hardy in [facility]    Patient called stating she wants to know what kind of treatment can be get for her bladder cancer  She wants to know if chemo, or medication   She states she is having pain all over and  She stated she was out and about today and she came home and she's been having pain and wants   Treatment closer to home  Call her at home number

## 2022-02-11 NOTE — TELEPHONE ENCOUNTER
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

## 2022-02-12 ENCOUNTER — NURSE TRIAGE (OUTPATIENT)
Dept: OTHER | Facility: OTHER | Age: 87
End: 2022-02-12

## 2022-02-12 DIAGNOSIS — C67.8 MALIGNANT NEOPLASM OF OVERLAPPING SITES OF BLADDER (HCC): Primary | ICD-10-CM

## 2022-02-12 RX ORDER — OXYCODONE HYDROCHLORIDE AND ACETAMINOPHEN 5; 325 MG/1; MG/1
1 TABLET ORAL EVERY 4 HOURS PRN
Qty: 28 TABLET | Refills: 0 | Status: SHIPPED | OUTPATIENT
Start: 2022-02-12 | End: 2022-02-16 | Stop reason: SDUPTHER

## 2022-02-12 NOTE — TELEPHONE ENCOUNTER
Spoke with patient regarding her symptoms and  increasing pain   Patient reports she has cancer that has worsened and she prefers comfort measures  Tiger text to on call provider Lea Hood  Per the on call provider:  A new prescription was sent to [address] [place] [address] 1  Patient is to call Office Monday and follow up   Patient instructed that medication sent to pharmacy (percocet) contains tylenol   Patient verbalizes understanding  Reason for Disposition   Prescription refill request for a CONTROLLED substance (e g , narcotics, ADHD medicines)    Answer Assessment - Initial Assessment Questions  1  NAME of MEDICATION: "What medicine are you calling about?"        Patient calling stating she has terminal cancer and opted no aggressive treatment      Bladder cancer     Patient calling now because she had generalized body pain / bone pain  Also states vomit/diarrhea on and off since Thursday - mostly in morning   She is able to keep food/drink down in between     Calling because pain is worsening and she does not know what to to     PCP Dr Clara Walsh     2  SYMPTOMS: "Do you have any symptoms?"        "my whole body hurts"    3   SEVERITY: If symptoms are present, ask "Are they mild, moderate or severe?"       Its a ten at times , its been getting worse    Protocols used: MEDICATION QUESTION CALL-ADULT-

## 2022-02-12 NOTE — TELEPHONE ENCOUNTER
Regarding: Having pain throught my body, throw up, diarrhea   ----- Message from Cathleen Juárez sent at 2/12/2022  9:22 AM EST -----  '' I am having pain through out my body I throw up and having diarrhea ''

## 2022-02-14 ENCOUNTER — TELEPHONE (OUTPATIENT)
Dept: FAMILY MEDICINE CLINIC | Facility: CLINIC | Age: 87
End: 2022-02-14

## 2022-02-14 NOTE — TELEPHONE ENCOUNTER
----- Message from Cuauhtemoc Garg DO sent at 2/14/2022 12:55 PM EST -----  Pt has appointment 2/28   ----- Message -----  From: Zach Jenkins  Sent: 2/14/2022   7:23 AM EST  To: Cuauhtemoc Garg DO    Pt is terminally ill and opted for comfort measures, I sent a short fill for pain medication over the weekend  she may need additional medication or hospice   She was recommended to call office today

## 2022-02-15 NOTE — PROGRESS NOTES
Problem List Items Addressed This Visit        Genitourinary    Malignant neoplasm of overlapping sites of bladder ([facility] [address] ) - Primary    Relevant Medications    oxyCODONE-acetaminophen (PERCOCET) 5-325 mg per tablet    Other Relevant Orders    Ambulatory Referral to Palliative Care       Other    Encounter to discuss test results      Other Visit Diagnoses     Cancer associated pain        Relevant Medications    oxyCODONE-acetaminophen (PERCOCET) 5-325 mg per tablet            Discussion:  Rose Jonas and her friend and I had a productive consultation today  She does appear to be somewhat more frail relative to last visit  She does have a history of papillary urothelial carcinoma, high-grade, with imaging showing a large upper tract lesion  At her previous visit around November we talked about her treatment options which would include potential surgical resection of the large upper tract tumor by way of a nephro ureterectomy with potential for chemotherapy or immunotherapy at that time  I again spoke with her about potential conversion from comfort care measures to potential restaging with referral to Medical Oncology, after participating in the shared decision making mount a she wishes to not do this, this is certainly reasonable given her treatment preference is and her age and performance status  I have refilled her Percocet for 60 tablets, have referred her to our palliative care colleagues who are better suited to help her to maneuver her urinary through terminal bladder and upper tract urothelial carcinoma  Consideration can be given to decreasing her overall medication burden including discontinuing medications for long-term treatment/impression of breast cancer and other preventative healthcare medications after further discussion between her and her primary care provider as preventative medications can lengthen the disease process as she faces further growth of her tumor burden      She will see me back in 6 months  All questions and concerns answered and addressed  Assessment and plan:     Please see problem oriented charting for the assessment plan of today's urological complaints    Gilberto Ortega MD      Chief Complaint     Chief Complaint   Patient presents with    Bladder Cancer         History of Present Illness     Tram Thomas is a 82 y  o  woman with a history of bladder cancer and likely upper tract urothelial carcinoma  We had a goals of care discussion in November, she opted for no intervention at that time  Recently she has been having pain and discomfort  I outlined to her referral to palliative care as well as hospice care and she wishes to proceed to referral to palliative care, I have refilled her Percocet, she does state that she feels better when taking this medication and this refilled for cancer associated pain is certainly reasonable  The following portions of the patient's history were reviewed and updated as appropriate: allergies, current medications, past family history, past medical history, past social history, past surgical history and problem list     Detailed Urologic History     - please refer to HPI    Review of Systems     Review of Systems   Constitutional: Positive for appetite change (Decreased) and fatigue  HENT: Negative  Eyes: Negative  Respiratory: Negative  Cardiovascular: Negative  Gastrointestinal: Positive for abdominal pain  Endocrine: Negative  Genitourinary: Negative  Musculoskeletal: Positive for back pain  Skin: Negative  Allergic/Immunologic: Negative  Neurological: Negative  Hematological: Negative  Psychiatric/Behavioral: Negative                Allergies     Allergies   Allergen Reactions    Celecoxib Swelling     celebrex- swelling of throat    Loratadine Swelling     Jaw swelled,couldn't swallow; face swelling    Aspirin GI Intolerance     : nausea; okay with Ecotrin    Lisinopril Swelling Physical Exam     Physical Exam  Vitals reviewed  Constitutional:       General: She is not in acute distress  Appearance: Normal appearance  She is not ill-appearing, toxic-appearing or diaphoretic  HENT:      Head: Normocephalic and atraumatic  Eyes:      General: No scleral icterus  Right eye: No discharge  Left eye: No discharge  Cardiovascular:      Pulses: Normal pulses  Pulmonary:      Effort: Pulmonary effort is normal    Abdominal:      General: There is no distension  Palpations: There is no mass  Tenderness: There is no abdominal tenderness  Hernia: No hernia is present  Musculoskeletal:         General: No swelling  Skin:     Coloration: Skin is not jaundiced  Neurological:      General: No focal deficit present  Mental Status: She is alert and oriented to person, place, and time  Cranial Nerves: No cranial nerve deficit  Psychiatric:         Mood and Affect: Mood normal          Behavior: Behavior normal          Thought Content:  Thought content normal          Judgment: Judgment normal              Vital Signs  Vitals:    02/16/22 1436   BP: 110/70   Pulse: 92   SpO2: 96%   Weight: 68 kg (150 lb)   Height: 5' 3" (1 6 m)         Current Medications       Current Outpatient Medications:     anastrozole (ARIMIDEX) 1 mg tablet, Take 1 mg by mouth daily with lunch , Disp: , Rfl:     Apoaequorin (Prevagen) 10 MG CAPS, Take 10 mg by mouth daily in the early morning , Disp: , Rfl:     atorvastatin (LIPITOR) 40 mg tablet, TAKE ONE TABLET BY MOUTH EVERY DAY AT BEDTIME, Disp: 90 tablet, Rfl: 0    bisacodyl (DULCOLAX) 10 mg suppository, Insert 1 suppository (10 mg total) into the rectum daily as needed for constipation, Disp: 12 suppository, Rfl: 0    Cholecalciferol 25 MCG (1000 UT) tablet, Take 1,000 Units by mouth daily, Disp: , Rfl:     DOCOSAHEXAENOIC ACID PO, Take 2 capsules by mouth daily, Disp: , Rfl:     ferrous sulfate 324 (65 Fe) mg, Daily, Disp: 60 tablet, Rfl: 5    ibuprofen (MOTRIN) 400 mg tablet, Take 1 tablet (400 mg total) by mouth every 6 (six) hours as needed for moderate pain, Disp: , Rfl: 0    metFORMIN (GLUCOPHAGE) 500 mg tablet, TAKE ONE TABLET BY MOUTH ONE TIME DAILY, Disp: 90 tablet, Rfl: 0    oxyCODONE-acetaminophen (PERCOCET) 5-325 mg per tablet, Take 1 tablet by mouth every 4 (four) hours as needed for moderate pain Max Daily Amount: 6 tablets, Disp: 60 tablet, Rfl: 0    polyethylene glycol (MIRALAX) 17 g packet, Take 17 g by mouth daily as needed (constipation), Disp: , Rfl: 0    polyethylene glycol-propylene glycol (SYSTANE) 0 4-0 3 %, Apply 4 drops to eye 2 (two) times a day, Disp: , Rfl:     potassium chloride (K-DUR,KLOR-CON) 10 mEq tablet, Take 2 tablets daily, Disp: 180 tablet, Rfl: 3    sertraline (ZOLOFT) 50 mg tablet, TAKE ONE TABLET BY MOUTH ONE TIME DAILY, Disp: 90 tablet, Rfl: 0    triamterene-hydrochlorothiazide (MAXZIDE-25) 37 5-25 mg per tablet, TAKE ONE TABLET BY MOUTH ONE TIME DAILY, Disp: 90 tablet, Rfl: 0    docusate sodium (COLACE) 100 mg capsule, Take 1 capsule (100 mg total) by mouth 3 (three) times a day for 7 days, Disp: 21 capsule, Rfl: 0      Active Problems     Patient Active Problem List   Diagnosis    Un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diabetes mellitus with hyperglycemia (HCC)    Essential hypertension    Hyperlipidemia    Left breast mass    Depression    Hyponatremia    Hypokalemia    Dizziness    Bradycardia    Sick sinus syndrome (HCC)    Cardiac pacemaker in situ    Abnormal ultrasound cardiogram    Anxiety    BMI 33 0-33 9,adult    Breast carcinoma, female, right (HCC)    GERD (gastroesophageal reflux disease)    Macular degeneration    Malignant neoplasm of upper-inner quadrant of left breast in female, estrogen receptor positive (HCC)    Personal history of breast cancer    S/P hysterectomy    Memory loss    Medicare annual wellness visit, subsequent    Hypercalcemia  Balance problem    Cough    Malignant neoplasm of overlapping sites of bladder ([facility] [address] )    Encounter to discuss test results    Status post small bowel resection    Hearing loss of left ear due to cerumen impaction    Person consulting for explanation of examination or test finding         Past Medical History     Past Medical History:   Diagnosis Date    Depression     GERD (gastroesophageal reflux disease)     History of chemotherapy     right breast 1996    History of echocardiogram 02/14/2018    EF 0 55 (55%), trace mitral regurgitation   Hx of radiation therapy     2017  left breast    Hyperlipidemia     Macular degeneration     [facility]     Sick sinus syndrome ([facility] )     Small bowel volvulus [facility])          Surgical History     Past Surgical History:   Procedure Laterality Date    APPENDECTOMY      BREAST LUMPECTOMY Bilateral     CARDIAC PACEMAKER PLACEMENT Left 07/05/2018    [facility]    HYSTERECTOMY      LAPAROTOMY N/A 08/10/2021    Procedure: LAPAROTOMY EXPLORATORY,  SMALL BOWEL RESECTION;  Surgeon: Kacey Romeo MD;  Location: [facility] MAIN OR;  Service: General    [place] CYSTOURETHROSCOPY,FULGUR <0 5 CM LESN N/A 07/02/2021    Procedure: CYSTOSCOPY, TRANSURETHRAL RESECTION OF MEDIUM BLADDER TUMOR (TURBT);   Surgeon: Raad Hubbard MD;  [facility] Main [facility];  Service: Urology    SMALL INTESTINE SURGERY           Family History     Family History   Problem Relation Age of Onset    Alzheimer's disease Father     Uterine cancer Daughter          Social History     Social History     Social History     Tobacco Use   Smoking Status Never Smoker   Smokeless Tobacco Never Used         Pertinent Lab Values     Lab Results   Component Value Date    CREATININE 0 93 08/16/2021                 Pertinent Imaging      Last imaging reviewed, enhancing mass in the right renal pelvis concerning for upper tract urothelial carcinoma

## 2022-02-15 NOTE — PATIENT INSTRUCTIONS
Palliative Care   WHAT YOU NEED TO KNOW:   What is palliative care? Palliative care is specialized care to help you manage a serious, long-term health condition  Care can begin at any stage of your illness  Palliative care helps prevent or relieve any suffering you may have  The care also improves quality of life for you and your family  A palliative care team treats the whole person, not the illness  Your palliative care team will work with your healthcare providers as they treat your illness  Care will be specific to your needs and the needs of your family  Care can be provided in a hospital or at home  It can also be provided in a long-term care facility or outpatient clinic  Who provides palliative care? Your palliative care team may include any of the following:  · Doctors, nurses, and dietitians    · Physical and occupational therapists    · Pain specialists and pharmacists    · Chaplains, social workers, and counselors    What do I need to know about my treatment and care decisions? · You define your goals for care  You may want any treatment that might improve your condition or treat the illness  You may want rehabilitation so you can do daily activities more easily  You may only want care that helps you live as long as possible or relieves your symptoms  · You decide the treatment you want, based on your goals  Your team can help you make a care plan to meet your goals  Your care plan will include the treatments you want and for how long you want them to continue  Care may change if your condition or goals for care change  The plan will be used where you are currently receiving care  If you move to another place, your plan will move with you  This will help your new care providers know your wishes  Treatment may include any of the following:    ? A blood transfusion if needed    ? CPR or ventilation if you stop breathing or your heart stops    ?  Dialysis to clean your blood if your kidneys stop working    ? IV fluids or tube feeding to give fluids and nutrition if you cannot swallow    ? Antibiotics to treat an infection, or other medicines such as pain or nausea medicine    ? Treatment that requires a transfer to another place, such as a hospital    · You can choose a person to make decisions for you  The person can be a family member or friend  If you are not able to make decisions, the person works with your team to provide the care you want  How will my symptoms be managed? Symptoms such as pain or shortness of breath may be caused by your illness  You may have side effects from treatment, such as nausea, fatigue, or constipation  The following are ways palliative care can help prevent or treat symptoms:  · Pain  may be relieved with medicine  You may instead choose other methods, such as massage, music, or aromatherapy  Your team will ask how the pain is affecting your quality of life  You can describe where and how often you feel pain, and when it started  You can describe the pain as sharp, achy, or throbbing  You can also describe how bad it is  This may be on a pain scale from 0 to 10, or by choosing a face on a pain scale picture  Your answers will help them relieve your pain with the method you choose  · Shortness of breath  may be relieved with extra oxygen or breathing treatments  You can describe any problems with breathing  You may feel short of breath all the time, or only with activity  You may feel a little winded, or like you are struggling to breathe  Treatment options depend on what is causing your shortness of breath  Your care team will work with you to define your shortness of breath and decide on the treatment or relief you want  · Other symptoms  can be relieved or treated to improve your quality of life  Examples include nausea, fatigue, and sleep problems  Your skin can be treated or soothed if it is irritated or you develop a pressure injury from lying in bed  Your care team will work with you to relieve any symptoms you choose  What support services are offered in palliative care? Your team is available 24 hours a day, 7 days a week for support  You may be able to speak to them by phone, in person, or through a patient portal application ([employer])  · Treatment support  is given to help you and your family understand your condition and explore treatment options  Your team can answer any questions you or family members have  This includes questions about your condition and your treatment options  You define what quality of life means to you and your family  · Emotional and psychological support  helps you and those close to you cope with feelings about your condition  Patients and their families may join support groups or meet others in similar situations  · Practical support  assists with concerns such as employment and legal issues  It may also include financial counseling  Your  can help you find services that fit your needs and your family's needs  · Spiritual and cultural support  helps you and your family evaluate Judaism values and cultural beliefs  This may make it easier to understand and accept your condition  · Transition support  can help you, your family, and friends with a change in your condition  Transition support can help you readjust to daily life if your condition improves  Your palliative care team will also continue to help if you need end-of-life care  Where can I find more information? · [address] [facility])  [facility] [address] [facility] [address]  [place] , [address] [facility]  Phone: [phone]  Web Address: [employer] br  · [address] [facility] [address] [facility], [address] [facility] [address]  [place] , [address] [facility]  Phone: [phone]  Web Address: [email or web address]    CARE AGREEMENT:   You have the right to help plan your care   Learn about your health condition and how it may be treated  Discuss treatment options with your healthcare providers to decide what care you want to receive  You always have the right to refuse treatment  The above information is an  only  It is not intended as medical advice for individual conditions or treatments  Talk to your doctor, nurse or pharmacist before following any medical regimen to see if it is safe and effective for you  © Copyright [employer] 2021 Information is for End User's use only and may not be sold, redistributed or otherwise used for commercial purposes   All illustrations and images included in CareNotes® are the copyrighted property of [employer]  or [address] [place] [address]

## 2022-02-16 ENCOUNTER — TELEPHONE (OUTPATIENT)
Dept: PALLIATIVE MEDICINE | Facility: CLINIC | Age: 87
End: 2022-02-16

## 2022-02-16 ENCOUNTER — OFFICE VISIT (OUTPATIENT)
Dept: UROLOGY | Facility: CLINIC | Age: 87
End: 2022-02-16
Payer: COMMERCIAL

## 2022-02-16 VITALS
HEIGHT: 63 IN | SYSTOLIC BLOOD PRESSURE: 110 MMHG | HEART RATE: 92 BPM | OXYGEN SATURATION: 96 % | WEIGHT: 150 LBS | DIASTOLIC BLOOD PRESSURE: 70 MMHG | BODY MASS INDEX: 26.58 KG/M2

## 2022-02-16 DIAGNOSIS — C67.8 MALIGNANT NEOPLASM OF OVERLAPPING SITES OF BLADDER (HCC): Primary | ICD-10-CM

## 2022-02-16 DIAGNOSIS — Z71.2 ENCOUNTER TO DISCUSS TEST RESULTS: ICD-10-CM

## 2022-02-16 DIAGNOSIS — G89.3 CANCER ASSOCIATED PAIN: ICD-10-CM

## 2022-02-16 PROCEDURE — 3078F DIAST BP <80 MM HG: CPT | Performed by: UROLOGY

## 2022-02-16 PROCEDURE — 99214 OFFICE O/P EST MOD 30 MIN: CPT | Performed by: UROLOGY

## 2022-02-16 PROCEDURE — 3074F SYST BP LT 130 MM HG: CPT | Performed by: UROLOGY

## 2022-02-16 RX ORDER — OXYCODONE HYDROCHLORIDE AND ACETAMINOPHEN 5; 325 MG/1; MG/1
1 TABLET ORAL EVERY 4 HOURS PRN
Qty: 60 TABLET | Refills: 0 | Status: SHIPPED | OUTPATIENT
Start: 2022-02-16 | End: 2022-03-18

## 2022-02-16 NOTE — TELEPHONE ENCOUNTER
Spoke to [place] (daughter)  Gave her some dates and times available for the [facility] office and she will return call to the office to schedule a palliative Care appt for mom

## 2022-02-22 ENCOUNTER — RA CDI HCC (OUTPATIENT)
Dept: OTHER | Facility: HOSPITAL | Age: 87
End: 2022-02-22

## 2022-02-22 NOTE — PROGRESS NOTES
[facility]  coding opportunities        E11 22     Chart Reviewed * (Number of) Inbasket suggestions sent to Provider: 1                  Patients insurance company: [address]  (Medicare Advantage and Commercial)

## 2022-02-24 DIAGNOSIS — I10 ESSENTIAL HYPERTENSION: ICD-10-CM

## 2022-02-24 RX ORDER — POTASSIUM CHLORIDE 750 MG/1
TABLET, FILM COATED, EXTENDED RELEASE ORAL
Qty: 180 TABLET | Refills: 0 | Status: SHIPPED | OUTPATIENT
Start: 2022-02-24

## 2022-02-28 ENCOUNTER — OFFICE VISIT (OUTPATIENT)
Dept: FAMILY MEDICINE CLINIC | Facility: CLINIC | Age: 87
End: 2022-02-28
Payer: COMMERCIAL

## 2022-02-28 VITALS
BODY MASS INDEX: 27.82 KG/M2 | SYSTOLIC BLOOD PRESSURE: 122 MMHG | HEART RATE: 80 BPM | DIASTOLIC BLOOD PRESSURE: 64 MMHG | OXYGEN SATURATION: 99 % | TEMPERATURE: 98.2 F | HEIGHT: 63 IN | WEIGHT: 157 LBS | RESPIRATION RATE: 18 BRPM

## 2022-02-28 DIAGNOSIS — Z85.3 PERSONAL HISTORY OF BREAST CANCER: ICD-10-CM

## 2022-02-28 DIAGNOSIS — I49.5 SICK SINUS SYNDROME (HCC): Chronic | ICD-10-CM

## 2022-02-28 DIAGNOSIS — C67.8 MALIGNANT NEOPLASM OF OVERLAPPING SITES OF BLADDER (HCC): ICD-10-CM

## 2022-02-28 DIAGNOSIS — F32.A DEPRESSION, UNSPECIFIED DEPRESSION TYPE: ICD-10-CM

## 2022-02-28 DIAGNOSIS — R26.89 BALANCE PROBLEM: ICD-10-CM

## 2022-02-28 DIAGNOSIS — E11.65 UNCONTROLLED TYPE 2 DIABETES MELLITUS WITH HYPERGLYCEMIA (HCC): Primary | ICD-10-CM

## 2022-02-28 PROCEDURE — 99214 OFFICE O/P EST MOD 30 MIN: CPT | Performed by: FAMILY MEDICINE

## 2022-02-28 PROCEDURE — 1160F RVW MEDS BY RX/DR IN RCRD: CPT | Performed by: FAMILY MEDICINE

## 2022-02-28 PROCEDURE — 1036F TOBACCO NON-USER: CPT | Performed by: FAMILY MEDICINE

## 2022-02-28 NOTE — ASSESSMENT & PLAN NOTE
Patient appears stable and is coping with her diagnosis of metastatic cancer she lost her daughter last year to a COVID infection as her daughter had been battling metastatic cancer as well    She will continue on sertraline 50 milligrams

## 2022-02-28 NOTE — PROGRESS NOTES
Assessment/Plan:       Problem List Items Addressed This Visit        Endocrine    Un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diabetes mellitus with hyperglycemia ([facility] [address] ) - Primary       Lab Results   Component Value Date    HGBA1C 5 6 10/26/2021   Diabetes with A1c at 5 6 continue on current diet regimen and metformin as directed            Cardiovascular and Mediastinum    Sick sinus syndrome (HCC) (Chronic)     Sick sinus syndrome by history currently patient is stable with pacemaker follow-up with Cardiology            Genitourinary    Malignant neoplasm of overlapping sites of bladder ([facility] )     Malignant high-grade bladder cancer invasive  Patient elected for palliative care does not want to pursue surgery or other options after discussion with Urology  Other    Depression     Patient appears stable and is coping with her diagnosis of metastatic cancer she lost her daughter last year to a COVID infection as her daughter had been battling metastatic cancer as well  She will continue on sertraline 50 milligrams         Personal history of breast cancer     Personal history of breast cancer treated resolved         Balance problem     Patient notes difficulty with balance at times he declines further physical therapy will use a cane or her other assistive device                 Subjective:      Patient ID: Leo Mullen is a 82 y o  female  Patient presents today for evaluation and discussion regarding her metastatic bladder cancer      The following portions of the patient's history were reviewed and updated as appropriate: allergies, current medications, past family history, past medical history, past social history, past surgical history and problem list     Review of Systems   Constitutional: Positive for fatigue  Negative for chills and fever  HENT: Negative for congestion, nosebleeds, rhinorrhea, sinus pressure and sore throat  Eyes: Negative for discharge and redness     Respiratory: Negative for cough and shortness of breath  Cardiovascular: Negative for chest pain, palpitations and leg swelling  Gastrointestinal: Positive for constipation  Negative for abdominal pain, blood in stool and nausea  Endocrine: Negative for cold intolerance, heat intolerance and polyuria  Genitourinary: Negative for dysuria and frequency  Musculoskeletal: Negative for arthralgias, back pain and myalgias  Skin: Negative for rash  Neurological: Positive for weakness  Negative for dizziness and headaches  Hematological: Negative for adenopathy  Psychiatric/Behavioral: Negative for behavioral problems and sleep disturbance  The patient is not nervous/anxious  Objective:      /64 (BP Location: Left arm, Patient Position: Sitting)   Pulse 80   Temp 98 2 °F (36 8 °C)   Resp 18   Ht 5' 3" (1 6 m)   Wt 71 2 kg (157 lb)   SpO2 99%   BMI 27 81 kg/m²        Physical Exam  Vitals and nursing note reviewed  Constitutional:       General: She is not in acute distress  Appearance: Normal appearance  She is well-developed and normal weight  HENT:      Head: Normocephalic and atraumatic  Right Ear: Tympanic membrane, ear canal and external ear normal       Left Ear: Tympanic membrane, ear canal and external ear normal       Nose: Rhinorrhea present  Mouth/Throat:      Mouth: Mucous membranes are moist       Pharynx: Oropharynx is clear  No oropharyngeal exudate  Eyes:      General: No scleral icterus  Right eye: No discharge  Left eye: No discharge  Extraocular Movements: Extraocular movements intact  Conjunctiva/sclera: Conjunctivae normal       Pupils: Pupils are equal, round, and reactive to light  Neck:      Thyroid: No thyromegaly  Vascular: No JVD  Cardiovascular:      Rate and Rhythm: Normal rate and regular rhythm  Pulses: Normal pulses  Heart sounds: Normal heart sounds  No murmur heard        Pulmonary:      Effort: Pulmonary effort is normal  Breath sounds: No wheezing or rales  Chest:      Chest wall: No tenderness  Abdominal:      General: Bowel sounds are normal  There is no distension  Palpations: Abdomen is soft  There is no mass  Tenderness: There is no abdominal tenderness  Musculoskeletal:         General: Swelling and tenderness present  No deformity  Normal range of motion  Cervical back: Normal range of motion  Right lower leg: Edema present  Left lower leg: Edema present  Lymphadenopathy:      Cervical: No cervical adenopathy  Skin:     General: Skin is warm and dry  Capillary Refill: Capillary refill takes less than 2 seconds  Findings: No rash  Neurological:      General: No focal deficit present  Mental Status: She is alert and oriented to person, place, and time  Mental status is at baseline  Cranial Nerves: No cranial nerve deficit  Coordination: Coordination normal       Deep Tendon Reflexes: Reflexes are normal and symmetric  Reflexes normal    Psychiatric:         Mood and Affect: Mood normal          Behavior: Behavior normal          Thought Content: Thought content normal          Judgment: Judgment normal           Data:    Laboratory Results: I have personally reviewed the pertinent laboratory results/reports   Radiology/Other Diagnostic Testing Results: I have personally reviewed pertinent reports         Lab Results   Component Value Date    WBC 5 30 08/16/2021    HGB 9 0 (L) 08/16/2021    HCT 27 5 (L) 08/16/2021    MCV 83 08/16/2021     08/16/2021     Lab Results   Component Value Date    K 3 4 (L) 08/16/2021     08/16/2021    CO2 28 08/16/2021    BUN 10 08/16/2021    CREATININE 0 93 08/16/2021    GLUCOSE 88 07/02/2021    GLUF 99 02/20/2021    CALCIUM 10 6 (H) 08/16/2021    CORRECTEDCA 11 4 (H) 06/16/2020    AST 14 08/09/2021    ALT 8 08/09/2021    ALKPHOS 63 08/09/2021    EGFR 57 08/16/2021     Lab Results   Component Value Date    CHOLESTEROL 148 02/20/2021    CHOLESTEROL 141 10/16/2020    CHOLESTEROL 134 06/16/2020     Lab Results   Component Value Date    HDL 66 02/20/2021    HDL 65 10/16/2020    HDL 51 06/16/2020     Lab Results   Component Value Date    LDLCALC 58 02/20/2021    LDLCALC 52 10/16/2020    LDLCALC 60 06/16/2020     Lab Results   Component Value Date    TRIG 118 02/20/2021    TRIG 120 10/16/2020    TRIG 114 06/16/2020     No results found for: [place], [place]  Lab Results   Component Value Date    KYJ0HQNDNXPG 2 320 05/26/2021     Lab Results   Component Value Date    HGBA1C 5 6 10/26/2021     No results found for: BRAYDEN Le, DO

## 2022-02-28 NOTE — ASSESSMENT & PLAN NOTE
Lab Results   Component Value Date    HGBA1C 5 6 10/26/2021   Diabetes with A1c at 5 6 continue on current diet regimen and metformin as directed

## 2022-02-28 NOTE — ASSESSMENT & PLAN NOTE
Patient notes difficulty with balance at times he declines further physical therapy will use a cane or her other assistive device

## 2022-02-28 NOTE — ASSESSMENT & PLAN NOTE
Malignant high-grade bladder cancer invasive  Patient elected for palliative care does not want to pursue surgery or other options after discussion with Urology

## 2022-03-14 ENCOUNTER — REMOTE DEVICE CLINIC VISIT (OUTPATIENT)
Dept: CARDIOLOGY CLINIC | Facility: CLINIC | Age: 87
End: 2022-03-14
Payer: COMMERCIAL

## 2022-03-14 DIAGNOSIS — Z45.010 ENCOUNTER FOR CHECKING AND TESTING OF CARDIAC PACEMAKER PULSE GENERATOR (BATTERY): ICD-10-CM

## 2022-03-14 DIAGNOSIS — I49.5 SICK SINUS SYNDROME (HCC): Primary | ICD-10-CM

## 2022-03-14 PROCEDURE — 93294 REM INTERROG EVL PM/LDLS PM: CPT | Performed by: INTERNAL MEDICINE

## 2022-03-14 PROCEDURE — 93296 REM INTERROG EVL PM/IDS: CPT | Performed by: INTERNAL MEDICINE

## 2022-03-22 ENCOUNTER — CONSULT (OUTPATIENT)
Dept: PALLIATIVE MEDICINE | Facility: CLINIC | Age: 87
End: 2022-03-22
Payer: COMMERCIAL

## 2022-03-22 DIAGNOSIS — Z51.5 PALLIATIVE CARE PATIENT: ICD-10-CM

## 2022-03-22 DIAGNOSIS — C50.911 BREAST CARCINOMA, FEMALE, RIGHT (HCC): ICD-10-CM

## 2022-03-22 DIAGNOSIS — C67.8 MALIGNANT NEOPLASM OF OVERLAPPING SITES OF BLADDER (HCC): Primary | ICD-10-CM

## 2022-03-22 DIAGNOSIS — G89.3 CANCER RELATED PAIN: ICD-10-CM

## 2022-03-22 DIAGNOSIS — F32.A DEPRESSION, UNSPECIFIED DEPRESSION TYPE: ICD-10-CM

## 2022-03-22 PROCEDURE — 99204 OFFICE O/P NEW MOD 45 MIN: CPT | Performed by: STUDENT IN AN ORGANIZED HEALTH CARE EDUCATION/TRAINING PROGRAM

## 2022-03-22 PROCEDURE — 3078F DIAST BP <80 MM HG: CPT | Performed by: STUDENT IN AN ORGANIZED HEALTH CARE EDUCATION/TRAINING PROGRAM

## 2022-03-22 PROCEDURE — 1036F TOBACCO NON-USER: CPT | Performed by: STUDENT IN AN ORGANIZED HEALTH CARE EDUCATION/TRAINING PROGRAM

## 2022-03-22 PROCEDURE — 1160F RVW MEDS BY RX/DR IN RCRD: CPT | Performed by: STUDENT IN AN ORGANIZED HEALTH CARE EDUCATION/TRAINING PROGRAM

## 2022-03-22 PROCEDURE — 3074F SYST BP LT 130 MM HG: CPT | Performed by: STUDENT IN AN ORGANIZED HEALTH CARE EDUCATION/TRAINING PROGRAM

## 2022-03-22 RX ORDER — OXYCODONE HCL 5 MG/5 ML
2.5 SOLUTION, ORAL ORAL EVERY 4 HOURS PRN
Qty: 200 ML | Refills: 0 | Status: SHIPPED | OUTPATIENT
Start: 2022-03-22 | End: 2022-03-25 | Stop reason: SDUPTHER

## 2022-03-22 RX ORDER — ACETAMINOPHEN 500 MG
500 TABLET ORAL EVERY 6 HOURS PRN
Start: 2022-03-22

## 2022-03-22 NOTE — PROGRESS NOTES
Outpatient Consultation - Palliative and Supportive Care   Rebecca Guo 82 y o  female [id number]    Assessment & Plan  Problem List Items Addressed This Visit        Genitourinary    Malignant neoplasm of overlapping sites of bladder ([facility] [address] ) - Primary    Relevant Medications    acetaminophen (TYLENOL) 500 mg tablet    oxyCODONE (ROXICODONE) 5 mg/5 mL solution       Other    Depression    Breast carcinoma, female, right ([facility] [address] )    Cancer related pain    Relevant Medications    acetaminophen (TYLENOL) 500 mg tablet    oxyCODONE (ROXICODONE) 5 mg/5 mL solution    Palliative care patient    Relevant Medications    acetaminophen (TYLENOL) 500 mg tablet    oxyCODONE (ROXICODONE) 5 mg/5 mL solution        #symptom management  #cancer-related pain   - start APAP 500 mg PO Q6H PRN   - max daily 3000 mg   - discontinue oxy-APAP 5/325   - start oxy-IR solution 2 5 mg PO Q4H PRN    #OIC   - continue bowel regimen to prevent OIC    #goals of care   - introduced Palliative and Supportive Care   - LW completed, on file, excerpts below   - recent OP urology follow up with patient clearly stating does not wish for surgical intervention or other cancer-directed therapy [including chemotherapy and immunotherapy]; patient confirmed these wishes during today's encounter   Daughter stated that patient considered surgical intervention after urology appointment on 02/16, however, ultimately does not feel that surgery is in alignment with overall wishes   - patient's clearly stated goal is to have pain adequately managed and to remain home, also stated that she wishes to die at home and not in a hospital or another facility   - introduced hospice model of care, as this model is in alignment with above stated goals, given patient's advanced cancer with no cancer-directed therapy pursued + no desire to return to the hospital   - at this time, patient wishes to focus on pain management with palliative care and on-going supportive cares    #living will [on file]    Excerpts:          #psychosocial support   - emotional support provided   -    - three adult children [2 living, 1 ]    - Hernandez Figueroa, [address] [daughter, ]   - parents    - seven siblings [5 living, 2 ]   - Aj [brother]   - Dede Lobo [phone]   - resides with Octaviano Pham      Next [facility] follow up in 4 weeks  Addendum [2022]: Referral placed for Home Health for PT/OT/RN with evaluation for safety in the home as well as therapy    Patient currently with cognitive and functional decline  Recent falls with rapid functional decline  No longer toileting independently consistently  Requiring assistance with dressings  Patient was living independently, now moved in with daughter, so would need a safety evaluation in new household, including railings/shower supports/etc  New rollator device, would benefit from PT evaluation for safe use  Family would benefit from RN support of medication management, given family is new to care for the patient  Controlled Substance Review    [place]MP or [place]  reviewed: No red flags were identified; safe to proceed with prescription  [place] PDMP Review       Value Time User    PDMP Reviewed  Yes (P)  3/23/2022  6:59 AM Sim Duque MD          Medications adjusted this encounter:  Requested Prescriptions     Signed Prescriptions Disp Refills    acetaminophen (TYLENOL) 500 mg tablet       Sig: Take 1 tablet (500 mg total) by mouth every 6 (six) hours as needed for mild pain Max Daily: 3,000 mg    oxyCODONE (ROXICODONE) 5 mg/5 mL solution 200 mL 0     Sig: Take 2 5 mL (2 5 mg total) by mouth every 4 (four) hours as needed (cancer-related pain) Max Daily Amount: 15 mg     No orders of the defined types were placed in this encounter  There are no discontinued medications      PPS: 80-90%      Stewart Hazard was seen today for symptoms and planning cares related to above illnesses  Above orders were sent electronically, or provided in hardcopy in clinic  I have reviewed the patient's controlled substance dispensing history in the Prescription Drug Monitoring Program in compliance with the [facility] regulations before prescribing any controlled substances  We appreciate the referral, and wish for her to continue to follow with us  If there are questions or concerns, please contact us through our clinic/answering service 24 hours a day, seven days a week  Cally Dominguez MD  [facility] Palliative and Supportive Care  [phone]        Visit Information    Accompanied By: Daughter    Source of History: Self, Family member, Medical record    History Limitations: None      History of Present Illness    Jayden Enriquez is a 82 y o  female who presents as a referral from Dr Alvaro Rashid of Urology for primary palliative diagnosis of bladder cancer and 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 carcinoma  Consultation is requested for: symptom management, goal of care assessment and decisional support, advance care planning  R lateral bladder tumor initially diagnosed on biopsy [high-grade urothelial carcinoma] during cystoscopy on 04/15/2021  s/p TURBT + instillation of mitomycin on 07/02/2021  Admitted for SBO in the setting of volvulus in 08/2021, CT imaging also revealed R renal pelvis mass c/w urothelial malignancy  Completed 6 cycles of BCG therapy on 10/12/2021  Recurrence of bladder CA on cystoscopy on 11/21/2021  At that time, patient communicated wish to not undergo further surgery or other cancer-directed therapy [including chemotherapy/immunotherapy]  On follow up with Urology in 02/16/2022, patient continued to communicate desire to not pursue cancer-directed therapy; family present during both conversations with urology  Given worsening pain and goals to focus on comfort-oriented cares, consultation to [facility] team placed      Patient currently reports worsening and intolerable L-sided lower back pain, intermittent, no aggravating factors identified  Use of oxy-APAP 5/325 mg x 2-4 tabs/day, at times, daughter reports that the patient confused after use of medication  Patient with cognitive decline, use of Prevagen Mora Edward is a focal point of patient to continue this medication]  Denies nausea, vomiting  Appetite poor, completing 2 small portion meals/day, reports minimal hunger, overall weight loss since diagnosis, however, recently weight stable  BM every other day, regular, use of stool softener  Sleep fragmented, naps throughout the day, frequent awakenings 2/2 nocturia  Pertinent Palliative Care Domains    Physical Symptoms: ambulates    Psychological Symptoms: mild anxiety, limited insight, some difficulty coping    Social Aspects: support system strong with daughter and other family members supporting patient, current independent ADLs      Advance Directive and Living Will:   On file  Power of : Yes  POLST:   n/a    Historical Data  Past Medical History:   Diagnosis Date    Depression     GERD (gastroesophageal reflux disease)     History of chemotherapy     right breast 1996    History of echocardiogram 02/14/2018    EF 0 55 (55%), trace mitral regurgitation      Hx of radiation therapy     2017  left breast    Hyperlipidemia     Macular degeneration     Malignant neoplasm of overlapping sites of bladder ([address] )     Sick sinus syndrome ([address] )     Small bowel volvulus ([address] )      Past Surgical History:   Procedure Laterality Date    APPENDECTOMY      BREAST LUMPECTOMY Bilateral     CARDIAC PACEMAKER PLACEMENT Left 07/05/2018    [facility]    HYSTERECTOMY      LAPAROTOMY N/A 08/10/2021    Procedure: LAPAROTOMY EXPLORATORY,  SMALL BOWEL RESECTION;  Surgeon: Nik Hinson MD;  Location[facility] MAIN OR;  Service: General    TX CYSTOURETHROSCOPY,FULGUR <0 5 CM LESN N/A 07/02/2021    Procedure: CYSTOSCOPY, TRANSURETHRAL RESECTION OF MEDIUM BLADDER TUMOR (TURBT); Surgeon: Mague Salgado MD;  Location: [facility] Main [facility];  Service: Urology    SMALL INTESTINE SURGERY       Social History     Socioeconomic History    Marital status:      Spouse name: Not on file    Number of children: Not on file    Years of education: Not on file    Highest education level: Not on file   Occupational History    Not on file   Tobacco Use    Smoking status: Never Smoker    Smokeless tobacco: Never Used   Vaping Use    Vaping Use: Never used   Substance and Sexual Activity    Alcohol use: Never    Drug use: No    Sexual activity: Not Currently   Other Topics Concern    Not on file   Social History Narrative    Not on file     Social Determinants of Health     Financial Resource Strain: Not on file   Food Insecurity: Not on file   Transportation Needs: No Transportation Needs    Lack of Transportation (Medical): No    Lack of Transportation (Non-Medical):  No   Physical Activity: Not on file   Stress: Not on file   Social Connections: Not on file   Intimate Partner Violence: Not on file   Housing Stability: Not on file     Family History   Problem Relation Age of Onset    Alzheimer's disease Father     Uterine cancer Daughter      Allergies   Allergen Reactions    Celecoxib Swelling     celebrex- swelling of throat    Loratadine Swelling     Jaw swelled,couldn't swallow; face swelling    Aspirin GI Intolerance     : nausea; okay with Ecotrin    Lisinopril Swelling     Current Outpatient Medications   Medication Sig Dispense Refill    Apoaequorin (Prevagen) 10 MG CAPS Take 10 mg by mouth daily in the early morning       atorvastatin (LIPITOR) 40 mg tablet TAKE ONE TABLET BY MOUTH EVERY DAY AT BEDTIME 90 tablet 0    DOCOSAHEXAENOIC ACID PO Take 2 capsules by mouth daily      ferrous sulfate 324 (65 Fe) mg Daily 60 tablet 5    metFORMIN (GLUCOPHAGE) 500 mg tablet TAKE ONE TABLET BY MOUTH ONE TIME DAILY 90 tablet 0    potassium chloride (Klor-Con) 10 mEq tablet TAKE TWO TABLETS BY MOUTH DAILY 180 tablet 0    sertraline (ZOLOFT) 50 mg tablet TAKE ONE TABLET BY MOUTH ONE TIME DAILY 90 tablet 0    triamterene-hydrochlorothiazide (MAXZIDE-25) 37 5-25 mg per tablet TAKE ONE TABLET BY MOUTH ONE TIME DAILY 90 tablet 0    acetaminophen (TYLENOL) 500 mg tablet Take 1 tablet (500 mg total) by mouth every 6 (six) hours as needed for mild pain Max Daily: 3,000 mg      anastrozole (ARIMIDEX) 1 mg tablet Take 1 mg by mouth daily with lunch       bisacodyl (DULCOLAX) 10 mg suppository Insert 1 suppository (10 mg total) into the rectum daily as needed for constipation 12 suppository 0    Cholecalciferol 25 MCG (1000 UT) tablet Take 1,000 Units by mouth daily      docusate sodium (COLACE) 100 mg capsule Take 1 capsule (100 mg total) by mouth 3 (three) times a day for 7 days 21 capsule 0    ibuprofen (MOTRIN) 400 mg tablet Take 1 tablet (400 mg total) by mouth every 6 (six) hours as needed for moderate pain  0    oxyCODONE (ROXICODONE) 5 mg/5 mL solution Take 2 5 mL (2 5 mg total) by mouth every 4 (four) hours as needed (cancer-related pain) Max Daily Amount: 15 mg 200 mL 0    polyethylene glycol (MIRALAX) 17 g packet Take 17 g by mouth daily as needed (constipation)  0    polyethylene glycol-propylene glycol (SYSTANE) 0 4-0 3 % Apply 4 drops to eye 2 (two) times a day       No current facility-administered medications for this visit  Review of Systems   Constitutional: Positive for decreased appetite and malaise/fatigue  Negative for chills and fever  HENT: Negative for congestion  Eyes: Negative for visual disturbance  Cardiovascular: Negative for chest pain  Respiratory: Negative for shortness of breath  Musculoskeletal: Positive for back pain  Negative for falls  Gastrointestinal: Positive for abdominal pain  Negative for constipation, nausea and vomiting  Genitourinary: Positive for flank pain, hematuria and nocturia  Neurological: Negative for headaches  Psychiatric/Behavioral: The patient has insomnia  All other systems reviewed and are negative  Vital Signs    /60 (BP Location: Left arm, Cuff Size: Standard)   Pulse 78   Temp 98 5 °F (36 9 °C) (Temporal)   Resp 20   Wt 71 8 kg (158 lb 3 2 oz)   SpO2 97%   BMI 28 02 kg/m²     Physical Exam and Objective Data  Physical Exam  Vitals and nursing note reviewed  Constitutional:       General: She is awake  Appearance: She is not diaphoretic  Comments: Sitting up in chair in NAD  Daughter accompanied  Non-toxic appearing   HENT:      Head: Normocephalic and atraumatic  Right Ear: External ear normal       Left Ear: External ear normal       Nose: No rhinorrhea  Eyes:      Comments: No gaze preference   Cardiovascular:      Rate and Rhythm: Normal rate  Pulmonary:      Effort: No tachypnea, accessory muscle usage or respiratory distress  Comments: Completes full sentences without difficulty  Musculoskeletal:      Cervical back: Normal range of motion  Neurological:      General: No focal deficit present  Mental Status: She is alert     Psychiatric:         Attention and Perception: Attention normal          Mood and Affect: Mood and affect normal          Speech: Speech normal            Radiology and Laboratory:  I personally reviewed and interpreted the following results:    Most Recent COVID-19 Results:  No results found for: Adolph Thompson, [facility]    Most Recent Lab Work:  Lab Results   Component Value Date/Time    SODIUM 140 08/16/2021 05:57 AM    K 3 4 (L) 08/16/2021 05:57 AM    BUN 10 08/16/2021 05:57 AM    CREATININE 0 93 08/16/2021 05:57 AM    GLUC 108 (H) 08/16/2021 05:57 AM     Lab Results   Component Value Date/Time    AST 14 08/09/2021 11:13 PM    ALT 8 08/09/2021 11:13 PM    ALB 4 6 08/09/2021 11:13 PM     Lab Results   Component Value Date/Time    HGB 9 0 (L) 08/16/2021 05:57 AM    WBC 5 30 08/16/2021 05:57 AM     08/16/2021 05:57 AM    INR 0 95 08/10/2021 01:46 AM    PTT 22 (L) 08/10/2021 01:46 AM       Most Recent Imaging [last 30 days]:  No results found  50 minutes was spent face to face with Ken Nguyen and her daughter with greater than 50% of the time spent in counseling or coordination of care including discussions of provided medical updates, discussed palliative care, determined goals of care, determined social/family support, discussed plans of care, discussed symptom management, provided psychosocial support  Intro to palliative medicine  Intro to hospice medicine  Review of Living Will  Opioid rotation  Non-opioid adjunct therapy management  Expanded goals of care convesation  PDMP Reviewed   All of the patient's questions were answered during this discussion

## 2022-03-23 VITALS
HEART RATE: 78 BPM | SYSTOLIC BLOOD PRESSURE: 108 MMHG | BODY MASS INDEX: 28.02 KG/M2 | OXYGEN SATURATION: 97 % | WEIGHT: 158.2 LBS | RESPIRATION RATE: 20 BRPM | DIASTOLIC BLOOD PRESSURE: 60 MMHG | TEMPERATURE: 98.5 F

## 2022-03-25 ENCOUNTER — TELEPHONE (OUTPATIENT)
Dept: PALLIATIVE MEDICINE | Facility: CLINIC | Age: 87
End: 2022-03-25

## 2022-03-25 DIAGNOSIS — G89.3 CANCER RELATED PAIN: ICD-10-CM

## 2022-03-25 DIAGNOSIS — Z51.5 PALLIATIVE CARE PATIENT: ICD-10-CM

## 2022-03-25 DIAGNOSIS — C67.8 MALIGNANT NEOPLASM OF OVERLAPPING SITES OF BLADDER (HCC): ICD-10-CM

## 2022-03-25 RX ORDER — OXYCODONE HCL 5 MG/5 ML
2.5 SOLUTION, ORAL ORAL EVERY 8 HOURS PRN
Qty: 200 ML | Refills: 0
Start: 2022-03-25 | End: 2022-04-06 | Stop reason: HOSPADM

## 2022-03-25 RX ORDER — NALOXONE HYDROCHLORIDE 4 MG/.1ML
SPRAY NASAL
Qty: 1 EACH | Refills: 1 | Status: SHIPPED | OUTPATIENT
Start: 2022-03-25

## 2022-03-25 RX ORDER — OXYCODONE HCL 5 MG/5 ML
2.5 SOLUTION, ORAL ORAL EVERY 6 HOURS PRN
Qty: 200 ML | Refills: 0
Start: 2022-03-25 | End: 2022-03-25 | Stop reason: SDUPTHER

## 2022-03-25 NOTE — TELEPHONE ENCOUNTER
Patient's daughter called office tearful from the situation, per daughter patient was throwing up, and notice that pt  was not using the measurement spoon/cup for the oxycodone liquid  It seems like patient is drinking form the medication bottle  Daughter was able to get the patient out of her house to to take her to daughter's house, and when it was time to take the medication pt again did not use the measurement  spoon that the daughter had provided for the second time  daughter is thinking that the tablets are better for the pt but still concern she will take more than one, daughter expressed sadness and sounded tearful stated " I want to be able to meet my mother wishes with dying at home but I don't know if she is able to be alone, she doesn't want any one at home  "       Please advise

## 2022-03-25 NOTE — TELEPHONE ENCOUNTER
Patient could not figure out use of syringe  Concern that patient taking more medications than prescribed as the patient was swigging directly out of the bottle  Granddaughter checked in on patient today, initially vomiting and somewhat confused  Recommended ED visit if concern for oversedation/overdose, however, patient now more alert/awake per report from granddaughter to daughter and they do not wish to go to the ED at this time  Daughter is currently leaving work, en route to patient's home  200 mL initially in the bottle at prescription fill 2 days ago [50 mL remaining]  Recommend that the full prescription bottle is not available to patient  Daughter to take bottle home  Daughter initially measured out prior dosing  Dosing was effective as patient was more functional and "the best she has been in months " Today patient was taking the medications as she wanted to  "I'm in my house and I'll do what I want to " Hour by hour changes in patient's personality, which daughter/granddaughter attribute to dementia v medications  Daughter is tearful  Struggling with honoring patient's wishes v managing symptoms safely  Discussed hospice model of care for further supports v paid caregiver support  Daughter interested in information for paid caregivers, [facility] to reach out today for further information/support  Hold any further oxy-IR for the next few days  Moving forward, further observed dosing should be administered to the patient by a caregiver  Dosing also changed to oxy-IR 2 5 mg PO Q8H PRN  Rx for narcan sent to the pharmacy, all questions/concerns addressed  If concern for overdose/oversedation, call 911 for ED evaluation/management  Patient currently awake and alert, no s/o respiratory distress  Daughter feels that patient is at baseline  No concern for opioid-induced overdose  Will closely follow for support      Renée Villarreal MD  Palliative and Supportive Care  Ph: [phone]

## 2022-03-25 NOTE — TELEPHONE ENCOUNTER
[facility] LSW placed call to patient's daughter, Jihan Glover who is requesting assistance with patient in the home  Daughter reported that they were trying to convince patient to go to daughter's home because patient's granddaughter is a stay at home mom and would be able to be home with patient all day but patient refused  Patient doesn't want to not sleep in her own home  Daughter, reported that she comes over to the home after work daily but thinking that patient could benefit from having someone during the day to remind about medications  Discussion regarding waiver services vs private pay caregivers vs home health  Patient not home bound at this times, goes to appointments, [employer], food shopping  LSW to forward list of local caregiving agencies to patient's daughter for her to contact  Discussed how caregiving agencies may have minimum amount of hours required, suggested scheduling at times most needed by patient  Daughter encouraged to call if needed for support/resources  LSW sent list of caregiving agencies

## 2022-03-28 ENCOUNTER — TELEPHONE (OUTPATIENT)
Dept: PALLIATIVE MEDICINE | Facility: CLINIC | Age: 87
End: 2022-03-28

## 2022-03-28 DIAGNOSIS — Z51.5 PALLIATIVE CARE PATIENT: Primary | ICD-10-CM

## 2022-03-28 DIAGNOSIS — C67.8 MALIGNANT NEOPLASM OF OVERLAPPING SITES OF BLADDER (HCC): ICD-10-CM

## 2022-03-28 DIAGNOSIS — R26.2 AMBULATORY DYSFUNCTION: ICD-10-CM

## 2022-03-28 DIAGNOSIS — R41.3 MEMORY LOSS: ICD-10-CM

## 2022-03-28 NOTE — TELEPHONE ENCOUNTER
[facility] received email from patient's daughter  Following is excerpt from email  "Good morning,   After we spoke on Friday my Mom decided she would rather move in to my mom than have a nurse in hers  We decided wed move her yesterday  She fell early yesterday am (thankfully shes ok) we got her moved in however she is unable to bathe, use the toilet or dress herself  My kids and I all work my daughter is home during the week with her 1 1/2 yr old son (who has HLHS and is awaiting his Fontan procedure) and her 5mth old son  She understandably cannot handle the level of care my Mom needs at this point  Im not sure how mom managed to hide from us how bad she has gotten but it has been a wake up call  I mentioned to her this morning getting her in at [facility] which my cousin owns a few of moms friends are residents  She nearly cried  Please help I am at a loss here  I can be reached either by email or my cell phone which is [phone]  I do not want to put her in a home but I dont know what else to do  Thank you,   Bryson Gutierrez from my iPhone"      [facility] placed call to patient's daughter  Daughter reported that mom mentioned over the weekend that she doesn't want to be a burden to the family  Daughter reported that patient fell this morning in the home, no known injury  Daughter reported that she has a cousin that owns [facility] but that they don't have any rooms available at this time but that patient isn't currently independent enough to live there  Discussed going to ED for eval and possible transfer to a SNF for STR placement  LSW discussed following options  ED- STR- [place] (maybe by time ready to d/c from STR, bed will be available)  ED-STR-Home with aides  Home Health for PT/OT/RN (Eval for safety in home as well as therapy)    Daughter has appointment today with Caregiving agency to see if caregivers can come into home     Family can look into roughly 4 hours of care to offset patient's granddaughter having to provide ADLs in the morning  Discussed Caregivers can be hired and Home health can be added in conjunction  Daughter reported that he wants to try and keep patient out of a nursing home for as long as possible because she is fearful that once she is there, she will give up and will die  Daughter reported that patient seems to enjoy being around the family, watching the great-grandchildren in the home  Being around family is helping mood  Daughter agreeable to referral for Home health   Daughter will follow up with caregivers for adl's

## 2022-03-29 ENCOUNTER — TELEPHONE (OUTPATIENT)
Dept: PALLIATIVE MEDICINE | Facility: CLINIC | Age: 87
End: 2022-03-29

## 2022-03-30 ENCOUNTER — TELEPHONE (OUTPATIENT)
Dept: PALLIATIVE MEDICINE | Facility: CLINIC | Age: 87
End: 2022-03-30

## 2022-03-30 ENCOUNTER — APPOINTMENT (EMERGENCY)
Dept: VASCULAR ULTRASOUND | Facility: HOSPITAL | Age: 87
DRG: 294 | End: 2022-03-30
Payer: COMMERCIAL

## 2022-03-30 ENCOUNTER — APPOINTMENT (EMERGENCY)
Dept: CT IMAGING | Facility: HOSPITAL | Age: 87
DRG: 294 | End: 2022-03-30
Payer: COMMERCIAL

## 2022-03-30 ENCOUNTER — HOSPITAL ENCOUNTER (INPATIENT)
Facility: HOSPITAL | Age: 87
LOS: 6 days | Discharge: NURSING, REHAB OR LONG-TERM CARE | DRG: 294 | End: 2022-04-06
Attending: EMERGENCY MEDICINE | Admitting: INTERNAL MEDICINE
Payer: COMMERCIAL

## 2022-03-30 DIAGNOSIS — E87.1 HYPONATREMIA: ICD-10-CM

## 2022-03-30 DIAGNOSIS — E83.52 HYPERCALCEMIA: ICD-10-CM

## 2022-03-30 DIAGNOSIS — R10.9 ABDOMINAL PAIN: Primary | ICD-10-CM

## 2022-03-30 DIAGNOSIS — C67.8 MALIGNANT NEOPLASM OF OVERLAPPING SITES OF BLADDER (HCC): ICD-10-CM

## 2022-03-30 DIAGNOSIS — I82.409 DVT (DEEP VENOUS THROMBOSIS) (HCC): ICD-10-CM

## 2022-03-30 DIAGNOSIS — Z51.5 PALLIATIVE CARE PATIENT: ICD-10-CM

## 2022-03-30 DIAGNOSIS — G89.3 CANCER RELATED PAIN: ICD-10-CM

## 2022-03-30 LAB
ALBUMIN SERPL BCP-MCNC: 2.6 G/DL (ref 3.5–5)
ALP SERPL-CCNC: 90 U/L (ref 46–116)
ALT SERPL W P-5'-P-CCNC: 18 U/L (ref 12–78)
ANION GAP SERPL CALCULATED.3IONS-SCNC: 8 MMOL/L (ref 4–13)
APTT PPP: >210 SECONDS (ref 23–37)
AST SERPL W P-5'-P-CCNC: 28 U/L (ref 5–45)
BASOPHILS # BLD AUTO: 0.02 THOUSANDS/ΜL (ref 0–0.1)
BASOPHILS NFR BLD AUTO: 0 % (ref 0–1)
BILIRUB SERPL-MCNC: 0.28 MG/DL (ref 0.2–1)
BUN SERPL-MCNC: 36 MG/DL (ref 5–25)
CALCIUM ALBUM COR SERPL-MCNC: 12.2 MG/DL (ref 8.3–10.1)
CALCIUM SERPL-MCNC: 11.1 MG/DL (ref 8.3–10.1)
CHLORIDE SERPL-SCNC: 88 MMOL/L (ref 100–108)
CO2 SERPL-SCNC: 28 MMOL/L (ref 21–32)
CREAT SERPL-MCNC: 1.71 MG/DL (ref 0.6–1.3)
EOSINOPHIL # BLD AUTO: 0.03 THOUSAND/ΜL (ref 0–0.61)
EOSINOPHIL NFR BLD AUTO: 0 % (ref 0–6)
ERYTHROCYTE [DISTWIDTH] IN BLOOD BY AUTOMATED COUNT: 13.7 % (ref 11.6–15.1)
ERYTHROCYTE [DISTWIDTH] IN BLOOD BY AUTOMATED COUNT: 13.9 % (ref 11.6–15.1)
GFR SERPL CREATININE-BSD FRML MDRD: 27 ML/MIN/1.73SQ M
GLUCOSE SERPL-MCNC: 132 MG/DL (ref 65–140)
HCT VFR BLD AUTO: 25.8 % (ref 34.8–46.1)
HCT VFR BLD AUTO: 30 % (ref 34.8–46.1)
HGB BLD-MCNC: 8.3 G/DL (ref 11.5–15.4)
HGB BLD-MCNC: 9.6 G/DL (ref 11.5–15.4)
IMM GRANULOCYTES # BLD AUTO: 0.3 THOUSAND/UL (ref 0–0.2)
IMM GRANULOCYTES NFR BLD AUTO: 2 % (ref 0–2)
INR PPP: 1.18 (ref 0.84–1.19)
LIPASE SERPL-CCNC: 187 U/L (ref 73–393)
LYMPHOCYTES # BLD AUTO: 0.72 THOUSANDS/ΜL (ref 0.6–4.47)
LYMPHOCYTES NFR BLD AUTO: 5 % (ref 14–44)
MCH RBC QN AUTO: 28.4 PG (ref 26.8–34.3)
MCH RBC QN AUTO: 28.7 PG (ref 26.8–34.3)
MCHC RBC AUTO-ENTMCNC: 32 G/DL (ref 31.4–37.4)
MCHC RBC AUTO-ENTMCNC: 32.2 G/DL (ref 31.4–37.4)
MCV RBC AUTO: 89 FL (ref 82–98)
MCV RBC AUTO: 89 FL (ref 82–98)
MONOCYTES # BLD AUTO: 0.87 THOUSAND/ΜL (ref 0.17–1.22)
MONOCYTES NFR BLD AUTO: 6 % (ref 4–12)
NEUTROPHILS # BLD AUTO: 12.56 THOUSANDS/ΜL (ref 1.85–7.62)
NEUTS SEG NFR BLD AUTO: 87 % (ref 43–75)
NRBC BLD AUTO-RTO: 0 /100 WBCS
PLATELET # BLD AUTO: 207 THOUSANDS/UL (ref 149–390)
PLATELET # BLD AUTO: 240 THOUSANDS/UL (ref 149–390)
PMV BLD AUTO: 8.2 FL (ref 8.9–12.7)
PMV BLD AUTO: 8.5 FL (ref 8.9–12.7)
POTASSIUM SERPL-SCNC: 4.2 MMOL/L (ref 3.5–5.3)
PROT SERPL-MCNC: 7.3 G/DL (ref 6.4–8.2)
PROTHROMBIN TIME: 14.5 SECONDS (ref 11.6–14.5)
RBC # BLD AUTO: 2.89 MILLION/UL (ref 3.81–5.12)
RBC # BLD AUTO: 3.38 MILLION/UL (ref 3.81–5.12)
SODIUM SERPL-SCNC: 124 MMOL/L (ref 136–145)
WBC # BLD AUTO: 11.95 THOUSAND/UL (ref 4.31–10.16)
WBC # BLD AUTO: 14.5 THOUSAND/UL (ref 4.31–10.16)

## 2022-03-30 PROCEDURE — 96361 HYDRATE IV INFUSION ADD-ON: CPT

## 2022-03-30 PROCEDURE — 96366 THER/PROPH/DIAG IV INF ADDON: CPT

## 2022-03-30 PROCEDURE — 74177 CT ABD & PELVIS W/CONTRAST: CPT

## 2022-03-30 PROCEDURE — 83690 ASSAY OF LIPASE: CPT | Performed by: EMERGENCY MEDICINE

## 2022-03-30 PROCEDURE — 85027 COMPLETE CBC AUTOMATED: CPT | Performed by: EMERGENCY MEDICINE

## 2022-03-30 PROCEDURE — 96375 TX/PRO/DX INJ NEW DRUG ADDON: CPT

## 2022-03-30 PROCEDURE — 99285 EMERGENCY DEPT VISIT HI MDM: CPT

## 2022-03-30 PROCEDURE — 85025 COMPLETE CBC W/AUTO DIFF WBC: CPT | Performed by: EMERGENCY MEDICINE

## 2022-03-30 PROCEDURE — 93971 EXTREMITY STUDY: CPT

## 2022-03-30 PROCEDURE — 80053 COMPREHEN METABOLIC PANEL: CPT | Performed by: EMERGENCY MEDICINE

## 2022-03-30 PROCEDURE — 96365 THER/PROPH/DIAG IV INF INIT: CPT

## 2022-03-30 PROCEDURE — 71260 CT THORAX DX C+: CPT

## 2022-03-30 PROCEDURE — 85730 THROMBOPLASTIN TIME PARTIAL: CPT | Performed by: EMERGENCY MEDICINE

## 2022-03-30 PROCEDURE — 36415 COLL VENOUS BLD VENIPUNCTURE: CPT | Performed by: EMERGENCY MEDICINE

## 2022-03-30 PROCEDURE — 85610 PROTHROMBIN TIME: CPT | Performed by: EMERGENCY MEDICINE

## 2022-03-30 RX ORDER — HEPARIN SODIUM 10000 [USP'U]/100ML
3-30 INJECTION, SOLUTION INTRAVENOUS
Status: DISCONTINUED | OUTPATIENT
Start: 2022-03-30 | End: 2022-04-04

## 2022-03-30 RX ORDER — OXYCODONE HYDROCHLORIDE 5 MG/1
2.5 TABLET ORAL EVERY 4 HOURS PRN
Status: DISCONTINUED | OUTPATIENT
Start: 2022-03-30 | End: 2022-04-05

## 2022-03-30 RX ORDER — MORPHINE SULFATE 4 MG/ML
4 INJECTION, SOLUTION INTRAMUSCULAR; INTRAVENOUS ONCE
Status: COMPLETED | OUTPATIENT
Start: 2022-03-30 | End: 2022-03-30

## 2022-03-30 RX ORDER — HYDROMORPHONE HCL/PF 1 MG/ML
0.5 SYRINGE (ML) INJECTION EVERY 4 HOURS PRN
Status: DISCONTINUED | OUTPATIENT
Start: 2022-03-30 | End: 2022-04-06 | Stop reason: HOSPADM

## 2022-03-30 RX ORDER — OXYCODONE HYDROCHLORIDE 5 MG/1
5 TABLET ORAL EVERY 4 HOURS PRN
Status: DISCONTINUED | OUTPATIENT
Start: 2022-03-30 | End: 2022-04-05

## 2022-03-30 RX ADMIN — MORPHINE SULFATE 4 MG: 4 INJECTION INTRAVENOUS at 17:08

## 2022-03-30 RX ADMIN — IOHEXOL 100 ML: 350 INJECTION, SOLUTION INTRAVENOUS at 21:00

## 2022-03-30 RX ADMIN — SODIUM CHLORIDE 1000 ML: 0.9 INJECTION, SOLUTION INTRAVENOUS at 18:27

## 2022-03-30 RX ADMIN — SODIUM CHLORIDE 1000 ML: 0.9 INJECTION, SOLUTION INTRAVENOUS at 18:26

## 2022-03-30 RX ADMIN — HEPARIN SODIUM AND DEXTROSE 18 UNITS/KG/HR: 10000; 5 INJECTION INTRAVENOUS at 19:26

## 2022-03-30 NOTE — TELEPHONE ENCOUNTER
Patient's daughter requesting a call back from [facility], patients daughter left message on machine stated " patient is being mean to her daughter (patient's granddaughter) , she is doesn't want to bathe and becoming demending to my daugther  daughter it  is becoming too much for my daughter and her fiance  I call some home health agencies but is taking a while to get a call back  I need an alternative "    Please advise

## 2022-03-30 NOTE — QUICK NOTE
Contacted by ED attending regarding recommendations for pain regimen for patient with intractable pain  Patient had been using medications as recommended by Dr Haris Perez (Oxy 2 5 mg Q 8 hrs PRN) without relief  Per ED report, pain not well controlled and with worsening LLE pain and swelling  LE VAS showed acute to subacute occlusive thrombus in the common femoral vein  Patient also noted to be on TILA and hypercalcemia (corrected Ca 12 2)  Patient was given Morphine 4 mg IV x1 and 2 L IVF     Recommended next regimen:  -Oxycodone 2 5 mg Q 4 hrs PRN for moderate pain  -Oxycodone 5 mg Q 4 hrs PRN for severe pain  -Dilaudid 0 5 mg every 4 hrs PRN for BT pain    Avoid Morphine given TILA and poor CrCl

## 2022-03-30 NOTE — TELEPHONE ENCOUNTER
[facility], they will submit to insurance for auth to start services  [facility] placed call to patient's daughter, daughter reported that family asked patient about going to hospital earlier today, patient refused  Patient now reporting increased pain symptoms would like meds adjusted  Daughter reported that patient's left leg is very swollen, patient now has inability to hold her head up, "head is floppy", sleeping on and off most of the day  Change in mood, more verbally combative toward granddaughter who is in the home with her during the day  LSW suggested patient go to ED for evaluation due to change in functionality, cognition, symptoms of pain and swelling of leg and behavior for evaluation  Will update [facility]- Dr Melburn Canavan of [facility]

## 2022-03-31 PROBLEM — N17.9 AKI (ACUTE KIDNEY INJURY) (HCC): Status: ACTIVE | Noted: 2022-03-31

## 2022-03-31 LAB
ANION GAP SERPL CALCULATED.3IONS-SCNC: 10 MMOL/L (ref 4–13)
APTT PPP: 37 SECONDS (ref 23–37)
APTT PPP: 44 SECONDS (ref 23–37)
APTT PPP: 85 SECONDS (ref 23–37)
BACTERIA UR QL AUTO: ABNORMAL /HPF
BILIRUB UR QL STRIP: NEGATIVE
BUN SERPL-MCNC: 32 MG/DL (ref 5–25)
CALCIUM SERPL-MCNC: 11.3 MG/DL (ref 8.3–10.1)
CHLORIDE SERPL-SCNC: 90 MMOL/L (ref 100–108)
CLARITY UR: ABNORMAL
CO2 SERPL-SCNC: 25 MMOL/L (ref 21–32)
COLOR UR: YELLOW
CREAT SERPL-MCNC: 1.59 MG/DL (ref 0.6–1.3)
ERYTHROCYTE [DISTWIDTH] IN BLOOD BY AUTOMATED COUNT: 13.9 % (ref 11.6–15.1)
GFR SERPL CREATININE-BSD FRML MDRD: 29 ML/MIN/1.73SQ M
GLUCOSE SERPL-MCNC: 130 MG/DL (ref 65–140)
GLUCOSE UR STRIP-MCNC: NEGATIVE MG/DL
HCT VFR BLD AUTO: 29.4 % (ref 34.8–46.1)
HGB BLD-MCNC: 9.4 G/DL (ref 11.5–15.4)
HGB UR QL STRIP.AUTO: ABNORMAL
KETONES UR STRIP-MCNC: NEGATIVE MG/DL
LEUKOCYTE ESTERASE UR QL STRIP: ABNORMAL
MCH RBC QN AUTO: 28.1 PG (ref 26.8–34.3)
MCHC RBC AUTO-ENTMCNC: 32 G/DL (ref 31.4–37.4)
MCV RBC AUTO: 88 FL (ref 82–98)
NITRITE UR QL STRIP: POSITIVE
NON-SQ EPI CELLS URNS QL MICRO: ABNORMAL /HPF
PH UR STRIP.AUTO: 7 [PH]
PLATELET # BLD AUTO: 233 THOUSANDS/UL (ref 149–390)
PMV BLD AUTO: 8.3 FL (ref 8.9–12.7)
POTASSIUM SERPL-SCNC: 4.1 MMOL/L (ref 3.5–5.3)
PROT UR STRIP-MCNC: ABNORMAL MG/DL
RBC # BLD AUTO: 3.35 MILLION/UL (ref 3.81–5.12)
RBC #/AREA URNS AUTO: ABNORMAL /HPF
SODIUM SERPL-SCNC: 125 MMOL/L (ref 136–145)
SP GR UR STRIP.AUTO: 1.01 (ref 1–1.03)
UROBILINOGEN UR QL STRIP.AUTO: 0.2 E.U./DL
WBC # BLD AUTO: 12.88 THOUSAND/UL (ref 4.31–10.16)
WBC #/AREA URNS AUTO: ABNORMAL /HPF

## 2022-03-31 PROCEDURE — 99220 PR INITIAL OBSERVATION CARE/DAY 70 MINUTES: CPT | Performed by: INTERNAL MEDICINE

## 2022-03-31 PROCEDURE — 81001 URINALYSIS AUTO W/SCOPE: CPT | Performed by: EMERGENCY MEDICINE

## 2022-03-31 PROCEDURE — 99223 1ST HOSP IP/OBS HIGH 75: CPT | Performed by: STUDENT IN AN ORGANIZED HEALTH CARE EDUCATION/TRAINING PROGRAM

## 2022-03-31 PROCEDURE — 85730 THROMBOPLASTIN TIME PARTIAL: CPT | Performed by: INTERNAL MEDICINE

## 2022-03-31 PROCEDURE — 93971 EXTREMITY STUDY: CPT | Performed by: SURGERY

## 2022-03-31 PROCEDURE — 87086 URINE CULTURE/COLONY COUNT: CPT | Performed by: EMERGENCY MEDICINE

## 2022-03-31 PROCEDURE — 85027 COMPLETE CBC AUTOMATED: CPT | Performed by: PHYSICIAN ASSISTANT

## 2022-03-31 PROCEDURE — 87186 SC STD MICRODIL/AGAR DIL: CPT | Performed by: EMERGENCY MEDICINE

## 2022-03-31 PROCEDURE — 80048 BASIC METABOLIC PNL TOTAL CA: CPT | Performed by: INTERNAL MEDICINE

## 2022-03-31 RX ORDER — ACETAMINOPHEN 325 MG/1
650 TABLET ORAL EVERY 6 HOURS PRN
Status: DISCONTINUED | OUTPATIENT
Start: 2022-03-31 | End: 2022-04-06 | Stop reason: HOSPADM

## 2022-03-31 RX ORDER — MAGNESIUM HYDROXIDE/ALUMINUM HYDROXICE/SIMETHICONE 120; 1200; 1200 MG/30ML; MG/30ML; MG/30ML
30 SUSPENSION ORAL EVERY 6 HOURS PRN
Status: DISCONTINUED | OUTPATIENT
Start: 2022-03-31 | End: 2022-04-06 | Stop reason: HOSPADM

## 2022-03-31 RX ORDER — SODIUM CHLORIDE 9 MG/ML
75 INJECTION, SOLUTION INTRAVENOUS CONTINUOUS
Status: DISCONTINUED | OUTPATIENT
Start: 2022-03-31 | End: 2022-04-01

## 2022-03-31 RX ORDER — ONDANSETRON 2 MG/ML
4 INJECTION INTRAMUSCULAR; INTRAVENOUS EVERY 6 HOURS PRN
Status: DISCONTINUED | OUTPATIENT
Start: 2022-03-31 | End: 2022-04-06 | Stop reason: HOSPADM

## 2022-03-31 RX ORDER — POLYETHYLENE GLYCOL 3350 17 G/17G
17 POWDER, FOR SOLUTION ORAL DAILY
Status: DISCONTINUED | OUTPATIENT
Start: 2022-03-31 | End: 2022-04-06 | Stop reason: HOSPADM

## 2022-03-31 RX ADMIN — HEPARIN SODIUM AND DEXTROSE 21 UNITS/KG/HR: 10000; 5 INJECTION INTRAVENOUS at 23:47

## 2022-03-31 RX ADMIN — CEFTRIAXONE SODIUM 1000 MG: 10 INJECTION, POWDER, FOR SOLUTION INTRAVENOUS at 18:55

## 2022-03-31 RX ADMIN — HYDROMORPHONE HYDROCHLORIDE 0.5 MG: 1 INJECTION, SOLUTION INTRAMUSCULAR; INTRAVENOUS; SUBCUTANEOUS at 23:56

## 2022-03-31 RX ADMIN — SODIUM CHLORIDE 75 ML/HR: 0.9 INJECTION, SOLUTION INTRAVENOUS at 18:43

## 2022-03-31 RX ADMIN — OXYCODONE HYDROCHLORIDE 5 MG: 5 TABLET ORAL at 18:50

## 2022-03-31 RX ADMIN — ACETAMINOPHEN 650 MG: 325 TABLET ORAL at 18:49

## 2022-03-31 RX ADMIN — HEPARIN SODIUM AND DEXTROSE 21 UNITS/KG/HR: 10000; 5 INJECTION INTRAVENOUS at 18:41

## 2022-03-31 NOTE — ASSESSMENT & PLAN NOTE
· Malignant high-grade bladder cancer invasive  Patient elected for palliative care does not want to pursue surgery or other options    · Following with palliative care

## 2022-03-31 NOTE — ASSESSMENT & PLAN NOTE
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

## 2022-03-31 NOTE — ASSESSMENT & PLAN NOTE
Patient is on palliative care  She has been having intractable pain on at home Oxy 2 5 mg Q 8 hrs PRN  Also noted to have swelling in left lower extremity  [facility] LSW placed call to patient's daughter, daughter reported that family asked patient about going to hospital earlier today, patient refused     · CT shows progression of disease as well as extensive DVT in IVC/left lower extremity  · ED discussed with palliative care, recommendations as below  · Oxycodone 2 5 mg Q 4 hrs PRN for moderate pain  · Oxycodone 5 mg Q 4 hrs PRN for severe pain  · Dilaudid 0 5 mg every 4 hrs PRN for BT pain  · Monitor pain response

## 2022-03-31 NOTE — PLAN OF CARE
Problem: MOBILITY - ADULT  Goal: Maintain or return to baseline ADL function  Description: INTERVENTIONS:  -  Assess patient's ability to carry out ADLs; assess patient's baseline for ADL function and identify physical deficits which impact ability to perform ADLs (bathing, care of mouth/teeth, toileting, grooming, dressing, etc )  - Assess/evaluate cause of self-care deficits   - Assess range of motion  - Assess patient's mobility; develop plan if impaired  - Assess patient's need for assistive devices and provide as appropriate  - Encourage maximum independence but intervene and supervise when necessary  - Involve family in performance of ADLs  - Assess for home care needs following discharge   - Consider OT consult to assist with ADL evaluation and planning for discharge  - Provide patient education as appropriate  Outcome: Progressing     Problem: Prexisting or High Potential for Compromised Skin Integrity  Goal: Skin integrity is maintained or improved  Description: INTERVENTIONS:  - Identify patients at risk for skin breakdown  - Assess and monitor skin integrity  - Assess and monitor nutrition and hydration status  - Monitor labs   - Assess for incontinence   - Turn and reposition patient  - Assist with mobility/ambulation  - Relieve pressure over bony prominences  - Avoid friction and shearing  - Provide appropriate hygiene as needed including keeping skin clean and dry  - Evaluate need for skin moisturizer/barrier cream  - Collaborate with interdisciplinary team   - Patient/family teaching  - Consider wound care consult   Outcome: Progressing     Problem: SAFETY ADULT  Goal: Maintain or return to baseline ADL function  Description: INTERVENTIONS:  -  Assess patient's ability to carry out ADLs; assess patient's baseline for ADL function and identify physical deficits which impact ability to perform ADLs (bathing, care of mouth/teeth, toileting, grooming, dressing, etc )  - Assess/evaluate cause of self-care deficits   - Assess range of motion  - Assess patient's mobility; develop plan if impaired  - Assess patient's need for assistive devices and provide as appropriate  - Encourage maximum independence but intervene and supervise when necessary  - Involve family in performance of ADLs  - Assess for home care needs following discharge   - Consider OT consult to assist with ADL evaluation and planning for discharge  - Provide patient education as appropriate  Outcome: Progressing     Problem: DISCHARGE PLANNING  Goal: Discharge to home or other facility with appropriate resources  Description: INTERVENTIONS:  - Identify barriers to discharge w/patient and caregiver  - Arrange for needed discharge resources and transportation as appropriate  - Identify discharge learning needs (meds, wound care, etc )  - Arrange for interpretive services to assist at discharge as needed  - Refer to Case Management Department for coordinating discharge planning if the patient needs post-hospital services based on physician/advanced practitioner order or complex needs related to functional status, cognitive ability, or social support system  Outcome: Progressing     Problem: Knowledge Deficit  Goal: Patient/family/caregiver demonstrates understanding of disease process, treatment plan, medications, and discharge instructions  Description: Complete learning assessment and assess knowledge base    Interventions:  - Provide teaching at level of understanding  - Provide teaching via preferred learning methods  Outcome: Progressing

## 2022-03-31 NOTE — UTILIZATION REVIEW
Initial Clinical Review    OBS order 3/30 2342 converted to IP on 3/31 @ 1256 w/ intractable pain , safe DC plan   Level of Care Med Surg   Estimated length of stay More than 2 Midnights   Certification I certify that inpatient services are medically necessary for this patient for a duration of greater than two midnights  See H&P and MD Progress Notes for additional information about the patient's course of treatment       03/31/22 1256  Inpatient Admission  Once         03/31/22 1255   03/30/22 2343  Place in Observation  Once         03/30/22 2342       ED Arrival Information     Expected Arrival Acuity    3/30/2022  3/30/2022 16:35 Urgent         Means of arrival Escorted by Service Admission type    Ambulance [address]  [facility] Hospitalist Urgent         Arrival complaint    Pain        Chief Complaint   Patient presents with    Pain     on pallulative care for cancer and is having uncontrolled pain        Initial Presentation: 82 y o  female PMH of malignant high-grade metastatic bladder cancer invasive, breast cancer, depression, GERD who presents with intractable pain  Patient is on palliative care  She has been having intractable pain on at home Oxy 2 5 mg Q 8 hrs PRN  Also noted to have swelling in left lower extremity  In ED, patient was found to have extensive DVT in IVC/left lower extremity and small PE  admitted IP status for cancer related pain and DVT   Cont  pain mngt , started on iv heparin gtt   CR 1 7 , baseline 0 8-0 9 cont IVf and monitor I&O   Calcium 12 1 acute on chronic s/p 2 l IVF in ED , cont IVF and monitor in am , hold Vit D   PE: Edema + nonpitting RLE, + 2 pitting edema     3/31 IM Note   subacute extensive DVT, infrahepatic IVC thrombus, possible LLE provoked secondary to underlying malignancy  does not wish for surgical intervention, not a candidate anyway  Cont on IV heparin gtt   Family unable to care for her and wishing for STR       3/31 Palliative Care consult Family not ready for hospice at this time  Would like to pursue STR on DC , cont cancer related mngt     4/1 Nephrology Note   Hyponatremia Na 124, suspect SIADH emanating from progression of cancer  TILA Cr 1 71 , baseline 0 7-0 9  improved to 1 5  High grade invasive urothelial carcinoma, extension of tumor into R kidney likely contributing to TILA/ Calcium improved to 10 6 from 12  2  on heparin gtt for thromboembolism   4/1 Palliative care Note  Cont symptom mgt of pain   cognitive decline most likely dementia   Discussed STR vs home hospice   ED Triage Vitals   Temperature Pulse Respirations Blood Pressure SpO2   03/30/22 1644 03/30/22 1644 03/30/22 1644 03/30/22 1644 03/30/22 1644   97 5 °F (36 4 °C) 75 18 110/68 96 %      Temp Source Heart Rate Source Patient Position - Orthostatic VS BP Location FiO2 (%)   03/30/22 1644 03/30/22 1644 03/30/22 1644 03/30/22 1644 --   Oral Monitor Lying Right arm       Pain Score       03/30/22 1708       9          Wt Readings from Last 1 Encounters:   03/31/22 75 4 kg (166 lb 3 6 oz)     Additional Vital Signs:   03/31/22 22:23:37 97 8 °F (36 6 °C) 77 18 109/61 77 95 % None (Room air) Lying   03/31/22 15:50:03 97 3 °F (36 3 °C) Abnormal  103 18 111/70 84 96 % --        03/31/22 07:45:43 97 5 °F (36 4 °C) 85 17 102/64 77 93 % -- --   03/31/22 00:38:47 97 7 °F (36 5 °C) 64 18 112/71 85 98 % None (Room air) --   03/30/22 2115 -- -- -- 132/61 88 -- -- --   03/30/22 1716 -- -- -- -- -- -- None (Room         Pertinent Labs/Diagnostic Test Results:   CT chest abdomen pelvis w contrast   Final Result by Melissa Garcia MD (03/30 7326)      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 with extensive infiltration of the right kidney and invasion of the adjacent adrenal gland, medial aspect of the liver, duodenum and right iliopsoas muscle      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 extending inferiorly and likely involves the iliac vessels with extension to the left lower extremity deep venous system identified on ultrasound       Suspected small pulmonary embolus in the left lower lobe  No large central PE  Confluent periaortic fanny mass with suspected invasion of the anterior L2 vertebral body where there is a subtle cortical erosion    Metastatic lung nodules  Metastatic lesions along the anterior wall of the heart that may appear to involve the myocardium  New mild compression L2 fracture that is that I suspect is pathologic given cortical erosion at the anterior aspect of the vertebral body  No significant bony retropulsion  I personally discussed this study with Caity Hernandez on 3/30/2022 at 11:00 PM                Workstation performed: [id number]         VAS lower limb venous duplex study, unilateral/limited   Final Result by Yajaira Karimi MD (03/31 1427)      RIGHT LOWER LIMB LIMITED:  Evaluation shows no evidence of thrombus in the common femoral vein  Doppler evaluation shows a normal response to augmentation maneuvers  LEFT LOWER LIMB:  There is acute to subacute occlusive thrombus noted in the common femoral vein,  deep femoral vein, and proximal to distal superficial femoral vein  There is acute to subacute occlusive thrombophlebitis noted in the great  saphenous vein from proximal to distal thigh  Doppler evaluation shows a normal response to augmentation maneuvers  Popliteal, posterior tibial and anterior tibial arterial Doppler waveforms are  triphasic  Results from last 7 days   Lab Units 04/01/22  0519 03/31/22  0536 03/30/22  2132 03/30/22  1701 03/30/22  1701   WBC Thousand/uL 12 61* 12 88* 11 95*   < > 14 50*   HEMOGLOBIN g/dL 9 3* 9 4* 8 3*  --  9 6*   HEMATOCRIT % 28 1* 29 4* 25 8*  --  30 0*   PLATELETS Thousands/uL 255 233 207   < > 240   NEUTROS ABS Thousands/µL  --   --   --   --  12 56*    < > = values in this interval not displayed       Results from last 7 days   Lab Units 04/01/22  0519 03/31/22  1323 03/30/22  1701   SODIUM mmol/L  --  125* 124*   POTASSIUM mmol/L  --  4 1 4 2   CHLORIDE mmol/L  --  90* 88*   CO2 mmol/L  --  25 28   ANION GAP mmol/L  --  10 8   BUN mg/dL  --  32* 36*   CREATININE mg/dL  --  1 59* 1 71*   EGFR ml/min/1 73sq m  --  29 27   CALCIUM mg/dL  --  11 3* 11 1*   CALCIUM, IONIZED mmol/L 1 42*  --   --      Results from last 7 days   Lab Units 03/30/22  1701   AST U/L 28   ALT U/L 18   ALK PHOS U/L 90   TOTAL PROTEIN g/dL 7 3   ALBUMIN g/dL 2 6*   TOTAL BILIRUBIN mg/dL 0 28     Results from last 7 days   Lab Units 03/31/22  1323 03/30/22  1701   GLUCOSE RANDOM mg/dL 130 132     Results from last 7 days   Lab Units 04/01/22  0519 03/31/22  2130 03/31/22  1325 03/31/22  0536 03/30/22  2132   PROTIME seconds  --   --   --   --  14 5   INR   --   --   --   --  1 18   PTT seconds 89* 85* 37   < > >210*    < > = values in this interval not displayed  Results from last 7 days   Lab Units 03/30/22  1701   LIPASE u/L 187     ED Treatment:   Medication Administration from 03/30/2022 1635 to 03/31/2022 0028       Date/Time Order Dose Route Action     03/30/2022 1708 morphine (PF) 4 mg/mL injection 4 mg 4 mg Intravenous Given     03/30/2022 1827 sodium chloride 0 9 % bolus 1,000 mL 1,000 mL Intravenous New Bag     03/30/2022 1826 sodium chloride 0 9 % bolus 1,000 mL 1,000 mL Intravenous New Bag     03/30/2022 2332 heparin (porcine) 25,000 units in D5W 250 mL infusion (premix) 15 Units/kg/hr Intravenous Rate/Dose Change     03/30/2022 1926 heparin (porcine) 25,000 units in D5W 250 mL infusion (premix) 18 Units/kg/hr Intravenous New Bag        Past Medical History:   Diagnosis Date    Depression     GERD (gastroesophageal reflux disease)     History of chemotherapy     right breast 1996    History of echocardiogram 02/14/2018    EF 0 55 (55%), trace mitral regurgitation      Hx of radiation therapy     2017  left breast    Hyperlipidemia     Macular degeneration  [facility] of [facility]     [facility]     Small bowel volvulus [facility]      Present on Admission:   Cancer related pain   Malignant neoplasm of overlapping sites of bladder [facility]   Hypercalcemia      Admitting Diagnosis: Hypercalcemia [E83 52]  Abdominal pain [R10 9]  Pain [R52]  DVT (deep venous thrombosis) (HCC) [I82 409]  Age/Sex: 82 y o  female  Admission Orders:  Scheduled Medications:  cefTRIAXone, 1,000 mg, Intravenous, Q24H  polyethylene glycol, 17 g, Oral, Daily      Continuous IV Infusions:  heparin (porcine), 3-30 Units/kg/hr (Order-Specific), Intravenous, Titrated      PRN Meds:  aluminum-magnesium hydroxide-simethicone, 30 mL, Oral, Q6H PRN  HYDROmorphone, 0 5 mg, Intravenous, Q4H PRN  3/31  x1  ondansetron, 4 mg, Intravenous, Q6H PRN  oxyCODONE, 2 5 mg, Oral, Q4H PRN  oxyCODONE, 5 mg, Oral, Q4H PRN    Weights   I&O   PT OT eval       Network Utilization Review Department  ATTENTION: Please call with any questions or concerns to [phone] and carefully listen to the prompts so that you are directed to the right person  All voicemails are confidential   Donovan Child all requests for admission clinical reviews, approved or denied determinations and any other requests to dedicated fax number below belonging to the campus where the patient is receiving treatment   List of dedicated fax numbers for the Facilities:  [address] DENIALS (Administrative/Medical Necessity) [phone]   [address] (Maternity/NICU/Pediatrics) [phone]   [address]258 [address] [place] [address] [address] [address] Sarah  Rylee  41  [phone]   187 [facility] 1481 [address] [facility] [place] [address] [phone]

## 2022-03-31 NOTE — ASSESSMENT & PLAN NOTE
Hypercalcemia time of admission, corrected calcium 12 1  · Acute on chronic elevation, likely due to malignancy  · S/p 2L fluid folus in ED   · Will continue NS @ 150cc/hr for now, repeat CA in am   · Hold vitamin d supplementation for now

## 2022-03-31 NOTE — CONSULTS
Consultation - [id number] [facility] M Schoenberger 82 y o  female MRN: [id number]  Unit/Bed#: -01 Encounter: [id number]      Assessment/Plan   Patient Active Problem List   Diagnosis    Un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diabetes mellitus with hyperglycemia ([place] [address] )    Essential hypertension    Hyperlipidemia    Left breast mass    Depression    Hyponatremia    Hypokalemia    Dizziness    Bradycardia    Sick sinus syndrome ([facility] )    Cardiac pacemaker in situ    Abnormal ultrasound cardiogram    Anxiety    BMI 33 0-33 9,adult    Breast carcinoma, female, right ([place] [address] )    GERD (gastroesophageal reflux disease)    Macular degeneration    Malignant neoplasm of upper-inner quadrant of left breast in female, estrogen receptor positive ([place] [address] )    Personal history of breast cancer    S/P hysterectomy    Memory loss    Medicare annual wellness visit, subsequent    Hypercalcemia    Balance problem    Cough    Malignant neoplasm of overlapping sites of bladder ([place] [address] )    Encounter to discuss test results    Status post small bowel resection    Hearing loss of left ear due to cerumen impaction    Person consulting for explanation of examination or test finding    Cancer related pain    Palliative care patient    DVT (deep venous thrombosis) ([place] [address] )    TILA (acute kidney injury) ([place] [address] )     Active issues specifically addressed today include:   - intractable cancer-related pain   - acute/subacute occlusive L CFV/DFV DVT + acute/subacute thrombophlebitis L great saphenous v    - ?occlusive bland infarct v tumor thrombus in infrahepatic IVC with extension to iliac vessels + LLE   - TILA, SCr 1 7 on admission with baseline 0 8-0 9   - hypercalcemia, corrected Ca 12 1 on admission   - bladder cancer s/p TURBT + instillation of mitomycin [07/2021] with 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 carcinoma; marked progression on imaging   - cognitive decline, most likely 2/2 dementia   - debility/deconditioning   - goals of care support    Plan:  #symptom management  #cancer-related pain   - add APAP 500 mg PO Q6H PRN   - max daily 3000 mg   - continue oxy-IR 2 5/5 mg PO Q4H PRN [mod/severe pain]   - continue hydromorphone 0 5 mg IV Q4H PRN [breakthrough pain]   - AVOID morphine, eGFR 27    #OIC   - continue miralax 17 g PO QDaily    #nausea   - continue ondansetron 4 mg IV Q6H PRN    #goals of care   - recently established care with Palliative and Supportive Care   - patient with limited insight into overall disease course/process; per daughter, concern that patient is unable to safely manage self independently  Patient will reside with daughter, no longer living independently  - patient's stated goal at this moment is for adequate pain/symptoms management     - spoke with patient's daughter via telephone [phone] regarding medical updates and plan of care  Discussed DVT, IVC thrombus, and acute PE with treatment regimen of heparin gtt with consideration for transition to PO anticoagulant; however, per daughter, patient with three recent unwitnessed falls at home; which puts patient at increased risk for bleed on [facility]  Daughter will be available tomorrow after 1 pm to further discuss medical plan of care  - "mom deserves the opportunity to get better " Daughter is struggling accepting disease process + decline in the setting of progressive disease and the knowledge that cancer creates a hypercoagulable state which predisposes to clotting  At this time, daughter would like to pursue STR for discharge plan with transition to [facility] v hospice [depending on rehab progress]  - discussed terminal nature of advanced cancer + diffuse VTE - brief, expanded discussion regarding hospice model of care, not ready for hospice at this time     - requesting nutrition evaluation, as concern that patient with decreased nutritional intake prior to and during hospitalization   - family placing caregiver supports at home for when patient returns home with daughter    Code Status: DNAR/DNI, Level 3  Decisional apparatus:  Patient is marginally competent on my exam today  If competence is lost, patient's medical Power of Jose Hutton is identified as #1 Ishan Ferrer, #2 Hellen Anglin, #3 Maicol Morinscal by [employer]  Advance Directive / Living Will / POLST: On File    #living will [on file]     Excerpts:           #psychosocial support   - emotional support provided   -    - three adult children [2 living, 1 ]              - Jeanette Salazar Lena Reams [phone]              - Alvina Coleman [daughter, ]   - parents    - seven siblings [5 living, 2 ]              - Matt Odom [brother]   - Amol Quintero [phone]   - resides with Lurdes Opal      We appreciate the opportunity to participate in this patient's care  We will continue to follow  Please do not hesitate to contact our on-call provider through our clinic answering service at [phone] should you have acute symptom control concerns  Controlled Sub[place] Review    [place] or [place]  reviewed: No red flags were identified; safe to proceed with prescription  [place] PDMP Review       Value Time User    PDMP Reviewed  Yes (P)  3/31/2022 10:12 AM Ganesh Mojica MD          History of Present Illness   Physician Requesting Consult: Nash Warren  Reason for Consult / Principal Problem: symptoms management  Hx and PE limited by: cognitive decline  HPI: Nacho Laurent is a 82 y o  female with a PMH of bladder cancer s/p TURBT + instillation of mitomycin [2021] with 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 carcinoma, SSS who initially p/w worsening cancer-related pain yesterday   Initial ED w/u concerning for acute/subacute occlusive L CFV/DFV DVT + acute/subacute thrombophlebitis L great saphenous v + occlusive bland infarct v tumor thrombus in infrahepatic IVC with extension to iliac vessels + LLE, also with an TILA [SCr 1 7 on admission with baseline 0 8-0 9] and hypercalcemia [corrected Ca 12 1 on admission]  [facility] consulted for symptoms management and goals of care support  Excerpt from OP consultation note 03/22/2022: R lateral bladder tumor initially diagnosed on biopsy [high-grade urothelial carcinoma] during cystoscopy on 04/15/2021  s/p TURBT + instillation of mitomycin on 07/02/2021  Admitted for SBO in the setting of volvulus in 08/2021, CT imaging also revealed R renal pelvis mass c/w urothelial malignancy  Completed 6 cycles of BCG therapy on 10/12/2021  Recurrence of bladder CA on cystoscopy on 11/21/2021  At that time, patient communicated wish to not undergo further surgery or other cancer-directed therapy [including chemotherapy/immunotherapy]  On follow up with Urology in 02/16/2022, patient continued to communicate desire to not pursue cancer-directed therapy; family present during both conversations with urology  Patient currently reports persistent abdominal pain and pain in LLE, intermittent, no aggravating factors identified  IV morphine effective yesterday  Denies nausea, vomiting  Minimal hunger, tolerated small volume PO intake without difficulty  Last BM was day of admission  Variable sleep while in hospital       Inpatient consult to Palliative Care  Consult performed by: Americo Art MD  Consult ordered by: Danette Brower DO          Review of Systems   Unable to perform ROS: Dementia   Constitutional: Positive for activity change and appetite change  Respiratory: Negative for shortness of breath  Cardiovascular: Positive for leg swelling  Negative for chest pain  Gastrointestinal: Positive for abdominal pain  Negative for nausea and vomiting  Musculoskeletal:        L leg pain   Neurological: Negative for syncope         Historical Information   Past Medical History:   Diagnosis Date    Depression     GERD (gastroesophageal reflux disease)     History of chemotherapy     right breast 1996    History of echocardiogram 02/14/2018    EF 0 55 (55%), trace mitral regurgitation   Hx of radiation therapy     2017  left breast    Hyperlipidemia     Macular degeneration     Malignant neoplasm of overlapping sites of bladder ([address] )     Sick [facility] syndrome ([facility]     Small bowel volvulus ([facility] )      Past Surgical History:   Procedure Laterality Date    APPENDECTOMY      BREAST LUMPECTOMY Bilateral     CARDIAC PACEMAKER PLACEMENT Left 07/05/2018    [facility]    HYSTERECTOMY      LAPAROTOMY N/A 08/10/2021    Procedure: LAPAROTOMY EXPLORATORY,  SMALL BOWEL RESECTION;  Surgeon: Antionette Jordan MD;  [facility] MAIN OR;  Service: General    [place] CYSTOURETHROSCOPY,FULGUR <0 5 CM LESN N/A 07/02/2021    Procedure: CYSTOSCOPY, TRANSURETHRAL RESECTION OF MEDIUM BLADDER TUMOR (TURBT); Surgeon: Jeanna Blanco MD;  [facility] Main [facility];  Service: Urology    SMALL INTESTINE SURGERY       E-Cigarette/Vaping    E-Cigarette Use Never User      E-Cigarette/Vaping Substances    Nicotine No     THC No     CBD No     Flavoring No     Other No     Unknown No      Social History     Socioeconomic History    Marital status:      Spouse name: None    Number of children: None    Years of education: None    Highest education level: None   Occupational History    None   Tobacco Use    Smoking status: Never Smoker    Smokeless tobacco: Never Used   Vaping Use    Vaping Use: Never used   Substance and Sexual Activity    Alcohol use: Never    Drug use: No    Sexual activity: Not Currently   Other Topics Concern    None   Social History Narrative    None     Social Determinants of Health     Financial Resource Strain: Not on file   Food Insecurity: No Food Insecurity    Worried About Running Out of Food in the Last Year: Never true    Elyssa of Food in the Last Year: Never true   Transportation Needs: No Transportation Needs    Lack of Transportation (Medical):  No    Lack of Transportation (Non-Medical): No   Physical Activity: Not on file   Stress: Not on file   Social Connections: Not on file   Intimate Partner Violence: Not on file   Housing Stability: High Risk    Unable to Pay for Housing in the Last Year: Yes    Number of Places Lived in the Last Year: 1    Unstable Housing in the Last Year: No     Family History   Problem Relation Age of Onset    Alzheimer's disease Father     Uterine cancer Daughter        Meds/Allergies   current meds:   Current Facility-Administered Medications   Medication Dose Route Frequency    acetaminophen (TYLENOL) tablet 650 mg  650 mg Oral Q6H PRN    aluminum-magnesium hydroxide-simethicone (MYLANTA) oral suspension 30 mL  30 mL Oral Q6H PRN    heparin (porcine) 25,000 units in D5W 250 mL infusion (premix)  3-30 Units/kg/hr (Order-Specific) Intravenous Titrated    HYDROmorphone (DILAUDID) injection 0 5 mg  0 5 mg Intravenous Q4H PRN    ondansetron (ZOFRAN) injection 4 mg  4 mg Intravenous Q6H PRN    oxyCODONE (ROXICODONE) IR tablet 2 5 mg  2 5 mg Oral Q4H PRN    oxyCODONE (ROXICODONE) IR tablet 5 mg  5 mg Oral Q4H PRN    polyethylene glycol (MIRALAX) packet 17 g  17 g Oral Daily         Allergies   Allergen Reactions    Celecoxib Swelling     celebrex- swelling of throat    Loratadine Swelling     Jaw swelled,couldn't swallow; face swelling    Aspirin GI Intolerance     : nausea; okay with Ecotrin    Lisinopril Swelling       Objective     Physical Exam  Vitals and nursing note reviewed  Constitutional:       General: She is awake  Appearance: She is not diaphoretic  Comments: Elderly, lying in bed in NAD  BMI 29 5  Non-toxic appearing   HENT:      Head: Normocephalic and atraumatic  Right Ear: External ear normal       Left Ear: External ear normal       Nose: No rhinorrhea  Eyes:      Comments: No gaze preference   Cardiovascular:      Rate and Rhythm: Tachycardia present     Pulmonary:      Effort: No tachypnea, accessory muscle usage or respiratory distress  Comments: Completes full sentences without difficulty  Musculoskeletal:      Cervical back: Normal range of motion  Comments: LLE edema >>> RLE edema   Neurological:      General: No focal deficit present  Mental Status: She is alert  Psychiatric:         Attention and Perception: Attention normal          Mood and Affect: Mood and affect normal          Speech: Speech normal          Cognition and Memory: Cognition and memory normal          Lab Results:   I have personally reviewed pertinent labs  , CBC:   Lab Results   Component Value Date    WBC 12 88 (H) 03/31/2022    HGB 9 4 (L) 03/31/2022    HCT 29 4 (L) 03/31/2022    MCV 88 03/31/2022     03/31/2022    MCH 28 1 03/31/2022    MCHC 32 0 03/31/2022    RDW 13 9 03/31/2022    MPV 8 3 (L) 03/31/2022    NRBC 0 03/30/2022   , CMP:   Lab Results   Component Value Date    SODIUM 125 (L) 03/31/2022    K 4 1 03/31/2022    CL 90 (L) 03/31/2022    CO2 25 03/31/2022    BUN 32 (H) 03/31/2022    CREATININE 1 59 (H) 03/31/2022    CALCIUM 11 3 (H) 03/31/2022    AST 28 03/30/2022    ALT 18 03/30/2022    ALKPHOS 90 03/30/2022    EGFR 29 03/31/2022   , PT/PTT:  Lab Results   Component Value Date    PTT 37 03/31/2022     Imaging Studies: I have personally reviewed pertinent reports  EKG, Pathology, and Other Studies: I have personally reviewed pertinent reports  Code Status: Level 3 - DNAR and DNI  Advance Directive and Living Will:   On File  Power of : Yes  POLST:   n/a    Counseling / Coordination of Care  Total floor / unit time spent today 60 minutes  Greater than 50% of total time was spent with the patient and / or family counseling and / or coordination of care   A description of the counseling / coordination of care: provided medical updates, discussed palliative care, determined competency, determined goals of care, determined POA, determined social/family support, discussed plans of care, discussed symptom management, provided psychosocial support  Telephone discussion with daughter Carmen Zamora  PDMP Reviewed   Coordinated plan of care with primary team

## 2022-03-31 NOTE — SOCIAL WORK
Palliative Inpatient Assessment Note    LSW appreciates the opportunity to provide patient/family with inpatient emotional support and guidance while they continue to receive medical attention from a Palliative provider  LSW completed an assessment of need which was completed with patient    Family dynamics:  Relationship status:   Duration of relationship:   Name of significant other:  Children and Ages: Alina Mulligan and son,   Pets:  Other important family information: Patient has a granddaughter who is helping to care for patient  Living situation: Patient currently staying at her daughter's home  During the day, patient is home with granddaughter, granddaughter's fijoel and their children  Patient's primary caregiver:  Daughter, Alina Monroy  Any limitations: daughter works full time  Environmental concerns or barriers:   history:  Employment history/ Source of income: Patient worked in a number [place]:  Spirituality/ [place]:  Patient reported that she used to go to [employer] regularly but states that she is no longer able to  Patient reported that she watches [employer] services on her phone  Cultural information:   Mental Health and/or Drug and Alcohol history:  Advanced Directives: Has ACP  Patient's strengths, social supports, and resources: Patient's family trying to assist patient  Patient does have a niece that owns a local personal care home  Patient/family current level of coping:  Level of understanding: patient understands that she is not able to do things the way she once was able to  Patient/family concerns and areas of need: ongoing support          *All questions may not be answered due to constraints    Follow-up discussions may need to occur

## 2022-03-31 NOTE — H&P
[address] [facility]  H&P- William Driscoll 1935, 82 y o  female MRN: [id number]  Unit/Bed#: -Ashvin Encounter: [id number]  Primary Care Provider: Rasheeda Taylor DO   Date and time admitted to hospital: 3/30/2022  4:36 PM    * Cancer related pain  Assessment & Plan  Patient is on palliative care  She has been having intractable pain on at home Oxy 2 5 mg Q 8 hrs PRN  Also noted to have swelling in left lower extremity  [facility] placed call to patient's daughter, daughter reported that family asked patient about going to hospital earlier today, patient refused  · CT shows progression of disease as well as extensive DVT in IVC/left lower extremity  · ED discussed with palliative care, recommendations as below  · Oxycodone 2 5 mg Q 4 hrs PRN for moderate pain  · Oxycodone 5 mg Q 4 hrs PRN for severe pain  · Dilaudid 0 5 mg every 4 hrs PRN for BT pain  · Monitor pain response    DVT (deep venous thrombosis) (HCC)  Assessment & Plan  · B/l doppler LE: acute- subacute occlusive thrombus noted in the common femoral vein, deep femoral vein, and proximal to distal superficial femoral vein  acute- subacute occlusive thrombophlebitis noted in the great saphenous vein from proximal to distal thigh  No RLE DVT  · CT c/a/p: 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 extending inferiorly and likely involves the iliac vessels with extension to the left lower extremity  Suspected small PE in LLL  No large central PE  · Started on IV heparin, continue for now  Adjust per protocol  · Defer to day team for long term ac plan    TILA (acute kidney injury) ([facility] [address] )  Assessment & Plan  Cr 1 7, baseline 0 8-0 9  · Continue IVFs  · Monitor I/O, avoid nephrotoxins    Malignant neoplasm of overlapping sites of bladder [facility])  Assessment & Plan  · Malignant high-grade bladder cancer invasive    Patient elected for palliative care does not want to pursue surgery or other options  · Following with palliative care     Hypercalcemia  Assessment & Plan  Hypercalcemia time of admission, corrected calcium 12 1  · Acute on chronic elevation, likely due to malignancy  · S/p 2L fluid folus in ED   · Will continue NS @ 150cc/hr for now, repeat CA in am   · Hold vitamin d supplementation for now    VTE Pharmacologic Prophylaxis: VTE Score: 9 High Risk (Score >/= 5) - Pharmacological DVT Prophylaxis Ordered: heparin drip  Sequential Compression Devices Ordered  Code Status: Prior     Anticipated Length of Stay: Patient will be admitted on an observation basis with an anticipated length of stay of less than 2 midnights secondary to DVT  Total Time for Visit, including Counseling / Coordination of Care: 60 minutes Greater than 50% of this total time spent on direct patient counseling and coordination of care  Chief Complaint:   Chief Complaint   Patient presents with    Pain     on pallulative care for cancer and is having uncontrolled pain      History of Present Illness:  Teresa Ricardo is a 82 y o  female with a PMH of malignant high-grade metastatic bladder cancer invasive, breast cancer, depression, GERD who presents with intractable pain  Patient is on palliative care  She has been having intractable pain on at home Oxy 2 5 mg Q 8 hrs PRN  Also noted to have swelling in left lower extremity  [facility], daughter reported that family asked patient about going to hospital earlier today, patient refused  It was recommended that the patient be evaluated by the ED due to due to change in functionality, cognition, symptoms of pain and swelling of leg and behavior for evaluation  In ED, patient was found to have extensive DVT in IVC/left lower extremity and small PE  Patient reports that she takes some vitamins    Pain medications, metformin and eyedrops at home however is unable to verify her medication list   She has requested that we discussed with her daughter or pharmacist in a m  Review of Systems:  Review of Systems   Constitutional: Negative for chills and fever  HENT: Negative for sore throat  Respiratory: Negative for cough and shortness of breath  Gastrointestinal: Negative for diarrhea and nausea  Genitourinary: Negative for dysuria and hematuria  Musculoskeletal:        LLE pain and swelling       Skin: Negative for color change and rash  Neurological: Positive for weakness  All other systems reviewed and are negative  Past Medical and Surgical History:   Past Medical History:   Diagnosis Date    Depression     GERD (gastroesophageal reflux disease)     History of chemotherapy     right breast 1996    History of echocardiogram 02/14/2018    EF 0 55 (55%), trace mitral regurgitation   Hx of radiation therapy     2017  left breast    Hyperlipidemia     Macular degeneration     Malignant neoplasm of overlapping sites of bladder ([facility] [address] )     Sick sinus syndrome ([facility] [address] )     Small bowel volvulus ([facility] [address] )        Past Surgical History:   Procedure Laterality Date    APPENDECTOMY      BREAST LUMPECTOMY Bilateral     CARDIAC PACEMAKER PLACEMENT Left 07/05/2018    [facility]    HYSTERECTOMY      LAPAROTOMY N/A 08/10/2021    Procedure: LAPAROTOMY EXPLORATORY,  SMALL BOWEL RESECTION;  Surgeon: Bud Becerril MD;  Location: [address] [place] [address] MAIN OR;  Service: General    KS CYSTOURETHROSCOPY,FULGUR <0 5 CM LESN N/A 07/02/2021    Procedure: CYSTOSCOPY, TRANSURETHRAL RESECTION OF MEDIUM BLADDER TUMOR (TURBT); Surgeon: Mauro Fenton MD;  Location: [facility] Main OR;  Service: Urology    SMALL INTESTINE SURGERY         Meds/Allergies:  Prior to Admission medications    Medication Sig Start Date End Date Taking?  Authorizing Provider   acetaminophen (TYLENOL) 500 mg tablet Take 1 tablet (500 mg total) by mouth every 6 (six) hours as needed for mild pain Max Daily: 3,000 mg 3/22/22   Fayetta Denver, MD   anastrozole (ARIMIDEX) 1 mg tablet Take 1 mg by mouth daily with lunch  4/9/18   Historical Provider, MD   Apoaequorin (Prevagen) 10 MG CAPS Take 10 mg by mouth daily in the early morning     Historical Provider, MD   atorvastatin (LIPITOR) 40 mg tablet TAKE ONE TABLET BY MOUTH EVERY DAY AT BEDTIME 2/7/22   Giles Soto DO   bisacodyl (DULCOLAX) 10 mg suppository Insert 1 suppository (10 mg total) into the rectum daily as needed for constipation 8/16/21   Lisset Dawson MD   Cholecalciferol 25 MCG (1000 UT) tablet Take 1,000 Units by mouth daily    Historical Provider, MD   DOCOSAHEXAENOIC ACID PO Take 2 capsules by mouth daily 6/24/14   Historical Provider, MD   docusate sodium (COLACE) 100 mg capsule Take 1 capsule (100 mg total) by mouth 3 (three) times a day for 7 days 7/2/21 8/23/21  Lupe Tripp MD   ferrous sulfate 324 (65 Fe) mg Daily 1/11/22   Giles Soto DO   ibuprofen (MOTRIN) 400 mg tablet Take 1 tablet (400 mg total) by mouth every 6 (six) hours as needed for moderate pain 8/16/21   Lisset Dawson MD   metFORMIN (GLUCOPHAGE) 500 mg tablet TAKE ONE TABLET BY MOUTH ONE TIME DAILY 1/17/22   Jeimy Soto DO   naloxone (NARCAN) 4 mg/0 1 mL nasal spray Administer 1 spray into a nostril  If no response after 2-3 minutes, give another dose in the other nostril using a new spray   3/25/22   Lexie Hearn MD   oxyCODONE (ROXICODONE) 5 mg/5 mL solution Take 2 5 mL (2 5 mg total) by mouth every 8 (eight) hours as needed (cancer-related pain) Max Daily Amount: 7 5 mg 3/25/22   Lexie Hearn MD   polyethylene glycol (MIRALAX) 17 g packet Take 17 g by mouth daily as needed (constipation) 8/16/21   Lisset Dawson MD   polyethylene glycol-propylene glycol (SYSTANE) 0 4-0 3 % Apply 4 drops to eye 2 (two) times a day 6/24/14   Historical Provider, MD   potassium chloride (Klor-Con) 10 mEq tablet TAKE TWO TABLETS BY MOUTH DAILY 2/24/22   Giles Munshower, DO   sertraline (ZOLOFT) 50 mg tablet TAKE ONE TABLET BY MOUTH ONE TIME DAILY 12/13/21   Giles Soto DO   triamterene-hydrochlorothiazide (MAXZIDE-25) 37 5-25 mg per tablet TAKE ONE TABLET BY MOUTH ONE TIME DAILY 2/7/22   Bethany Estrella DO     I have been unable to obtain / verify an up to date medication list despite all reasonable attempts  Allergies: Allergies   Allergen Reactions    Celecoxib Swelling     celebrex- swelling of throat    Loratadine Swelling     Jaw swelled,couldn't swallow; face swelling    Aspirin GI Intolerance     : nausea; okay with Ecotrin    Lisinopril Swelling       Social History:  Marital Status:    Occupation:   Patient Pre-hospital Living Situation: With other family member: daughter marlen  Patient Pre-hospital Level of Mobility: non-ambulatory/bed bound  Patient Pre-hospital Diet Restrictions:   Substance Use History:   Social History     Substance and Sexual Activity   Alcohol Use Never     Social History     Tobacco Use   Smoking Status Never Smoker   Smokeless Tobacco Never Used     Social History     Substance and Sexual Activity   Drug Use No       Family History:  Family History   Problem Relation Age of Onset    Alzheimer's disease Father     Uterine cancer Daughter        Physical Exam:     Vitals:   Blood Pressure: 112/71 (03/31/22 0038)  Pulse: 64 (03/31/22 0038)  Temperature: 97 7 °F (36 5 °C) (03/31/22 0038)  Temp Source: Oral (03/30/22 1644)  Respirations: 18 (03/31/22 0038)  Height: 5' 3" (160 cm) (03/30/22 1644)  Weight - Scale: 71 7 kg (158 lb) (03/30/22 1644)  SpO2: 98 % (03/31/22 0038)    Physical Exam  Vitals and nursing note reviewed  Constitutional:       General: She is not in acute distress  Appearance: She is well-developed  She is ill-appearing (chronic)  HENT:      Head: Normocephalic and atraumatic  Eyes:      Conjunctiva/sclera: Conjunctivae normal    Cardiovascular:      Rate and Rhythm: Normal rate and regular rhythm  Heart sounds: No murmur heard        Pulmonary:      Effort: Pulmonary effort is normal  No respiratory distress  Breath sounds: Normal breath sounds  Abdominal:      Palpations: Abdomen is soft  Tenderness: There is no abdominal tenderness  Musculoskeletal:      Cervical back: Neck supple  Right lower leg: Edema (1+ nonpitting) present  Left lower leg: Edema (pitting 2+) present  Skin:     General: Skin is warm and dry  Neurological:      Mental Status: She is alert  Additional Data:     Lab Results:  Results from last 7 days   Lab Units 03/30/22  2132 03/30/22  1701 03/30/22  1701   WBC Thousand/uL 11 95*   < > 14 50*   HEMOGLOBIN g/dL 8 3*   < > 9 6*   HEMATOCRIT % 25 8*   < > 30 0*   PLATELETS Thousands/uL 207   < > 240   NEUTROS PCT %  --   --  87*   LYMPHS PCT %  --   --  5*   MONOS PCT %  --   --  6   EOS PCT %  --   --  0    < > = values in this interval not displayed  Results from last 7 days   Lab Units 03/30/22  1701   SODIUM mmol/L 124*   POTASSIUM mmol/L 4 2   CHLORIDE mmol/L 88*   CO2 mmol/L 28   BUN mg/dL 36*   CREATININE mg/dL 1 71*   ANION GAP mmol/L 8   CALCIUM mg/dL 11 1*   ALBUMIN g/dL 2 6*   TOTAL BILIRUBIN mg/dL 0 28   ALK PHOS U/L 90   ALT U/L 18   AST U/L 28   GLUCOSE RANDOM mg/dL 132     Results from last 7 days   Lab Units 03/30/22 2132   INR  1 18                   Imaging: Reviewed radiology reports from this admission including: abdominal/pelvic CT  CT chest abdomen pelvis w contrast   Final Result by Savage Branch MD (03/30 2303)      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 with extensive infiltration of the right kidney and invasion of the adjacent adrenal gland, medial aspect of the liver, duodenum and right iliopsoas muscle  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 extending inferiorly and likely involves the iliac vessels with extension to the left lower extremity deep venous system identified on ultrasound       Suspected small pulmonary embolus in the left lower lobe  No large central PE  Confluent periaortic fanny mass with suspected invasion of the anterior L2 vertebral body where there is a subtle cortical erosion    Metastatic lung nodules  Metastatic lesions along the anterior wall of the heart that may appear to involve the myocardium  New mild compression L2 fracture that is that I suspect is pathologic given cortical erosion at the anterior aspect of the vertebral body  No significant bony retropulsion  I personally discussed this study with Rae Bethea on 3/30/2022 at 11:00 PM                Workstation performed: [id number]         VAS lower limb venous duplex study, unilateral/limited    (Results Pending)       EKG and Other Studies Reviewed on Admission:   · EKG: No EKG obtained  ** Please Note: This note has been constructed using a voice recognition system   **

## 2022-03-31 NOTE — ED NOTES
Assumed care of patient at this time   Patient received on stretcher NS infusion without incident     [facility]  03/30/22 1991

## 2022-03-31 NOTE — CASE MANAGEMENT
Case Management Assessment & Discharge Planning Note    Patient name Grace Balderrama  Location /-39 MRN [id number]  : 1935 Date 3/31/2022       Current Admission Date: 3/30/2022  Current Admission Diagnosis:Cancer related pain   Patient Active Problem List    Diagnosis Date Noted    TILA (acute kidney injury) ([place] [address] ) 2022    DVT (deep venous thrombosis) ([place] [address] ) 2022    Cancer related pain 2022    Palliative care patient 2022   Adelina Slipkate Person consulting for explanation of examination or test finding 11/10/2021    Hearing loss of left ear due to cerumen impaction 10/26/2021    Status post small bowel resection 2021    Malignant neoplasm of overlapping sites of bladder ([place] [address] ) 2021    Encounter to discuss test results 2021    Cough 2021    Hypercalcemia 10/22/2020    Balance problem 10/22/2020    Medicare annual wellness visit, subsequent 10/18/2019    Memory loss 06/10/2019    Abnormal ultrasound cardiogram 2018    Breast carcinoma, female, right ([place] [address] ) 2018    S/P hysterectomy 2018    Cardiac pacemaker in situ 2018    Sick sinus syndrome ([place] [address] ) 2018    Bradycardia 2018    Hyponatremia 2018    Hypokalemia 2018    Dizziness 2018    Malignant neoplasm of upper-inner quadrant of left breast in female, estrogen receptor positive ([place] [address] ) 2018    Personal history of breast cancer 2018    Un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diabetes mellitus with hyperglycemia ([place] [address] ) 2017    Left breast mass 12/10/2017    Depression 2017    Anxiety 2016    BMI 33 0-33 9,adult 2016    Macular degeneration 2016    Hyperlipidemia 2015    GERD (gastroesophageal reflux disease) 2015    Essential hypertension 2011      LOS (days): 0  Geometric Mean LOS (GMLOS) (days):   Days to GMLOS:     OBJECTIVE:              Current admission status: Observation Preferred Pharmacy:   [id number] - [place] Gutierrez, [address] [facility] [address] [place] [id number]  Phone: [phone] Fax: [phone]    Primary Care Provider: Lissette Gomez DO    Primary Insurance: [facility]fa Patel [facility] HOSPITAL REP  Secondary Insurance:     ASSESSMENT:  [address], [facility] Representative - Daughter   Primary Phone: [phone] (Mobile)               Advance Directives  Does patient have a [address]?: Yes  Does patient have Advance Directives?: Yes  Advance Directives: Living will,Power of  for health care,Power of  for finance  Primary Contact: Lou Schmitz (Daughter) [phone] (Mobile    Obs Notice Signed: 03/31/22    Readmission Root Cause  30 Day Readmission: No    Patient Information  Admitted from[de-identified] Home  Mental Status: Alert  During Assessment patient was accompanied by: Not accompanied during assessment  Assessment information provided by[de-identified] Patient  Primary Caregiver: Family  Caregiver's Name[de-identified] Alivia Green (Daughter) [phone] (Mobile  Caregiver's Relationship to Patient[de-identified] Family Member  Caregiver's Telephone Number[de-identified] Alivia Green (Daughter) [phone] (Mobile  Support Systems: Daughter  [place] of Residence: [place] [address] do you live in?: [facility]  Type of Current Residence: Bi-level  Upon entering residence, is there a bedroom on the main floor (no further steps)?: Yes  Upon entering residence, is there a bathroom on the main floor (no further steps)?: Yes  In the last 12 months, was there a time when you were not able to pay the mortgage or rent on time?: Yes  In the last 12 months, how many places have you lived?: 1  In the last 12 months, was there a time when you did not have a steady place to sleep or slept in a shelter (including now)?: No  Homeless/housing insecurity resource given?: No  Living Arrangements: Lives w/ Daughter    Activities of Daily Living Prior to Admission  Functional Status: Assistance  Completes ADLs independently?: No  Level of ADL dependence: Assistance  Ambulates independently?: No  Level of ambulatory dependence: Assistance  Does patient use assisted devices?: No  Does patient currently own DME?: Yes  What DME does the patient currently own?: [employer]  Does patient have a history of Outpatient Therapy (PT/OT)?: No  Does the patient have a history of Short-Term Rehab?: No  Does patient have a history of HHC?: No  Does patient currently have [facility]?: No         Patient Information Continued  Income Source: Pension/FCI  Does patient have prescription coverage?: Yes  Within the past 12 months, you worried that your food would run out before you got the money to buy more : Never true  Within the past 12 months, the food you bought just didnt last and you didnt have money to get more : Never true  Food insecurity resource given?: No  Does patient receive dialysis treatments?: No  Does patient have a history of substance abuse?: No  Does patient have a history of Mental Health Diagnosis?: No         Means of Transportation  Means of Transport to Appts[de-identified] Family transport  In the past 12 months, has lack of transportation kept you from medical appointments or from getting medications?: No  In the past 12 months, has lack of transportation kept you from meetings, work, or from getting things needed for daily living?: No  Was application for public transport provided?: No        DISCHARGE DETAILS:    Discharge planning discussed with[de-identified] patient and daughter  Freedom of Choice: Yes  Comments - Freedom of Choice: FOC considered for DC planning  patient feels she would go home with daughter, daughter does not feel that family is able to care for  patient at home  She would advocate for [facility] @ a SNF  Her cousin owns [address] [facility] [address] and they would accept her if they had a bed and she is able to care for herself somewhat   DCP is pending medical workup  CM contacted family/caregiver?: Yes  Were Treatment Team discharge recommendations reviewed with patient/caregiver?: Yes  Did patient/caregiver verbalize understanding of patient care needs?: Yes  Were patient/caregiver advised of the risks associated with not following Treatment Team discharge recommendations?: Yes    Contacts  Patient Contacts: Aniya Duarte (Daughter) [phone] (Mobile  Relationship to Patient[de-identified] Family  Contact Method: Phone  Phone Number: Aniya Duarte ([place]) [phone] (Mobile  Reason/Outcome: Emergency [address]         Is the patient interested in [address] at discharge?: Yes  [address] Leandra Leavitt requested[de-identified] [facility] Name[de-identified] Other  [address] Provider[de-identified] PCP  Home Health Services Needed[de-identified] Gait/ADL Training,Evaluate Functional Status and Safety,Strengthening/Theraputic Exercises to Improve Function  Homebound Criteria Met[de-identified] Uses an Assist Device (i e  cane, walker, etc)  Supporting Clincal Findings[de-identified] Limited Endurance    DME Referral Provided  Referral made for DME?: No    Other Referral/Resources/Interventions Provided:  Interventions: C  Referral Comments: [address] vs Hospice vs STR         Treatment Team Recommendation: Home with [address]  Discharge Destination Plan[de-identified] Home with [address]

## 2022-03-31 NOTE — PLAN OF CARE
Problem: PAIN - ADULT  Goal: Verbalizes/displays adequate comfort level or baseline comfort level  Description: Interventions:  - Encourage patient to monitor pain and request assistance  - Assess pain using appropriate pain scale  - Administer analgesics based on type and severity of pain and evaluate response  - Implement non-pharmacological measures as appropriate and evaluate response  - Consider cultural and social influences on pain and pain management  - Notify physician/advanced practitioner if interventions unsuccessful or patient reports new pain  Outcome: Progressing  Note: Patient communicates pain but declines medication at this time

## 2022-04-01 PROBLEM — S32.020A COMPRESSION FRACTURE OF L2 LUMBAR VERTEBRA (HCC): Status: ACTIVE | Noted: 2022-04-01

## 2022-04-01 PROBLEM — R82.81 PYURIA: Status: ACTIVE | Noted: 2022-04-01

## 2022-04-01 LAB
25(OH)D3 SERPL-MCNC: 38.2 NG/ML (ref 30–100)
ANION GAP SERPL CALCULATED.3IONS-SCNC: 8 MMOL/L (ref 4–13)
ANION GAP SERPL CALCULATED.3IONS-SCNC: 9 MMOL/L (ref 4–13)
ANION GAP SERPL CALCULATED.3IONS-SCNC: 9 MMOL/L (ref 4–13)
APTT PPP: 89 SECONDS (ref 23–37)
BACTERIA UR QL AUTO: ABNORMAL /HPF
BILIRUB UR QL STRIP: NEGATIVE
BUN SERPL-MCNC: 22 MG/DL (ref 5–25)
BUN SERPL-MCNC: 25 MG/DL (ref 5–25)
BUN SERPL-MCNC: 27 MG/DL (ref 5–25)
CA-I BLD-SCNC: 1.42 MMOL/L (ref 1.12–1.32)
CALCIUM SERPL-MCNC: 10.6 MG/DL (ref 8.3–10.1)
CALCIUM SERPL-MCNC: 10.7 MG/DL (ref 8.3–10.1)
CALCIUM SERPL-MCNC: 8.8 MG/DL (ref 8.3–10.1)
CHLORIDE SERPL-SCNC: 118 MMOL/L (ref 100–108)
CHLORIDE SERPL-SCNC: 92 MMOL/L (ref 100–108)
CHLORIDE SERPL-SCNC: 93 MMOL/L (ref 100–108)
CLARITY UR: CLEAR
CO2 SERPL-SCNC: 23 MMOL/L (ref 21–32)
CO2 SERPL-SCNC: 25 MMOL/L (ref 21–32)
CO2 SERPL-SCNC: 25 MMOL/L (ref 21–32)
COLOR UR: YELLOW
CREAT SERPL-MCNC: 1.13 MG/DL (ref 0.6–1.3)
CREAT SERPL-MCNC: 1.43 MG/DL (ref 0.6–1.3)
CREAT SERPL-MCNC: 1.51 MG/DL (ref 0.6–1.3)
ERYTHROCYTE [DISTWIDTH] IN BLOOD BY AUTOMATED COUNT: 13.8 % (ref 11.6–15.1)
GFR SERPL CREATININE-BSD FRML MDRD: 31 ML/MIN/1.73SQ M
GFR SERPL CREATININE-BSD FRML MDRD: 33 ML/MIN/1.73SQ M
GFR SERPL CREATININE-BSD FRML MDRD: 44 ML/MIN/1.73SQ M
GLUCOSE SERPL-MCNC: 102 MG/DL (ref 65–140)
GLUCOSE SERPL-MCNC: 143 MG/DL (ref 65–140)
GLUCOSE SERPL-MCNC: 161 MG/DL (ref 65–140)
GLUCOSE UR STRIP-MCNC: NEGATIVE MG/DL
HCT VFR BLD AUTO: 28.1 % (ref 34.8–46.1)
HGB BLD-MCNC: 9.3 G/DL (ref 11.5–15.4)
HGB UR QL STRIP.AUTO: ABNORMAL
KETONES UR STRIP-MCNC: NEGATIVE MG/DL
LEUKOCYTE ESTERASE UR QL STRIP: ABNORMAL
MCH RBC QN AUTO: 28.1 PG (ref 26.8–34.3)
MCHC RBC AUTO-ENTMCNC: 33.1 G/DL (ref 31.4–37.4)
MCV RBC AUTO: 85 FL (ref 82–98)
NITRITE UR QL STRIP: NEGATIVE
NON-SQ EPI CELLS URNS QL MICRO: ABNORMAL /HPF
OSMOLALITY UR/SERPL-RTO: 274 MMOL/KG (ref 282–298)
OSMOLALITY UR: 429 MMOL/KG
PH UR STRIP.AUTO: 6 [PH]
PLATELET # BLD AUTO: 255 THOUSANDS/UL (ref 149–390)
PMV BLD AUTO: 8.7 FL (ref 8.9–12.7)
POTASSIUM SERPL-SCNC: 3.5 MMOL/L (ref 3.5–5.3)
POTASSIUM SERPL-SCNC: 3.7 MMOL/L (ref 3.5–5.3)
POTASSIUM SERPL-SCNC: 3.8 MMOL/L (ref 3.5–5.3)
POTASSIUM UR-SCNC: 37.7 MMOL/L
PROT UR STRIP-MCNC: ABNORMAL MG/DL
PTH-INTACT SERPL-MCNC: 47.7 PG/ML (ref 18.4–80.1)
RBC # BLD AUTO: 3.31 MILLION/UL (ref 3.81–5.12)
RBC #/AREA URNS AUTO: ABNORMAL /HPF
SODIUM 24H UR-SCNC: 35 MOL/L
SODIUM SERPL-SCNC: 126 MMOL/L (ref 136–145)
SODIUM SERPL-SCNC: 126 MMOL/L (ref 136–145)
SODIUM SERPL-SCNC: 150 MMOL/L (ref 136–145)
SP GR UR STRIP.AUTO: 1.01 (ref 1–1.03)
URATE SERPL-MCNC: 7.4 MG/DL (ref 2–6.8)
UROBILINOGEN UR QL STRIP.AUTO: 0.2 E.U./DL
WBC # BLD AUTO: 12.61 THOUSAND/UL (ref 4.31–10.16)
WBC #/AREA URNS AUTO: ABNORMAL /HPF

## 2022-04-01 PROCEDURE — 99233 SBSQ HOSP IP/OBS HIGH 50: CPT | Performed by: STUDENT IN AN ORGANIZED HEALTH CARE EDUCATION/TRAINING PROGRAM

## 2022-04-01 PROCEDURE — 84300 ASSAY OF URINE SODIUM: CPT | Performed by: INTERNAL MEDICINE

## 2022-04-01 PROCEDURE — 85027 COMPLETE CBC AUTOMATED: CPT | Performed by: INTERNAL MEDICINE

## 2022-04-01 PROCEDURE — 81001 URINALYSIS AUTO W/SCOPE: CPT | Performed by: INTERNAL MEDICINE

## 2022-04-01 PROCEDURE — 84133 ASSAY OF URINE POTASSIUM: CPT | Performed by: INTERNAL MEDICINE

## 2022-04-01 PROCEDURE — 83970 ASSAY OF PARATHORMONE: CPT | Performed by: INTERNAL MEDICINE

## 2022-04-01 PROCEDURE — 83930 ASSAY OF BLOOD OSMOLALITY: CPT | Performed by: INTERNAL MEDICINE

## 2022-04-01 PROCEDURE — 99223 1ST HOSP IP/OBS HIGH 75: CPT | Performed by: INTERNAL MEDICINE

## 2022-04-01 PROCEDURE — 82306 VITAMIN D 25 HYDROXY: CPT | Performed by: INTERNAL MEDICINE

## 2022-04-01 PROCEDURE — NC001 PR NO CHARGE: Performed by: INTERNAL MEDICINE

## 2022-04-01 PROCEDURE — 84550 ASSAY OF BLOOD/URIC ACID: CPT | Performed by: INTERNAL MEDICINE

## 2022-04-01 PROCEDURE — 83935 ASSAY OF URINE OSMOLALITY: CPT | Performed by: INTERNAL MEDICINE

## 2022-04-01 PROCEDURE — 82330 ASSAY OF CALCIUM: CPT | Performed by: INTERNAL MEDICINE

## 2022-04-01 PROCEDURE — 99232 SBSQ HOSP IP/OBS MODERATE 35: CPT | Performed by: INTERNAL MEDICINE

## 2022-04-01 PROCEDURE — 80048 BASIC METABOLIC PNL TOTAL CA: CPT | Performed by: INTERNAL MEDICINE

## 2022-04-01 PROCEDURE — 85730 THROMBOPLASTIN TIME PARTIAL: CPT | Performed by: INTERNAL MEDICINE

## 2022-04-01 RX ORDER — DESMOPRESSIN ACETATE 4 UG/ML
2 INJECTION, SOLUTION INTRAVENOUS; SUBCUTANEOUS ONCE
Status: COMPLETED | OUTPATIENT
Start: 2022-04-01 | End: 2022-04-01

## 2022-04-01 RX ORDER — DEXTROSE MONOHYDRATE 50 MG/ML
75 INJECTION, SOLUTION INTRAVENOUS CONTINUOUS
Status: DISCONTINUED | OUTPATIENT
Start: 2022-04-01 | End: 2022-04-01

## 2022-04-01 RX ADMIN — SODIUM CHLORIDE 75 ML/HR: 0.9 INJECTION, SOLUTION INTRAVENOUS at 10:49

## 2022-04-01 RX ADMIN — DESMOPRESSIN ACETATE 2 MCG: 4 SOLUTION INTRAVENOUS at 21:53

## 2022-04-01 RX ADMIN — ACETAMINOPHEN 650 MG: 325 TABLET ORAL at 21:19

## 2022-04-01 RX ADMIN — OXYCODONE HYDROCHLORIDE 5 MG: 5 TABLET ORAL at 13:10

## 2022-04-01 RX ADMIN — HEPARIN SODIUM AND DEXTROSE 21 UNITS/KG/HR: 10000; 5 INJECTION INTRAVENOUS at 10:55

## 2022-04-01 RX ADMIN — SODIUM CHLORIDE: 234 INJECTION, SOLUTION, CONCENTRATE INTRAVENOUS at 11:41

## 2022-04-01 RX ADMIN — CEFTRIAXONE SODIUM 1000 MG: 10 INJECTION, POWDER, FOR SOLUTION INTRAVENOUS at 16:45

## 2022-04-01 RX ADMIN — ACETAMINOPHEN 650 MG: 325 TABLET ORAL at 08:55

## 2022-04-01 RX ADMIN — POLYETHYLENE GLYCOL 3350 17 G: 17 POWDER, FOR SOLUTION ORAL at 08:43

## 2022-04-01 RX ADMIN — DEXTROSE 1000 ML: 5 SOLUTION INTRAVENOUS at 21:20

## 2022-04-01 RX ADMIN — HEPARIN SODIUM AND DEXTROSE 21 UNITS/KG/HR: 10000; 5 INJECTION INTRAVENOUS at 07:22

## 2022-04-01 NOTE — ASSESSMENT & PLAN NOTE
· Urine culture revealing Staph coag-negative    Also from prior urine culture in September 2021  · Likely colonization  · No sign of acute infectious process  · Monitor off antibiotics

## 2022-04-01 NOTE — PROGRESS NOTES
[facility]  Progress Note - Jey Osborne 1935, 82 y o  female MRN: [id number]  Unit/Bed#: -[facility] Encounter: [id number]  Primary Care Provider: Baron Rawls DO   Date and time admitted to hospital: 3/30/2022  4:36 PM    * Cancer related pain  Assessment & Plan  · CT shows progression of disease as well as extensive DVT in IVC/left lower extremity  · Palliative care following  · Monitor pain response    DVT (deep venous thrombosis) (HCC)  Assessment & Plan  · B/l doppler LE: acute- subacute occlusive thrombus noted in the common femoral vein, deep femoral vein, and proximal to distal superficial femoral vein  acute- subacute occlusive thrombophlebitis noted in the great saphenous vein from proximal to distal thigh  No RLE DVT  · CT c/a/p: 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 extending inferiorly and likely involves the iliac vessels with extension to the left lower extremity  Suspected small PE in LLL  No large central PE    · Provoked secondary to underlying malignancy  · Discussion had with patient's daughter  Her preference is to not continue on anticoagulation upon discharge given patient is agree for risk  · While hospitalized will continue on IV heparin drip    Hyponatremia  Assessment & Plan  · Appreciate input per Nephrology  · On IV fluid normal saline 1 8%  · Continue serial BMP    Malignant neoplasm of overlapping sites of bladder [facility])  Assessment & Plan  · Malignant High-grade invasive urethral carcinoma; 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  · Patient elected for palliative care does not want to pursue surgery or oncologic intervention  · Palliative care on board  · Extensive discussion with patient's daughter  They are not ready for hospice    The hope is for possible rehabilitation and then after which will likely transition to home hospice    Hypercalcemia  Assessment & Plan  · Acute on chronic elevation, likely due to malignancy  · Nephrology following  · Resolving  · S/p IVF hydration    Compression fracture of L2 lumbar vertebra ([address] )  Assessment & Plan  · Suspect pathological fracture given underlying malignancy  · Patient family does not wish for invasive intervention  · May need brace Fitting but will discuss more with family    Pyuria  Assessment & Plan  · Urine culture revealing Staph coag-negative  Also from prior urine culture in 2021  · Likely colonization  · No sign of acute infectious process  · Monitor off antibiotics    TILA (acute kidney injury) [facility])  Assessment & Plan  · Resolving  · Nephrology on board    VTE Pharmacologic Prophylaxis:   Pharmacologic: Heparin Drip  Mechanical VTE Prophylaxis in Place: No    Patient Centered Rounds: I have performed bedside rounds with nursing staff today  Discussions with Specialists or Other Care Team Provider:  Palliative, nephrology    Education and Discussions with Family / Patient:  Extensive discussion with family per palliative team    Time Spent for Care: 30 minutes  More than 50% of total time spent on counseling and coordination of care as described above  Current Length of Stay: 1 day(s)    Current Patient Status: Inpatient   Certification Statement: The patient will continue to require additional inpatient hospital stay due to Acute illnesses    Discharge Plan:     Code Status: Level 3 - DNAR and DNI      Subjective:   Patient seen examined at bedside  Positive back pain but relief with pain analgesics  No acute events overnight  No change in mental status, thankfully    Objective:     Vitals:   Temp (24hrs), Av 8 °F (36 6 °C), Min:97 2 °F (36 2 °C), Max:98 3 °F (36 8 °C)    Temp:  [97 2 °F (36 2 °C)-98 3 °F (36 8 °C)] 97 2 °F (36 2 °C)  HR:  [77-89] 89  Resp:  [18-22] 19  BP: (109-117)/(61-65) 116/64  SpO2:  [93 %-96 %] 96 %  Body mass index is 29 45 kg/m²  Input and Output Summary (last 24 hours):        Intake/Output Summary (Last 24 hours) at 4/1/2022 1846  Last data filed at 4/1/2022 1815  Gross per 24 hour   Intake 1840 ml   Output 0 ml   Net 1840 ml       Physical Exam:     Physical Exam  Cardiovascular:      Rate and Rhythm: Normal rate and regular rhythm  Pulses: Normal pulses  Heart sounds: Normal heart sounds  No murmur heard  Pulmonary:      Effort: Pulmonary effort is normal  No respiratory distress  Breath sounds: Normal breath sounds  No wheezing or rales  Abdominal:      General: Abdomen is flat  Bowel sounds are normal  There is no distension  Palpations: Abdomen is soft  Tenderness: There is no abdominal tenderness  There is no guarding  Musculoskeletal:         General: Normal range of motion  Cervical back: Normal range of motion and neck supple  Right lower leg: No edema  Left lower leg: Edema present  Skin:     General: Skin is warm and dry  Neurological:      General: No focal deficit present  Mental Status: She is alert and oriented to person, place, and time  Mental status is at baseline  Cranial Nerves: No cranial nerve deficit  Motor: No weakness  Additional Data:     Labs:    Results from last 7 days   Lab Units 04/01/22  0519 03/30/22  2132 03/30/22  1701   WBC Thousand/uL 12 61*   < > 14 50*   HEMOGLOBIN g/dL 9 3*   < > 9 6*   HEMATOCRIT % 28 1*   < > 30 0*   PLATELETS Thousands/uL 255   < > 240   NEUTROS PCT %  --   --  87*   LYMPHS PCT %  --   --  5*   MONOS PCT %  --   --  6   EOS PCT %  --   --  0    < > = values in this interval not displayed       Results from last 7 days   Lab Units 04/01/22  1639 03/31/22  1323 03/30/22  1701   SODIUM mmol/L 150*   < > 124*   POTASSIUM mmol/L 3 5   < > 4 2   CHLORIDE mmol/L 118*   < > 88*   CO2 mmol/L 23   < > 28   BUN mg/dL 22   < > 36*   CREATININE mg/dL 1 13   < > 1 71*   ANION GAP mmol/L 9   < > 8   CALCIUM mg/dL 8 8   < > 11 1*   ALBUMIN g/dL  --   --  2 6*   TOTAL BILIRUBIN mg/dL  --   -- 0  28   ALK PHOS U/L  --   --  90   ALT U/L  --   --  18   AST U/L  --   --  28   GLUCOSE RANDOM mg/dL 102   < > 132    < > = values in this interval not displayed  Results from last 7 days   Lab Units 03/30/22  2132   INR  1 18                       * I Have Reviewed All Lab Data Listed Above  * Additional Pertinent Lab Tests Reviewed: All Labs Within Last 24 Hours Reviewed    Imaging:    Imaging Reports Reviewed Today Include:   Imaging Personally Reviewed by Myself Includes:      Recent Cultures (last 7 days):     Results from last 7 days   Lab Units 03/31/22  1203   URINE CULTURE  >100,000 cfu/ml Staphylococcus coagulase negative*       Last 24 Hours Medication List:   Current Facility-Administered Medications   Medication Dose Route Frequency Provider Last Rate    acetaminophen  650 mg Oral Q6H PRN Renee Quevedo MD      aluminum-magnesium hydroxide-simethicone  30 mL Oral Q6H PRN Gagan Parkinson PA-C      cefTRIAXone  1,000 mg Intravenous Q24H Juventino Ceron DO 1,000 mg (04/01/22 1645)    heparin (porcine)  3-30 Units/kg/hr (Order-Specific) Intravenous Titrated Ar Jackson MD 21 Units/kg/hr (04/01/22 1055)    HYDROmorphone  0 5 mg Intravenous Q4H PRN Ar Jackson MD      ondansetron  4 mg Intravenous Q6H PRN Gagan Parkinson PA-C      oxyCODONE  2 5 mg Oral Q4H PRN Ar Jackson MD      oxyCODONE  5 mg Oral Q4H PRN Ar Jackson MD      polyethylene glycol  17 g Oral Daily Gagan Parkinson PA-C      IV infusion builder   Intravenous Continuous Laurie Cross MD 50 mL/hr at 04/01/22 1141        Today, Patient Was Seen By: Juventino Ceron DO    ** Please Note: Dictation voice to text software may have been used in the creation of this document   **

## 2022-04-01 NOTE — PLAN OF CARE
Problem: MOBILITY - ADULT  Goal: Maintain or return to baseline ADL function  Description: INTERVENTIONS:  -  Assess patient's ability to carry out ADLs; assess patient's baseline for ADL function and identify physical deficits which impact ability to perform ADLs (bathing, care of mouth/teeth, toileting, grooming, dressing, etc )  - Assess/evaluate cause of self-care deficits   - Assess range of motion  - Assess patient's mobility; develop plan if impaired  - Assess patient's need for assistive devices and provide as appropriate  - Encourage maximum independence but intervene and supervise when necessary  - Involve family in performance of ADLs  - Assess for home care needs following discharge   - Consider OT consult to assist with ADL evaluation and planning for discharge  - Provide patient education as appropriate  Outcome: Progressing  Goal: Maintains/Returns to pre admission functional level  Description: INTERVENTIONS:  - Perform BMAT or MOVE assessment daily    - Set and communicate daily mobility goal to care team and patient/family/caregiver  - Collaborate with rehabilitation services on mobility goals if consulted  - Perform Range of Motion 3 times a day  - Reposition patient every 2 hours    - Dangle patient 4 times a day  - Stand patient 4 times a day  - Ambulate patient 4 times a day  - Out of bed to chair 3 times a day   - Out of bed for meals 3 times a day  - Out of bed for toileting  - Record patient progress and toleration of activity level   Outcome: Progressing     Problem: Potential for Falls  Goal: Patient will remain free of falls  Description: INTERVENTIONS:  - Educate patient/family on patient safety including physical limitations  - Instruct patient to call for assistance with activity   - Consult OT/PT to assist with strengthening/mobility   - Keep Call bell within reach  - Keep bed low and locked with side rails adjusted as appropriate  - Keep care items and personal belongings within reach  - Initiate and maintain comfort rounds  - Make Fall Risk Sign visible to staff  - Offer Toileting every 2 Hours, in advance of need  - Initiate/Maintain Bed alarm  - Obtain necessary fall risk management equipment: Bed alarm  - Apply yellow socks and bracelet for high fall risk patients  - Consider moving patient to room near nurses station  Outcome: Progressing     Problem: Prexisting or High Potential for Compromised Skin Integrity  Goal: Skin integrity is maintained or improved  Description: INTERVENTIONS:  - Identify patients at risk for skin breakdown  - Assess and monitor skin integrity  - Assess and monitor nutrition and hydration status  - Monitor labs   - Assess for incontinence   - Turn and reposition patient  - Assist with mobility/ambulation  - Relieve pressure over bony prominences  - Avoid friction and shearing  - Provide appropriate hygiene as needed including keeping skin clean and dry  - Evaluate need for skin moisturizer/barrier cream  - Collaborate with interdisciplinary team   - Patient/family teaching  - Consider wound care consult   Outcome: Progressing     Problem: PAIN - ADULT  Goal: Verbalizes/displays adequate comfort level or baseline comfort level  Description: Interventions:  - Encourage patient to monitor pain and request assistance  - Assess pain using appropriate pain scale  - Administer analgesics based on type and severity of pain and evaluate response  - Implement non-pharmacological measures as appropriate and evaluate response  - Consider cultural and social influences on pain and pain management  - Notify physician/advanced practitioner if interventions unsuccessful or patient reports new pain  Outcome: Progressing

## 2022-04-01 NOTE — ASSESSMENT & PLAN NOTE
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

## 2022-04-01 NOTE — PHYSICAL THERAPY NOTE
Physical Therapy Cancellation    Patient's Name: Neyda Dong    Admitting Diagnosis  Hypercalcemia [E83 52]  Abdominal pain [R10 9]  Pain [R52]  DVT [facility]deep venous thrombosis) ([facility] ) [I17 409]    Problem List  Patient Active Problem List   Diagnosis    Un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diabetes mellitus with hyperglycemia (HCC)    Essential hypertension    Hyperlipidemia    Left breast mass    Depression    Hyponatremia    Hypokalemia    Dizziness    Bradycardia    Sick [facility] syndrome [facility]    Cardiac pacemaker in situ    Abnormal ultrasound cardiogram    Anxiety    BMI 33 0-33 9,adult    Breast carcinoma, female, right ([facility] )    GERD (gastroesophageal reflux disease)    Macular degeneration    Malignant neoplasm of upper-inner quadrant of left breast in female, estrogen receptor positive ([facility] )    Personal history of breast cancer    S/P hysterectomy    Memory loss    Medicare annual wellness visit, subsequent    Hypercalcemia    Balance problem    Cough    Malignant neoplasm of overlapping sites of bladder ([facility] )    Encounter to discuss test results    Status post small bowel resection    Hearing loss of left ear due to cerumen impaction    Person consulting for explanation of examination or test finding    Cancer related pain    Palliative care patient    DVT (deep venous thrombosis) ([facility] )    TILA (acute kidney injury) ([facility] )       Past Medical History  Past Medical History:   Diagnosis Date    Depression     GERD (gastroesophageal reflux disease)     History of chemotherapy     right breast 1996    History of echocardiogram 02/14/2018    EF 0 55 (55%), trace mitral regurgitation      Hx of radiation therapy     2017  left breast    Hyperlipidemia     Macular degeneration     Malignant neoplasm of overlapping sites of bladder ([facility] )     Sick sinus syndrome ([facility] )     Small bowel volvulus [facility])        Past Surgical History  Past Surgical History:   Procedure Laterality Date    APPENDECTOMY      BREAST LUMPECTOMY Bilateral     CARDIAC PACEMAKER PLACEMENT Left 07/05/2018    [facility]    HYSTERECTOMY      LAPAROTOMY N/A 08/10/2021    Procedure: LAPAROTOMY EXPLORATORY,  SMALL BOWEL RESECTION;  Surgeon: Lucy Plummer MD;  Location: [facility] MAIN OR;  Service: General    AK CYSTOURETHROSCOPY,FULGUR <0 5 CM LESN N/A 07/02/2021    Procedure: CYSTOSCOPY, TRANSURETHRAL RESECTION OF MEDIUM BLADDER TUMOR (TURBT); Surgeon: Mike Arreaga MD;  [facility] Main [facility];  Service: Urology    SMALL INTESTINE SURGERY            04/01/22 1321   PT Last Visit   PT Visit Date 04/01/22   Note Type   Note type Cancelled Session   Cancel Reasons Medical status   Additional Comments PT order received  Chart review performed  At this time, PT evaluation cancelled as pt not appropriate for PT evaluation per Dr Shelley Aldridge  PT will follow and evaluate as appropriate           Jose Simmons, PT, DPT

## 2022-04-01 NOTE — ASSESSMENT & PLAN NOTE
· Suspect pathological fracture given underlying malignancy  · Patient family does not wish for invasive intervention  · May need brace Fitting but will discuss more with family

## 2022-04-01 NOTE — PROGRESS NOTES
Progress note - Palliative and Supportive Care   Henry Ill 82 y o  female [id number]    Patient Active Problem List   Diagnosis    Un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diabetes mellitus with hyperglycemia ([facility] )    Essential hypertension    Hyperlipidemia    Left breast mass    Depression    Hyponatremia    Hypokalemia    Dizziness    Bradycardia    Sick sinus syndrome (HCC)    Cardiac pacemaker in situ    Abnormal ultrasound cardiogram    Anxiety    BMI 33 0-33 9,adult    Breast carcinoma, female, right ([facility] )    GERD (gastroesophageal reflux disease)    Macular degeneration    Malignant neoplasm of upper-inner quadrant of left breast in female, estrogen receptor positive ([place] [address] )    Personal history of breast cancer    S/P hysterectomy    Memory loss    Medicare annual wellness visit, subsequent    Hypercalcemia    Balance problem    Cough    Malignant neoplasm of overlapping sites of bladder ([place] [address] )    Encounter to discuss test results    Status post small bowel resection    Hearing loss of left ear due to cerumen impaction    Person consulting for explanation of examination or test finding    Cancer related pain    Palliative care patient    DVT (deep venous thrombosis) ([place] [address] )    TILA (acute kidney injury) ([place] [address] )     Active issues specifically addressed today include:    - intractable cancer-related pain   - acute/subacute occlusive L CFV/DFV DVT + acute/subacute thrombophlebitis L great saphenous v    - ?occlusive bland infarct v tumor thrombus in infrahepatic IVC with extension to iliac vessels + LLE   - TILA, SCr 1 7 on admission with baseline 0 8-0 9   - hypercalcemia, corrected Ca 12 1 on admission   - bladder cancer s/p TURBT + instillation of mitomycin [07/2021] with 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 carcinoma; marked progression on imaging   - cognitive decline, most likely 2/2 dementia   - debility/deconditioning   - goals of care support    Plan:  #symptom management  #cancer-related pain   - continue APAP 500 mg PO Q6H PRN              - max daily 3000 mg   - continue oxy-IR 2 5/5 mg PO Q4H PRN [mod/severe pain]   - continue hydromorphone 0 5 mg IV Q4H PRN [breakthrough pain]   - OME usage yesterday: 17 5 mg    AVOID morphine, eGFR 27    Continue current pain regimen  Promote PRN with patient to clarify is pain medication is required      #OIC   - continue miralax 17 g PO QDaily     #nausea   - continue ondansetron 4 mg IV Q6H PRN    #goals of care   - expanded goals of care discussion with patient and patient's daughter [at bedside]  Discussed current medical course, including persistent hyponatremia in the setting of 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 + on-going VTE on heparin gtt  Overall patient's daughter feels that PO anticoagulant would not be a safe option, given patient's recent multiple falls and suspected confusion in the setting of underlying cognitive decline + anticipated hyponatremia    - discussed STR v home hospice, at this time, plan is to continue current medical cares with family to consider next steps plan of care   - no decision at this time regarding ultimate plan of care   - will continue to closely follow    #psychosocial support   - emotional support provided   -    - three adult children [2 living, 1 ]              - Yeimi Cook [phone]              - Olivia Martinez              - Matilde Hodgeeste, ]   - parents    - seven siblings [5 living, 2 ]              - Aj [brother]   - Katarzyna Rios [phone]   - resides with Jhon Doshi      We appreciate the opportunity to participate in this patient's care  We will continue to follow  Please do not hesitate to contact our on-call provider through our clinic answering service at [phone] should you have acute symptom control concerns  Code Status: DNAR/DNI - Level 3   Decisional apparatus:  Patient is marginally competent on my exam today    If competence is lost, patient's medical Power of Tequila Teixeira is identified as #1 Bryantmichelle Ortizlauro, #2 Angelia Mc, #3 Talia Purcell by [employer]  Advance Directive / Living Will / POLST: On File    Interval history:   - NAEO   - symptoms assessment in MDM section above    MEDICATIONS / ALLERGIES:     current meds:   Current Facility-Administered Medications   Medication Dose Route Frequency    acetaminophen (TYLENOL) tablet 650 mg  650 mg Oral Q6H PRN    aluminum-magnesium hydroxide-simethicone (MYLANTA) oral suspension 30 mL  30 mL Oral Q6H PRN    cefTRIAXone (ROCEPHIN) 1,000 mg in dextrose 5 % 50 mL IVPB  1,000 mg Intravenous Q24H    heparin (porcine) 25,000 units in D5W 250 mL infusion (premix)  3-30 Units/kg/hr (Order-Specific) Intravenous Titrated    HYDROmorphone (DILAUDID) injection 0 5 mg  0 5 mg Intravenous Q4H PRN    ondansetron (ZOFRAN) injection 4 mg  4 mg Intravenous Q6H PRN    oxyCODONE (ROXICODONE) IR tablet 2 5 mg  2 5 mg Oral Q4H PRN    oxyCODONE (ROXICODONE) IR tablet 5 mg  5 mg Oral Q4H PRN    polyethylene glycol (MIRALAX) packet 17 g  17 g Oral Daily    sodium chloride 0 9 % infusion  75 mL/hr Intravenous Continuous       Allergies   Allergen Reactions    Celecoxib Swelling     celebrex- swelling of throat    Loratadine Swelling     Jaw swelled,couldn't swallow; face swelling    Aspirin GI Intolerance     : nausea; okay with Ecotrin    Lisinopril Swelling       OBJECTIVE:    Physical Exam  Physical Exam  Vitals and nursing note reviewed  Constitutional:       General: She is awake  Appearance: She is not diaphoretic  Comments: Chronically ill appearing in NAD  BMI 29 5  Non-toxic appearing   HENT:      Head: Normocephalic and atraumatic  Right Ear: External ear normal       Left Ear: External ear normal       Nose: No rhinorrhea  Eyes:      Comments: No gaze preference   Pulmonary:      Effort: No tachypnea, accessory muscle usage or respiratory distress  Comments: Completes full sentences without difficulty  Musculoskeletal:      Cervical back: Normal range of motion  Comments: LLE edema >>> RLE edema   Neurological:      General: No focal deficit present  Mental Status: She is alert  Psychiatric:         Attention and Perception: Attention normal          Mood and Affect: Mood and affect normal          Speech: Speech normal          Lab Results: I have personally reviewed pertinent labs  , CBC:   Lab Results   Component Value Date    WBC 12 61 (H) 04/01/2022    HGB 9 3 (L) 04/01/2022    HCT 28 1 (L) 04/01/2022    MCV 85 04/01/2022     04/01/2022    MCH 28 1 04/01/2022    MCHC 33 1 04/01/2022    RDW 13 8 04/01/2022    MPV 8 7 (L) 04/01/2022   , CMP:   Lab Results   Component Value Date    SODIUM 126 (L) 04/01/2022    K 3 7 04/01/2022    CL 92 (L) 04/01/2022    CO2 25 04/01/2022    BUN 27 (H) 04/01/2022    CREATININE 1 51 (H) 04/01/2022    CALCIUM 10 6 (H) 04/01/2022    EGFR 31 04/01/2022   , PT/PTT:  Lab Results   Component Value Date    PTT 89 (H) 04/01/2022     Imaging Studies: Reviewed  EKG, Pathology, and Other Studies: Reviewed    Counseling / Coordination of Care    Total floor / unit time spent today 45 minutes  Greater than 50% of total time was spent with the patient and / or family counseling and / or coordination of care  A description of the counseling / coordination of care: provided medical updates, discussed palliative care, determined competency, determined goals of care, determined POA, determined social/family support, discussed plans of care, discussed symptom management, provided psychosocial support  Expanded goals of care conversation with patient and patient's daughter at bedside   Coordinated plan of care with primary team

## 2022-04-01 NOTE — CONSULTS
NEPHROLOGY CONSULTATION NOTE    Patient: Henry Lozano               Sex: female          DOA: 3/30/2022  4:36 PM   YOB: 1935        Age:  82 y o         LOS:  LOS: 1 day     REFERRING PHYSICIAN: Elena Connolly / CONSULTATION:  TILA/Hyponatremia    DATE OF CONSULTATION / SERVICE: 4/1/2022    ADMISSION DIAGNOSIS: Cancer related pain     CHIEF COMPLAINT     Generalized pain    HPI     This is a 51-year-old female with past medical history of bladder cancer status post TURBT status post mitomycin in July 2021, progression of malignancy to upper tract urothelial carcinoma, diabetes mellitus type 2, breast cancer, dyslipidemia, depression, sick sinus syndrome, volvulus who presented to our facility on 03/30/2022 with complains of generalized pain along with left lower extremity edema and pain  During the course of hospitalization, pertinent lab abnormalities included elevated creatinine of 1 71, serum sodium of 124 mEq per L  imaging of lower extremities per duplex revealed extensive left lower extremity DVT along with pulmonary embolism as noted on CT scan of chest with IV contrast   Patient received 2 L of normal saline boluses currently maintained on normal saline at 75 cc/hour without much improvement in serum sodium levels  Nephrotoxic medication taken at home included ibuprofen  Patient also noted to be on triamterene hydrochlorothiazide which is currently held  Currently patient is awake but very lethargic  He is currently noted to be on heparin GTT and normal saline infusion at 75 cc/hour         PAST MEDICAL HISTORY     Past Medical History:   Diagnosis Date    Depression     GERD (gastroesophageal reflux disease)     History of chemotherapy     right breast 1996    History of echocardiogram 02/14/2018    EF 0 55 (55%), trace mitral regurgitation      Hx of radiation therapy     2017  left breast    Hyperlipidemia     Macular degeneration     Malignant neoplasm of overlapping sites of bladder [facility] [address] [facility]     Sick sinus syndrome ([facility] [address] [facility]     Small bowel volvulus ([facility] [address] [facility]        PAST SURGICAL HISTORY     Past Surgical History:   Procedure Laterality Date    APPENDECTOMY      BREAST LUMPECTOMY Bilateral     CARDIAC PACEMAKER PLACEMENT Left 07/05/2018    [facility]    HYSTERECTOMY      LAPAROTOMY N/A 08/10/2021    Procedure: LAPAROTOMY EXPLORATORY,  SMALL BOWEL RESECTION;  Surgeon: Julee Whiteside MD;  Location: [facility] [address] MAIN OR;  Service: General    SD CYSTOURETHROSCOPY,FULGUR <0 5 CM LESN N/A 07/02/2021    Procedure: CYSTOSCOPY, TRANSURETHRAL RESECTION OF MEDIUM BLADDER TUMOR (TURBT);   Surgeon: David Longo MD;  Location: [facility];  Service: Urology    SMALL INTESTINE SURGERY         ALLERGIES     Allergies   Allergen Reactions    Celecoxib Swelling     celebrex- swelling of throat    Loratadine Swelling     Jaw swelled,couldn't swallow; face swelling    Aspirin GI Intolerance     : nausea; okay with Ecotrin    Lisinopril Swelling       SOCIAL HISTORY     Social History     Substance and Sexual Activity   Alcohol Use Never     Social History     Substance and Sexual Activity   Drug Use No     Social History     Tobacco Use   Smoking Status Never Smoker   Smokeless Tobacco Never Used       FAMILY HISTORY     Family History   Problem Relation Age of Onset    Alzheimer's disease Father     Uterine cancer Daughter        CURRENT MEDICATIONS       Current Facility-Administered Medications:     acetaminophen (TYLENOL) tablet 650 mg, 650 mg, Oral, Q6H PRN, Willie Shell MD, 650 mg at 04/01/22 0855    aluminum-magnesium hydroxide-simethicone (MYLANTA) oral suspension 30 mL, 30 mL, Oral, Q6H PRN, [address], PA-C    cefTRIAXone (ROCEPHIN) 1,000 mg in dextrose 5 % 50 mL IVPB, 1,000 mg, Intravenous, Q24H, Jearlean Blizzard, DO, Last Rate: 100 mL/hr at 03/31/22 1855, 1,000 mg at 03/31/22 1855    heparin (porcine) 25,000 units in D5W 250 mL infusion (premix), 3-30 Units/kg/hr (Order-Specific), Intravenous, Titrated, Henry De Santiago MD, Last Rate: 14 7 mL/hr at 04/01/22 0722, 21 Units/kg/hr at 04/01/22 0722    HYDROmorphone (DILAUDID) injection 0 5 mg, 0 5 mg, Intravenous, Q4H PRN, Henry De Santiago MD, 0 5 mg at 03/31/22 2356    ondansetron (ZOFRAN) injection 4 mg, 4 mg, Intravenous, Q6H PRN, Celi Jimenez PA-C    oxyCODONE (ROXICODONE) IR tablet 2 5 mg, 2 5 mg, Oral, Q4H PRN, Henry De Santiago MD    oxyCODONE (ROXICODONE) IR tablet 5 mg, 5 mg, Oral, Q4H PRN, Henry De Santiago MD, 5 mg at 03/31/22 1850    polyethylene glycol (MIRALAX) packet 17 g, 17 g, Oral, Daily, ROBERT Adams-C, 17 g at 04/01/22 5096    sodium chloride 0 9 % infusion, 75 mL/hr, Intravenous, Continuous, Dipak Cartagena DO, Last Rate: 75 mL/hr at 03/31/22 1843, 75 mL/hr at 03/31/22 1843    REVIEW OF SYSTEMS     Review of Systems   Constitutional: Negative  HENT: Negative  Eyes: Negative  Respiratory: Negative  Cardiovascular: Positive for leg swelling  Gastrointestinal: Negative  Endocrine: Negative  Genitourinary: Negative  Musculoskeletal: Negative  Skin: Negative  Allergic/Immunologic: Negative  Neurological: Negative  Hematological: Negative  All other systems reviewed and are negative  OBJECTIVE     Current Weight: Weight - Scale: 75 4 kg (166 lb 3 6 oz)  Vitals:    04/01/22 0757   BP: 117/65   Pulse: 84   Resp: 22   Temp: 98 3 °F (36 8 °C)   SpO2: 93%     Body mass index is 29 45 kg/m²  Intake/Output Summary (Last 24 hours) at 4/1/2022 1028  Last data filed at 4/1/2022 0601  Gross per 24 hour   Intake 220 ml   Output 100 ml   Net 120 ml       PHYSICAL EXAMINATION     Physical Exam  Constitutional:       Appearance: She is well-developed  She is ill-appearing  HENT:      Head: Normocephalic and atraumatic  Eyes:      Pupils: Pupils are equal, round, and reactive to light     Cardiovascular:      Rate and Rhythm: Normal rate and regular rhythm  Heart sounds: Murmur heard  Pulmonary:      Effort: Pulmonary effort is normal       Breath sounds: Rhonchi present  Abdominal:      General: Bowel sounds are normal       Palpations: Abdomen is soft  Musculoskeletal:         General: Tenderness present  Normal range of motion  Cervical back: Neck supple  Left lower leg: Edema present  Skin:     General: Skin is warm  Neurological:      Mental Status: She is alert and oriented to person, place, and time  LAB RESULTS        Results from last 7 days   Lab Units 04/01/22  1002 04/01/22  0519 03/31/22  1323 03/31/22  0536 03/30/22  2132 03/30/22  1701   WBC Thousand/uL  --  12 61*  --  12 88* 11 95* 14 50*   HEMOGLOBIN g/dL  --  9 3*  --  9 4* 8 3* 9 6*   HEMATOCRIT %  --  28 1*  --  29 4* 25 8* 30 0*   PLATELETS Thousands/uL  --  255  --  233 207 240   POTASSIUM mmol/L 3 7  --  4 1  --   --  4 2   CHLORIDE mmol/L 92*  --  90*  --   --  88*   CO2 mmol/L 25  --  25  --   --  28   BUN mg/dL 27*  --  32*  --   --  36*   CREATININE mg/dL 1 51*  --  1 59*  --   --  1 71*   EGFR ml/min/1 73sq m 31  --  29  --   --  27   CALCIUM mg/dL 10 6*  --  11 3*  --   --  11 1*       I have personally reviewed the old medical records and patient's previously known baseline creatinine level is ~ 0 9    RADIOLOGY RESULTS     Results for orders placed during the hospital encounter of 07/02/18    XR chest portable    Narrative  INDICATION:  Status post pacemaker insertion  ORDERING PROVIDER:  Mark Price  TECHNIQUE:  Frontal chest was obtained at 10:22 hours  COMPARISON:  None Available  FINDINGS: Mild cardiomegaly  Mild hypoinflation  No gross consolidative  pneumonia, effusion, or pneumothorax  Left-sided pacemaker, leads appear  intact  Subtle hilar vascular prominence  Impression  1  No evidence for pneumothorax  2   Mild cardiomegaly and subtle hilar vascular prominence                PLAN / RECOMMENDATIONS      65-year-old female with past medical history of bladder cancer status post TURBT with progression to right urothelial cancer with probable widespread metastasis as seen on imaging, diabetes mellitus type 2, hypertension, breast cancer, dyslipidemia who presents to our facility with intractable pain related to cancer as well as left lower extremity swelling and pain  1  Hyponatremia:  Presented to our facility on 03/30 with serum sodium 124 mEq per L  -  initially suspected to have hypotonic hypovolemic hyponatremia and received 2 L of saline however with resulting serum sodium rise of up to 126 mEq per L   -suspect SIADH emanating from progression of cancer   -will switch fluids to 1 8% saline and discontinue normal saline   -obtain urinalysis, urine sodium, urine potassium, serum uric acid, urine osmolality, serum osmolality to complete the workup    2  Acute kidney injury:  Present on admission  Baseline serum creatinine of 0 7-0 9  -  presented with serum creatinine 1 71 and elevated   -given progression of right renal urothelial mass into the right kidney with invasion of the adjacent adrenal gland, suspect this abnormality to be contributing to # 2    -other contributing factors to acute kidney injury involve administration of contrast and hence KATHLEEN  -adequate urine output noted  -serum creatinine has improved to 1 5 mEq per L which is encouraging    3  High-grade invasive urothelial carcinoma:  Now with extension of tumor into the right kidney and likely contributing to acute kidney injury  -with development of extensive DVT, acute kidney injury, hypercalcemia and findings of new metastatic lesions, recommend hospice care  4  Hypercalcemia:  Presented with serum calcium of 12 2 corrected  -calcium appears to have improved to 10 6 and corrected and likely occurred secondary to volume depletion        4  Thromboembolism:  New findings of extensive left lower extremity DVT with small pulmonary embolism as noted on CT scan   -on heparin GTT  -this likely emanating from malignancy    Thank you for the consultation to participate in patient's care  I have personally discussed my plan with the referring physician       Sherry Khan MD    4/1/2022

## 2022-04-01 NOTE — SOCIAL WORK
Palliative LSW saw patient at the bedside today  LSW appreciates the opportunity to provide patient/family with inpatient emotional support and guidance while patient continues to receive medical attention from the medical team     Topics discussed: Discussion had regarding patient's current medical status and options for cares moving forward  Dr Charlie Yung was able to speak with patient's daughter to address concerns regarding patient's current condition and treatment options       LSW was able to provide support to patient, active listening     Areas that need follow-up: ongoing support  Resources given: none  Others present:  Patient's daughter, Soha Interiano and [facility] - Dr Li Ram will continue to follow as requested by the medical team, patient, or family

## 2022-04-01 NOTE — ASSESSMENT & PLAN NOTE
· CT shows progression of disease as well as extensive DVT in IVC/left lower extremity  · Palliative care following  · Monitor pain response

## 2022-04-01 NOTE — ASSESSMENT & PLAN NOTE
· Acute on chronic elevation, likely due to malignancy  · Nephrology following  · Resolving  · S/p IVF hydration

## 2022-04-01 NOTE — ASSESSMENT & PLAN NOTE
· B/l doppler LE: acute- subacute occlusive thrombus noted in the common femoral vein, deep femoral vein, and proximal to distal superficial femoral vein  acute- subacute occlusive thrombophlebitis noted in the great saphenous vein from proximal to distal thigh  No RLE DVT  · CT c/a/p: 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 extending inferiorly and likely involves the iliac vessels with extension to the left lower extremity  Suspected small PE in LLL  No large central PE    · Provoked secondary to underlying malignancy  · Discussion had with patient's daughter  Her preference is to not continue on anticoagulation upon discharge given patient is agree for risk    · While hospitalized will continue on IV heparin drip

## 2022-04-02 LAB
ANION GAP SERPL CALCULATED.3IONS-SCNC: 10 MMOL/L (ref 4–13)
ANION GAP SERPL CALCULATED.3IONS-SCNC: 12 MMOL/L (ref 4–13)
ANION GAP SERPL CALCULATED.3IONS-SCNC: 8 MMOL/L (ref 4–13)
APTT PPP: 82 SECONDS (ref 23–37)
BACTERIA UR CULT: ABNORMAL
BUN SERPL-MCNC: 23 MG/DL (ref 5–25)
BUN SERPL-MCNC: 25 MG/DL (ref 5–25)
BUN SERPL-MCNC: 26 MG/DL (ref 5–25)
CALCIUM SERPL-MCNC: 10.4 MG/DL (ref 8.3–10.1)
CALCIUM SERPL-MCNC: 10.6 MG/DL (ref 8.3–10.1)
CALCIUM SERPL-MCNC: 11.5 MG/DL (ref 8.3–10.1)
CHLORIDE SERPL-SCNC: 86 MMOL/L (ref 100–108)
CHLORIDE SERPL-SCNC: 92 MMOL/L (ref 100–108)
CHLORIDE SERPL-SCNC: 93 MMOL/L (ref 100–108)
CO2 SERPL-SCNC: 23 MMOL/L (ref 21–32)
CO2 SERPL-SCNC: 25 MMOL/L (ref 21–32)
CO2 SERPL-SCNC: 25 MMOL/L (ref 21–32)
CREAT SERPL-MCNC: 1.34 MG/DL (ref 0.6–1.3)
CREAT SERPL-MCNC: 1.38 MG/DL (ref 0.6–1.3)
CREAT SERPL-MCNC: 1.4 MG/DL (ref 0.6–1.3)
ERYTHROCYTE [DISTWIDTH] IN BLOOD BY AUTOMATED COUNT: 13.7 % (ref 11.6–15.1)
GFR SERPL CREATININE-BSD FRML MDRD: 34 ML/MIN/1.73SQ M
GFR SERPL CREATININE-BSD FRML MDRD: 35 ML/MIN/1.73SQ M
GFR SERPL CREATININE-BSD FRML MDRD: 36 ML/MIN/1.73SQ M
GLUCOSE SERPL-MCNC: 111 MG/DL (ref 65–140)
GLUCOSE SERPL-MCNC: 136 MG/DL (ref 65–140)
GLUCOSE SERPL-MCNC: 349 MG/DL (ref 65–140)
HCT VFR BLD AUTO: 28.1 % (ref 34.8–46.1)
HGB BLD-MCNC: 8.9 G/DL (ref 11.5–15.4)
MCH RBC QN AUTO: 28.1 PG (ref 26.8–34.3)
MCHC RBC AUTO-ENTMCNC: 31.7 G/DL (ref 31.4–37.4)
MCV RBC AUTO: 89 FL (ref 82–98)
PLATELET # BLD AUTO: 257 THOUSANDS/UL (ref 149–390)
PMV BLD AUTO: 8.2 FL (ref 8.9–12.7)
POTASSIUM SERPL-SCNC: 3.6 MMOL/L (ref 3.5–5.3)
POTASSIUM SERPL-SCNC: 3.6 MMOL/L (ref 3.5–5.3)
POTASSIUM SERPL-SCNC: 3.9 MMOL/L (ref 3.5–5.3)
RBC # BLD AUTO: 3.17 MILLION/UL (ref 3.81–5.12)
SODIUM SERPL-SCNC: 121 MMOL/L (ref 136–145)
SODIUM SERPL-SCNC: 126 MMOL/L (ref 136–145)
SODIUM SERPL-SCNC: 127 MMOL/L (ref 136–145)
WBC # BLD AUTO: 11.06 THOUSAND/UL (ref 4.31–10.16)

## 2022-04-02 PROCEDURE — 80048 BASIC METABOLIC PNL TOTAL CA: CPT | Performed by: INTERNAL MEDICINE

## 2022-04-02 PROCEDURE — 85730 THROMBOPLASTIN TIME PARTIAL: CPT | Performed by: INTERNAL MEDICINE

## 2022-04-02 PROCEDURE — 99232 SBSQ HOSP IP/OBS MODERATE 35: CPT | Performed by: INTERNAL MEDICINE

## 2022-04-02 PROCEDURE — 85027 COMPLETE CBC AUTOMATED: CPT | Performed by: INTERNAL MEDICINE

## 2022-04-02 RX ORDER — SODIUM CHLORIDE 1000 MG
3 TABLET, SOLUBLE MISCELLANEOUS
Status: DISCONTINUED | OUTPATIENT
Start: 2022-04-02 | End: 2022-04-06

## 2022-04-02 RX ORDER — SODIUM CHLORIDE 9 MG/ML
75 INJECTION, SOLUTION INTRAVENOUS CONTINUOUS
Status: DISCONTINUED | OUTPATIENT
Start: 2022-04-02 | End: 2022-04-02

## 2022-04-02 RX ADMIN — Medication 15 MG: at 21:44

## 2022-04-02 RX ADMIN — OXYCODONE HYDROCHLORIDE 5 MG: 5 TABLET ORAL at 14:49

## 2022-04-02 RX ADMIN — Medication 3 G: at 21:43

## 2022-04-02 RX ADMIN — ALUMINUM HYDROXIDE, MAGNESIUM HYDROXIDE, AND SIMETHICONE 30 ML: 200; 200; 20 SUSPENSION ORAL at 18:40

## 2022-04-02 RX ADMIN — OXYCODONE HYDROCHLORIDE 5 MG: 5 TABLET ORAL at 09:08

## 2022-04-02 RX ADMIN — HEPARIN SODIUM AND DEXTROSE 21 UNITS/KG/HR: 10000; 5 INJECTION INTRAVENOUS at 04:22

## 2022-04-02 RX ADMIN — POLYETHYLENE GLYCOL 3350 17 G: 17 POWDER, FOR SOLUTION ORAL at 09:02

## 2022-04-02 RX ADMIN — SODIUM CHLORIDE 75 ML/HR: 0.9 INJECTION, SOLUTION INTRAVENOUS at 12:18

## 2022-04-02 RX ADMIN — Medication 3 G: at 17:10

## 2022-04-02 NOTE — ASSESSMENT & PLAN NOTE
· Malignant High-grade invasive urethral carcinoma; 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  · Patient elected for palliative care does not wish to pursue surgery or oncologic intervention  · Palliative care on board  · Extensive discussion with patient's daughter  They are not ready for hospice    The hope is for possible rehabilitation and then after which will likely transition to home hospice

## 2022-04-02 NOTE — PROGRESS NOTES
Progress Note    Noted patient's Na level to increase from 126 meq/L to 150 meq/L after just 4 hours of 1 8% saline  Patient was previously a non responder to correction of hyponatremia with Normal saline  Hypertonic saline d/c and given D5W bolus 1L along with DDAVP 2 mcg and STAT BMP ordered  Target sodium of 135 meq/L  May consider starting maintenance hypotonic fluids

## 2022-04-02 NOTE — QUICK NOTE
Patient's stat repeat  (down from 150); unclear at this time if there was a lab error  Informed on-call nephrology of results  Will stop D5 infusion at this time (10:41PM)  Continue to trend BMP as ordered

## 2022-04-02 NOTE — PROGRESS NOTES
NEPHROLOGY PROGRESS NOTE    Patient: Kaci Tran               Sex: female          DOA: 3/30/2022  4:36 PM   YOB: 1935        Age:  82 y o         LOS:  LOS: 2 days   4/2/2022    REASON FOR THE CONSULTATION:      Hyponatremia    SUBJECTIVE     Patient is seen and examined next to the bedside  Awake, alert and following commands  Admits to decreased oral intake  Events of the past 24 hours noted including patient serum sodium falls sleepy elevated to 150 mEq per L  repeat sodium level was noted to be 126 milliequivalent/liter but not before receiving 1 L of D5 water as well as DDAVP      CURRENT MEDICATIONS       Current Facility-Administered Medications:     acetaminophen (TYLENOL) tablet 650 mg, 650 mg, Oral, Q6H PRN, Belgica Huff MD, 650 mg at 04/01/22 2119    aluminum-magnesium hydroxide-simethicone (MYLANTA) oral suspension 30 mL, 30 mL, Oral, Q6H PRN, Rodrigo Poole PA-C    cefTRIAXone (ROCEPHIN) 1,000 mg in dextrose 5 % 50 mL IVPB, 1,000 mg, Intravenous, Q24H, Sandy Worley DO, Last Rate: 100 mL/hr at 04/01/22 1645, 1,000 mg at 04/01/22 1645    heparin (porcine) 25,000 units in D5W 250 mL infusion (premix), 3-30 Units/kg/hr (Order-Specific), Intravenous, Titrated, Donaldo Mendoza MD, Last Rate: 15 1 mL/hr at 04/02/22 0422, 21 571 Units/kg/hr at 04/02/22 0422    HYDROmorphone (DILAUDID) injection 0 5 mg, 0 5 mg, Intravenous, Q4H PRN, Donaldo Mendoza MD, 0 5 mg at 03/31/22 5276    ondansetron (ZOFRAN) injection 4 mg, 4 mg, Intravenous, Q6H PRN, Rodrigo Poole PA-C    oxyCODONE (ROXICODONE) IR tablet 2 5 mg, 2 5 mg, Oral, Q4H PRN, Donaldo Mendoza MD    oxyCODONE (ROXICODONE) IR tablet 5 mg, 5 mg, Oral, Q4H PRN, Donaldo Mendoza MD, 5 mg at 04/02/22 0908    polyethylene glycol (MIRALAX) packet 17 g, 17 g, Oral, Daily, Rodrigo Poole PA-C, 17 g at 04/02/22 5953    REVIEW OF SYSTEMS     Review of Systems   Constitutional: Positive for activity change, appetite change and fatigue  HENT: Negative  Eyes: Negative  Respiratory: Negative  Cardiovascular: Negative  Gastrointestinal: Negative  Endocrine: Negative  Genitourinary: Negative  Musculoskeletal: Negative  Skin: Negative  Allergic/Immunologic: Negative  Neurological: Negative  Hematological: Negative  All other systems reviewed and are negative  OBJECTIVE     Current Weight: Weight - Scale: 74 4 kg (164 lb 0 4 oz)  Vitals:    04/02/22 0737   BP: 118/65   Pulse:    Resp:    Temp: 98 2 °F (36 8 °C)   SpO2:      Body mass index is 29 06 kg/m²  Intake/Output Summary (Last 24 hours) at 4/2/2022 1015  Last data filed at 4/2/2022 0640  Gross per 24 hour   Intake 1870 ml   Output 900 ml   Net 970 ml       PHYSICAL EXAMINATION     Physical Exam  Constitutional:       Appearance: She is well-developed  HENT:      Head: Normocephalic and atraumatic  Eyes:      Pupils: Pupils are equal, round, and reactive to light  Cardiovascular:      Rate and Rhythm: Normal rate and regular rhythm  Heart sounds: Murmur heard  Pulmonary:      Effort: Pulmonary effort is normal       Breath sounds: Rhonchi present  Abdominal:      General: Bowel sounds are normal       Palpations: Abdomen is soft  Musculoskeletal:         General: Normal range of motion  Cervical back: Neck supple  Skin:     General: Skin is warm  Neurological:      Mental Status: She is alert and oriented to person, place, and time             LAB RESULTS     Results from last 7 days   Lab Units 04/02/22  0924 04/02/22  0533 04/02/22  0040 04/01/22  2150 04/01/22  1639 04/01/22  1002 04/01/22  0519 03/31/22  1323 03/31/22  0536 03/30/22  2132 03/30/22  1701   WBC Thousand/uL  --  11 06*  --   --   --   --  12 61*  --  12 88* 11 95* 14 50*   HEMOGLOBIN g/dL  --  8 9*  --   --   --   --  9 3*  --  9 4* 8 3* 9 6*   HEMATOCRIT %  --  28 1*  --   --   --   --  28 1*  --  29 4* 25 8* 30 0*   PLATELETS Thousands/uL  --  257  --   --   --   --  255  --  233 207 240   POTASSIUM mmol/L 3 9  --  3 6 3 8 3 5 3 7  --  4 1  --   --  4 2   CHLORIDE mmol/L 92*  --  93* 93* 118* 92*  --  90*  --   --  88*   CO2 mmol/L 25  --  25 25 23 25  --  25  --   --  28   BUN mg/dL 26*  --  25 25 22 27*  --  32*  --   --  36*   CREATININE mg/dL 1 38*  --  1 34* 1 43* 1 13 1 51*  --  1 59*  --   --  1 71*   EGFR ml/min/1 73sq m 35  --  36 33 44 31  --  29  --   --  27   CALCIUM mg/dL 11 5*  --  10 4* 10 7* 8 8 10 6*  --  11 3*  --   --  11 1*           RADIOLOGY RESULTS      Results for orders placed during the hospital encounter of 07/02/18    XR chest portable    Narrative  INDICATION:  Status post pacemaker insertion  ORDERING PROVIDER:  Shady Vila  TECHNIQUE:  Frontal chest was obtained at 10:22 hours  COMPARISON:  None Available  FINDINGS: Mild cardiomegaly  Mild hypoinflation  No gross consolidative  pneumonia, effusion, or pneumothorax  Left-sided pacemaker, leads appear  intact  Subtle hilar vascular prominence  Impression  1  No evidence for pneumothorax  2   Mild cardiomegaly and subtle hilar vascular prominence  ASSESSMENT/PLAN     61-year-old female with past medical history of bladder cancer status post TURBT and administration of BCG in July 2021, progression of malignancy to upper tract urothelial carcinoma, diabetes mellitus type 2, breast cancer, dyslipidemia, depression, sick sinus syndrome, volvulus who presents to our facility on 03/30 with complaint of generalized pain along with left lower extremity edema  1  Hyponatremia:  Presented to our facility on 03/30 with serum sodium 124 mEq per L   -etiology thought to be secondary to decreased solute intake as suggested by urine studies  -noted low urine sodium, elevated serum uric acid and normal urine osmolality    -current sodium is 127 mEq per L   -patient had early received 1 8% saline   -will initiate patient on salt tablets at a rate of 3 g p o  T i d once serum calcium improves on IV hydration  2  Acute kidney injury:  Present on admission  Baseline serum creatinine of 0 7-0 9   -presented with serum creatinine of 1 71 and elevated  -current creatinine is 1 38 and improving   -etiology acute kidney injury likely contrast induced nephropathy along with hypercalcemia induced renal vasoconstriction    3  Hypercalcemia:  Noted calcium 11 5 and elevated   -this is likely secondary to underlying malignancy  -will obtain PTH intact   -given persistent and worsening hypercalcemia, will initiate patient on isotonic saline at 75 cc/hour  4  High-grade invasive urothelial carcinoma:  Noted extension of she went to right kidney   -this has the potential for causing worsening renal function  5  Thromboembolism:  Presented with left lower extremity swelling with findings of left lower extremity DVT along with small pulmonary embolism as noted on CT scan        Marcia Díaz MD  Nephrology  4/2/2022

## 2022-04-02 NOTE — ASSESSMENT & PLAN NOTE
· Acute on chronic elevation, likely due to malignancy  · Nephrology following  · Continue IVF hydration

## 2022-04-02 NOTE — ASSESSMENT & PLAN NOTE
· CT shows progression of disease as well as extensive DVT in IVC/left lower extremity  · Palliative care following  · Continue pain analgesics regimen as recommended for palliative

## 2022-04-02 NOTE — ASSESSMENT & PLAN NOTE
· B/l doppler LE: acute- subacute occlusive thrombus noted in the common femoral vein, deep femoral vein, and proximal to distal superficial femoral vein  acute- subacute occlusive thrombophlebitis noted in the great saphenous vein from proximal to distal thigh  No RLE DVT  · CT c/a/p: 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 extending inferiorly and likely involves the iliac vessels with extension to the left lower extremity  Suspected small PE in LLL  No large central PE    · Provoked secondary to underlying malignancy  · Discussion had with patient's daughter  Her preference is to not continue on anticoagulation upon discharge given patient is a fall risk    · While hospitalized will continue on IV heparin drip for now

## 2022-04-02 NOTE — ASSESSMENT & PLAN NOTE
· Suspect pathological fracture given underlying malignancy  · Spoke at length with patient's daughter, does not wish for invasive intervention  She also does not wish for the patient to wear back brace for comfort purposes

## 2022-04-02 NOTE — PLAN OF CARE
Problem: MOBILITY - ADULT  Goal: Maintain or return to baseline ADL function  Description: INTERVENTIONS:  -  Assess patient's ability to carry out ADLs; assess patient's baseline for ADL function and identify physical deficits which impact ability to perform ADLs (bathing, care of mouth/teeth, toileting, grooming, dressing, etc )  - Assess/evaluate cause of self-care deficits   - Assess range of motion  - Assess patient's mobility; develop plan if impaired  - Assess patient's need for assistive devices and provide as appropriate  - Encourage maximum independence but intervene and supervise when necessary  - Involve family in performance of ADLs  - Assess for home care needs following discharge   - Consider OT consult to assist with ADL evaluation and planning for discharge  - Provide patient education as appropriate  Outcome: Progressing  Goal: Maintains/Returns to pre admission functional level  Description: INTERVENTIONS:  - Perform BMAT or MOVE assessment daily    - Set and communicate daily mobility goal to care team and patient/family/caregiver  - Collaborate with rehabilitation services on mobility goals if consulted  - Perform Range of Motion  times a day  - Reposition patient every  hours    - Dangle patient  times a day  - Stand patient  times a day  - Ambulate patient times a day  - Out of bed to chair  times a day   - Out of bed for meals  times a day  - Out of bed for toileting  - Record patient progress and toleration of activity level   Outcome: Progressing     Problem: Potential for Falls  Goal: Patient will remain free of falls  Description: INTERVENTIONS:  - Educate patient/family on patient safety including physical limitations  - Instruct patient to call for assistance with activity   - Consult OT/PT to assist with strengthening/mobility   - Keep Call bell within reach  - Keep bed low and locked with side rails adjusted as appropriate  - Keep care items and personal belongings within reach  - Initiate and maintain comfort rounds  - Make Fall Risk Sign visible to staff  - Offer Toileting every  Hours, in advance of need  - Initiate/Maintain alarm  - Obtain necessary fall risk management equipment:   - Apply yellow socks and bracelet for high fall risk patients  - Consider moving patient to room near nurses station  Outcome: Progressing     Problem: Prexisting or High Potential for Compromised Skin Integrity  Goal: Skin integrity is maintained or improved  Description: INTERVENTIONS:  - Identify patients at risk for skin breakdown  - Assess and monitor skin integrity  - Assess and monitor nutrition and hydration status  - Monitor labs   - Assess for incontinence   - Turn and reposition patient  - Assist with mobility/ambulation  - Relieve pressure over bony prominences  - Avoid friction and shearing  - Provide appropriate hygiene as needed including keeping skin clean and dry  - Evaluate need for skin moisturizer/barrier cream  - Collaborate with interdisciplinary team   - Patient/family teaching  - Consider wound care consult   Outcome: Progressing     Problem: PAIN - ADULT  Goal: Verbalizes/displays adequate comfort level or baseline comfort level  Description: Interventions:  - Encourage patient to monitor pain and request assistance  - Assess pain using appropriate pain scale  - Administer analgesics based on type and severity of pain and evaluate response  - Implement non-pharmacological measures as appropriate and evaluate response  - Consider cultural and social influences on pain and pain management  - Notify physician/advanced practitioner if interventions unsuccessful or patient reports new pain  Outcome: Progressing     Problem: SAFETY ADULT  Goal: Maintain or return to baseline ADL function  Description: INTERVENTIONS:  -  Assess patient's ability to carry out ADLs; assess patient's baseline for ADL function and identify physical deficits which impact ability to perform ADLs (bathing, care of mouth/teeth, toileting, grooming, dressing, etc )  - Assess/evaluate cause of self-care deficits   - Assess range of motion  - Assess patient's mobility; develop plan if impaired  - Assess patient's need for assistive devices and provide as appropriate  - Encourage maximum independence but intervene and supervise when necessary  - Involve family in performance of ADLs  - Assess for home care needs following discharge   - Consider OT consult to assist with ADL evaluation and planning for discharge  - Provide patient education as appropriate  Outcome: Progressing  Goal: Maintains/Returns to pre admission functional level  Description: INTERVENTIONS:  - Perform BMAT or MOVE assessment daily    - Set and communicate daily mobility goal to care team and patient/family/caregiver  - Collaborate with rehabilitation services on mobility goals if consulted  - Perform Range of Motion  times a day  - Reposition patient every  hours    - Dangle patient  times a day  - Stand patient times a day  - Ambulate patient  times a day  - Out of bed to chair  times a day   - Out of bed for meals  times a day  - Out of bed for toileting  - Record patient progress and toleration of activity level   Outcome: Progressing  Goal: Patient will remain free of falls  Description: INTERVENTIONS:  - Educate patient/family on patient safety including physical limitations  - Instruct patient to call for assistance with activity   - Consult OT/PT to assist with strengthening/mobility   - Keep Call bell within reach  - Keep bed low and locked with side rails adjusted as appropriate  - Keep care items and personal belongings within reach  - Initiate and maintain comfort rounds  - Make Fall Risk Sign visible to staff  - Offer Toileting every  Hours, in advance of need  - Initiate/Maintain alarm  - Obtain necessary fall risk management equipment:  - Apply yellow socks and bracelet for high fall risk patients  - Consider moving patient to room near nurses station  Outcome: Progressing     Problem: DISCHARGE PLANNING  Goal: Discharge to home or other facility with appropriate resources  Description: INTERVENTIONS:  - Identify barriers to discharge w/patient and caregiver  - Arrange for needed discharge resources and transportation as appropriate  - Identify discharge learning needs (meds, wound care, etc )  - Arrange for interpretive services to assist at discharge as needed  - Refer to Case Management Department for coordinating discharge planning if the patient needs post-hospital services based on physician/advanced practitioner order or complex needs related to functional status, cognitive ability, or social support system  Outcome: Progressing     Problem: Knowledge Deficit  Goal: Patient/family/caregiver demonstrates understanding of disease process, treatment plan, medications, and discharge instructions  Description: Complete learning assessment and assess knowledge base    Interventions:  - Provide teaching at level of understanding  - Provide teaching via preferred learning methods  Outcome: Progressing

## 2022-04-02 NOTE — ASSESSMENT & PLAN NOTE
· Multifactorial, Secondary to underlying malignancy as infiltrating renal mass, contrast induced nephropathy, hypercalcemia  · Nephrology on board  · Avoid nephrotoxins, renally dose meds

## 2022-04-02 NOTE — PROGRESS NOTES
Patient underwent repeat BMP at 4:39 pm and noted to be 150 and elevated  Patients 1 8% saline was stopped and D5W 1 L bolus and DDAVP administered  Repeat BMP ordered  If Na remains elevated will start D5W maintainence fluids  SLIM made aware

## 2022-04-03 LAB
ANION GAP SERPL CALCULATED.3IONS-SCNC: 10 MMOL/L (ref 4–13)
ANION GAP SERPL CALCULATED.3IONS-SCNC: 10 MMOL/L (ref 4–13)
ANION GAP SERPL CALCULATED.3IONS-SCNC: 8 MMOL/L (ref 4–13)
APTT PPP: 69 SECONDS (ref 23–37)
BUN SERPL-MCNC: 21 MG/DL (ref 5–25)
BUN SERPL-MCNC: 23 MG/DL (ref 5–25)
BUN SERPL-MCNC: 23 MG/DL (ref 5–25)
CALCIUM SERPL-MCNC: 10.6 MG/DL (ref 8.3–10.1)
CALCIUM SERPL-MCNC: 10.7 MG/DL (ref 8.3–10.1)
CALCIUM SERPL-MCNC: 11.3 MG/DL (ref 8.3–10.1)
CHLORIDE SERPL-SCNC: 92 MMOL/L (ref 100–108)
CHLORIDE SERPL-SCNC: 95 MMOL/L (ref 100–108)
CHLORIDE SERPL-SCNC: 96 MMOL/L (ref 100–108)
CO2 SERPL-SCNC: 23 MMOL/L (ref 21–32)
CO2 SERPL-SCNC: 25 MMOL/L (ref 21–32)
CO2 SERPL-SCNC: 25 MMOL/L (ref 21–32)
CREAT SERPL-MCNC: 1.2 MG/DL (ref 0.6–1.3)
CREAT SERPL-MCNC: 1.27 MG/DL (ref 0.6–1.3)
CREAT SERPL-MCNC: 1.36 MG/DL (ref 0.6–1.3)
ERYTHROCYTE [DISTWIDTH] IN BLOOD BY AUTOMATED COUNT: 13.9 % (ref 11.6–15.1)
GFR SERPL CREATININE-BSD FRML MDRD: 35 ML/MIN/1.73SQ M
GFR SERPL CREATININE-BSD FRML MDRD: 38 ML/MIN/1.73SQ M
GFR SERPL CREATININE-BSD FRML MDRD: 41 ML/MIN/1.73SQ M
GLUCOSE SERPL-MCNC: 121 MG/DL (ref 65–140)
GLUCOSE SERPL-MCNC: 137 MG/DL (ref 65–140)
GLUCOSE SERPL-MCNC: 251 MG/DL (ref 65–140)
HCT VFR BLD AUTO: 27.2 % (ref 34.8–46.1)
HGB BLD-MCNC: 8.8 G/DL (ref 11.5–15.4)
MCH RBC QN AUTO: 28.2 PG (ref 26.8–34.3)
MCHC RBC AUTO-ENTMCNC: 32.4 G/DL (ref 31.4–37.4)
MCV RBC AUTO: 87 FL (ref 82–98)
PLATELET # BLD AUTO: 264 THOUSANDS/UL (ref 149–390)
PMV BLD AUTO: 8.1 FL (ref 8.9–12.7)
POTASSIUM SERPL-SCNC: 3.7 MMOL/L (ref 3.5–5.3)
POTASSIUM SERPL-SCNC: 3.8 MMOL/L (ref 3.5–5.3)
POTASSIUM SERPL-SCNC: 3.9 MMOL/L (ref 3.5–5.3)
RBC # BLD AUTO: 3.12 MILLION/UL (ref 3.81–5.12)
SODIUM SERPL-SCNC: 126 MMOL/L (ref 136–145)
SODIUM SERPL-SCNC: 127 MMOL/L (ref 136–145)
SODIUM SERPL-SCNC: 131 MMOL/L (ref 136–145)
WBC # BLD AUTO: 11.02 THOUSAND/UL (ref 4.31–10.16)

## 2022-04-03 PROCEDURE — 99232 SBSQ HOSP IP/OBS MODERATE 35: CPT | Performed by: INTERNAL MEDICINE

## 2022-04-03 PROCEDURE — 80048 BASIC METABOLIC PNL TOTAL CA: CPT | Performed by: INTERNAL MEDICINE

## 2022-04-03 PROCEDURE — 99285 EMERGENCY DEPT VISIT HI MDM: CPT | Performed by: EMERGENCY MEDICINE

## 2022-04-03 PROCEDURE — 85027 COMPLETE CBC AUTOMATED: CPT | Performed by: INTERNAL MEDICINE

## 2022-04-03 PROCEDURE — 85730 THROMBOPLASTIN TIME PARTIAL: CPT | Performed by: INTERNAL MEDICINE

## 2022-04-03 RX ORDER — TOLVAPTAN 30 MG/1
30 TABLET ORAL ONCE
Status: COMPLETED | OUTPATIENT
Start: 2022-04-03 | End: 2022-04-03

## 2022-04-03 RX ADMIN — Medication 3 G: at 12:08

## 2022-04-03 RX ADMIN — Medication 3 G: at 22:31

## 2022-04-03 RX ADMIN — HEPARIN SODIUM AND DEXTROSE 21 UNITS/KG/HR: 10000; 5 INJECTION INTRAVENOUS at 00:37

## 2022-04-03 RX ADMIN — Medication 3 G: at 09:06

## 2022-04-03 RX ADMIN — Medication 3 G: at 16:12

## 2022-04-03 RX ADMIN — Medication 15 MG: at 10:12

## 2022-04-03 RX ADMIN — HEPARIN SODIUM AND DEXTROSE 21 UNITS/KG/HR: 10000; 5 INJECTION INTRAVENOUS at 19:08

## 2022-04-03 RX ADMIN — TOLVAPTAN 30 MG: 30 TABLET ORAL at 14:16

## 2022-04-03 RX ADMIN — OXYCODONE HYDROCHLORIDE 5 MG: 5 TABLET ORAL at 14:19

## 2022-04-03 NOTE — ED PROVIDER NOTES
History  Chief Complaint   Patient presents with    Pain     on pallulative care for cancer and is having uncontrolled pain      30-year-old female presenting to the emergency department for evaluation of back pain or abdominal pain  Patient has end-stage metastatic bladder cancer, is on palliative care for this, but has had low back pain with radiation into the abdomen which has been intolerable despite her home p o  Pain medications  Patient also has some lower extremity swelling worse in her left leg          Prior to Admission Medications   Prescriptions Last Dose Informant Patient Reported? Taking? Apoaequorin (Prevagen) 10 MG CAPS  Self Yes No   Sig: Take 10 mg by mouth daily in the early morning    Cholecalciferol 25 MCG (1000 UT) tablet  Self Yes No   Sig: Take 1,000 Units by mouth daily   DOCOSAHEXAENOIC ACID PO  Self Yes No   Sig: Take 2 capsules by mouth daily   acetaminophen (TYLENOL) 500 mg tablet   No No   Sig: Take 1 tablet (500 mg total) by mouth every 6 (six) hours as needed for mild pain Max Daily: 3,000 mg   anastrozole (ARIMIDEX) 1 mg tablet  Self Yes No   Sig: Take 1 mg by mouth daily with lunch    atorvastatin (LIPITOR) 40 mg tablet  Self No No   Sig: TAKE ONE TABLET BY MOUTH EVERY DAY AT BEDTIME   bisacodyl (DULCOLAX) 10 mg suppository  Self No No   Sig: Insert 1 suppository (10 mg total) into the rectum daily as needed for constipation   docusate sodium (COLACE) 100 mg capsule  Self No No   Sig: Take 1 capsule (100 mg total) by mouth 3 (three) times a day for 7 days   ferrous sulfate 324 (65 Fe) mg  Self No No   Sig: Daily   ibuprofen (MOTRIN) 400 mg tablet  Self No No   Sig: Take 1 tablet (400 mg total) by mouth every 6 (six) hours as needed for moderate pain   metFORMIN (GLUCOPHAGE) 500 mg tablet  Self No No   Sig: TAKE ONE TABLET BY MOUTH ONE TIME DAILY   naloxone (NARCAN) 4 mg/0 1 mL nasal spray   No No   Sig: Administer 1 spray into a nostril   If no response after 2-3 minutes, give another dose in the other nostril using a new spray  oxyCODONE (ROXICODONE) 5 mg/5 mL solution   No No   Sig: Take 2 5 mL (2 5 mg total) by mouth every 8 (eight) hours as needed (cancer-related pain) Max Daily Amount: 7 5 mg   polyethylene glycol (MIRALAX) 17 g packet  Self No No   Sig: Take 17 g by mouth daily as needed (constipation)   polyethylene glycol-propylene glycol (SYSTANE) 0 4-0 3 %  Self Yes No   Sig: Apply 4 drops to eye 2 (two) times a day   potassium chloride (Klor-Con) 10 mEq tablet   No No   Sig: TAKE TWO TABLETS BY MOUTH DAILY   sertraline (ZOLOFT) 50 mg tablet  Self No No   Sig: TAKE ONE TABLET BY MOUTH ONE TIME DAILY   triamterene-hydrochlorothiazide (MAXZIDE-25) 37 5-25 mg per tablet  Self No No   Sig: TAKE ONE TABLET BY MOUTH ONE TIME DAILY      Facility-Administered Medications: None       Past Medical History:   Diagnosis Date    Depression     GERD (gastroesophageal reflux disease)     History of chemotherapy     right breast 1996    History of echocardiogram 02/14/2018    EF 0 55 (55%), trace mitral regurgitation   Hx of radiation therapy     2017  left breast    Hyperlipidemia     Macular degeneration     Malignant neoplasm of overlapping sites of bladder ([address] )     Sick sinus syndrome ([address] )     Small bowel volvulus ([address] )        Past Surgical History:   Procedure Laterality Date    APPENDECTOMY      BREAST LUMPECTOMY Bilateral     CARDIAC PACEMAKER PLACEMENT Left 07/05/2018    [facility]    HYSTERECTOMY      LAPAROTOMY N/A 08/10/2021    Procedure: LAPAROTOMY EXPLORATORY,  SMALL BOWEL RESECTION;  Surgeon: Steven Aguero MD;  Location: [facility] MAIN OR;  Service: General    RI CYSTOURETHROSCOPY,FULGUR <0 5 CM LESN N/A 07/02/2021    Procedure: CYSTOSCOPY, TRANSURETHRAL RESECTION OF MEDIUM BLADDER TUMOR (TURBT);   Surgeon: Марина Ramey MD;  [facility];  Service: Urology    SMALL INTESTINE SURGERY         Family History   Problem Relation Age of Onset    Alzheimer's disease Father     Uterine cancer Daughter      I have reviewed and agree with the history as documented  E-Cigarette/Vaping    E-Cigarette Use Never User      E-Cigarette/Vaping Substances    Nicotine No     THC No     CBD No     Flavoring No     Other No     Unknown No      Social History     Tobacco Use    Smoking status: Never Smoker    Smokeless tobacco: Never Used   Vaping Use    Vaping Use: Never used   Substance Use Topics    Alcohol use: Never    Drug use: No       Review of Systems   Constitutional: Negative for appetite change, chills, fatigue and fever  HENT: Negative for sneezing and sore throat  Eyes: Negative for visual disturbance  Respiratory: Negative for cough, choking, chest tightness, shortness of breath and wheezing  Cardiovascular: Positive for leg swelling  Negative for chest pain and palpitations  Gastrointestinal: Positive for abdominal pain  Negative for constipation, diarrhea, nausea and vomiting  Genitourinary: Negative for difficulty urinating and dysuria  Musculoskeletal: Positive for back pain  Neurological: Negative for dizziness, weakness, light-headedness, numbness and headaches  All other systems reviewed and are negative  Physical Exam  Physical Exam  Vitals and nursing note reviewed  Constitutional:       General: She is not in acute distress  Appearance: She is well-developed  She is not diaphoretic  HENT:      Head: Normocephalic and atraumatic  Eyes:      Pupils: Pupils are equal, round, and reactive to light  Neck:      Vascular: No JVD  Trachea: No tracheal deviation  Cardiovascular:      Rate and Rhythm: Normal rate and regular rhythm  Heart sounds: Normal heart sounds  No murmur heard  No friction rub  No gallop  Pulmonary:      Effort: Pulmonary effort is normal  No respiratory distress  Breath sounds: Normal breath sounds  No wheezing or rales     Abdominal:      General: Bowel sounds are normal  There is no distension  Palpations: Abdomen is soft  Tenderness: There is generalized abdominal tenderness  There is no guarding or rebound  Musculoskeletal:      Comments: Midline L-spine tenderness   Skin:     General: Skin is warm and dry  Coloration: Skin is not pale  Comments: Left lower extremity swelling  Neurological:      Mental Status: She is alert and oriented to person, place, and time  Cranial Nerves: No cranial nerve deficit  Motor: No abnormal muscle tone     Psychiatric:         Behavior: Behavior normal          Vital Signs  ED Triage Vitals   Temperature Pulse Respirations Blood Pressure SpO2   03/30/22 1644 03/30/22 1644 03/30/22 1644 03/30/22 1644 03/30/22 1644   97 5 °F (36 4 °C) 75 18 110/68 96 %      Temp Source Heart Rate Source Patient Position - Orthostatic VS BP Location FiO2 (%)   03/30/22 1644 03/30/22 1644 03/30/22 1644 03/30/22 1644 --   Oral Monitor Lying Right arm       Pain Score       03/30/22 1708       9           Vitals:    04/02/22 2214 04/02/22 2214 04/03/22 0719 04/03/22 1608   BP: 122/63 122/63 126/65 117/72   Pulse: 65  76 98   Patient Position - Orthostatic VS:             Visual Acuity  Visual Acuity      Most Recent Value   L Pupil Size (mm) 3   R Pupil Size (mm) 3   L Pupil Shape Round   R Pupil Shape Round          ED Medications  Medications   oxyCODONE (ROXICODONE) IR tablet 2 5 mg (has no administration in time range)   oxyCODONE (ROXICODONE) IR tablet 5 mg (5 mg Oral Given 4/3/22 1419)   HYDROmorphone (DILAUDID) injection 0 5 mg (0 5 mg Intravenous Given 3/31/22 5626)   heparin (porcine) 25,000 units in D5W 250 mL infusion (premix) (21 Units/kg/hr × 70 kg (Order-Specific) Intravenous New Bag 4/3/22 1830)   aluminum-magnesium hydroxide-simethicone (MYLANTA) oral suspension 30 mL (30 mL Oral Given 4/2/22 1840)   polyethylene glycol (MIRALAX) packet 17 g (17 g Oral Refused 4/3/22 0906)   ondansetron (ZOFRAN) injection 4 mg (has no administration in time range)   acetaminophen (TYLENOL) tablet 650 mg (650 mg Oral Given 4/1/22 2119)   sodium chloride tablet 3 g (3 g Oral Given 4/3/22 1612)   morphine (PF) 4 mg/mL injection 4 mg (4 mg Intravenous Given 3/30/22 1708)   sodium chloride 0 9 % bolus 1,000 mL (0 mL Intravenous Stopped 3/30/22 1928)   sodium chloride 0 9 % bolus 1,000 mL (0 mL Intravenous Stopped 3/30/22 1928)   iohexol (OMNIPAQUE) 350 MG/ML injection (SINGLE-DOSE) 100 mL (100 mL Intravenous Given 3/30/22 2100)   desmopressin (DDAVP) injection 2 mcg (2 mcg Intravenous Given 4/1/22 2153)   tolvaptan (SAMSCA) split tablet 15 mg (15 mg Oral Given 4/2/22 2144)   tolvaptan (SAMSCA) split tablet 15 mg (15 mg Oral Given 4/3/22 1012)   tolvaptan (SAMSCA) tablet 30 mg (30 mg Oral Given 4/3/22 1416)       Diagnostic Studies  Results Reviewed     Procedure Component Value Units Date/Time    UA w Reflex to Microscopic w Reflex to Culture [[id number]]  (Abnormal) Collected: 03/31/22 1203    Lab Status: Final result Specimen: Urine, Other Updated: 03/31/22 1210     Color, UA Yellow     Clarity, UA Slightly Cloudy     Specific [place], UA 1 010     pH, UA 7 0     Leukocytes, UA Moderate     Nitrite, UA Positive     Protein, UA Trace mg/dl      Glucose, UA Negative mg/dl      Ketones, UA Negative mg/dl      Urobilinogen, UA 0 2 E U /dl      Bilirubin, UA Negative     Blood, UA Moderate    APTT [[id number]]  (Abnormal) Collected: 03/30/22 2132    Lab Status: Final result Specimen: Blood from Arm, Left Updated: 03/30/22 2229     PTT >210 seconds     Protime-INR [[id number]]  (Normal) Collected: 03/30/22 2132    Lab Status: Final result Specimen: Blood from Arm, Left Updated: 03/30/22 2229     Protime 14 5 seconds      INR 1 18    CBC [[id number]]  (Abnormal) Collected: 03/30/22 2132    Lab Status: Final result Specimen: Blood from Arm, Left Updated: 03/30/22 2136     WBC 11 95 Thousand/uL      RBC 2 89 Million/uL      Hemoglobin 8 3 g/dL      Hematocrit 25 8 %      MCV 89 fL      MCH 28 7 pg      MCHC 32 2 g/dL      RDW 13 7 %      Platelets 705 Thousands/uL      MPV 8 2 fL     Comprehensive metabolic panel [[id number]]  (Abnormal) Collected: 03/30/22 1701    Lab Status: Final result Specimen: Blood from Arm, Left Updated: 03/30/22 1741     Sodium 124 mmol/L      Potassium 4 2 mmol/L      Chloride 88 mmol/L      CO2 28 mmol/L      ANION GAP 8 mmol/L      BUN 36 mg/dL      Creatinine 1 71 mg/dL      Glucose 132 mg/dL      Calcium 11 1 mg/dL      Corrected Calcium 12 2 mg/dL      AST 28 U/L      ALT 18 U/L      Alkaline Phosphatase 90 U/L      Total Protein 7 3 g/dL      Albumin 2 6 g/dL      Total Bilirubin 0 28 mg/dL      eGFR 27 ml/min/1 73sq m     Narrative:      National Kid[employer] Disease Foundation guidelines for Chronic Kidney Disease (CKD):     Stage 1 with normal or high GFR (GFR > 90 mL/min/1 73 square meters)    Stage 2 Mild CKD (GFR = 60-89 mL/min/1 73 square meters)    Stage 3A Moderate CKD (GFR = 45-59 mL/min/1 73 square meters)    Stage 3B Moderate CKD (GFR = 30-44 mL/min/1 73 square meters)    Stage 4 Severe CKD (GFR = 15-29 mL/min/1 73 square meters)    Stage 5 End Stage CKD (GFR <15 mL/min/1 73 square meters)  Note: GFR calculation is accurate only with a steady state creatinine    Lipase [[id number]]  (Normal) Collected: 03/30/22 1701    Lab Status: Final result Specimen: Blood from Arm, Left Updated: 03/30/22 1724     Lipase 187 u/L     CBC and differential [[id number]]  (Abnormal) Collected: 03/30/22 1701    Lab Status: Final result Specimen: Blood from Arm, Left Updated: 03/30/22 1707     WBC 14 50 Thousand/uL      RBC 3 38 Million/uL      Hemoglobin 9 6 g/dL      Hematocrit 30 0 %      MCV 89 fL      MCH 28 4 pg      MCHC 32 0 g/dL      RDW 13 9 %      MPV 8 5 fL      Platelets 516 Thousands/uL      nRBC 0 /100 WBCs      Neutrophils Relative 87 %      Immat GRANS % 2 %      Lymphocytes Relative 5 %      Monocytes Relative 6 %      Eosinophils Relative 0 %      Basophils Relative 0 %      Neutrophils Absolute 12 56 Thousands/µL      Immature Grans Absolute 0 30 Thousand/uL      Lymphocytes Absolute 0 72 Thousands/µL      Monocytes Absolute 0 87 Thousand/µL      Eosinophils Absolute 0 03 Thousand/µL      Basophils Absolute 0 02 Thousands/µL                  CT chest abdomen pelvis w contrast   Final Result by Aruna Mckenzie MD (03/30 2303)      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 with extensive infiltration of the right kidney and invasion of the adjacent adrenal gland, medial aspect of the liver, duodenum and right iliopsoas muscle  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 extending inferiorly and likely involves the iliac vessels with extension to the left lower extremity deep venous system identified on ultrasound       Suspected small pulmonary embolus in the left lower lobe  No large central PE  Confluent periaortic fanny mass with suspected invasion of the anterior L2 vertebral body where there is a subtle cortical erosion    Metastatic lung nodules  Metastatic lesions along the anterior wall of the heart that may appear to involve the myocardium  New mild compression L2 fracture that is that I suspect is pathologic given cortical erosion at the anterior aspect of the vertebral body  No significant bony retropulsion  I personally discussed this study with Kevin Velasquez on 3/30/2022 at 11:00 PM                Workstation performed: [id number]         VAS lower limb venous duplex study, unilateral/limited   Final Result by Lori Guadarrama MD (03/31 8423)                 Procedures  Procedures         ED Course                               SBIRT 20yo+      Most Recent Value   SBIRT (25 yo +)    In order to provide better care to our patients, we are screening all of our patients for alcohol and drug use  Would it be okay to ask you these screening questions?  Yes Filed at: 03/30/2022 1717   Initial Alcohol Screen: US AUDIT-C     1  How often do you have a drink containing alcohol? 0 Filed at: 03/30/2022 1717   2  How many drinks containing alcohol do you have on a typical day you are drinking? 0 Filed at: 03/30/2022 1717   3a  Male UNDER 65: How often do you have five or more drinks on one occasion? 0 Filed at: 03/30/2022 1717   3b  FEMALE Any Age, or MALE 65+: How often do you have 4 or more drinks on one occassion? 0 Filed at: 03/30/2022 1717   Audit-C Score 0 Filed at: 03/30/2022 1717   SETH: How many times in the past year have you    Used an illegal drug or used a prescription medication for non-medical reasons? Never Filed at: 03/30/2022 1717                    MDM  Number of Diagnoses or Management Options  Abdominal pain  DVT (deep venous thrombosis) (HCC)  Hypercalcemia  Diagnosis management comments: 44-year-old female with leg swelling, abdominal and back pain  Suspect DVT with possible IVC thrombus, will give pain medicines, check labs, CT, ultrasound reassess  Workup shows significant IVC thrombus/clot burden, increased tumor burden, hypercalcemia  Patient's pain is better controlled    I discussed case with the palliative care team as well also made some pain medicine recommendations, have started the patient on heparin for the IVC thrombus, will admit to the hospital       Disposition  Final diagnoses:   Abdominal pain   Hypercalcemia   DVT (deep venous thrombosis) ([facility] )     Time reflects when diagnosis was documented in both MDM as applicable and the Disposition within this note     Time User Action Codes Description Comment    3/30/2022 11:40 PM Nicholas [address] [place] [address] [R10 9] Abdominal pain     3/30/2022 11:41 PM Mike Garcia Add [E83 52] Hypercalcemia     3/30/2022 11:41 PM Arnel Peterson [I82 409] DVT (deep venous thrombosis) ([facility] )     3/31/2022  9:35 AM Colletta Neer Add [Z51 5] Palliative care patient     3/31/2022  9:35 AM Colletta Neer Add [G89 3] Cancer related pain     3/31/2022  9:35 AM April Riley [C67 8] Malignant neoplasm of overlapping sites of bladder ([facility] )     3/31/2022  5:09 PM April Riley [E87 1] Hyponatremia       ED Disposition     ED Disposition Condition Date/Time Comment    Admit Stable Wed Mar 30, 2022 11:40 PM Case was discussed with ADEOLA and the patient's admission status was agreed to be Admission Status: observation status to the service of Dr Mateo Kemp  Follow-up Information    None         Current Discharge Medication List      START taking these medications    Details   apixaban (Eliquis) 5 mg Take 2 tablets (10 mg total) by mouth 2 (two) times a day for 7 days, THEN 1 tablet (5 mg total) 2 (two) times a day for 23 days  Qty: 74 tablet, Refills: 0    Comments: Eliquis Starter Pack  Associated Diagnoses: DVT (deep venous thrombosis) ([facility])         CONTINUE these medications which have NOT CHANGED    Details   acetaminophen (TYLENOL) 500 mg tablet Take 1 tablet (500 mg total) by mouth every 6 (six) hours as needed for mild pain Max Daily: 3,000 mg    Associated Diagnoses: Malignant neoplasm of overlapping sites of bladder ([facility] );  Cancer related pain; Palliative care patient      anastrozole (ARIMIDEX) 1 mg tablet Take 1 mg by mouth daily with lunch       Apoaequorin (Prevagen) 10 MG CAPS Take 10 mg by mouth daily in the early morning       atorvastatin (LIPITOR) 40 mg tablet TAKE ONE TABLET BY MOUTH EVERY DAY AT BEDTIME  Qty: 90 tablet, Refills: 0    Associated Diagnoses: Un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diabetes mellitus with hyperglycemia ([facility])      bisacodyl (DULCOLAX) 10 mg suppository Insert 1 suppository (10 mg total) into the rectum daily as needed for constipation  Qty: 12 suppository, Refills: 0    Associated Diagnoses: Volvulus of small intestine (HCC)      Cholecalciferol 25 MCG (1000 UT) tablet Take 1,000 Units by mouth daily      DOCOSAHEXAENOIC ACID PO Take 2 capsules by mouth daily      docusate sodium (COLACE) 100 mg capsule Take 1 capsule (100 mg total) by mouth 3 (three) times a day for 7 days  Qty: 21 capsule, Refills: 0    Associated Diagnoses: Bladder tumor      ferrous sulfate 324 (65 Fe) mg Daily  Qty: 60 tablet, Refills: 5    Associated Diagnoses: Gastroesophageal reflux disease without esophagitis; Un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diabetes mellitus with hyperglycemia ([facility] ); Iron deficiency anemia due to chronic blood loss      ibuprofen (MOTRIN) 400 mg tablet Take 1 tablet (400 mg total) by mouth every 6 (six) hours as needed for moderate pain  Refills: 0    Associated Diagnoses: Volvulus of small intestine (HCC)      metFORMIN (GLUCOPHAGE) 500 mg tablet TAKE ONE TABLET BY MOUTH ONE TIME DAILY  Qty: 90 tablet, Refills: 0    Associated Diagnoses: Un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diabetes mellitus with hyperglycemia (HCC)      naloxone (NARCAN) 4 mg/0 1 mL nasal spray Administer 1 spray into a nostril  If no response after 2-3 minutes, give another dose in the other nostril using a new spray  Qty: 1 each, Refills: 1    Associated Diagnoses: Malignant neoplasm of overlapping sites of bladder ([facility] ); Cancer related pain; Palliative care patient      oxyCODONE (ROXICODONE) 5 mg/5 mL solution Take 2 5 mL (2 5 mg total) by mouth every 8 (eight) hours as needed (cancer-related pain) Max Daily Amount: 7 5 mg  Qty: 200 mL, Refills: 0    Associated Diagnoses: Malignant neoplasm of overlapping sites of bladder ([place] [address] );  Cancer related pain; Palliative care patient      polyethylene glycol (MIRALAX) 17 g packet Take 17 g by mouth daily as needed (constipation)  Refills: 0    Associated Diagnoses: Volvulus of small intestine (HCC)      polyethylene glycol-propylene glycol (SYSTANE) 0 4-0 3 % Apply 4 drops to eye 2 (two) times a day      potassium chloride (Klor-Con) 10 mEq tablet TAKE TWO TABLETS BY MOUTH DAILY  Qty: 180 tablet, Refills: 0    Associated Diagnoses: Essential hypertension      sertraline (ZOLOFT) 50 mg tablet TAKE ONE TABLET BY MOUTH ONE TIME DAILY  Qty: 90 tablet, Refills: 0    Associated Diagnoses: Un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diabetes mellitus with hyperglycemia ([facility])      triamterene-hydrochlorothiazide (MAXZIDE-25) 37 5-25 mg per tablet TAKE ONE TABLET BY MOUTH ONE TIME DAILY  Qty: 90 tablet, Refills: 0    Associated Diagnoses: Un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diabetes mellitus with hyperglycemia ([facility] )             No discharge procedures on file      PDMP Review       Value Time User    PDMP Reviewed  Yes 4/1/2022 10:50 AM Ivan Christy MD          ED Provider  Electronically Signed by           Miesha Carter MD  04/03/22 8222

## 2022-04-03 NOTE — PROGRESS NOTES
[facility]  Progress Note - Neyda Dong 1935, 82 y o  female MRN: [id number]  Unit/Bed#: -[facility] Encounter: [id number]  Primary Care Provider: Ashwin Rojo DO   Date and time admitted to hospital: 3/30/2022  4:36 PM    * Cancer related pain  Assessment & Plan  · CT shows progression of disease as well as extensive DVT in IVC/left lower extremity  · Palliative care on board  · Continue pain analgesics regimen as recommended for palliative    DVT (deep venous thrombosis) (HCC)  Assessment & Plan  · B/l doppler LE: acute- subacute occlusive thrombus noted in the common femoral vein, deep femoral vein, and proximal to distal superficial femoral vein  acute-subacute occlusive thrombophlebitis noted in the great saphenous vein from proximal to distal thigh  No RLE DVT  · CT c/a/p: 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 extending inferiorly and likely involves the iliac vessels with extension to the left lower extremity  Suspected small PE in LLL  No large central PE    · Provoked secondary to underlying malignancy  · Discussion had with patient's daughter  Her preference is to not continue on anticoagulation upon discharge given patient is a fall risk  · While hospitalized will continue on IV heparin drip for now    Hyponatremia  Assessment & Plan  · Nephrology following  · Off IVF given worsening NA  · Received tolvaptan x 2 doses (4/2 and 4/3)   · On salt tablets  · Continue serial BMP    Malignant neoplasm of overlapping sites of bladder [facility])  Assessment & Plan  · Malignant High-grade invasive urethral carcinoma; 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  · Patient elected for palliative care does not wish to pursue surgical or oncologic intervention  · Palliative care on board  · Extensive discussion with patient's daughter  They are not ready for hospice    The hope is for possible rehabilitation and then after which will likely transition to home hospice    Hypercalcemia  Assessment & Plan  · Acute on chronic elevation, likely due to malignancy  · Nephrology following  · Improving  · Continue IVF hydration    Compression fracture of L2 lumbar vertebra [facility])  Assessment & Plan  · Suspect pathological fracture given underlying malignancy  · Spoke at length with patient's daughter, does not wish for invasive intervention  She also does not wish for the patient to wear back brace for comfort purposes  Pyuria  Assessment & Plan  · Urine culture revealing Staph coag-negative  Also from prior urine culture in September 2021  · Likely colonization  · Denies  symptoms  · Monitor off antibiotics    TILA (acute kidney injury) ([facility] [address] )  Assessment & Plan  · Multifactorial, Secondary to underlying malignancy as infiltrating renal mass, contrast induced nephropathy, hypercalcemia  · Nephrology on board  · Avoid nephrotoxins, renally dose meds    VTE Pharmacologic Prophylaxis:   Pharmacologic: Heparin Drip  Mechanical VTE Prophylaxis in Place: No    Patient Centered Rounds: I have performed bedside rounds with nursing staff today  Discussions with Specialists or Other Care Team Provider:     Education and Discussions with Family / Patient: Patient and her dtr    Time Spent for Care: 30 minutes  More than 50% of total time spent on counseling and coordination of care as described above  Current Length of Stay: 3 day(s)    Current Patient Status: Inpatient   Certification Statement: The patient will continue to require additional inpatient hospital stay due to hyponatremia, hypercalcemia, pain controlm thromboembolism    Discharge Plan: to SNF    Code Status: Level 3 - DNAR and DNI      Subjective:   Was constipated now having liquid stool   Wanted to give more laxetives but wanted to hold off for now  Appeared slighty confused but then quickly oriented herself  Expressed she was glad her great grandsons were able to visit yesterday    Objective:     Vitals:   Temp (24hrs), Av °F (36 7 °C), Min:97 9 °F (36 6 °C), Max:98 °F (36 7 °C)    Temp:  [97 9 °F (36 6 °C)-98 °F (36 7 °C)] 97 9 °F (36 6 °C)  HR:  [65-86] 76  Resp:  [18] 18  BP: (118-126)/(63-65) 126/65  SpO2:  [93 %-94 %] 93 %  Body mass index is 30 5 kg/m²  Input and Output Summary (last 24 hours): Intake/Output Summary (Last 24 hours) at 4/3/2022 1318  Last data filed at 4/3/2022 0436  Gross per 24 hour   Intake 902 9 ml   Output 1400 ml   Net -497 1 ml       Physical Exam:     Physical Exam  Cardiovascular:      Rate and Rhythm: Normal rate and regular rhythm  Pulses: Normal pulses  Heart sounds: Normal heart sounds  No murmur heard  Pulmonary:      Effort: Pulmonary effort is normal  No respiratory distress  Breath sounds: Normal breath sounds  No wheezing or rales  Abdominal:      General: Abdomen is flat  Bowel sounds are normal  There is no distension  Palpations: Abdomen is soft  Tenderness: There is no abdominal tenderness  There is no guarding  Musculoskeletal:      Cervical back: Normal range of motion and neck supple  Right lower leg: No edema  Left lower leg: Edema present  Skin:     General: Skin is warm and dry  Neurological:      General: No focal deficit present  Mental Status: She is alert  Cranial Nerves: No cranial nerve deficit  Motor: No weakness  Additional Data:     Labs:    Results from last 7 days   Lab Units 22  0625 22  2132 22  1701   WBC Thousand/uL 11 02*   < > 14 50*   HEMOGLOBIN g/dL 8 8*   < > 9 6*   HEMATOCRIT % 27 2*   < > 30 0*   PLATELETS Thousands/uL 264   < > 240   NEUTROS PCT %  --   --  87*   LYMPHS PCT %  --   --  5*   MONOS PCT %  --   --  6   EOS PCT %  --   --  0    < > = values in this interval not displayed       Results from last 7 days   Lab Units 22  0815 22  1323 22  1701   SODIUM mmol/L 127*   < > 124* POTASSIUM mmol/L 3 7   < > 4 2   CHLORIDE mmol/L 92*   < > 88*   CO2 mmol/L 25   < > 28   BUN mg/dL 23   < > 36*   CREATININE mg/dL 1 27   < > 1 71*   ANION GAP mmol/L 10   < > 8   CALCIUM mg/dL 10 7*   < > 11 1*   ALBUMIN g/dL  --   --  2 6*   TOTAL BILIRUBIN mg/dL  --   --  0 28   ALK PHOS U/L  --   --  90   ALT U/L  --   --  18   AST U/L  --   --  28   GLUCOSE RANDOM mg/dL 251*   < > 132    < > = values in this interval not displayed  Results from last 7 days   Lab Units 03/30/22  2132   INR  1 18                       * I Have Reviewed All Lab Data Listed Above  * Additional Pertinent Lab Tests Reviewed: All Labs Within Last 24 Hours Reviewed    Imaging:    Imaging Reports Reviewed Today Include:   Imaging Personally Reviewed by Myself Includes:      Recent Cultures (last 7 days):     Results from last 7 days   Lab Units 03/31/22  1203   URINE CULTURE  >100,000 cfu/ml Staphylococcus coagulase negative*       Last 24 Hours Medication List:   Current Facility-Administered Medications   Medication Dose Route Frequency Provider Last Rate    acetaminophen  650 mg Oral Q6H PRN Digna Burger MD      aluminum-magnesium hydroxide-simethicone  30 mL Oral Q6H PRN Nathanel Counts, PA-C      heparin (porcine)  3-30 Units/kg/hr (Order-Specific) Intravenous Titrated Antionette Arteaga MD 21 Units/kg/hr (04/03/22 0037)    HYDROmorphone  0 5 mg Intravenous Q4H PRN Antionette Arteaga MD      ondansetron  4 mg Intravenous Q6H PRN Nathanel Counts, PA-C      oxyCODONE  2 5 mg Oral Q4H PRN Antionette Arteaga MD      oxyCODONE  5 mg Oral Q4H PRN Antionette Arteaga MD      polyethylene glycol  17 g Oral Daily Nathanel Counts, PA-C      sodium chloride  3 g Oral 4x Daily (with meals and at bedtime) Hope De La Vega MD      tolvaptan  30 mg Oral Once Hope De La Vega MD          Today, Patient Was Seen By: Pierre Pantoja DO    ** Please Note: Dictation voice to text software may have been used in the creation of this document  **

## 2022-04-03 NOTE — ASSESSMENT & PLAN NOTE
· Acute on chronic elevation, likely due to malignancy  · Nephrology following  · Improving  · Continue IVF hydration

## 2022-04-03 NOTE — ASSESSMENT & PLAN NOTE
· Nephrology following  · Off IVF given worsening NA  · Received tolvaptan x 2 doses (4/2 and 4/3)   · On salt tablets  · Continue serial BMP

## 2022-04-03 NOTE — PROGRESS NOTES
NEPHROLOGY PROGRESS NOTE    Patient: Bryant Severe               Sex: female          DOA: 3/30/2022  4:36 PM   YOB: 1935        Age:  82 y o         LOS:  LOS: 3 days   4/3/2022    REASON FOR THE CONSULTATION:      Hyponatremia    SUBJECTIVE     Patient is seen and examined next to the bedside  Complaining of diarrhea  Received tolvaptan yesterday due to worsening hyponatremia  Noted improvement in sodium up to 126    CURRENT MEDICATIONS       Current Facility-Administered Medications:     acetaminophen (TYLENOL) tablet 650 mg, 650 mg, Oral, Q6H PRN, Marlow Alpers, MD, 650 mg at 04/01/22 2119    aluminum-magnesium hydroxide-simethicone (MYLANTA) oral suspension 30 mL, 30 mL, Oral, Q6H PRN, Sveta Cruz PA-C, 30 mL at 04/02/22 1840    heparin (porcine) 25,000 units in D5W 250 mL infusion (premix), 3-30 Units/kg/hr (Order-Specific), Intravenous, Titrated, Venessa Hinojosa MD, Last Rate: 14 7 mL/hr at 04/03/22 0037, 21 Units/kg/hr at 04/03/22 0037    HYDROmorphone (DILAUDID) injection 0 5 mg, 0 5 mg, Intravenous, Q4H PRN, Venessa Hinojosa MD, 0 5 mg at 03/31/22 2356    ondansetron (ZOFRAN) injection 4 mg, 4 mg, Intravenous, Q6H PRN, Sveta Cruz PA-C    oxyCODONE (ROXICODONE) IR tablet 2 5 mg, 2 5 mg, Oral, Q4H PRN, Venessa Hinojosa MD    oxyCODONE (ROXICODONE) IR tablet 5 mg, 5 mg, Oral, Q4H PRN, Venessa Hinojosa MD, 5 mg at 04/02/22 1449    polyethylene glycol (MIRALAX) packet 17 g, 17 g, Oral, Daily, Sveta Cruz PA-C, 17 g at 04/02/22 0902    sodium chloride tablet 3 g, 3 g, Oral, 4x Daily (with meals and at bedtime), Aishwarya Ness MD, 3 g at 04/03/22 0906    REVIEW OF SYSTEMS     Review of Systems   Constitutional: Positive for fatigue  HENT: Negative  Eyes: Negative  Respiratory: Negative  Cardiovascular: Negative  Gastrointestinal: Positive for diarrhea  Endocrine: Negative  Genitourinary: Negative  Musculoskeletal: Negative  Skin: Negative  Allergic/Immunologic: Negative  Neurological: Negative  Hematological: Negative  All other systems reviewed and are negative  OBJECTIVE     Current Weight: Weight - Scale: 78 1 kg (172 lb 2 9 oz)  Vitals:    04/03/22 0719   BP: 126/65   Pulse: 76   Resp: 18   Temp: 97 9 °F (36 6 °C)   SpO2: 93%     Body mass index is 30 5 kg/m²  Intake/Output Summary (Last 24 hours) at 4/3/2022 1012  Last data filed at 4/3/2022 0436  Gross per 24 hour   Intake 902 9 ml   Output 1400 ml   Net -497 1 ml       PHYSICAL EXAMINATION     Physical Exam  Constitutional:       Appearance: She is well-developed  She is ill-appearing  HENT:      Head: Normocephalic and atraumatic  Eyes:      Pupils: Pupils are equal, round, and reactive to light  Cardiovascular:      Rate and Rhythm: Normal rate and regular rhythm  Heart sounds: Murmur heard  Pulmonary:      Effort: Pulmonary effort is normal    Abdominal:      General: Bowel sounds are normal       Palpations: Abdomen is soft  Musculoskeletal:         General: Normal range of motion  Cervical back: Neck supple  Skin:     General: Skin is warm  Neurological:      Mental Status: She is alert and oriented to person, place, and time             LAB RESULTS     Results from last 7 days   Lab Units 04/03/22  0815 04/03/22  0625 04/03/22  0000 04/02/22  1620 04/02/22  0924 04/02/22  0533 04/02/22  0040 04/01/22  2150 04/01/22  1639 04/01/22  1002 04/01/22  0519 03/31/22  1323 03/31/22  0536 03/30/22  2132 03/30/22  1701   WBC Thousand/uL  --  11 02*  --   --   --  11 06*  --   --   --   --  12 61*  --  12 88* 11 95* 14 50*   HEMOGLOBIN g/dL  --  8 8*  --   --   --  8 9*  --   --   --   --  9 3*  --  9 4* 8 3* 9 6*   HEMATOCRIT %  --  27 2*  --   --   --  28 1*  --   --   --   --  28 1*  --  29 4* 25 8* 30 0*   PLATELETS Thousands/uL  --  264  --   --   --  257  --   --   --   --  255  --  233 207 240   POTASSIUM mmol/L 3 7  --  3 9 3 6 3 9  --  3 6 3 8 3 5   < >  --    < >  --   --  4 2   CHLORIDE mmol/L 92*  --  95* 86* 92*  --  93* 93* 118*   < >  --    < >  --   --  88*   CO2 mmol/L 25  --  23 23 25  --  25 25 23   < >  --    < >  --   --  28   BUN mg/dL 23  --  23 23 26*  --  25 25 22   < >  --    < >  --   --  36*   CREATININE mg/dL 1 27  --  1 20 1 40* 1 38*  --  1 34* 1 43* 1 13   < >  --    < >  --   --  1 71*   EGFR ml/min/1 73sq m 38  --  41 34 35  --  36 33 44   < >  --    < >  --   --  27   CALCIUM mg/dL 10 7*  --  10 6* 10 6* 11 5*  --  10 4* 10 7* 8 8   < >  --    < >  --   --  11 1*    < > = values in this interval not displayed  RADIOLOGY RESULTS      Results for orders placed during the hospital encounter of 07/02/18    XR chest portable    Narrative  INDICATION:  Status post pacemaker insertion  ORDERING PROVIDER:  Willem Jimenez  TECHNIQUE:  Frontal chest was obtained at 10:22 hours  COMPARISON:  None Available  FINDINGS: Mild cardiomegaly  Mild hypoinflation  No gross consolidative  pneumonia, effusion, or pneumothorax  Left-sided pacemaker, leads appear  intact  Subtle hilar vascular prominence  Impression  1  No evidence for pneumothorax  2   Mild cardiomegaly and subtle hilar vascular prominence  ASSESSMENT/PLAN     79-year-old female with past medical history of bladder cancer status post TURBT and administration of BCG in July 2021, progression of malignancy to upper tract urothelial carcinoma, diabetes mellitus type 2, breast cancer, dyslipidemia, depression, sick sinus syndrome, volvulus who presents to our facility on 03/30 with complaint of generalized pain along with left lower extremity edema  1  Hyponatremia:  Presented to our facility on 03/30 with serum sodium 124 mEq per L   -etiology thought to be secondary to decreased solute intake as suggested by urine studies as well as component of SIADH  -noted low urine sodium, elevated serum uric acid and normal urine osmolality    -current sodium is 126 meq/L  - received tolvaptan yesterday after sodium worsened to 121 meq/L  - will administer tolvaptan 30 mg x 1 dose  - target Na of 133 meq/L by AM  - place patient on fluid restriction up to 1 2 L      2  Acute kidney injury:  Present on admission  Baseline serum creatinine of 0 7-0 9   -presented with serum creatinine of 1 71 and elevated  -current creatinine is 1 2 and improving   -etiology of acute kidney injury likely contrast induced nephropathy along with hypercalcemia induced renal vasoconstriction    3  Hypercalcemia:  Calcium peaked up to 11 5  Receive Normal saline which resulted in improvement in S Ca to 10 7  Fluid d/c due to worsening Na  4  High-grade invasive urothelial carcinoma:  Noted extension of she went to right kidney   -this has the potential for causing worsening renal function  5  Thromboembolism:  Presented with left lower extremity swelling with findings of left lower extremity DVT along with small pulmonary embolism as noted on CT scan        Deann Delgado MD  Nephrology  4/3/2022

## 2022-04-03 NOTE — ASSESSMENT & PLAN NOTE
· B/l doppler LE: acute- subacute occlusive thrombus noted in the common femoral vein, deep femoral vein, and proximal to distal superficial femoral vein  acute-subacute occlusive thrombophlebitis noted in the great saphenous vein from proximal to distal thigh  No RLE DVT  · CT c/a/p: 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 extending inferiorly and likely involves the iliac vessels with extension to the left lower extremity  Suspected small PE in LLL  No large central PE    · Provoked secondary to underlying malignancy  · Discussion had with patient's daughter  Her preference is to not continue on anticoagulation upon discharge given patient is a fall risk    · While hospitalized will continue on IV heparin drip for now

## 2022-04-03 NOTE — ASSESSMENT & PLAN NOTE
· CT shows progression of disease as well as extensive DVT in IVC/left lower extremity  · Palliative care on board  · Continue pain analgesics regimen as recommended for palliative

## 2022-04-03 NOTE — ASSESSMENT & PLAN NOTE
· Urine culture revealing Staph coag-negative    Also from prior urine culture in September 2021  · Likely colonization  · Denies  symptoms  · Monitor off antibiotics

## 2022-04-03 NOTE — ASSESSMENT & PLAN NOTE
· Malignant High-grade invasive urethral carcinoma; 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  · Patient elected for palliative care does not wish to pursue surgical or oncologic intervention  · Palliative care on board  · Extensive discussion with patient's daughter  They are not ready for hospice    The hope is for possible rehabilitation and then after which will likely transition to home hospice

## 2022-04-04 PROBLEM — E11.9 DM II (DIABETES MELLITUS, TYPE II), CONTROLLED (HCC): Status: ACTIVE | Noted: 2022-04-04

## 2022-04-04 LAB
ANION GAP SERPL CALCULATED.3IONS-SCNC: 7 MMOL/L (ref 4–13)
ANION GAP SERPL CALCULATED.3IONS-SCNC: 7 MMOL/L (ref 4–13)
ANION GAP SERPL CALCULATED.3IONS-SCNC: 9 MMOL/L (ref 4–13)
APTT PPP: 64 SECONDS (ref 23–37)
BUN SERPL-MCNC: 18 MG/DL (ref 5–25)
BUN SERPL-MCNC: 19 MG/DL (ref 5–25)
BUN SERPL-MCNC: 19 MG/DL (ref 5–25)
CALCIUM SERPL-MCNC: 11.6 MG/DL (ref 8.3–10.1)
CALCIUM SERPL-MCNC: 11.6 MG/DL (ref 8.3–10.1)
CALCIUM SERPL-MCNC: 12.2 MG/DL (ref 8.3–10.1)
CHLORIDE SERPL-SCNC: 100 MMOL/L (ref 100–108)
CHLORIDE SERPL-SCNC: 98 MMOL/L (ref 100–108)
CHLORIDE SERPL-SCNC: 98 MMOL/L (ref 100–108)
CO2 SERPL-SCNC: 25 MMOL/L (ref 21–32)
CO2 SERPL-SCNC: 26 MMOL/L (ref 21–32)
CO2 SERPL-SCNC: 26 MMOL/L (ref 21–32)
CREAT SERPL-MCNC: 1.22 MG/DL (ref 0.6–1.3)
CREAT SERPL-MCNC: 1.35 MG/DL (ref 0.6–1.3)
CREAT SERPL-MCNC: 1.4 MG/DL (ref 0.6–1.3)
GFR SERPL CREATININE-BSD FRML MDRD: 34 ML/MIN/1.73SQ M
GFR SERPL CREATININE-BSD FRML MDRD: 36 ML/MIN/1.73SQ M
GFR SERPL CREATININE-BSD FRML MDRD: 40 ML/MIN/1.73SQ M
GLUCOSE SERPL-MCNC: 117 MG/DL (ref 65–140)
GLUCOSE SERPL-MCNC: 118 MG/DL (ref 65–140)
GLUCOSE SERPL-MCNC: 127 MG/DL (ref 65–140)
GLUCOSE SERPL-MCNC: 133 MG/DL (ref 65–140)
GLUCOSE SERPL-MCNC: 137 MG/DL (ref 65–140)
GLUCOSE SERPL-MCNC: 164 MG/DL (ref 65–140)
POTASSIUM SERPL-SCNC: 3.8 MMOL/L (ref 3.5–5.3)
POTASSIUM SERPL-SCNC: 3.9 MMOL/L (ref 3.5–5.3)
POTASSIUM SERPL-SCNC: 3.9 MMOL/L (ref 3.5–5.3)
SODIUM SERPL-SCNC: 131 MMOL/L (ref 136–145)
SODIUM SERPL-SCNC: 132 MMOL/L (ref 136–145)
SODIUM SERPL-SCNC: 133 MMOL/L (ref 136–145)

## 2022-04-04 PROCEDURE — 97163 PT EVAL HIGH COMPLEX 45 MIN: CPT

## 2022-04-04 PROCEDURE — 99232 SBSQ HOSP IP/OBS MODERATE 35: CPT | Performed by: INTERNAL MEDICINE

## 2022-04-04 PROCEDURE — 82948 REAGENT STRIP/BLOOD GLUCOSE: CPT

## 2022-04-04 PROCEDURE — 85730 THROMBOPLASTIN TIME PARTIAL: CPT | Performed by: INTERNAL MEDICINE

## 2022-04-04 PROCEDURE — 97167 OT EVAL HIGH COMPLEX 60 MIN: CPT

## 2022-04-04 PROCEDURE — 80048 BASIC METABOLIC PNL TOTAL CA: CPT | Performed by: INTERNAL MEDICINE

## 2022-04-04 PROCEDURE — 99232 SBSQ HOSP IP/OBS MODERATE 35: CPT | Performed by: STUDENT IN AN ORGANIZED HEALTH CARE EDUCATION/TRAINING PROGRAM

## 2022-04-04 RX ORDER — FERROUS SULFATE 325(65) MG
325 TABLET ORAL
Status: DISCONTINUED | OUTPATIENT
Start: 2022-04-05 | End: 2022-04-06 | Stop reason: HOSPADM

## 2022-04-04 RX ORDER — ANASTROZOLE 1 MG/1
1 TABLET ORAL
Status: DISCONTINUED | OUTPATIENT
Start: 2022-04-04 | End: 2022-04-06 | Stop reason: HOSPADM

## 2022-04-04 RX ORDER — MINERAL OIL AND PETROLATUM 150; 830 MG/G; MG/G
OINTMENT OPHTHALMIC
Status: DISCONTINUED | OUTPATIENT
Start: 2022-04-04 | End: 2022-04-06 | Stop reason: HOSPADM

## 2022-04-04 RX ORDER — ATORVASTATIN CALCIUM 40 MG/1
40 TABLET, FILM COATED ORAL
Status: DISCONTINUED | OUTPATIENT
Start: 2022-04-04 | End: 2022-04-06 | Stop reason: HOSPADM

## 2022-04-04 RX ADMIN — APIXABAN 10 MG: 5 TABLET, FILM COATED ORAL at 13:03

## 2022-04-04 RX ADMIN — Medication 3 G: at 22:46

## 2022-04-04 RX ADMIN — MINERAL OIL AND WHITE PETROLATUM: 150; 830 OINTMENT OPHTHALMIC at 22:51

## 2022-04-04 RX ADMIN — HYDROMORPHONE HYDROCHLORIDE 0.5 MG: 1 INJECTION, SOLUTION INTRAMUSCULAR; INTRAVENOUS; SUBCUTANEOUS at 13:04

## 2022-04-04 RX ADMIN — POLYETHYLENE GLYCOL 3350 17 G: 17 POWDER, FOR SOLUTION ORAL at 08:27

## 2022-04-04 RX ADMIN — OXYCODONE HYDROCHLORIDE 5 MG: 5 TABLET ORAL at 22:45

## 2022-04-04 RX ADMIN — ANASTROZOLE 1 MG: 1 TABLET ORAL at 14:30

## 2022-04-04 RX ADMIN — APIXABAN 10 MG: 5 TABLET, FILM COATED ORAL at 18:14

## 2022-04-04 RX ADMIN — OXYCODONE HYDROCHLORIDE 5 MG: 5 TABLET ORAL at 08:26

## 2022-04-04 RX ADMIN — Medication 3 G: at 18:14

## 2022-04-04 RX ADMIN — ATORVASTATIN CALCIUM 40 MG: 40 TABLET, FILM COATED ORAL at 22:45

## 2022-04-04 RX ADMIN — Medication 3 G: at 13:03

## 2022-04-04 RX ADMIN — Medication 3 G: at 08:26

## 2022-04-04 NOTE — ASSESSMENT & PLAN NOTE
· Urine culture revealing Staph coag-negative    Also from prior urine culture in September 2021  · Likely colonization  · Denies  symptoms  · Monitoring off antibiotics

## 2022-04-04 NOTE — PLAN OF CARE
Problem: OCCUPATIONAL THERAPY ADULT  Goal: Performs self-care activities at highest level of function for planned discharge setting  See evaluation for individualized goals  Description: Treatment Interventions: ADL retraining,UE strengthening/ROM,Functional transfer training,Endurance training,Equipment evaluation/education,Patient/family training,Compensatory technique education,Energy conservation,Activityengagement,Continued evaluation          See flowsheet documentation for full assessment, interventions and recommendations  Note: Limitation: Decreased ADL status,Decreased UE ROM,Decreased Safe judgement during ADL,Decreased endurance,Decreased self-care trans,Decreased high-level ADLs  Prognosis: Good  Assessment: Patient is a 82 y o  female seen for OT evaluation s/p admit to [address] [place] [address] on 3/30/2022 w/Cancer related pain  Commorbidities affecting patient's functional performance at time of assessment include: Bilateral LE DVT, TILA, Malignant neoplasm of overlapping sites of bladder, Hypercalcemia, presented to [facility] with intractable pain  Orders placed for OT evaluation and treatment  Performed at least two patient identifiers during session including name and wristband  Prior to admission, Patient reported independnet with basic self care and mobility, has a supportive friend in community  Patient lives with her Son in a 2 story house, no SAUNDRA , flight of stairs to access main floor and one more flight to access bedroom  Pt ambulates without an AD for household distances  patient does not drive    Personal factors affecting patient at time of initial evaluation include: limited caregiver support, steps to enter, difficulty performing ADLs and difficulty performing IADLs  Upon evaluation, patient requires minimal  assist for UB ADLs, maximal assist for LB ADLs    Occupational performance is affected by the following deficits: decreased functional use of BUEs, decreased muscle strength, impaired gross motor coordination, dynamic sit/ stand balance deficit with poor standing tolerance time for self care and functional mobility, decreased activity tolerance, increased pain and postural control and postural alignment deficit, requiring external assistance to complete transitional movements  Patient to benefit from continued Occupational Therapy treatment while in the hospital to address deficits as defined above and maximize level of functional independence with ADLs and functional mobility  Occupational Performance areas to address include: grooming , bathing/ shower, dressing, toilet hygiene, transfer to all surfaces, functional ambulation, functional mobility, emergency response, health maintenance, IADLs: safety procedures, IADLs: meal prep/ clean up, Leisure Participation and Social participation  From OT standpoint, recommendation at time of d/c would be Short Term Rehab         OT Discharge Recommendation: Post acute rehabilitation services

## 2022-04-04 NOTE — SOCIAL WORK
Palliative LSW saw patient at the bedside today  LSW appreciates the opportunity to provide patient/family with inpatient emotional support and guidance while patient continues to receive medical attention from the medical team     Topics discussed: Upon arriving into patient room, patient sitting in chair, legs elevated  Patient had reading material in front of her  Patient reported that her daily routine has always included in the morning, reading her daily devotional book which includes passages from the bible and a story that can relate to bible versus  Patient also had her bible on the bedside table and reported that she plans on reading this after her daily readings  Patient hoping she will be able to stay in chair until dinner time  Patient discussed symptoms, reported improvement in pain  Patient reported that she knows that it may never go away completely  Patient reports that she can now feel her left leg which she wasn't previously able to do  Patient discussed missing her grandchildren, sera, and her daughter who has      Areas that need follow-up: ongoing support  Resources given: none  Others present:  [facility]- Dr Flory Sanchez will continue to follow as requested by the medical team, patient, or family

## 2022-04-04 NOTE — PROGRESS NOTES
NEPHROLOGY PROGRESS NOTE    Patient: Susanne Mehta               Sex: female          DOA: 3/30/2022  4:36 PM   YOB: 1935        Age:  82 y o         LOS:  LOS: 4 days   4/4/2022    REASON FOR THE CONSULTATION:      Hyponatremia    SUBJECTIVE     Patient is seen and examined next to the bedside  Sitting up in bed and feels better today      CURRENT MEDICATIONS       Current Facility-Administered Medications:     acetaminophen (TYLENOL) tablet 650 mg, 650 mg, Oral, Q6H PRN, Lexie Hearn MD, 650 mg at 04/01/22 2119    aluminum-magnesium hydroxide-simethicone (MYLANTA) oral suspension 30 mL, 30 mL, Oral, Q6H PRN, Mabel Mendosa PA-C, 30 mL at 04/02/22 1840    anastrozole (ARIMIDEX) tablet 1 mg, 1 mg, Oral, Daily With Lunch, Sherren Jones, DO    Apoaequorin CAPS 10 mg, 10 mg, Oral, Early Morning, Sherren Jones, DO    artificial tear (LUBRIFRESH P M ) ophthalmic ointment, , Both Eyes, HS, Sherren Jones, DO    atorvastatin (LIPITOR) tablet 40 mg, 40 mg, Oral, HS, Radha Villafana DO    [START ON 4/5/2022] ferrous sulfate tablet 325 mg, 325 mg, Oral, Daily With Breakfast, Sherren Jones, DO    heparin (porcine) 25,000 units in D5W 250 mL infusion (premix), 3-30 Units/kg/hr (Order-Specific), Intravenous, Titrated, Juan Medeiros MD, Last Rate: 14 7 mL/hr at 04/03/22 1908, 21 Units/kg/hr at 04/03/22 1908    HYDROmorphone (DILAUDID) injection 0 5 mg, 0 5 mg, Intravenous, Q4H PRN, Juan Medeiros MD, 0 5 mg at 03/31/22 8126    insulin lispro (HumaLOG) 100 units/mL subcutaneous injection 1-5 Units, 1-5 Units, Subcutaneous, TID AC **AND** Fingerstick Glucose (POCT), , , TID AC, Radha Villafana DO    insulin lispro (HumaLOG) 100 units/mL subcutaneous injection 1-5 Units, 1-5 Units, Subcutaneous, HS, Radha Villafana DO    ondansetron [facility]) injection 4 mg, 4 mg, Intravenous, Q6H PRN, Mabel Mendosa PA-C    oxyCODONDONNIE (ROXICODONE) IR tablet 2 5 mg, 2 5 mg, Oral, Q4H PRN, Yudy Lopez MD    oxyCODONE (ROXICODONE) IR tablet 5 mg, 5 mg, Oral, Q4H PRN, Yudy Lopez MD, 5 mg at 04/04/22 0826    polyethylene glycol (MIRALAX) packet 17 g, 17 g, Oral, Daily, Control de Pacientes, Rapport, 17 g at 04/04/22 0827    sodium chloride tablet 3 g, 3 g, Oral, 4x Daily (with meals and at bedtime), Marcia Díaz MD, 3 g at 04/04/22 6381    REVIEW OF SYSTEMS     Review of Systems   Constitutional: Negative  HENT: Negative  Eyes: Negative  Respiratory: Negative  Cardiovascular: Negative  Gastrointestinal: Negative  Endocrine: Negative  Genitourinary: Negative  Musculoskeletal: Negative  Skin: Negative  Allergic/Immunologic: Negative  Neurological: Negative  Hematological: Negative  All other systems reviewed and are negative  OBJECTIVE     Current Weight: Weight - Scale: 80 kg (176 lb 5 9 oz)  Vitals:    04/04/22 0816   BP: 131/70   Pulse: 60   Resp:    Temp: (!) 97 3 °F (36 3 °C)   SpO2: 94%     Body mass index is 31 24 kg/m²  Intake/Output Summary (Last 24 hours) at 4/4/2022 1233  Last data filed at 4/4/2022 0834  Gross per 24 hour   Intake 480 ml   Output 700 ml   Net -220 ml       PHYSICAL EXAMINATION     Physical Exam  Constitutional:       Appearance: She is well-developed  HENT:      Head: Normocephalic and atraumatic  Eyes:      Pupils: Pupils are equal, round, and reactive to light  Cardiovascular:      Rate and Rhythm: Normal rate and regular rhythm  Heart sounds: Normal heart sounds  Pulmonary:      Effort: Pulmonary effort is normal    Abdominal:      General: Bowel sounds are normal       Palpations: Abdomen is soft  Musculoskeletal:         General: Normal range of motion  Cervical back: Neck supple  Right lower leg: Edema present  Left lower leg: Edema present  Skin:     General: Skin is warm     Neurological:      Mental Status: She is alert and oriented to person, place, and time  LAB RESULTS     Results from last 7 days   Lab Units 04/04/22  0516 04/03/22  2135 04/03/22  0815 04/03/22  0625 04/03/22  0000 04/02/22  1620 04/02/22  0924 04/02/22  0533 04/02/22  0040 04/01/22  1002 04/01/22  0519 03/31/22  1323 03/31/22  0536 03/30/22  2132 03/30/22  1701   WBC Thousand/uL  --   --   --  11 02*  --   --   --  11 06*  --   --  12 61*  --  12 88* 11 95* 14 50*   HEMOGLOBIN g/dL  --   --   --  8 8*  --   --   --  8 9*  --   --  9 3*  --  9 4* 8 3* 9 6*   HEMATOCRIT %  --   --   --  27 2*  --   --   --  28 1*  --   --  28 1*  --  29 4* 25 8* 30 0*   PLATELETS Thousands/uL  --   --   --  264  --   --   --  257  --   --  255  --  233 207 240   POTASSIUM mmol/L 3 9 3 8 3 7  --  3 9 3 6 3 9  --  3 6   < >  --    < >  --   --  4 2   CHLORIDE mmol/L 100 96* 92*  --  95* 86* 92*  --  93*   < >  --    < >  --   --  88*   CO2 mmol/L 25 25 25  --  23 23 25  --  25   < >  --    < >  --   --  28   BUN mg/dL 19 21 23  --  23 23 26*  --  25   < >  --    < >  --   --  36*   CREATININE mg/dL 1 22 1 36* 1 27  --  1 20 1 40* 1 38*  --  1 34*   < >  --    < >  --   --  1 71*   EGFR ml/min/1 73sq m 40 35 38  --  41 34 35  --  36   < >  --    < >  --   --  27   CALCIUM mg/dL 11 6* 11 3* 10 7*  --  10 6* 10 6* 11 5*  --  10 4*   < >  --    < >  --   --  11 1*    < > = values in this interval not displayed  RADIOLOGY RESULTS      Results for orders placed during the hospital encounter of 07/02/18    XR chest portable    Narrative  INDICATION:  Status post pacemaker insertion  ORDERING PROVIDER:  Willem Jimenez  TECHNIQUE:  Frontal chest was obtained at 10:22 hours  COMPARISON:  None Available  FINDINGS: Mild cardiomegaly  Mild hypoinflation  No gross consolidative  pneumonia, effusion, or pneumothorax  Left-sided pacemaker, leads appear  intact  Subtle hilar vascular prominence  Impression  1  No evidence for pneumothorax    2   Mild cardiomegaly and subtle hilar vascular prominence  ASSESSMENT/PLAN     82-year-old female with past medical history of bladder cancer status post TURBT and administration of BCG in July 2021, progression of malignancy to upper tract urothelial carcinoma, diabetes mellitus type 2, breast cancer, dyslipidemia, depression, sick sinus syndrome, volvulus who presents to our facility on 03/30 with complaint of generalized pain along with left lower extremity edema  1  Hyponatremia:  Presented to our facility on 03/30 with serum sodium 124 mEq per L   -etiology thought to be secondary to decreased solute intake as suggested by urine studies as well as component of SIADH  -noted low urine sodium, elevated serum uric acid and normal urine osmolality  -current sodium is 132 meq/L after receiving tolvaptan  - keep patient on salt tablets  2  Acute kidney injury:  Present on admission  Baseline serum creatinine of 0 7-0 9   -presented with serum creatinine of 1 71 and elevated  -current creatinine is 1 22 and stable  -etiology of acute kidney injury likely contrast induced nephropathy along with hypercalcemia induced renal vasoconstriction    3  Hypercalcemia:  Calcium is 11 6 and elevated  Etiology likely malignancy  Encouraged PO fluid intake  4  High-grade invasive urothelial carcinoma:  Noted extension of cancer to right kidney   -this has the potential for causing worsening renal function  5  Thromboembolism:  Presented with left lower extremity swelling with findings of left lower extremity DVT along with small pulmonary embolism as noted on CT scan        Hope De La Vega MD  Nephrology  4/4/2022

## 2022-04-04 NOTE — ASSESSMENT & PLAN NOTE
Lab Results   Component Value Date    HGBA1C 5 6 10/26/2021       Recent Labs     04/04/22  1221   POCGLU 164*       Blood Sugar Average: Last 72 hrs:  (P) 164   Hold metformin  Cont ISS  If CBG is well controlled onsider d/c of ISS given eventual goals to pursue comfort  Liberalized DM diet

## 2022-04-04 NOTE — PROGRESS NOTES
Progress note - Palliative and Supportive Care   Deon Bae 82 y o  female [id number]    Patient Active Problem List   Diagnosis    Un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diabetes mellitus with hyperglycemia ([facility] )    Essential hypertension    Hyperlipidemia    Left breast mass    Depression    Hyponatremia    Hypokalemia    Dizziness    Bradycardia    Sick sinus syndrome (HCC)    Cardiac pacemaker in situ    Abnormal ultrasound cardiogram    Anxiety    BMI 33 0-33 9,adult    Breast carcinoma, female, right ([facility] )    GERD (gastroesophageal reflux disease)    Macular degeneration    Malignant neoplasm of upper-inner quadrant of left breast in female, estrogen receptor positive ([facility] )    Personal history of breast cancer    S/P hysterectomy    Memory loss    Medicare annual wellness visit, subsequent    Hypercalcemia    Balance problem    Cough    Malignant neoplasm of overlapping sites of bladder ([place] [address] )    Encounter to discuss test results    Status post small bowel resection    Hearing loss of left ear due to cerumen impaction    Person consulting for explanation of examination or test finding    Cancer related pain    Palliative care patient    DVT (deep venous thrombosis) ([facility] )    TILA (acute kidney injury) ([place] [address] )    Compression fracture of L2 lumbar vertebra (HCC)    Pyuria     Active issues specifically addressed today include:    - intractable cancer-related pain   - hyponatremia, decreased solute intake +/- component of SIADH   - acute/subacute occlusive L CFV/DFV DVT + acute/subacute thrombophlebitis L great saphenous v    - ?occlusive bland infarct v tumor thrombus in infrahepatic IVC with extension to iliac vessels + LLE   - TILA, SCr 1 7 on admission with baseline 0 8-0 9   - hypercalcemia, corrected Ca 12 1 on admission   - bladder cancer s/p TURBT + instillation of mitomycin [07/2021] with 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 carcinoma; marked progression on imaging   - cognitive decline, most likely 2/2 dementia   - debility/deconditioning   - goals of care support    Plan:  #symptom management  #cancer-related pain   - continue APAP 500 mg PO Q6H PRN              - max daily 3000 mg   - continue oxy-IR 2 5/5 mg PO Q4H PRN [mod/severe pain]   - continue hydromorphone 0 5 mg IV Q4H PRN [breakthrough pain]              - OME usage yesterday: 7 5 mg     AVOID morphine, eGFR 40 today     Patient reports pain is tolerable with current regimen  Sitting up in bedside chair comfortably this AM      #OIC   - continue miralax 17 g PO QDaily    No BM today, patient states episode of diarrhea over the weekend, unable to confirm timeframe      #nausea   - continue ondansetron 4 mg IV Q6H PRN    Denies nausea, vomiting  Tolerating PO intake without difficulty  #goals of care   - patient in bedside chair, states symptoms well managed at this time, goal to remain in chair until supper   - stated goal of patient is to build strength and return home   - often tearful when discussing insight into personal functional decline, struggles with feeling like a burden    Spoke with patient's daughter [Lou] via telephone [11:25-11:50]   - tentative plan for discharge to [address] [place] [address] [[facility] [place]] with Marya 80 upon discharge from rehab  - daughter is struggling with patient's decline, as insight into advanced stage of cancer + VTE + hyponatremia; however, patient's "will to live and to fight" is strong and makes it difficult to know what the "right" thing to do [in terms of advanced care planning]     - discussed that current plan is in alignment with patient's stated and known goals, to build strength to support adequate functionality and desire to return home    - at this time, daughter felt that back brace would be too constrictive and not allow patient the functional life that would be acceptable to patient   - again discussed hospice model of care, given desire to pursue STR and plan return to the hospital as indicated, hospice is not currently in alignment with stated plan of care   - will continue to follow and support as clinical condition evolves    #psychosocial support   - emotional support provided   -    - three adult children [2 living, 1 ]              - Juniorrashad Rosariotravis, Genna Bailey              - Demarcomarcelomichele Felipanaveen              - Tony Wiggins, ]   - parents    - seven siblings [5 living, 2 ]              - Mike [brother]   - Fiona Tenorio [phone]   - resides with Tu Tejeda      We appreciate the opportunity to participate in this patient's care  We will continue to follow  Please do not hesitate to contact our on-call provider through our clinic answering service at [phone] should you have acute symptom control concerns  Code Status: DNAR/DNI - Level 3   Decisional apparatus:  Patient is marginally competent on my exam today  If competence is lost, patient's medical Power of Aimee Duque is identified as #1 Terry Talley, #2 Rahul Donaldson, #3 Yaniv Mathews by [employer]    Advance Directive / Living Will / POLST: On File    Interval history:   - NAEO   - symptoms assessment in MDM section above    MEDICATIONS / ALLERGIES:     current meds:   Current Facility-Administered Medications   Medication Dose Route Frequency    acetaminophen (TYLENOL) tablet 650 mg  650 mg Oral Q6H PRN    aluminum-magnesium hydroxide-simethicone (MYLANTA) oral suspension 30 mL  30 mL Oral Q6H PRN    heparin (porcine) 25,000 units in D5W 250 mL infusion (premix)  3-30 Units/kg/hr (Order-Specific) Intravenous Titrated    HYDROmorphone (DILAUDID) injection 0 5 mg  0 5 mg Intravenous Q4H PRN    ondansetron (ZOFRAN) injection 4 mg  4 mg Intravenous Q6H PRN    oxyCODONE (ROXICODONE) IR tablet 2 5 mg  2 5 mg Oral Q4H PRN    oxyCODONE (ROXICODONE) IR tablet 5 mg  5 mg Oral Q4H PRN    polyethylene glycol (MIRALAX) packet 17 g  17 g Oral Daily    sodium chloride tablet 3 g  3 g Oral 4x Daily (with meals and at bedtime)       Allergies   Allergen Reactions    Celecoxib Swelling     celebrex- swelling of throat    Loratadine Swelling     Jaw swelled,couldn't swallow; face swelling    Aspirin GI Intolerance     : nausea; okay with Ecotrin    Lisinopril Swelling       OBJECTIVE:    Physical Exam  Physical Exam  Vitals and nursing note reviewed  Constitutional:       General: She is awake  Appearance: She is not diaphoretic  Comments: Chronically ill appearing in NAD  Sitting up in bedside chair comfortably  Non-toxic appearing   HENT:      Head: Normocephalic and atraumatic  Right Ear: External ear normal       Left Ear: External ear normal       Nose: No rhinorrhea  Eyes:      Comments: No gaze preference   Cardiovascular:      Rate and Rhythm: Normal rate  Pulmonary:      Effort: No tachypnea, accessory muscle usage or respiratory distress  Comments: Completes full sentences without difficulty  Musculoskeletal:      Cervical back: Normal range of motion  Neurological:      General: No focal deficit present  Mental Status: She is alert  Psychiatric:         Attention and Perception: Attention normal          Mood and Affect: Mood and affect normal          Speech: Speech normal          Lab Results: I have personally reviewed pertinent labs  , CBC: No results found for: WBC, HGB, HCT, MCV, PLT, ADJUSTEDWBC, MCH, MCHC, RDW, MPV, NRBC, CMP:   Lab Results   Component Value Date    SODIUM 132 (L) 04/04/2022    K 3 9 04/04/2022     04/04/2022    CO2 25 04/04/2022    BUN 19 04/04/2022    CREATININE 1 22 04/04/2022    CALCIUM 11 6 (H) 04/04/2022    EGFR 40 04/04/2022   , PT/PTT:  Lab Results   Component Value Date    PTT 64 (H) 04/04/2022     Imaging Studies: Reviewed  EKG, Pathology, and Other Studies: Reviewed    Counseling / Coordination of Care    Total floor / unit time spent today 40 minutes   Greater than 50% of total time was spent with the patient and / or family counseling and / or coordination of care  A description of the counseling / coordination of care: provided medical updates, discussed palliative care, determined competency, determined goals of care, determined POA, determined social/family support, discussed plans of care, discussed symptom management, provided psychosocial support  Telephone call with daughter [11:25-11:50]   Coordinated plan of care with primary team

## 2022-04-04 NOTE — ASSESSMENT & PLAN NOTE
· B/l doppler LE: acute- subacute occlusive thrombus noted in the common femoral vein, deep femoral vein, and proximal to distal superficial femoral vein  acute-subacute occlusive thrombophlebitis noted in the great saphenous vein from proximal to distal thigh  No RLE DVT  · CT c/a/p: 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 extending inferiorly and likely involves the iliac vessels with extension to the left lower extremity  Suspected small PE in LLL  No large central PE    · Provoked secondary to underlying malignancy  · Discussion had with patient's daughter  Her preference is to not continue on anticoagulation upon discharge given patient is a fall risk and goals for comfort    She is ok to keep on a/c tx while hospitalized  · Has been on heparin gtt, transitioned to eliquis

## 2022-04-04 NOTE — PHYSICAL THERAPY NOTE
Physical Therapy Evaluation     Patient's Name: Edel Baird    Admitting Diagnosis  Hypercalcemia [E83 52]  Abdominal pain [R10 9]  Pain [R52]  DVT (deep venous thrombosis) ([facility] [address] ) [I93 409]    Problem List  Patient Active Problem List   Diagnosis    Un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diabetes mellitus with hyperglycemia ([facility] )    Essential hypertension    Hyperlipidemia    Left breast mass    Depression    Hyponatremia    Hypokalemia    Dizziness    Bradycardia    Sick sinus syndrome ([facility] )    Cardiac pacemaker in situ    Abnormal ultrasound cardiogram    Anxiety    BMI 33 0-33 9,adult    Breast carcinoma, female, right ([facility] )    GERD (gastroesophageal reflux disease)    Macular degeneration    Malignant neoplasm of upper-inner quadrant of left breast in female, estrogen receptor positive ([facility] )    History of breast cancer    S/P hysterectomy    Memory loss    Medicare annual wellness visit, subsequent    Hypercalcemia    Balance problem    Cough    Malignant neoplasm of overlapping sites of bladder ([facility] [facility] )    Encounter to discuss test results    Status post small bowel resection    Hearing loss of left ear due to cerumen impaction    Person consulting for explanation of examination or test finding    Cancer related pain    Palliative care patient    DVT (deep venous thrombosis) ([facility] )    TILA (acute kidney injury) ([facility] [facility] )    Compression fracture of L2 lumbar vertebra ([facility] )    Pyuria    DM II (diabetes mellitus, type II), controlled ([facility] [address] )     Past Medical History  Past Medical History:   Diagnosis Date    Depression     GERD (gastroesophageal reflux disease)     History of chemotherapy     right breast 1996    History of echocardiogram 02/14/2018    EF 0 55 (55%), trace mitral regurgitation      Hx of radiation therapy     2017  left breast    Hyperlipidemia     Macular degeneration     Malignant neoplasm of overlapping sites of bladder (HCC)     Sick sinus syndrome ([address] )     Small bowel volvulus [facility])      Past Surgical History  Past Surgical History:   Procedure Laterality Date    APPENDECTOMY      BREAST LUMPECTOMY Bilateral     CARDIAC PACEMAKER PLACEMENT Left 07/05/2018    [facility]    HYSTERECTOMY      LAPAROTOMY N/A 08/10/2021    Procedure: LAPAROTOMY EXPLORATORY,  SMALL BOWEL RESECTION;  Surgeon: Parth Betancourt MD;  Location: [address] [place] [address] MAIN OR;  Service: General    OH CYSTOURETHROSCOPY,FULGUR <0 5 CM LESN N/A 07/02/2021    Procedure: CYSTOSCOPY, TRANSURETHRAL RESECTION OF MEDIUM BLADDER TUMOR (TURBT); Surgeon: Mitra Orona MD;  Location: [facility] Main OR;  Service: Urology    SMALL INTESTINE SURGERY        04/04/22 0834   PT Last Visit   PT Visit Date 04/04/22   Note Type   Note type Evaluation   Pain Assessment   Pain Assessment Tool 0-10   Pain Score 10 - Worst Possible Pain   Pain Location/Orientation Orientation: Lower; Location: Back   Hospital Pain Intervention(s) Repositioned; Ambulation/increased activity   Restrictions/Precautions   Weight Bearing Precautions Per Order No   Braces or Orthoses Other (Comment)  (none per patient)   Other Precautions Limb alert;Multiple lines; Chair Alarm; Bed Alarm; Fall Risk;Pain   Home Living   Type of [address] [place] [address] Two level;Bed/bath upstairs  (No SAUNDRA fom basement; flight of stairs to 1st floor and 2nd )   Bathroom Shower/Tub Walk-in shower   Bathroom Toilet Standard   Bathroom Equipment Other (Comment)  (none per patient)   [facility]  (rollator)   Additional Comments Pt ambulates without an AD for household distances     Prior Function   Level of Story Independent with ADLs and functional mobility   Lives With Son   Receives Help From Family;Friend(s)   ADL Assistance Independent   IADLs Independent   Falls in the last 6 months 0   Vocational Retired   General   Family/Caregiver Present No   Cognition   Overall Cognitive Status WFL   Arousal/Participation Alert   Attention Attends with cues to redirect   Orientation Level Oriented X4   Memory Within functional limits   Following Commands Follows all commands and directions without difficulty   Comments Pt agreeable to PT  Subjective   Subjective "I have not been out of bed "   RUE Assessment   RUE Assessment   (defer to OT assessment)   LUE Assessment   LUE Assessment   (defer to OT assessment)   RLE Assessment   RLE Assessment X   Strength RLE   RLE Overall Strength 3+/5  (grossly assessed with functional mobility)   LLE Assessment   LLE Assessment X   Strength LLE   LLE Overall Strength 3+/5  (grossly assessed with functional mobility)   Light Touch   RLE Light Touch Grossly intact   LLE Light Touch Grossly intact   Bed Mobility   Rolling R 3  Moderate assistance   Additional items Assist x 1;HOB elevated; Bedrails; Increased time required;Verbal cues;LE management   Supine to Sit 3  Moderate assistance   Additional items Assist x 1;HOB elevated; Bedrails; Increased time required;Verbal cues;LE management   Transfers   Sit to Stand 4  Minimal assistance   Additional items Assist x 2; Increased time required;Verbal cues   Stand to Sit 4  Minimal assistance   Additional items Assist x 2;Armrests; Increased time required;Verbal cues   Ambulation/Elevation   Gait pattern Decreased toe off;Decreased heel strike;Decreased hip extension; Excessively slow; Step to;Short stride; Shuffling;Narrow RADHA   Gait Assistance 4  Minimal assist   Additional items Assist x 2;Verbal cues   Assistive Device Rolling walker   Distance 2 feet, bed to chair   Stair Management Assistance Not tested   Balance   Static Sitting Fair   Dynamic Sitting Fair -   Static Standing Poor +   Dynamic Standing Poor   Ambulatory Poor   Endurance Deficit   Endurance Deficit Yes   Endurance Deficit Description decreased activity tolerance   Activity Tolerance   Activity Tolerance Patient limited by fatigue;Patient limited by pain   Medical Staff Made Aware OT Kiera Cruz   Assessment   Prognosis Good Problem List Decreased strength;Decreased endurance; Impaired balance;Decreased mobility;Pain   Assessment Pt is 82year old female seen for PT evaluation s/p admit to [facility] on 3/30/2022 with Cancer related pain  PT consulted to assess pt's functional mobility and d/c needs  Order placed for PT eval and tx, with up with assist order  Comorbidities affecting pt's physical performance at time of assessment include hyponatremia, history of breast cancer, hypercalcemia, malignant neoplasm of overlapping sites of bladder, DVT, TILA, compression fracture of L2 lumbar vertebra, pyuria, and type 2 DM  PTA, pt was independent with all functional mobility without an AD for household distances  Pt resides in a two level house with no steps to enter from the basement and a flight of stairs between each level  Personal factors affecting pt at time of IE include lives in a two story house, ambulating with an assistive device, inability to ambulate household distances, inability to navigate level surfaces without external assistance, unable to perform dynamic tasks in community, limited home support, inability to perform IADLs, and inability to perform ADLs  Please find objective findings from PT assessment regarding body systems outlined above with impairments and limitations including weakness, impaired balance, decreased endurance, gait deviations, pain, decreased activity tolerance, decreased functional mobility tolerance, and fall risk  The following objective measures performed on IE also reveal limitations: Barthel Index: 30/100, Modified [place]: 4 (moderate/severe disability) and AM-PAC 6-Clicks: 56/35  Pt's clinical presentation is currently unstable/unpredictable seen in pt's presentation of need for ongoing medical management/monitoring, pt is a fall risk, pt is requiring use of AD for safe ambulation, and pt requires cues and assist of two for safety with functional mobility   Pt to benefit from continued PT tx to address deficits as defined above and maximize level of functional independent mobility and consistency  From PT/mobility standpoint, recommendation at time of d/c would be STR pending progress in order to facilitate return to PLOF  Barriers to Discharge Inaccessible home environment;Decreased caregiver support   Goals   STG Expiration Date 04/14/22   Short Term Goal #1 In 7-10 days: Increase bilateral LE strength 1/2 grade to facilitate independent mobility, Perform all bed mobility tasks with CG assist to decrease caregiver burden, Perform all transfers with CG assist to improve independence, Ambulate > 25 ft  with RW with CG assist w/o LOB and w/ normalized gait pattern 100% of the time, Increase all balance 1/2 grade to decrease risk for falls, and PT to see and establish goals for stairs when appropriate   Plan   Treatment/Interventions Functional transfer training;LE strengthening/ROM; Therapeutic exercise; Endurance training;Patient/family training;Bed mobility;Gait training;OT   PT Frequency 3-5x/wk   Recommendation   PT Discharge Recommendation Post acute rehabilitation services   AM-PAC Basic Mobility Inpatient   Turning in Bed Without Bedrails 2   Lying on Back to Sitting on Edge of Flat Bed 2   Moving Bed to Chair 2   Standing Up From Chair 2   Walk in Room 1   Climb 3-5 Stairs 1   Basic Mobility Inpatient Raw Score 10   Turning Head Towards Sound 4   Follow Simple Instructions 4   Low Function Basic Mobility Raw Score 18   Low Function Basic Mobility Standardized Score 29 25   Highest Level Of Mobility   JH-HL Goal 4: Move to chair/commode   JH-HL Highest Level of Mobility 4: Move to chair/commode   JH-HLM Goal Achieved Yes   Modified Alexis Scale   Modified Alexis Scale 4   Barthel Index   Feeding 5   Bathing 0   Grooming Score 0   Dressing Score 5   Bladder Score 5   Bowels Score 5   Toilet Use Score 5   Transfers (Bed/Chair) Score 5   Mobility (Level Surface) Score 0   Stairs Score 0 Barthel Index Score 30     PT Evaluation Time: 9169-0078    Ana Maria Shoulders, PT, DPT

## 2022-04-04 NOTE — ASSESSMENT & PLAN NOTE
· Suspect pathological fracture given underlying malignancy  · Spoke at length with patient's daughter, does not wish for invasive/surgical intervention  She also does not wish for the patient to wear back brace for comfort purposes

## 2022-04-04 NOTE — PROGRESS NOTES
[facility]  Progress Note - Sindi Brown 1935, 82 y o  female MRN: [id number]  Unit/Bed#: -[facility] Encounter: [id number]  Primary Care Provider: Carly Quintana DO   Date and time admitted to hospital: 3/30/2022  4:36 PM    * Cancer related pain  Assessment & Plan  · CT shows progression of disease as well as extensive DVT in IVC/left lower extremity  · Palliative care on board  · Continue pain analgesics regimen as recommended for palliative    DVT (deep venous thrombosis) (HCC)  Assessment & Plan  · B/l doppler LE: acute- subacute occlusive thrombus noted in the common femoral vein, deep femoral vein, and proximal to distal superficial femoral vein  acute-subacute occlusive thrombophlebitis noted in the great saphenous vein from proximal to distal thigh  No RLE DVT  · CT c/a/p: 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 extending inferiorly and likely involves the iliac vessels with extension to the left lower extremity  Suspected small PE in LLL  No large central PE    · Provoked secondary to underlying malignancy  · Discussion had with patient's daughter  Her preference is to not continue on anticoagulation upon discharge given patient is a fall risk and goals for comfort  She is ok to keep on a/c tx while hospitalized  · Has been on heparin gtt, transitioned to eliquis    Hyponatremia  Assessment & Plan  · Nephrology following  · Off IVF  · Received tolvaptan x 2 doses (4/2 and 4/3)   · On salt tablets  · Chronically on zoloft and trimeterene-hctz which will continue to hold  · Improved    Malignant neoplasm of overlapping sites of bladder [facility])  Assessment & Plan  · Malignant High-grade invasive urethral carcinoma; 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  · Patient elected for palliative care does not wish to pursue surgical or oncologic intervention  · Palliative care on board  · Extensive discussion with patient's daughter    They are not ready for hospice  The hope is for possible rehabilitation and then after which will likely transition to home hospice    Hypercalcemia  Assessment & Plan  · Acute on chronic elevation, likely due to malignancy  · Nephrology following      Compression fracture of L2 lumbar vertebra [facility])  Assessment & Plan  · Suspect pathological fracture given underlying malignancy  · Spoke at length with patient's daughter, does not wish for invasive/surgical intervention  She also does not wish for the patient to wear back brace for comfort purposes  DM II (diabetes mellitus, type II), controlled [facility])  Assessment & Plan  Lab Results   Component Value Date    HGBA1C 5 6 10/26/2021       Recent Labs     04/04/22  1221   POCGLU 164*       Blood Sugar Average: Last 72 hrs:  (P) 164   Hold metformin  Cont ISS  If CBG is well controlled onsider d/c of ISS given eventual goals to pursue comfort  Liberalized DM diet    Pyuria  Assessment & Plan  · Urine culture revealing Staph coag-negative  Also from prior urine culture in September 2021  · Likely colonization  · Denies  symptoms  · Monitoring off antibiotics    TILA (acute kidney injury) ([facility] [address] )  Assessment & Plan  · Multifactorial, Secondary to underlying malignancy as infiltrating renal mass, contrast induced nephropathy, hypercalcemia  · Nephrology on board  · Avoid nephrotoxins, renally dose meds    History of breast cancer  Assessment & Plan  · On arimidex    VTE Pharmacologic Prophylaxis:   Pharmacologic: Heparin Drip  Mechanical VTE Prophylaxis in Place: No    Patient Centered Rounds: I have performed bedside rounds with nursing staff today  Discussions with Specialists or Other Care Team Provider:     Education and Discussions with Family / Patient: Patient and called and updated dtr Lou    Time Spent for Care: 30 minutes  More than 50% of total time spent on counseling and coordination of care as described above      Current Length of Stay: 4 day(s)    Current Patient Status: Inpatient   Certification Statement: The patient will continue to require additional inpatient hospital stay due to Acute illness    Discharge Plan: SNF    Code Status: Level 3 - DNAR and DNI      Subjective:   OOB to chair today  Glad she was able to do that  Denies dyspnea  Pain relieved with pain analgesics  No acute events overnight  More alert today    Objective:     Vitals:   Temp (24hrs), Av 5 °F (36 4 °C), Min:97 1 °F (36 2 °C), Max:98 °F (36 7 °C)    Temp:  [97 1 °F (36 2 °C)-98 °F (36 7 °C)] 97 3 °F (36 3 °C)  HR:  [60-98] 60  Resp:  [18] 18  BP: (117-131)/(70-72) 131/70  SpO2:  [90 %-95 %] 94 %  Body mass index is 31 24 kg/m²  Input and Output Summary (last 24 hours): Intake/Output Summary (Last 24 hours) at 2022 1242  Last data filed at 2022 0834  Gross per 24 hour   Intake 480 ml   Output 700 ml   Net -220 ml       Physical Exam:     Physical Exam  Cardiovascular:      Rate and Rhythm: Normal rate and regular rhythm  Pulses: Normal pulses  Heart sounds: Normal heart sounds  No murmur heard  Pulmonary:      Effort: Pulmonary effort is normal  No respiratory distress  Breath sounds: Normal breath sounds  No wheezing or rales  Abdominal:      General: Abdomen is flat  Bowel sounds are normal  There is no distension  Palpations: Abdomen is soft  Tenderness: There is no abdominal tenderness  There is no guarding  Musculoskeletal:         General: Normal range of motion  Cervical back: Normal range of motion and neck supple  Left lower leg: Edema present  Skin:     General: Skin is warm and dry  Neurological:      General: No focal deficit present  Mental Status: She is alert and oriented to person, place, and time  Mental status is at baseline  Cranial Nerves: No cranial nerve deficit  Motor: No weakness           Additional Data:     Labs:    Results from last 7 days   Lab Units 04/03/22  0625 03/30/22  2132 03/30/22  1701   WBC Thousand/uL 11 02*   < > 14 50*   HEMOGLOBIN g/dL 8 8*   < > 9 6*   HEMATOCRIT % 27 2*   < > 30 0*   PLATELETS Thousands/uL 264   < > 240   NEUTROS PCT %  --   --  87*   LYMPHS PCT %  --   --  5*   MONOS PCT %  --   --  6   EOS PCT %  --   --  0    < > = values in this interval not displayed  Results from last 7 days   Lab Units 04/04/22  0516 03/31/22  1323 03/30/22  1701   SODIUM mmol/L 132*   < > 124*   POTASSIUM mmol/L 3 9   < > 4 2   CHLORIDE mmol/L 100   < > 88*   CO2 mmol/L 25   < > 28   BUN mg/dL 19   < > 36*   CREATININE mg/dL 1 22   < > 1 71*   ANION GAP mmol/L 7   < > 8   CALCIUM mg/dL 11 6*   < > 11 1*   ALBUMIN g/dL  --   --  2 6*   TOTAL BILIRUBIN mg/dL  --   --  0 28   ALK PHOS U/L  --   --  90   ALT U/L  --   --  18   AST U/L  --   --  28   GLUCOSE RANDOM mg/dL 133   < > 132    < > = values in this interval not displayed  Results from last 7 days   Lab Units 03/30/22  2132   INR  1 18     Results from last 7 days   Lab Units 04/04/22  1221   POC GLUCOSE mg/dl 164*                   * I Have Reviewed All Lab Data Listed Above  * Additional Pertinent Lab Tests Reviewed:  All Labs Within Last 24 Hours Reviewed    Imaging:    Imaging Reports Reviewed Today Include:   Imaging Personally Reviewed by Myself Includes:      Recent Cultures (last 7 days):     Results from last 7 days   Lab Units 03/31/22  1203   URINE CULTURE  >100,000 cfu/ml Staphylococcus coagulase negative*       Last 24 Hours Medication List:   Current Facility-Administered Medications   Medication Dose Route Frequency Provider Last Rate    acetaminophen  650 mg Oral Q6H PRN Dylan Sumner MD      aluminum-magnesium hydroxide-simethicone  30 mL Oral Q6H PRN Lauri Montero PA-C      anastrozole  1 mg Oral Daily With Lunch Radha Hess DO      apixaban  10 mg Oral BID Nedra Be DO      Apoaequorin  10 mg Oral Early Morning Radha Bridget Sheyla Gray DO      artificial tear   Both Eyes HS Angie Hernadez DO      atorvastatin  40 mg Oral HS Angie Hernadez DO      [START ON 4/5/2022] ferrous sulfate  325 mg Oral Daily With Breakfast Angie Hernadez DO      HYDROmorphone  0 5 mg Intravenous Q4H PRN Perez Olivas MD      insulin lispro  1-5 Units Subcutaneous TID AC Radha Villafana DO      insulin lispro  1-5 Units Subcutaneous HS Radha Villafana DO      ondansetron  4 mg Intravenous Q6H PRN Visteon LayerBoom, PA-C      oxyCODONE  2 5 mg Oral Q4H PRN Perez Olivas MD      oxyCODONE  5 mg Oral Q4H PRN Perez Olivas MD      polyethylene glycol  17 g Oral Daily NEON Conciergeteon LayerBoom, PA-C      sodium chloride  3 g Oral 4x Daily (with meals and at bedtime) Kevan Farley MD          Today, Patient Was Seen By: Angie Hernadez DO    ** Please Note: Dictation voice to text software may have been used in the creation of this document   **

## 2022-04-04 NOTE — ASSESSMENT & PLAN NOTE
· Nephrology following  · Off IVF  · Received tolvaptan x 2 doses (4/2 and 4/3)   · On salt tablets  · Chronically on zoloft and trimeterene-hctz which will continue to hold  · Improved

## 2022-04-04 NOTE — PLAN OF CARE
Problem: PHYSICAL THERAPY ADULT  Goal: Performs mobility at highest level of function for planned discharge setting  See evaluation for individualized goals  Description: Treatment/Interventions: Functional transfer training,LE strengthening/ROM,Therapeutic exercise,Endurance training,Patient/family training,Bed mobility,Gait training,OT          See flowsheet documentation for full assessment, interventions and recommendations  4/4/2022 1440 by Sridhar Bishop PT  Note: Prognosis: Good  Problem List: Decreased strength,Decreased endurance,Impaired balance,Decreased mobility,Pain  Assessment: Pt is 82year old female seen for PT evaluation s/p admit to [facility] on [facility]/30/2022 with Cancer related pain  PT consulted to assess pt's functional mobility and d/c needs  Order placed for PT eval and tx, with up with assist order  Comorbidities affecting pt's physical performance at time of assessment include hyponatremia, history of breast cancer, hypercalcemia, malignant neoplasm of overlapping sites of bladder, DVT, ITLA, compression fracture of L2 lumbar vertebra, pyuria, and type 2 DM  PTA, pt was independent with all functional mobility without an AD for household distances  Pt resides in a two level house with no steps to enter from the basement and a flight of stairs between each level  Personal factors affecting pt at time of IE include lives in a two story house, ambulating with an assistive device, inability to ambulate household distances, inability to navigate level surfaces without external assistance, unable to perform dynamic tasks in community, limited home support, inability to perform IADLs, and inability to perform ADLs   Please find objective findings from PT assessment regarding body systems outlined above with impairments and limitations including weakness, impaired balance, decreased endurance, gait deviations, pain, decreased activity tolerance, decreased functional mobility tolerance, and fall risk  The following objective measures performed on IE also reveal limitations: Barthel Index: 30/100, Modified Alexis: 4 (moderate/severe disability) and AM-PAC 6-Clicks: 94/45  Pt's clinical presentation is currently unstable/unpredictable seen in pt's presentation of need for ongoing medical management/monitoring, pt is a fall risk, pt is requiring use of AD for safe ambulation, and pt requires cues and assist of two for safety with functional mobility  Pt to benefit from continued PT tx to address deficits as defined above and maximize level of functional independent mobility and consistency  From PT/mobility standpoint, recommendation at time of d/c would be STR pending progress in order to facilitate return to PLOF  Barriers to Discharge: Inaccessible home environment,Decreased caregiver support     PT Discharge Recommendation: Post acute rehabilitation services    See flowsheet documentation for full assessment

## 2022-04-04 NOTE — CASE MANAGEMENT
Case Management Discharge Planning Note    Patient name Nargis Garcia  Location /-65 MRN [id number]  : 1935 Date 2022       Current Admission Date: 3/30/2022  Current Admission Diagnosis:Cancer related pain   Patient Active Problem List    Diagnosis Date Noted    Compression fracture of L2 lumbar vertebra ([facility] ) 2022    Pyuria 2022    TILA (acute kidney injury) ([facility] ) 2022    DVT (deep venous thrombosis) ([place] [address] ) 2022    Cancer related pain 2022    Palliative care patient 2022   Jose Bones Person consulting for explanation of examination or test finding 11/10/2021    Hearing loss of left ear due to cerumen impaction 10/26/2021    Status post small bowel resection 2021    Malignant neoplasm of overlapping sites of bladder ([place] [address] ) 2021    Encounter to discuss test results 2021    Cough 2021    Hypercalcemia 10/22/2020    Balance problem 10/22/2020    Medicare annual wellness visit, subsequent 10/18/2019    Memory loss 06/10/2019    Abnormal ultrasound cardiogram 2018    Breast carcinoma, female, right ([place] [address] ) 2018    S/P hysterectomy 2018    Cardiac pacemaker in situ 2018    Sick sinus syndrome ([place] [address] ) 2018    Bradycardia 2018    Hyponatremia 2018    Hypokalemia 2018    Dizziness 2018    Malignant neoplasm of upper-inner quadrant of left breast in female, estrogen receptor positive ([place] [address] ) 2018    Personal history of breast cancer 2018    Un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diabetes mellitus with hyperglycemia ([place] [address] ) 2017    Left breast mass 12/10/2017    Depression 2017    Anxiety 2016    BMI 33 0-33 9,adult 2016    Macular degeneration 2016    Hyperlipidemia 2015    GERD (gastroesophageal reflux disease) 2015    Essential hypertension 2011      LOS (days): 4  Geometric Mean LOS (GMLOS) (days): 3 30  Days to GMLOS:-0 6     OBJECTIVE:  Risk of Unplanned Readmission Score: 10         Current admission status: Inpatient   Preferred Pharmacy:   [address], [address]  [address] [place] [id number]  Phone: [phone] Fax: [phone]    Primary Care Provider: Anuja Hutchison DO    Primary Insurance: Joselito Kingsley W [facility] REP  Secondary Insurance:     DISCHARGE DETAILS:    Discharge planning discussed with[de-identified] dgt Lou  Freedom of Choice: Yes  Comments - Freedom of Choice: CM spoke to daughter Caitie Carrasco who feels it would be very difficult for pt to be discharged with home hospice  Caitie Carrasco works and her dgt with a childr with medical needs is currently living with her  She asks for referrals to [address] [place] [address] and [facility] to see if pt could possibly get stronger before being d/afsaneh home  Referrals placed  CM contacted family/caregiver?: Yes  Were Treatment Team discharge recommendations reviewed with patient/caregiver?: Yes  Did patient/caregiver verbalize understanding of patient care needs?: Yes  Were patient/caregiver advised of the risks associated with not following Treatment Team discharge recommendations?: Yes    Contacts  Patient Contacts: Lou  Relationship to Patient[de-identified] Family  Contact Method: Phone  Phone Number: [phone]  Reason/Outcome: Discharge Planning              Other Referral/Resources/Interventions Provided:  Interventions: Short Term Rehab  Referral Comments: STR's pended per dgt Lou to [facility] and [facility]      Would you like to participate in our [address] service program?  : No - Declined    Treatment Team Recommendation: Short Term Rehab  Discharge Destination Plan[de-identified] Short Term Rehab  Transport at Discharge : [facility]LS Ambulance

## 2022-04-04 NOTE — PLAN OF CARE
Problem: Potential for Falls  Goal: Patient will remain free of falls  Description: INTERVENTIONS:  - Educate patient/family on patient safety including physical limitations  - Instruct patient to call for assistance with activity   - Consult OT/PT to assist with strengthening/mobility   - Keep Call bell within reach  - Keep bed low and locked with side rails adjusted as appropriate  - Keep care items and personal belongings within reach  - Initiate and maintain comfort rounds  - Make Fall Risk Sign visible to staff  - Offer Toileting every 2 Hours, in advance of need  - Initiate/Maintain bed and chair alarm  - Obtain necessary fall risk management equipment  - Apply yellow socks and bracelet for high fall risk patients  - Consider moving patient to room near nurses station  Outcome: Progressing     Problem: Prexisting or High Potential for Compromised Skin Integrity  Goal: Skin integrity is maintained or improved  Description: INTERVENTIONS:  - Identify patients at risk for skin breakdown  - Assess and monitor skin integrity  - Assess and monitor nutrition and hydration status  - Monitor labs   - Assess for incontinence   - Turn and reposition patient  - Assist with mobility/ambulation  - Relieve pressure over bony prominences  - Avoid friction and shearing  - Provide appropriate hygiene as needed including keeping skin clean and dry  - Evaluate need for skin moisturizer/barrier cream  - Collaborate with interdisciplinary team   - Patient/family teaching  - Consider wound care consult   Outcome: Progressing     Problem: PAIN - ADULT  Goal: Verbalizes/displays adequate comfort level or baseline comfort level  Description: Interventions:  - Encourage patient to monitor pain and request assistance  - Assess pain using appropriate pain scale  - Administer analgesics based on type and severity of pain and evaluate response  - Implement non-pharmacological measures as appropriate and evaluate response  - Consider cultural and social influences on pain and pain management  - Notify physician/advanced practitioner if interventions unsuccessful or patient reports new pain  Outcome: Progressing     Problem: DISCHARGE PLANNING  Goal: Discharge to home or other facility with appropriate resources  Description: INTERVENTIONS:  - Identify barriers to discharge w/patient and caregiver  - Arrange for needed discharge resources and transportation as appropriate  - Identify discharge learning needs (meds, wound care, etc )  - Arrange for interpretive services to assist at discharge as needed  - Refer to Case Management Department for coordinating discharge planning if the patient needs post-hospital services based on physician/advanced practitioner order or complex needs related to functional status, cognitive ability, or social support system  Outcome: Progressing     Problem: Knowledge Deficit  Goal: Patient/family/caregiver demonstrates understanding of disease process, treatment plan, medications, and discharge instructions  Description: Complete learning assessment and assess knowledge base    Interventions:  - Provide teaching at level of understanding  - Provide teaching via preferred learning methods  Outcome: Progressing

## 2022-04-04 NOTE — OCCUPATIONAL THERAPY NOTE
Occupational Therapy Evaluation        Patient Name: Dakota Dela Cruz  [id number] Date: 4/4/2022 04/04/22 0856   OT Last Visit   OT Visit Date 04/04/22   Note Type   Note type Evaluation   Restrictions/Precautions   Weight Bearing Precautions Per Order No   Braces or Orthoses Other (Comment)  (none per patient)   Other Precautions Limb alert;Multiple lines; Chair Alarm; Bed Alarm; Fall Risk;Pain   Pain Assessment   Pain Assessment Tool 0-10   Pain Score 10 - Worst Possible Pain  (RN made aware of pain level)   Pain Location/Orientation Orientation: Lower; Location: Back   Hospital Pain Intervention(s) Repositioned; Ambulation/increased activity   Home Living   Type of Home House   Home Layout Two level;Bed/bath upstairs  (No SAUNDRA fom basement; flight of stairs to 1st floor and 2nd )   Bathroom Shower/Tub Walk-in shower   Bathroom Toilet Standard   Bathroom Equipment Other (Comment)  (none at baseline)   [address] [facility]  (Rollator)   Additional Comments Pt ambulates without an AD for household distances  Prior Function   Level of [place] Independent with ADLs and functional mobility   Lives With Son   Receives Help From Family;Friend(s)   ADL Assistance Independent   IADLs Independent   Falls in the last 6 months 0   Vocational Retired   Lifestyle   Autonomy Patient reported independnet with basic self care and mobility, has a supportive friend in community  Patient lives with her Son in a 2 story house, no SAUNDRA , flight of stairs to access main floor and one more flight to access bedroom  Pt ambulates without an AD for household distances  patient does not drive  Reciprocal Relationships Supportive friend   Service to Others Retired   Psychosocial   Psychosocial (WDL) [address]  5  Supervision/Setup   Eating Deficit Increased time to complete   Grooming Assistance 5  Supervision/Setup   Grooming Deficit Steadying; Increased time to complete   UB Bathing Assistance 4  Minimal Assistance   LB Bathing Assistance 2  Maximal Assistance   UB Dressing Assistance 4  Minimal Assistance   LB Dressing Assistance 2  Maximal [address]  2  Maximal Assistance   Functional Assistance 3  Moderate Assistance   Bed Mobility   Supine to Sit 4  Minimal assistance   Additional items Assist x 1;HOB elevated; Increased time required;Verbal cues   Sit to Supine 4  Minimal assistance   Additional items Assist x 1; Increased time required;Verbal cues   Transfers   Sit to Stand 4  Minimal assistance   Additional items Increased time required;Verbal cues; Assist x 2   Stand to Sit 4  Minimal assistance   Additional items Assist x 2; Increased time required;Verbal cues   Functional Mobility   Functional Mobility 3  Moderate assistance   Additional Comments assist of 2/ overall weakness/ balance deficit/    Additional items Rolling walker   Balance   Static Sitting Fair +   Dynamic Sitting Fair   Static Standing Poor +   Dynamic Standing Poor   Activity Tolerance   Activity Tolerance Patient limited by fatigue;Patient limited by pain   Nurse Made Aware RN made aware of therapy outcomes and pain level  RUE Assessment   RUE Assessment X  (AROM grossly WFL, strength deficit)   RUE Strength   RUE Overall Strength Deficits  (3/5)   LUE Assessment   LUE Assessment X  (AROM grossly WFL, strength deficit)   LUE Strength   LUE Overall Strength Deficits  (3/5)   Hand Function   Gross Motor Coordination Impaired   Fine Motor Coordination Functional   Sensation   Light Touch No apparent deficits  (BUEs)   Proprioception   Proprioception No apparent deficits  (BUEs)   Vision-Basic Assessment   Current Vision Wears glasses all the time   Cognition   Overall Cognitive Status [facility]   Arousal/Participation Alert; Responsive; Cooperative   Attention Within functional limits   Orientation Level Oriented X4   Memory Within functional limits   Following Commands Follows all commands and directions without difficulty   Assessment   Limitation Decreased ADL status; Decreased UE ROM; Decreased Safe judgement during ADL;Decreased endurance;Decreased self-care trans;Decreased high-level ADLs   Prognosis Good   Assessment Patient is a 82 y o  female seen for OT evaluation s/p admit to [address] [facility] on 3/30/2022 w/Cancer related pain  Commorbidities affecting patient's functional performance at time of assessment include: Bilateral LE DVT, TILA, Malignant neoplasm of overlapping sites of bladder, Hypercalcemia, presented to [facility] with intractable pain  Orders placed for OT evaluation and treatment  Performed at least two patient identifiers during session including name and wristband  Prior to admission, Patient reported independnet with basic self care and mobility, has a supportive friend in community  Patient lives with her Son in a 2 story house, no SAUNDRA , flight of stairs to access main floor and one more flight to access bedroom  Pt ambulates without an AD for household distances  patient does not drive    Personal factors affecting patient at time of initial evaluation include: limited caregiver support, steps to enter, difficulty performing ADLs and difficulty performing IADLs  Upon evaluation, patient requires minimal  assist for UB ADLs, maximal assist for LB ADLs  Occupational performance is affected by the following deficits: decreased functional use of BUEs, decreased muscle strength, impaired gross motor coordination, dynamic sit/ stand balance deficit with poor standing tolerance time for self care and functional mobility, decreased activity tolerance, increased pain and postural control and postural alignment deficit, requiring external assistance to complete transitional movements    Patient to benefit from continued Occupational Therapy treatment while in the hospital to address deficits as defined above and maximize level of functional independence with ADLs and functional mobility  Occupational Performance areas to address include: grooming , bathing/ shower, dressing, toilet hygiene, transfer to all surfaces, functional ambulation, functional mobility, emergency response, health maintenance, IADLs: safety procedures, IADLs: meal prep/ clean up, Leisure Participation and Social participation  From OT standpoint, recommendation at time of d/c would be Short Term Rehab  Plan   Treatment Interventions ADL retraining;UE strengthening/ROM; Functional transfer training; Endurance training;Equipment evaluation/education;Patient/family training; Compensatory technique education; Energy conservation; Activityengagement;Continued evaluation   Goal Expiration Date 04/18/22   OT Frequency 3-5x/wk   Recommendation   OT Discharge Recommendation Post acute rehabilitation services   AM-PAC Daily Activity Inpatient   Lower Body Dressing 2   Bathing 2   Toileting 2   Upper Body Dressing 3   Grooming 3   Eating 3   Daily Activity Raw Score 15   Daily Activity Standardized Score (Calc for Raw Score >=11) 34 69   AM-PAC Applied Cognition Inpatient   Following a Speech/Presentation 4   Understanding Ordinary Conversation 4   Taking Medications 3   Remembering Where Things Are Placed or Put Away 3   Remembering List of 4-5 Errands 3   Taking Care of Complicated Tasks 3   Applied Cognition Raw Score 20   Applied Cognition Standardized Score 41 76   Barthel Index   Feeding 5   Bathing 0   Grooming Score 0   Dressing Score 5   Bladder Score 5   Bowels Score 5   Toilet Use Score 5   Transfers (Bed/Chair) Score 5   Mobility (Level Surface) Score 0   Stairs Score 0   Barthel Index Score 30             1 - Patient will verbalize and demonstrate use of energy conservation/ deep breathing technique and work simplification skills during functional activity with no verbal cues      2 - Patient will verbalize and demonstrate good body mechanics and joint protection techniques during  ADLs/ IADLs with no verbal cues  3 - Patient will increase OOB/ sitting tolerance to 2-4 hours per day for increased participation in self care and leisure tasks with no s/s of exertion  4 - Patient will increase standing tolerance time to 5  minutes with unilateral UE support to complete sink level ADLs@ mod I level  5 - Patient will increase sitting tolerance at edge of bed to 20 minutes to complete UB ADLs @ set up assist level  6 - Patient will transfer bed to Chair / toilet at Set up assist level with AD as indicated  7 - Patient will complete UB ADLs with set up assist      8 - Patient will complete LB ADLs with min assist with the use of adaptive equipment       9 - Patient will complete toileting hygiene with set up assist/ supervision for thoroughness    10 - Patient/ Family  will demonstrate competency with UE Home Exercise Program

## 2022-04-05 ENCOUNTER — TELEPHONE (OUTPATIENT)
Dept: PALLIATIVE MEDICINE | Facility: CLINIC | Age: 87
End: 2022-04-05

## 2022-04-05 LAB
ANION GAP SERPL CALCULATED.3IONS-SCNC: 6 MMOL/L (ref 4–13)
ANION GAP SERPL CALCULATED.3IONS-SCNC: 7 MMOL/L (ref 4–13)
BUN SERPL-MCNC: 18 MG/DL (ref 5–25)
BUN SERPL-MCNC: 18 MG/DL (ref 5–25)
CALCIUM SERPL-MCNC: 11.5 MG/DL (ref 8.3–10.1)
CALCIUM SERPL-MCNC: 11.8 MG/DL (ref 8.3–10.1)
CHLORIDE SERPL-SCNC: 101 MMOL/L (ref 100–108)
CHLORIDE SERPL-SCNC: 101 MMOL/L (ref 100–108)
CO2 SERPL-SCNC: 26 MMOL/L (ref 21–32)
CO2 SERPL-SCNC: 26 MMOL/L (ref 21–32)
CREAT SERPL-MCNC: 1.19 MG/DL (ref 0.6–1.3)
CREAT SERPL-MCNC: 1.24 MG/DL (ref 0.6–1.3)
FLUAV RNA RESP QL NAA+PROBE: NEGATIVE
FLUBV RNA RESP QL NAA+PROBE: NEGATIVE
GFR SERPL CREATININE-BSD FRML MDRD: 39 ML/MIN/1.73SQ M
GFR SERPL CREATININE-BSD FRML MDRD: 42 ML/MIN/1.73SQ M
GLUCOSE SERPL-MCNC: 105 MG/DL (ref 65–140)
GLUCOSE SERPL-MCNC: 106 MG/DL (ref 65–140)
GLUCOSE SERPL-MCNC: 107 MG/DL (ref 65–140)
GLUCOSE SERPL-MCNC: 113 MG/DL (ref 65–140)
GLUCOSE SERPL-MCNC: 113 MG/DL (ref 65–140)
GLUCOSE SERPL-MCNC: 128 MG/DL (ref 65–140)
POTASSIUM SERPL-SCNC: 4 MMOL/L (ref 3.5–5.3)
POTASSIUM SERPL-SCNC: 4 MMOL/L (ref 3.5–5.3)
RSV RNA RESP QL NAA+PROBE: NEGATIVE
SARS-COV-2 RNA RESP QL NAA+PROBE: NEGATIVE
SODIUM SERPL-SCNC: 133 MMOL/L (ref 136–145)
SODIUM SERPL-SCNC: 134 MMOL/L (ref 136–145)

## 2022-04-05 PROCEDURE — 80048 BASIC METABOLIC PNL TOTAL CA: CPT | Performed by: INTERNAL MEDICINE

## 2022-04-05 PROCEDURE — 82948 REAGENT STRIP/BLOOD GLUCOSE: CPT

## 2022-04-05 PROCEDURE — 99233 SBSQ HOSP IP/OBS HIGH 50: CPT | Performed by: FAMILY MEDICINE

## 2022-04-05 PROCEDURE — 99232 SBSQ HOSP IP/OBS MODERATE 35: CPT | Performed by: STUDENT IN AN ORGANIZED HEALTH CARE EDUCATION/TRAINING PROGRAM

## 2022-04-05 PROCEDURE — 99232 SBSQ HOSP IP/OBS MODERATE 35: CPT | Performed by: INTERNAL MEDICINE

## 2022-04-05 PROCEDURE — 0241U HB NFCT DS VIR RESP RNA 4 TRGT: CPT | Performed by: FAMILY MEDICINE

## 2022-04-05 RX ORDER — OXYCODONE HYDROCHLORIDE 5 MG/1
7.5 TABLET ORAL EVERY 4 HOURS PRN
Status: DISCONTINUED | OUTPATIENT
Start: 2022-04-05 | End: 2022-04-06 | Stop reason: HOSPADM

## 2022-04-05 RX ORDER — OXYCODONE HYDROCHLORIDE 5 MG/1
5 TABLET ORAL EVERY 4 HOURS PRN
Status: DISCONTINUED | OUTPATIENT
Start: 2022-04-05 | End: 2022-04-06 | Stop reason: HOSPADM

## 2022-04-05 RX ADMIN — HYDROMORPHONE HYDROCHLORIDE 0.5 MG: 1 INJECTION, SOLUTION INTRAMUSCULAR; INTRAVENOUS; SUBCUTANEOUS at 15:43

## 2022-04-05 RX ADMIN — Medication 3 G: at 22:12

## 2022-04-05 RX ADMIN — Medication 3 G: at 09:18

## 2022-04-05 RX ADMIN — Medication 3 G: at 15:43

## 2022-04-05 RX ADMIN — MINERAL OIL AND WHITE PETROLATUM: 150; 830 OINTMENT OPHTHALMIC at 22:11

## 2022-04-05 RX ADMIN — OXYCODONE HYDROCHLORIDE 5 MG: 5 TABLET ORAL at 12:04

## 2022-04-05 RX ADMIN — APIXABAN 10 MG: 5 TABLET, FILM COATED ORAL at 09:18

## 2022-04-05 RX ADMIN — HYDROMORPHONE HYDROCHLORIDE 0.5 MG: 1 INJECTION, SOLUTION INTRAMUSCULAR; INTRAVENOUS; SUBCUTANEOUS at 09:17

## 2022-04-05 RX ADMIN — ATORVASTATIN CALCIUM 40 MG: 40 TABLET, FILM COATED ORAL at 22:11

## 2022-04-05 RX ADMIN — APIXABAN 10 MG: 5 TABLET, FILM COATED ORAL at 18:56

## 2022-04-05 RX ADMIN — ONDANSETRON 4 MG: 2 INJECTION INTRAMUSCULAR; INTRAVENOUS at 09:19

## 2022-04-05 RX ADMIN — FERROUS SULFATE TAB 325 MG (65 MG ELEMENTAL FE) 325 MG: 325 (65 FE) TAB at 09:18

## 2022-04-05 RX ADMIN — ANASTROZOLE 1 MG: 1 TABLET ORAL at 12:05

## 2022-04-05 RX ADMIN — Medication 3 G: at 12:06

## 2022-04-05 NOTE — TELEPHONE ENCOUNTER
Patient was not admitted to home care because she was admitted to [address] [place] [address]

## 2022-04-05 NOTE — NURSING NOTE
Patient refused bloodwork this AM stating she is "already in enough pain from the cancer" and cursed at the PCA  Patient educated on importance of monitoring lab values given they have been abnormal  Patient still refused  Will attempt again later, day shift made aware

## 2022-04-05 NOTE — PLAN OF CARE
Problem: Potential for Falls  Goal: Patient will remain free of falls  Description: INTERVENTIONS:  - Educate patient/family on patient safety including physical limitations  - Instruct patient to call for assistance with activity   - Consult OT/PT to assist with strengthening/mobility   - Keep Call bell within reach  - Keep bed low and locked with side rails adjusted as appropriate  - Keep care items and personal belongings within reach  - Initiate and maintain comfort rounds  - Make Fall Risk Sign visible to staff    Problem: PAIN - ADULT  Goal: Verbalizes/displays adequate comfort level or baseline comfort level  Description: Interventions:  - Encourage patient to monitor pain and request assistance  - Assess pain using appropriate pain scale  - Administer analgesics based on type and severity of pain and evaluate response  - Implement non-pharmacological measures as appropriate and evaluate response  - Consider cultural and social influences on pain and pain management  - Notify physician/advanced practitioner if interventions unsuccessful or patient reports new pain  Outcome: Progressing     Problem: DISCHARGE PLANNING  Goal: Discharge to home or other facility with appropriate resources  Description: INTERVENTIONS:  - Identify barriers to discharge w/patient and caregiver  - Arrange for needed discharge resources and transportation as appropriate  - Identify discharge learning needs (meds, wound care, etc )  - Arrange for interpretive services to assist at discharge as needed  - Refer to Case Management Department for coordinating discharge planning if the patient needs post-hospital services based on physician/advanced practitioner order or complex needs related to functional status, cognitive ability, or social support system  Outcome: Progressing   - Consider moving patient to room near nurses station  Outcome: Progressing

## 2022-04-05 NOTE — ASSESSMENT & PLAN NOTE
· Pain control as ordered  ·  Suspect pathological fracture given underlying malignancy  · Spoke at length with patient's daughter, does not wish for invasive/surgical intervention  She also does not wish for the patient to wear back brace for comfort purposes

## 2022-04-05 NOTE — ASSESSMENT & PLAN NOTE
· Resolved  ·  Multifactorial, Secondary to underlying malignancy as infiltrating renal mass, contrast induced nephropathy, hypercalcemia  · Nephrology on board  · Avoid nephrotoxins, renally dose meds

## 2022-04-05 NOTE — ASSESSMENT & PLAN NOTE
· CT shows progression of disease as well as extensive DVT in IVC/left lower extremity  · Palliative care on board  · Continue pain analgesics regimen as recommended for palliative    Monitor for 24 more hours before increasing pain meds as she continues to complain of pain

## 2022-04-05 NOTE — ASSESSMENT & PLAN NOTE
· Acute on chronic elevation, likely due to malignancy    Continue to follow  · Nephrology following

## 2022-04-05 NOTE — PROGRESS NOTES
NEPHROLOGY PROGRESS NOTE    Patient: Maurizio Pathak               Sex: female          DOA: 3/30/2022  4:36 PM   YOB: 1935        Age:  82 y o         LOS:  LOS: 5 days   4/5/2022    REASON FOR THE CONSULTATION:      Hyponatremia    SUBJECTIVE     Patient is seen and examined next to the bedside  Complains of pain in left lower extremity      CURRENT MEDICATIONS       Current Facility-Administered Medications:     acetaminophen (TYLENOL) tablet 650 mg, 650 mg, Oral, Q6H PRN, Lorenza Panda MD, 650 mg at 04/01/22 2119    aluminum-magnesium hydroxide-simethicone (MYLANTA) oral suspension 30 mL, 30 mL, Oral, Q6H PRN, Leopold Alba, PA-C, 30 mL at 04/02/22 1840    anastrozole (ARIMIDEX) tablet 1 mg, 1 mg, Oral, Daily With Lunch, Fletomase Gals, DO, 1 mg at 04/05/22 1205    apixaban (ELIQUIS) tablet 10 mg, 10 mg, Oral, BID, Fleurette Gals, DO, 10 mg at 04/05/22 0714    Apoaequorin CAPS 10 mg, 10 mg, Oral, Early Morning, Fleurette Gals, DO    artificial tear (LUBRIFRESH P M ) ophthalmic ointment, , Both Eyes, HS, Fleurette Gals, DO, Given at 04/04/22 2251    atorvastatin (LIPITOR) tablet 40 mg, 40 mg, Oral, HS, Fleurette Gals, DO, 40 mg at 04/04/22 2245    ferrous sulfate tablet 325 mg, 325 mg, Oral, Daily With Breakfast, Fleurette Gals, DO, 325 mg at 04/05/22 0658    HYDROmorphone (DILAUDID) injection 0 5 mg, 0 5 mg, Intravenous, Q4H PRN, Joaquina Joshi MD, 0 5 mg at 04/05/22 0917    insulin lispro (HumaLOG) 100 units/mL subcutaneous injection 1-5 Units, 1-5 Units, Subcutaneous, TID AC **AND** Fingerstick Glucose (POCT), , , TID AC, Radha Villafana DO    insulin lispro (HumaLOG) 100 units/mL subcutaneous injection 1-5 Units, 1-5 Units, Subcutaneous, HS, Radha Bridget Villafana DO    ondansetron [facility]) injection 4 mg, 4 mg, Intravenous, Q6H PRN, Leopold Alba, PA-C, 4 mg at 04/05/22 0919    oxyCODONE (ROXICODONE) IR tablet 5 mg, 5 mg, Oral, Q4H PRN, 5 mg at 04/05/22 1204 **OR** oxyCODONE (ROXICODONE) IR tablet 7 5 mg, 7 5 mg, Oral, Q4H PRN, Rafita Jin MD    polyethylene glycol [facility]) packet 17 g, 17 g, Oral, Daily, Odalis Davies PA-C, 17 g at 04/04/22 0827    sodium chloride tablet 3 g, 3 g, Oral, 4x Daily (with meals and at bedtime), Amy Londono MD, 3 g at 04/05/22 1206    REVIEW OF SYSTEMS     Review of Systems   Constitutional: Positive for fatigue  HENT: Negative  Eyes: Negative  Respiratory: Negative  Cardiovascular: Negative  Gastrointestinal: Negative  Endocrine: Negative  Genitourinary: Negative  Musculoskeletal: Negative  Skin: Negative  Allergic/Immunologic: Negative  Neurological: Negative  Hematological: Negative  All other systems reviewed and are negative  OBJECTIVE     Current Weight: Weight - Scale: 80 kg (176 lb 5 9 oz) (pt unable to stand)  Vitals:    04/05/22 0717   BP: 123/61   Pulse: 81   Resp:    Temp: 98 °F (36 7 °C)   SpO2: 91%     Body mass index is 31 24 kg/m²  Intake/Output Summary (Last 24 hours) at 4/5/2022 1207  Last data filed at 4/5/2022 4217  Gross per 24 hour   Intake 720 ml   Output 500 ml   Net 220 ml       PHYSICAL EXAMINATION     Physical Exam  Constitutional:       Appearance: She is well-developed  HENT:      Head: Normocephalic and atraumatic  Eyes:      Pupils: Pupils are equal, round, and reactive to light  Cardiovascular:      Rate and Rhythm: Normal rate and regular rhythm  Heart sounds: Murmur heard  Pulmonary:      Effort: Pulmonary effort is normal    Abdominal:      General: Bowel sounds are normal       Palpations: Abdomen is soft  Musculoskeletal:         General: Normal range of motion  Cervical back: Neck supple  Left lower leg: Edema present  Skin:     General: Skin is warm  Neurological:      Mental Status: She is alert and oriented to person, place, and time  LAB RESULTS     Results from last 7 days   Lab Units 04/05/22  0731 04/04/22  1627 04/04/22  1219 04/04/22  0516 04/03/22  2135 04/03/22  0815 04/03/22  0625 04/03/22  0000 04/02/22  0924 04/02/22  0533 04/01/22  1002 04/01/22  0519 03/31/22  1323 03/31/22  0536 03/30/22  2132 03/30/22  1701   WBC Thousand/uL  --   --   --   --   --   --  11 02*  --   --  11 06*  --  12 61*  --  12 88* 11 95* 14 50*   HEMOGLOBIN g/dL  --   --   --   --   --   --  8 8*  --   --  8 9*  --  9 3*  --  9 4* 8 3* 9 6*   HEMATOCRIT %  --   --   --   --   --   --  27 2*  --   --  28 1*  --  28 1*  --  29 4* 25 8* 30 0*   PLATELETS Thousands/uL  --   --   --   --   --   --  264  --   --  257  --  255  --  233 207 240   POTASSIUM mmol/L 4 0 3 8 3 9 3 9 3 8 3 7  --  3 9   < >  --    < >  --    < >  --   --  4 2   CHLORIDE mmol/L 101 98* 98* 100 96* 92*  --  95*   < >  --    < >  --    < >  --   --  88*   CO2 mmol/L 26 26 26 25 25 25  --  23   < >  --    < >  --    < >  --   --  28   BUN mg/dL 18 19 18 19 21 23  --  23   < >  --    < >  --    < >  --   --  36*   CREATININE mg/dL 1 24 1 40* 1 35* 1 22 1 36* 1 27  --  1 20   < >  --    < >  --    < >  --   --  1 71*   EGFR ml/min/1 73sq m 39 34 36 40 35 38  --  41   < >  --    < >  --    < >  --   --  27   CALCIUM mg/dL 11 8* 12 2* 11 6* 11 6* 11 3* 10 7*  --  10 6*   < >  --    < >  --    < >  --   --  11 1*    < > = values in this interval not displayed  RADIOLOGY RESULTS      Results for orders placed during the hospital encounter of 07/02/18    XR chest portable    Narrative  INDICATION:  Status post pacemaker insertion  ORDERING PROVIDER:  Danielle Waldron  TECHNIQUE:  Frontal chest was obtained at 10:22 hours  COMPARISON:  None Available  FINDINGS: Mild cardiomegaly  Mild hypoinflation  No gross consolidative  pneumonia, effusion, or pneumothorax  Left-sided pacemaker, leads appear  intact  Subtle hilar vascular prominence  Impression  1    No evidence for pneumothorax  2   Mild cardiomegaly and subtle hilar vascular prominence  ASSESSMENT/PLAN     62-year-old female with past medical history of bladder cancer status post TURBT and administration of BCG in July 2021, progression of malignancy to upper tract urothelial carcinoma, diabetes mellitus type 2, breast cancer, dyslipidemia, depression, sick sinus syndrome, volvulus who presents to our facility on 03/30 with complaint of generalized pain along with left lower extremity edema  1  Hyponatremia:  Presented to our facility on 03/30 with serum sodium 124 mEq per L   -etiology thought to be secondary to decreased solute intake as suggested by urine studies as well as component of SIADH  -noted low urine sodium, elevated serum uric acid and normal urine osmolality  -current sodium is 134 meq/L and improved  - keep patient on salt tablets at a dose of 2g p o  T i d     2  Acute kidney injury:  Present on admission  Baseline serum creatinine of 0 7-0 9   -presented with serum creatinine of 1 71 and elevated  -current creatinine is 1 24 and stable  -etiology of acute kidney injury likely contrast induced nephropathy along with hypercalcemia induced renal vasoconstriction    3  Hypercalcemia:  Calcium is 11 8 and elevated though improved from yesterday  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

## 2022-04-05 NOTE — CASE MANAGEMENT
Case Management Discharge Planning Note    Patient name Aden Severe  Location /-63 MRN [id number]  : 1935 Date 2022       Current Admission Date: 3/30/2022  Current Admission Diagnosis:Cancer related pain   Patient Active Problem List    Diagnosis Date Noted    DM II (diabetes mellitus, type II), controlled ([place] [address] ) 2022    Compression fracture of L2 lumbar vertebra ([place] [address] ) 2022    Pyuria 2022    TILA (acute kidney injury) ([place] [address] ) 2022    DVT (deep venous thrombosis) ([place] [address] ) 2022    Cancer related pain 2022    Palliative care patient 2022   Nestor Prajapati Person consulting for explanation of examination or test finding 11/10/2021    Hearing loss of left ear due to cerumen impaction 10/26/2021    Status post small bowel resection 2021    Malignant neoplasm of overlapping sites of bladder ([place] [address] ) 2021    Encounter to discuss test results 2021    Cough 2021    Hypercalcemia 10/22/2020    Balance problem 10/22/2020    Medicare annual wellness visit, subsequent 10/18/2019    Memory loss 06/10/2019    Abnormal ultrasound cardiogram 2018    Breast carcinoma, female, right ([place] [address] ) 2018    S/P hysterectomy 2018    Cardiac pacemaker in situ 2018    Sick sinus syndrome ([place] [address] ) 2018    Bradycardia 2018    Hyponatremia 2018    Hypokalemia 2018    Dizziness 2018    Malignant neoplasm of upper-inner quadrant of left breast in female, estrogen receptor positive ([place] [address] ) 2018    History of breast cancer 2018    Un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diabetes mellitus with hyperglycemia ([place] [address] ) 2017    Left breast mass 12/10/2017    Depression 2017    Anxiety 2016    BMI 33 0-33 9,adult 2016    Macular degeneration 2016    Hyperlipidemia 2015    GERD (gastroesophageal reflux disease) 2015    Essential hypertension 2011      LOS (days): 5  Geometric Mean LOS (GMLOS) (days): 3 30  Days to GMLOS:-1 7     OBJECTIVE:  Risk of Unplanned Readmission Score: 11         Current admission status: Inpatient   Preferred Pharmacy:   [address] [place] [address],[address], [address] [place] [address]  Phone: [phone] Fax: [phone]    Primary Care Provider: Elizabeth Ortega DO    Primary Insurance: Surekha Dumont [facility] REP  Secondary Insurance:     DISCHARGE DETAILS:    Other Referral/Resources/Interventions Provided:  Referral Comments: Spoke with Daughter Lanre Bourne and she agreed with admission to [facility]  [place] notifies to statrt the [facility]   Dr Lieutenant Johnson made aware  requested covid test to be done

## 2022-04-05 NOTE — ASSESSMENT & PLAN NOTE
Lab Results   Component Value Date    HGBA1C 5 6 10/26/2021       Recent Labs     04/04/22  1540 04/04/22 2054 04/05/22  0719 04/05/22  1128   POCGLU 118 137 105 107       Blood Sugar Average: Last 72 hrs:  (P) 126 2   Hold metformin, stable sugars  Cont ISS  If CBG is well controlled onsider d/c of ISS given eventual goals to pursue comfort  Liberalized DM diet

## 2022-04-05 NOTE — PROGRESS NOTES
Progress note - Palliative and Supportive Care   Mckay Howard 82 y o  female [phone]    Patient Active Problem List   Diagnosis    Un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diabetes mellitus with hyperglycemia ([facility] )    Essential hypertension    Hyperlipidemia    Left breast mass    Depression    Hyponatremia    Hypokalemia    Dizziness    Bradycardia    Sick sinus syndrome (HCC)    Cardiac pacemaker in situ    Abnormal ultrasound cardiogram    Anxiety    BMI 33 0-33 9,adult    [facility]    GERD (gastroesophageal reflux disease)    Macular degeneration    Malignant neoplasm of upper-inner quadrant of left breast in female, estrogen receptor positive ([place] [address] )    History of breast cancer    S/P hysterectomy    Memory loss    Medicare annual wellness visit, subsequent    Hypercalcemia    Balance problem    Cough    Malignant neoplasm of overlapping sites of bladder ([place] [address] )    Encounter to discuss test results    Status post small bowel resection    Hearing loss of left ear due to cerumen impaction    Person consulting for explanation of examination or test finding    Cancer related pain    Palliative care patient    DVT (deep venous thrombosis) ([place] [address] )    TILA (acute kidney injury) ([place] [address] )    Compression fracture of L2 lumbar vertebra ([place] [address] )    Pyuria    DM II (diabetes mellitus, type II), controlled ([place] [address] )     Active issues specifically addressed today include:    - intractable cancer-related pain   - hyponatremia, decreased solute intake +/- component of SIADH   - acute/subacute occlusive L CFV/DFV DVT + acute/subacute thrombophlebitis L great saphenous v    - ?occlusive bland infarct v tumor thrombus in infrahepatic IVC with extension to iliac vessels + LLE   - TILA, SCr 1 7 on admission with baseline 0 8-0 9   - hypercalcemia, corrected Ca 12 1 on admission   - bladder cancer s/p TURBT + instillation of mitomycin [07/2021] with 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 carcinoma; marked progression on imaging   - cognitive decline, most likely 2/2 dementia   - debility/deconditioning   - goals of care support    Plan:  #symptom management  #cancer-related pain   - continue APAP 500 mg PO Q6H PRN              - max daily 3000 mg   - increase oxy-IR 5/7 5 mg PO Q4H PRN [mod/severe pain]   - continue hydromorphone 0 5 mg IV Q4H PRN [breakthrough pain]              - OME usage yesterday: 25 mg     AVOID morphine, eGFR 39 today  Na 134     Increased pain today per patient  Tolerating oxy-IR 5 mg dosing  Will increase to oxy-IR 5/7 5 mg dosing PRN  Promote use as needed      #OIC   - continue miralax 17 g PO QDaily     No reported BM this AM  Passing flatus      #nausea   - continue ondansetron 4 mg IV Q6H PRN     Intermittent nausea without vomiting  Tolerating small volume PO intake  Diminished taste with decreased appetite  #goals of care   - tentative plan for discharge to [facility] [[facility] [place]]   - will follow up OP [facility] clinic for continued symptoms management and supportive cares    #psychosocial support   - emotional support provided   -    - three adult children [2 living, 1 ]              - Mariam Hernandez, Marissa Dilshad              - Betty Keep              - Long Estefania, ]   - parents    - seven siblings [5 living, 2 ]              - Mike [brother]   - Joleen Joshi [phone]   - resides with [place]      We appreciate the opportunity to participate in this patient's care  We will continue to follow  Please do not hesitate to contact our on-call provider through our clinic answering service at [phone] should you have acute symptom control concerns  Code Status: DNAR/DNI - Level 3   Decisional apparatus:  Patient is marginally competent on my exam today    If competence is lost, patient's medical Power of Ahandyhand is identified as #1 Lev Garcia, #2 Kandice Vivar, #3 Jose Epperson by [employer] documentation  Advance Directive / Living Will / POLST: On File    Interval history:   - NAEO   - symptoms assessment in MDM section above    MEDICATIONS / ALLERGIES:     current meds:   Current Facility-Administered Medications   Medication Dose Route Frequency    acetaminophen (TYLENOL) tablet 650 mg  650 mg Oral Q6H PRN    aluminum-magnesium hydroxide-simethicone (MYLANTA) oral suspension 30 mL  30 mL Oral Q6H PRN    anastrozole (ARIMIDEX) tablet 1 mg  1 mg Oral Daily With Lunch    apixaban (ELIQUIS) tablet 10 mg  10 mg Oral BID    Apoaequorin CAPS 10 mg  10 mg Oral Early Morning    artificial tear (LUBRIFRESH P M ) ophthalmic ointment   Both Eyes HS    atorvastatin (LIPITOR) tablet 40 mg  40 mg Oral HS    ferrous sulfate tablet 325 mg  325 mg Oral Daily With Breakfast    HYDROmorphone (DILAUDID) injection 0 5 mg  0 5 mg Intravenous Q4H PRN    insulin lispro (HumaLOG) 100 units/mL subcutaneous injection 1-5 Units  1-5 Units Subcutaneous TID AC    insulin lispro (HumaLOG) 100 units/mL subcutaneous injection 1-5 Units  1-5 Units Subcutaneous HS    ondansetron (ZOFRAN) injection 4 mg  4 mg Intravenous Q6H PRN    oxyCODONE (ROXICODONE) IR tablet 2 5 mg  2 5 mg Oral Q4H PRN    oxyCODONE (ROXICODONE) IR tablet 5 mg  5 mg Oral Q4H PRN    polyethylene glycol (MIRALAX) packet 17 g  17 g Oral Daily    sodium chloride tablet 3 g  3 g Oral 4x Daily (with meals and at bedtime)       Allergies   Allergen Reactions    Celecoxib Swelling     celebrex- swelling of throat    Loratadine Swelling     Jaw swelled,couldn't swallow; face swelling    Aspirin GI Intolerance     : nausea; okay with Ecotrin    Lisinopril Swelling       OBJECTIVE:    Physical Exam  Physical Exam  Vitals and nursing note reviewed  Constitutional:       General: She is awake  Appearance: She is not diaphoretic  Comments: Lying in bed in NAD  Non-toxic appearing   HENT:      Head: Normocephalic and atraumatic        Right Ear: External ear normal       Left Ear: External ear normal       Nose: No rhinorrhea  Eyes:      Comments: No gaze preference   Cardiovascular:      Rate and Rhythm: Normal rate  Pulmonary:      Effort: No tachypnea, accessory muscle usage or respiratory distress  Comments: Completes full sentences without difficulty  Musculoskeletal:      Cervical back: Normal range of motion  Skin:     Coloration: Skin is pale  Neurological:      General: No focal deficit present  Mental Status: She is alert  Psychiatric:         Attention and Perception: Attention normal          Mood and Affect: Mood and affect normal          Speech: Speech normal          Lab Results: I have personally reviewed pertinent labs  , CBC: No results found for: WBC, HGB, HCT, MCV, PLT, ADJUSTEDWBC, MCH, MCHC, RDW, MPV, NRBC, CMP:   Lab Results   Component Value Date    SODIUM 131 (L) 04/04/2022    K 3 8 04/04/2022    CL 98 (L) 04/04/2022    CO2 26 04/04/2022    BUN 19 04/04/2022    CREATININE 1 40 (H) 04/04/2022    CALCIUM 12 2 (HH) 04/04/2022    EGFR 34 04/04/2022   , PT/PTT:No results found for: PT, PTT  Imaging Studies: Reviewed  EKG, Pathology, and Other Studies: Reviewed    Counseling / Coordination of Care    Total floor / unit time spent today 20 minutes  Greater than 50% of total time was spent with the patient and / or family counseling and / or coordination of care  A description of the counseling / coordination of care: provided medical updates, discussed palliative care, determined competency, determined goals of care, determined POA, determined social/family support, discussed plans of care, discussed symptom management, provided psychosocial support  Adjustment to opioid regimen   Coordinated plan of care with primary team

## 2022-04-05 NOTE — UTILIZATION REVIEW
Continued Stay Review    Date: 4/4                          Current Patient Class: IP  Current Level of Care: MS    HPI:86 y o  female initially admitted on 3/31    Assessment/Plan: pt up and OOB today , more alert today   Palliative care on board , cont pain mngt   CR remains elevated , nephrology following   + LLE edema   4/4 Nephrology Note   TILA acute kidney injury likely contrast induced nephropathy along with hypercalcemia induced renal vasoconstriction  [place] 11 6 and elevated   Enc po intake   Hyponatremia Na 133 cont Salt tablets     4/4 CM Note   Plan for STR on DC     Vital Signs:  04/04/22 23:39:19 98 4 °F (36 9 °C) 77 -- 124/60 81 93 % --   04/04/22 15:18:30 -- 76 -- 120/58 79 94 % --   04/04/22 1317 -- -- -- -- -- -- None (Room air)   04/04/22 08:16:28 97 3 °F (36 3 °C) Abnormal  60 -- 131/70 90 94 %          Pertinent Labs/Diagnostic Results:       Results from last 7 days   Lab Units 04/03/22  0625 04/02/22  0533 04/01/22  0519 03/31/22  0536 03/31/22  0536 03/30/22  2132 03/30/22  2132 03/30/22  1701 03/30/22  1701   WBC Thousand/uL 11 02* 11 06* 12 61*   < > 12 88*   < > 11 95*   < > 14 50*   HEMOGLOBIN g/dL 8 8* 8 9* 9 3*  --  9 4*  --  8 3*   < > 9 6*   HEMATOCRIT % 27 2* 28 1* 28 1*  --  29 4*  --  25 8*   < > 30 0*   PLATELETS Thousands/uL 264 257 255   < > 233   < > 207   < > 240   NEUTROS ABS Thousands/µL  --   --   --   --   --   --   --   --  12 56*    < > = values in this interval not displayed       Results from last 7 days   Lab Units 04/04/22  1219 04/04/22  0516 04/03/22  2135 04/03/22  0815 04/01/22  1002 04/01/22  0519   SODIUM mmol/L 133* 132* 131* 127*   < >  --    POTASSIUM mmol/L 3 9 3 9 3 8 3 7   < >  --    CHLORIDE mmol/L 98* 100 96* 92*   < >  --    CO2 mmol/L 26 25 25 25   < >  --    ANION GAP mmol/L 9 7 10 10   < >  --    BUN mg/dL 18 19 21 23   < >  --    CREATININE mg/dL 1 35* 1 22 1 36* 1 27   < >  --    EGFR ml/min/1 73sq m 36 40 35 38   < >  --    CALCIUM mg/dL 11 6* 11 6* 11 3* 10 7*   < >  --    CALCIUM, IONIZED mmol/L  --   --   --   --   --  1 42*    < > = values in this interval not displayed  Results from last 7 days   Lab Units 03/30/22  1701   AST U/L 28   ALT U/L 18   ALK PHOS U/L 90   TOTAL PROTEIN g/dL 7 3   ALBUMIN g/dL 2 6*   TOTAL BILIRUBIN mg/dL 0 28     Results from last 7 days   Lab Units 04/04/22  2054 04/04/22  1540 04/04/22  1221   POC GLUCOSE mg/dl 137 118 164*     Results from last 7 days   Lab Units 04/04/22  1627 04/04/22  1219 04/04/22  0516 04/03/22  2135 04/03/22  0815 04/03/22  0000 04/02/22  1620 04/02/22  0924 04/02/22  0040 04/01/22  2150 04/01/22  1639 04/01/22  1002   GLUCOSE RANDOM mg/dL 117 127 133 137 251* 121 349* 111 136 161* 102 143*     Results from last 7 days   Lab Units 04/01/22  1225   OSMOLALITY, SERUM mmol/*     Results from last 7 days   Lab Units 04/04/22  0516 04/03/22  0625 04/02/22  0701 03/31/22  0536 03/30/22  2132   PROTIME seconds  --   --   --   --  14 5   INR   --   --   --   --  1 18   PTT seconds 64* 69* 82*   < > >210*    < > = values in this interval not displayed       Results from last 7 days   Lab Units 03/30/22  1701   LIPASE u/L 187     Results from last 7 days   Lab Units 04/01/22  1641 04/01/22  1225   OSMOLALITY, SERUM mmol/KG  --  274*   OSMO UR mmol/  --      Results from last 7 days   Lab Units 04/01/22  1641 03/31/22  1203   CLARITY UA  Clear Slightly Cloudy   COLOR UA  Yellow Yellow   SPEC GRAV UA  1 015 1 010   PH UA  6 0 7 0   GLUCOSE UA mg/dl Negative Negative   KETONES UA mg/dl Negative Negative   BLOOD UA  Small* Moderate*   PROTEIN UA mg/dl Trace* Trace*   NITRITE UA  Negative Positive*   BILIRUBIN UA  Negative Negative   UROBILINOGEN UA E U /dl 0 2 0 2   LEUKOCYTES UA  Moderate* Moderate*   WBC UA /hpf 20-30* 30-50*   RBC UA /hpf 2-4 10-20*   BACTERIA UA /hpf Occasional Innumerable*   EPITHELIAL CELLS WET PREP /hpf Occasional Occasional   SODIUM UR  35  --        Results from last 7 days   Lab Units 03/31/22  1203   URINE CULTURE  >100,000 cfu/ml Staphylococcus coagulase negative*       Medications:   Scheduled Medications:  anastrozole, 1 mg, Oral, Daily With Lunch  apixaban, 10 mg, Oral, BID  Apoaequorin, 10 mg, Oral, Early Morning  artificial tear, , Both Eyes, HS  atorvastatin, 40 mg, Oral, HS  ferrous sulfate, 325 mg, Oral, Daily With Breakfast  insulin lispro, 1-5 Units, Subcutaneous, TID AC  insulin lispro, 1-5 Units, Subcutaneous, HS  polyethylene glycol, 17 g, Oral, Daily  sodium chloride, 3 g, Oral, 4x Daily (with meals and at bedtime)      Continuous IV Infusions:     PRN Meds:  acetaminophen, 650 mg, Oral, Q6H PRN  aluminum-magnesium hydroxide-simethicone, 30 mL, Oral, Q6H PRN  HYDROmorphone, 0 5 mg, Intravenous, Q4H PRN  ondansetron, 4 mg, Intravenous, Q6H PRN  oxyCODONE, 2 5 mg, Oral, Q4H PRN  oxyCODONE, 5 mg, Oral, Q4H PRN        Discharge Plan: [facility]     Network Utilization Review Department  ATTENTION: Please call with any questions or concerns to [phone] and carefully listen to the prompts so that you are directed to the right person  All voicemails are confidential   Nettie Sevilla all requests for admission clinical reviews, approved or denied determinations and any other requests to dedicated fax number below belonging to the campus where the patient is receiving treatment   List of dedicated fax numbers for the Facilities:  [address] DENIALS (Administrative/Medical Necessity) [phone]   [address] (Maternity/NICU/Pediatrics) [phone]   [address] [address] [facility] [id number] [facility] Adonis Beo [address] [phone]

## 2022-04-05 NOTE — PROGRESS NOTES
[facility]  Progress Note - Alyse Legato 1935, 82 y o  female MRN: [id number]  Unit/Bed#: -[facility] Encounter: [id number]  Primary Care Provider: Ashwin Rojo DO   Date and time admitted to hospital: 3/30/2022  4:36 PM    * Hyponatremia  Assessment & Plan  · Nephrology following  · Improving  · Received tolvaptan x 2 doses (4/2 and 4/3)   · On salt tablets  · Chronically on zoloft and trimeterene-hctz which will continue to hold      DM II (diabetes mellitus, type II), controlled [facility])  Assessment & Plan  Lab Results   Component Value Date    HGBA1C 5 6 10/26/2021       Recent Labs     04/04/22  1540 04/04/22  2054 04/05/22  0719 04/05/22  1128   POCGLU 118 137 105 107       Blood Sugar Average: Last 72 hrs:  (P) 126 2   Hold metformin, stable sugars  Cont ISS  If CBG is well controlled onsider d/c of ISS given eventual goals to pursue comfort  Liberalized DM diet    Pyuria  Assessment & Plan  · Urine culture revealing Staph coag-negative  Also from prior urine culture in September 2021  · Likely colonization  · Denies  symptoms  · Monitoring off antibiotics    Compression fracture of L2 lumbar vertebra (HCC)  Assessment & Plan  · Pain control as ordered  ·  Suspect pathological fracture given underlying malignancy  · Spoke at length with patient's daughter, does not wish for invasive/surgical intervention  She also does not wish for the patient to wear back brace for comfort purposes  TILA (acute kidney injury) ([facility] [address] )  Assessment & Plan  · Resolved  ·  Multifactorial, Secondary to underlying malignancy as infiltrating renal mass, contrast induced nephropathy, hypercalcemia  · Nephrology on board  · Avoid nephrotoxins, renally dose meds    DVT (deep venous thrombosis) ([facility])  Assessment & Plan  · B/l doppler LE: acute- subacute occlusive thrombus noted in the common femoral vein, deep femoral vein, and proximal to distal superficial femoral vein   acute-subacute occlusive thrombophlebitis noted in the great saphenous vein from proximal to distal thigh  No RLE DVT  · CT c/a/p: 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 extending inferiorly and likely involves the iliac vessels with extension to the left lower extremity  Suspected small PE in LLL  No large central PE    · Provoked secondary to underlying malignancy  · Discussion had with patient's daughter  Her preference is to not continue on anticoagulation upon discharge given patient is a fall risk and goals for comfort  She is ok to keep on a/c tx while hospitalized  · Has been on heparin gtt, transitioned to eliquis    Cancer related pain  Assessment & Plan  · CT shows progression of disease as well as extensive DVT in IVC/left lower extremity  · Palliative care on board  · Continue pain analgesics regimen as recommended for palliative  Monitor for 24 more hours before increasing pain meds as she continues to complain of pain    Malignant neoplasm of overlapping sites of bladder [facility])  Assessment & Plan  · Malignant High-grade invasive urethral carcinoma; 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  · Patient elected for palliative care does not wish to pursue surgical or oncologic intervention  · Palliative care on board  · Extensive discussion with patient's daughter  They are not ready for hospice  The hope is for possible rehabilitation and then after which will likely transition to home hospice    Hypercalcemia  Assessment & Plan  · Acute on chronic elevation, likely due to malignancy  Continue to follow  · Nephrology following        VTE Pharmacologic Prophylaxis: VTE Score: 9 Eliquis    Patient Centered Rounds: I performed bedside rounds with nursing staff today  Discussions with Specialists or Other Care Team Provider:  yes, palliative    Education and Discussions with Family / Patient: Updated  (daughter) via phone  Time Spent for Care: 20 minutes   More than 50% of total time spent on counseling and coordination of care as described above  Current Length of Stay: 5 day(s)  Current Patient Status: Inpatient   Certification Statement: The patient will continue to require additional inpatient hospital stay due to need for placement  Discharge Plan: pending placement    Code Status: Level 3 - DNAR and DNI    Subjective:   Seen and examined at bedside  Complains of pain around the buttocks area  Denies any chest pain or shortness and breath    Objective:     Vitals:   Temp (24hrs), Av 2 °F (36 8 °C), Min:98 °F (36 7 °C), Max:98 4 °F (36 9 °C)    Temp:  [98 °F (36 7 °C)-98 4 °F (36 9 °C)] 98 °F (36 7 °C)  HR:  [76-81] 81  BP: (120-124)/(58-61) 123/61  SpO2:  [91 %-94 %] 91 %  Body mass index is 31 24 kg/m²  Input and Output Summary (last 24 hours): Intake/Output Summary (Last 24 hours) at 2022 1359  Last data filed at 2022 0917  Gross per 24 hour   Intake 480 ml   Output 500 ml   Net -20 ml       Physical Exam:   Physical Exam alert and oriented times review, S1-S2 audible, regular, lungs CTA, abdomen nontender nondistended bowel sounds audible all 4 quadrants, no new focal neuro deficits, cooperative, oral mucosa moist    Additional Data:     Labs:  Results from last 7 days   Lab Units 22  0625 22  2132 22  1701   WBC Thousand/uL 11 02*   < > 14 50*   HEMOGLOBIN g/dL 8 8*   < > 9 6*   HEMATOCRIT % 27 2*   < > 30 0*   PLATELETS Thousands/uL 264   < > 240   NEUTROS PCT %  --   --  87*   LYMPHS PCT %  --   --  5*   MONOS PCT %  --   --  6   EOS PCT %  --   --  0    < > = values in this interval not displayed       Results from last 7 days   Lab Units 22  1210 22  1323 22  1701   SODIUM mmol/L 133*   < > 124*   POTASSIUM mmol/L 4 0   < > 4 2   CHLORIDE mmol/L 101   < > 88*   CO2 mmol/L 26   < > 28   BUN mg/dL 18   < > 36*   CREATININE mg/dL 1 19   < > 1 71*   ANION GAP mmol/L 6   < > 8   CALCIUM mg/dL 11 5*   < > 11 1* ALBUMIN g/dL  --   --  2 6*   TOTAL BILIRUBIN mg/dL  --   --  0 28   ALK PHOS U/L  --   --  90   ALT U/L  --   --  18   AST U/L  --   --  28   GLUCOSE RANDOM mg/dL 113   < > 132    < > = values in this interval not displayed       Results from last 7 days   Lab Units 03/30/22  2132   INR  1 18     Results from last 7 days   Lab Units 04/05/22  1128 04/05/22  0719 04/04/22  2054 04/04/22  1540 04/04/22  1221   POC GLUCOSE mg/dl 107 105 137 118 164*               Lines/Drains:  Invasive Devices  Report    Peripheral Intravenous Line            Peripheral IV 04/03/22 Left Antecubital 1 day          Line            Peripheral Nerve Catheter 238 days          Drain            External Urinary Catheter -- days                      Imaging: Reviewed radiology reports from this admission including: abdominal/pelvic CT    Recent Cultures (last 7 days):   Results from last 7 days   Lab Units 03/31/22  1203   URINE CULTURE  >100,000 cfu/ml Staphylococcus coagulase negative*       Last 24 Hours Medication List:   Current Facility-Administered Medications   Medication Dose Route Frequency Provider Last Rate    acetaminophen  650 mg Oral Q6H PRN Lauryn Painter MD      aluminum-magnesium hydroxide-simethicone  30 mL Oral Q6H PRN Blaze Edgar PA-C      anastrozole  1 mg Oral Daily With Lunch [employer], DO      apixaban  10 mg Oral BID [employer], DO      Apoaequorin  10 mg Oral Early Morning [employer], DO      artificial tear   Both Eyes HS [employer], DO      atorvastatin  40 mg Oral HS [employer], DO      ferrous sulfate  325 mg Oral Daily With Breakfast [employer], DO      HYDROmorphone  0 5 mg Intravenous Q4H PRN Camden Lizarraga MD      insulin lispro  1-5 Units Subcutaneous TID AC Radha Villafana, DO      insulin lispro  1-5 Units Subcutaneous HS Radha Villafana, DO      ondansetron  4 mg Intravenous Q6H PRN Maine Santos      oxyCODONE  5 mg Oral Q4H PRN Renee Quevedo MD      Or   Kandy Solano oxyCODONE  7 5 mg Oral Q4H PRN Renee Quevedo MD      polyethylene glycol  17 g Oral Daily Gagan Parkinson PA-C      sodium chloride  3 g Oral 4x Daily (with meals and at bedtime) Laurie Cross MD          Today, Patient Was Seen By: Sasha Yoder MD    **Please Note: This note may have been constructed using a voice recognition system  **

## 2022-04-05 NOTE — ASSESSMENT & PLAN NOTE
· Nephrology following  · Improving  · Received tolvaptan x 2 doses (4/2 and 4/3)   · On salt tablets  · Chronically on zoloft and trimeterene-hctz which will continue to hold

## 2022-04-05 NOTE — SOCIAL WORK
[facility] LSW placed call to patient's daughter to check in for emotional support and information  Daughter expressed concern about watching patient's slow decline, about if patient can't get to her desired level of functioning and what that would do to patient  Daughter also discussed how her daughter was crying saying that they can't bring patient home, they can't financially pay for a caregiver 24/7 and she is unable to do it  Daughter reported that patient's granddaughter is having her own mental health problems with her son's upcoming surgery this summer and her fiance's addiction history and all the stressors are very overwhelming  Daughter/LSW discussed LT plans, LSW suggested reaching out to [id number] [facility] her cousin owns  LSW suggested to see what patient needs to accomplish in order to live there and also to ask if she were decline, would she be allowed to remain and have hospice come in    Once information is received, LSW suggested that she notify the STR of the tasks patient needs to be able to complete so it can be an identified goal

## 2022-04-06 ENCOUNTER — IMMUNIZATIONS (INPATIENT)
Dept: FAMILY MEDICINE CLINIC | Facility: HOSPITAL | Age: 87
DRG: 294 | End: 2022-04-06
Payer: COMMERCIAL

## 2022-04-06 VITALS
HEART RATE: 63 BPM | HEIGHT: 63 IN | WEIGHT: 176.37 LBS | BODY MASS INDEX: 31.25 KG/M2 | TEMPERATURE: 97.8 F | RESPIRATION RATE: 17 BRPM | DIASTOLIC BLOOD PRESSURE: 57 MMHG | OXYGEN SATURATION: 94 % | SYSTOLIC BLOOD PRESSURE: 115 MMHG

## 2022-04-06 LAB
ANION GAP SERPL CALCULATED.3IONS-SCNC: 6 MMOL/L (ref 4–13)
BUN SERPL-MCNC: 18 MG/DL (ref 5–25)
CALCIUM SERPL-MCNC: 12 MG/DL (ref 8.3–10.1)
CHLORIDE SERPL-SCNC: 101 MMOL/L (ref 100–108)
CO2 SERPL-SCNC: 26 MMOL/L (ref 21–32)
CREAT SERPL-MCNC: 1.16 MG/DL (ref 0.6–1.3)
ERYTHROCYTE [DISTWIDTH] IN BLOOD BY AUTOMATED COUNT: 14.1 % (ref 11.6–15.1)
GFR SERPL CREATININE-BSD FRML MDRD: 43 ML/MIN/1.73SQ M
GLUCOSE SERPL-MCNC: 103 MG/DL (ref 65–140)
GLUCOSE SERPL-MCNC: 114 MG/DL (ref 65–140)
GLUCOSE SERPL-MCNC: 116 MG/DL (ref 65–140)
HCT VFR BLD AUTO: 28.2 % (ref 34.8–46.1)
HGB BLD-MCNC: 8.5 G/DL (ref 11.5–15.4)
MCH RBC QN AUTO: 27.8 PG (ref 26.8–34.3)
MCHC RBC AUTO-ENTMCNC: 30.1 G/DL (ref 31.4–37.4)
MCV RBC AUTO: 92 FL (ref 82–98)
PLATELET # BLD AUTO: 294 THOUSANDS/UL (ref 149–390)
PMV BLD AUTO: 8.2 FL (ref 8.9–12.7)
POTASSIUM SERPL-SCNC: 4 MMOL/L (ref 3.5–5.3)
RBC # BLD AUTO: 3.06 MILLION/UL (ref 3.81–5.12)
SODIUM SERPL-SCNC: 133 MMOL/L (ref 136–145)
WBC # BLD AUTO: 12.19 THOUSAND/UL (ref 4.31–10.16)

## 2022-04-06 PROCEDURE — 99239 HOSP IP/OBS DSCHRG MGMT >30: CPT | Performed by: FAMILY MEDICINE

## 2022-04-06 PROCEDURE — 85027 COMPLETE CBC AUTOMATED: CPT | Performed by: FAMILY MEDICINE

## 2022-04-06 PROCEDURE — 80048 BASIC METABOLIC PNL TOTAL CA: CPT | Performed by: FAMILY MEDICINE

## 2022-04-06 PROCEDURE — 0054A COVID-19 PFIZER VACC TRIS-SUCROSE GRAY CAP 0.3 ML: CPT

## 2022-04-06 PROCEDURE — 91305 COVID-19 PFIZER VACC TRIS-SUCROSE GRAY CAP 0.3 ML: CPT

## 2022-04-06 PROCEDURE — 82948 REAGENT STRIP/BLOOD GLUCOSE: CPT

## 2022-04-06 PROCEDURE — 99232 SBSQ HOSP IP/OBS MODERATE 35: CPT | Performed by: INTERNAL MEDICINE

## 2022-04-06 RX ORDER — SODIUM CHLORIDE 1000 MG
2 TABLET, SOLUBLE MISCELLANEOUS
Qty: 60 TABLET | Refills: 0 | Status: SHIPPED | OUTPATIENT
Start: 2022-04-06 | End: 2022-04-16

## 2022-04-06 RX ORDER — OXYCODONE HYDROCHLORIDE 5 MG/1
5 TABLET ORAL EVERY 4 HOURS PRN
Qty: 10 TABLET | Refills: 0 | Status: SHIPPED | OUTPATIENT
Start: 2022-04-06 | End: 2022-04-16

## 2022-04-06 RX ORDER — SODIUM CHLORIDE 1000 MG
2 TABLET, SOLUBLE MISCELLANEOUS
Status: DISCONTINUED | OUTPATIENT
Start: 2022-04-06 | End: 2022-04-06 | Stop reason: HOSPADM

## 2022-04-06 RX ADMIN — APIXABAN 10 MG: 5 TABLET, FILM COATED ORAL at 08:17

## 2022-04-06 RX ADMIN — FERROUS SULFATE TAB 325 MG (65 MG ELEMENTAL FE) 325 MG: 325 (65 FE) TAB at 08:16

## 2022-04-06 RX ADMIN — Medication 3 G: at 08:15

## 2022-04-06 RX ADMIN — OXYCODONE HYDROCHLORIDE 7.5 MG: 5 TABLET ORAL at 03:42

## 2022-04-06 RX ADMIN — Medication 2 G: at 12:57

## 2022-04-06 RX ADMIN — HYDROMORPHONE HYDROCHLORIDE 0.5 MG: 1 INJECTION, SOLUTION INTRAMUSCULAR; INTRAVENOUS; SUBCUTANEOUS at 08:16

## 2022-04-06 RX ADMIN — ANASTROZOLE 1 MG: 1 TABLET ORAL at 12:57

## 2022-04-06 NOTE — ASSESSMENT & PLAN NOTE
· Nephrology following  · Improving, continue to check BMP the nursing facility    Continue salt tablets  · Received tolvaptan x 2 doses (4/2 and 4/3)   · On salt tablets  · Chronically on zoloft and trimeterene-hctz which will continue to hold

## 2022-04-06 NOTE — ASSESSMENT & PLAN NOTE
· Discontinuing Eliquis at discharge as discussed with the daughter  ·  B/l doppler LE: acute- subacute occlusive thrombus noted in the common femoral vein, deep femoral vein, and proximal to distal superficial femoral vein  acute-subacute occlusive thrombophlebitis noted in the great saphenous vein from proximal to distal thigh  No RLE DVT  · CT c/a/p: 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 extending inferiorly and likely involves the iliac vessels with extension to the left lower extremity  Suspected small PE in LLL  No large central PE    · Provoked secondary to underlying malignancy  · Discussion had with patient's daughter  Her preference is to not continue on anticoagulation upon discharge given patient is a fall risk and goals for comfort    She is ok to keep on a/c tx while hospitalized  · Has been on heparin gtt, transitioned to eliquis

## 2022-04-06 NOTE — QUICK NOTE
Pt has received her 3rd Covid-19 booster shot on her left deltoid muscle  The product lot number is [id number]

## 2022-04-06 NOTE — CASE MANAGEMENT
Case Management Discharge Planning Note    Patient name Deon Bae  Location /-42 MRN [id number]  : 1935 Date 2022       Current Admission Date: 3/30/2022  Current Admission Diagnosis:Hyponatremia   Patient Active Problem List    Diagnosis Date Noted    DM II (diabetes mellitus, type II), controlled ([place] [address] ) 2022    Compression fracture of L2 lumbar vertebra ([facility] ) 2022    Pyuria 2022    TILA (acute kidney injury) ([facility] ) 2022    DVT (deep venous thrombosis) ([place] [address] ) 2022    Cancer related pain 2022    Palliative care patient 2022   Manjeet Calabrese Person consulting for explanation of examination or test finding 11/10/2021    Hearing loss of left ear due to cerumen impaction 10/26/2021    Status post small bowel resection 2021    Malignant neoplasm of overlapping sites of bladder ([place] [address] ) 2021    Encounter to discuss test results 2021    Cough 2021    Hypercalcemia 10/22/2020    Balance problem 10/22/2020    Medicare annual wellness visit, subsequent 10/18/2019    Memory loss 06/10/2019    Abnormal ultrasound cardiogram 2018    Breast carcinoma, female, right ([place] [address] ) 2018    S/P hysterectomy 2018    Cardiac pacemaker in situ 2018    Sick sinus syndrome ([place] [address] ) 2018    Bradycardia 2018    Hyponatremia 2018    Hypokalemia 2018    Dizziness 2018    Malignant neoplasm of upper-inner quadrant of left breast in female, estrogen receptor positive ([place] [address] ) 2018    History of breast cancer 2018    Un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diabetes mellitus with hyperglycemia ([place] [address] ) 2017    Left breast mass 12/10/2017    Depression 2017    Anxiety 2016    BMI 33 0-33 9,adult 2016    Macular degeneration 2016    Hyperlipidemia 2015    GERD (gastroesophageal reflux disease) 2015    Essential hypertension 2011      LOS (days): 6  Geometric Mean LOS (GMLOS) (days): 3 30  Days to GMLOS:-2 7     OBJECTIVE:  Risk of Unplanned Readmission Score: 11         Current admission status: Inpatient   Preferred Pharmacy:   [id number], [address] [place] [id number]  Phone: [phone] Fax: [phone]    Primary Care Provider: Wally Berry DO    Primary Insurance: [facility]  Secondary Insurance:     DISCHARGE DETAILS:                                                                                                      Accepting Facility Name, [facility] : [address]  Receiving Facility/Agency Phone Number: [phone]  Facility/Agency Fax Number: [phone]

## 2022-04-06 NOTE — PROGRESS NOTES
NEPHROLOGY PROGRESS NOTE    Patient: Riaz Clark               Sex: female          DOA: 3/30/2022  4:36 PM   YOB: 1935        Age:  82 y o         LOS:  LOS: 6 days   4/6/2022    REASON FOR THE CONSULTATION:      Hyponatremia    SUBJECTIVE     Patient is seen and examined next to the bedside  Continues to complain of pain in the back  Denies nausea or vomiting      CURRENT MEDICATIONS       Current Facility-Administered Medications:     acetaminophen (TYLENOL) tablet 650 mg, 650 mg, Oral, Q6H PRN, Brandon Dia MD, 650 mg at 04/01/22 2119    aluminum-magnesium hydroxide-simethicone (MYLANTA) oral suspension 30 mL, 30 mL, Oral, Q6H PRN, Roderick Bloom PA-C, 30 mL at 04/02/22 1840    anastrozole (ARIMIDEX) tablet 1 mg, 1 mg, Oral, Daily With Lunch, Nely Burgos DO, 1 mg at 04/05/22 1205    apixaban (ELIQUIS) tablet 10 mg, 10 mg, Oral, BID, Nely Burgos DO, 10 mg at 04/06/22 6895    artificial tear (LUBRIFRESH P M ) ophthalmic ointment, , Both Eyes, HS, Nely Burgos DO, Given at 04/05/22 2211    atorvastatin (LIPITOR) tablet 40 mg, 40 mg, Oral, HS, Nely Burgos DO, 40 mg at 04/05/22 2211    ferrous sulfate tablet 325 mg, 325 mg, Oral, Daily With Breakfast, Nely Burgos DO, 325 mg at 04/06/22 0816    HYDROmorphone (DILAUDID) injection 0 5 mg, 0 5 mg, Intravenous, Q4H PRN, Mike Cannon MD, 0 5 mg at 04/06/22 0816    insulin lispro (HumaLOG) 100 units/mL subcutaneous injection 1-5 Units, 1-5 Units, Subcutaneous, TID AC **AND** Fingerstick Glucose (POCT), , , TID AC, Radha Villafana DO    insulin lispro (HumaLOG) 100 units/mL subcutaneous injection 1-5 Units, 1-5 Units, Subcutaneous, HS, Radha Villafana DO    ondansetron [facility]) injection 4 mg, 4 mg, Intravenous, Q6H PRN, Roderick Bloom PA-C, 4 mg at 04/05/22 0919    oxyCODONE (ROXICODONE) IR tablet 5 mg, 5 mg, Oral, Q4H PRN, 5 mg at 04/05/22 1204 **OR** oxyCODONE (ROXICODONE) IR tablet 7 5 mg, 7 5 mg, Oral, Q4H PRN, Marlon Fam MD, 7 5 mg at 04/06/22 0342    polyethylene glycol (MIRALAX) packet 17 g, 17 g, Oral, Daily, [employer], [facility], 17 g at 04/04/22 0827    sodium chloride tablet 3 g, 3 g, Oral, 4x Daily (with meals and at bedtime), Rod Seay MD, 3 g at 04/06/22 0815    REVIEW OF SYSTEMS     Review of Systems   Constitutional: Negative  HENT: Negative  Eyes: Negative  Respiratory: Negative  Cardiovascular: Positive for leg swelling  Gastrointestinal: Negative  Endocrine: Negative  Genitourinary: Negative  Musculoskeletal: Negative  Skin: Negative  Allergic/Immunologic: Negative  Neurological: Negative  Hematological: Negative  All other systems reviewed and are negative  OBJECTIVE     Current Weight: Weight - Scale: 80 kg (176 lb 5 9 oz)  Vitals:    04/06/22 0803   BP: 115/57   Pulse: 65   Resp: 17   Temp: 97 8 °F (36 6 °C)   SpO2: 93%     Body mass index is 31 24 kg/m²  Intake/Output Summary (Last 24 hours) at 4/6/2022 1136  Last data filed at 4/5/2022 1300  Gross per 24 hour   Intake 120 ml   Output 700 ml   Net -580 ml       PHYSICAL EXAMINATION     Physical Exam  Constitutional:       Appearance: She is well-developed  HENT:      Head: Normocephalic and atraumatic  Eyes:      Pupils: Pupils are equal, round, and reactive to light  Cardiovascular:      Rate and Rhythm: Normal rate and regular rhythm  Heart sounds: Normal heart sounds  Pulmonary:      Effort: Pulmonary effort is normal    Abdominal:      General: Bowel sounds are normal       Palpations: Abdomen is soft  Musculoskeletal:         General: Normal range of motion  Cervical back: Neck supple  Skin:     General: Skin is warm  Neurological:      Mental Status: She is alert and oriented to person, place, and time             LAB RESULTS     Results from last 7 days   Lab Units 04/06/22  3729 04/05/22  1210 04/05/22  0731 04/04/22  1627 04/04/22  1219 04/04/22  0516 04/03/22  2135 04/03/22  0815 04/03/22  0625 04/02/22  0924 04/02/22  0533 04/01/22  1002 04/01/22  0519 03/31/22  1323 03/31/22  0536 03/30/22  2132 03/30/22  1701   WBC Thousand/uL 12 19*  --   --   --   --   --   --   --  11 02*  --  11 06*  --  12 61*  --  12 88* 11 95* 14 50*   HEMOGLOBIN g/dL 8 5*  --   --   --   --   --   --   --  8 8*  --  8 9*  --  9 3*  --  9 4* 8 3* 9 6*   HEMATOCRIT % 28 2*  --   --   --   --   --   --   --  27 2*  --  28 1*  --  28 1*  --  29 4* 25 8* 30 0*   PLATELETS Thousands/uL 294  --   --   --   --   --   --   --  264  --  257  --  255  --  233 207 240   POTASSIUM mmol/L 4 0 4 0 4 0 3 8 3 9 3 9 3 8   < >  --    < >  --    < >  --    < >  --   --  4 2   CHLORIDE mmol/L 101 101 101 98* 98* 100 96*   < >  --    < >  --    < >  --    < >  --   --  88*   CO2 mmol/L 26 26 26 26 26 25 25   < >  --    < >  --    < >  --    < >  --   --  28   BUN mg/dL 18 18 18 19 18 19 21   < >  --    < >  --    < >  --    < >  --   --  36*   CREATININE mg/dL 1 16 1 19 1 24 1 40* 1 35* 1 22 1 36*   < >  --    < >  --    < >  --    < >  --   --  1 71*   EGFR ml/min/1 73sq m 43 42 39 34 36 40 35   < >  --    < >  --    < >  --    < >  --   --  27   CALCIUM mg/dL 12 0* 11 5* 11 8* 12 2* 11 6* 11 6* 11 3*   < >  --    < >  --    < >  --    < >  --   --  11 1*    < > = values in this interval not displayed  RADIOLOGY RESULTS      Results for orders placed during the hospital encounter of 07/02/18    XR chest portable    Narrative  INDICATION:  Status post pacemaker insertion  ORDERING PROVIDER:  Pinky Grullon  TECHNIQUE:  Frontal chest was obtained at 10:22 hours  COMPARISON:  None Available  FINDINGS: Mild cardiomegaly  Mild hypoinflation  No gross consolidative  pneumonia, effusion, or pneumothorax  Left-sided pacemaker, leads appear  intact  Subtle hilar vascular prominence  Impression  1    No evidence for pneumothorax  2   Mild cardiomegaly and subtle hilar vascular prominence  ASSESSMENT/PLAN     82-year-old female with past medical history of bladder cancer status post TURBT and administration of BCG in July 2021, progression of malignancy to upper tract urothelial carcinoma, diabetes mellitus type 2, breast cancer, dyslipidemia, depression, sick sinus syndrome, volvulus who presents to our facility on 03/30 with complaint of generalized pain along with left lower extremity edema  1  Hyponatremia:  Presented to our facility on 03/30 with serum sodium 124 mEq per L   -etiology thought to be secondary to decreased solute intake as suggested by urine studies as well as component of SIADH  -noted low urine sodium, elevated serum uric acid and normal urine osmolality  -current sodium is 133 meq/L and stable compared to yesterday   -target sodium is greater than 130 mEq per L    - keep patient on salt tablets at a dose of 2g p o  T i d     2  Acute kidney injury:  Present on admission  Baseline serum creatinine of 0 7-0 9   -presented with serum creatinine of 1 71 and elevated  -current creatinine is 1 1 and stable  -etiology of acute kidney injury likely contrast induced nephropathy along with hypercalcemia induced renal vasoconstriction    3  Hypercalcemia:  Calcium is 12 and elevated   -this is likely secondary to 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

## 2022-04-06 NOTE — DISCHARGE SUMMARY
[facility]  Discharge- Jeanenne Hawks Schoenberger 1935, 82 y o  female MRN: [id number]  Unit/Bed#: -[facility] Encounter: [id number]  Primary Care Provider: Albert Banegas DO   Date and time admitted to hospital: 3/30/2022  4:36 PM    * Hyponatremia  Assessment & Plan  · Nephrology following  · Improving, continue to check BMP the nursing facility  Continue salt tablets  · Received tolvaptan x 2 doses (4/2 and 4/3)   · On salt tablets  · Chronically on zoloft and trimeterene-hctz which will continue to hold      DM II (diabetes mellitus, type II), controlled [facility])  Assessment & Plan  Lab Results   Component Value Date    HGBA1C 5 6 10/26/2021       Recent Labs     04/05/22  1656 04/05/22  2034 04/06/22  0802 04/06/22  1144   POCGLU 106 128 103 116       Blood Sugar Average: Last 72 hrs:  (P) 232 [id number]   Hold metformin, sugar stable  Cont ISS  If CBG is well controlled onsider d/c of ISS given eventual goals to pursue comfort  Liberalized DM diet    Compression fracture of L2 lumbar vertebra (HCC)  Assessment & Plan  · Pain control as ordered , tolerating oxycodone  ·  Suspect pathological fracture given underlying malignancy  · Spoke at length with patient's daughter, does not wish for invasive/surgical intervention  She also does not wish for the patient to wear back brace for comfort purposes  TILA (acute kidney injury) ([facility] [address] )  Assessment & Plan  · Resolved  ·  Multifactorial, Secondary to underlying malignancy as infiltrating renal mass, contrast induced nephropathy, hypercalcemia  · Nephrology on board  · Avoid nephrotoxins, renally dose meds    DVT (deep venous thrombosis) ([facility])  Assessment & Plan  · Discontinuing Eliquis at discharge as discussed with the daughter  ·  B/l doppler LE: acute- subacute occlusive thrombus noted in the common femoral vein, deep femoral vein, and proximal to distal superficial femoral vein   acute-subacute occlusive thrombophlebitis noted in the great saphenous vein from proximal to distal thigh  No RLE DVT  · CT c/a/p: 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 extending inferiorly and likely involves the iliac vessels with extension to the left lower extremity  Suspected small PE in LLL  No large central PE    · Provoked secondary to underlying malignancy  · Discussion had with patient's daughter  Her preference is to not continue on anticoagulation upon discharge given patient is a fall risk and goals for comfort  She is ok to keep on a/c tx while hospitalized  · Has been on heparin gtt, transitioned to eliquis    Cancer related pain  Assessment & Plan  · Controlled,  CT shows progression of disease as well as extensive DVT in IVC/left lower extremity  · Palliative care on board  · Continue pain analgesics regimen as recommended for palliative  Monitor for 24 more hours before increasing pain meds as she continues to complain of pain    Malignant neoplasm of overlapping sites of bladder [facility])  Assessment & Plan  · Malignant High-grade invasive urethral carcinoma; 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  · Patient elected for palliative care does not wish to pursue surgical or oncologic intervention  · Palliative care on board  · Extensive discussion with patient's daughter  They are not ready for hospice  The hope is for possible rehabilitation and then after which will likely transition to home hospice    Hypercalcemia  Assessment & Plan  · Acute on chronic elevation, likely due to malignancy    Continue to follow  · Nephrology following        Medical Problems             Resolved Problems  Date Reviewed: 4/6/2022    None              Discharging Physician / Practitioner: Bernie Coleman MD  PCP: Pranav Wilkerson DO  Admission Date:   Admission Orders (From admission, onward)     Ordered        03/31/22 1255  Inpatient Admission  Once            03/30/22 2342  Place in Observation  Once                      Discharge Date: 04/06/22    Consultations During Hospital Stay:  [address]  IP CONSULT TO CASE MANAGEMENT  · IP CONSULT TO NEPHROLOGY      Procedures Performed:   CT chest abdomen pelvis w contrast [[id number]] Collected: 03/30/22 2148   Order Status: Completed Updated: 03/30/22 2304   Narrative:     CT CHEST, ABDOMEN AND PELVIS WITH IV CONTRAST     INDICATION:   chest/abd pain, hx of ca   Left lower extremity DVT on venous Doppler malignant  History of high-grade invasive bladder cancer  Patient elected for palliative care  Prior history of breast cancer   COMPARISON:  CT dated 8/9/2021  Report for level extremity venous Doppler performed today  No prior chest CT  TECHNIQUE: CT examination of the chest, abdomen and pelvis was performed  Axial, sagittal, and coronal 2D reformatted images were created from the source data and submitted for interpretation  Radiation dose length product (DLP) for this visit:  mGy-cm    This examination, like all CT scans performed in the [facility], was performed utilizing techniques to minimize radiation dose exposure, including the use of iterative   reconstruction and automated exposure control  IV Contrast:  100 mL of iohexol (OMNIPAQUE)   Enteric Contrast: Enteric contrast was not administered  FINDINGS:     CHEST     LUNGS:  Multiple metastatic pulmonary nodules that are mostly in the lower lobes, right greater than left  Largest measures 1 6 cm at the right lung base on image 80 series 18  Mild bibasilar dependent atelectasis  There is no tracheal or endobronchial   lesion  PLEURA:  Small right effusion  Trace left effusion  HEART/GREAT VESSELS: Heart is mildly enlarged  There are at least 3 soft tissue nodules suspicious for metastases measuring up to 1 4 x 1 2 x 1 8 cm along the anterior aspect of the right ventricle of the heart that appear to involve myocardium  No   pericardial effusion   Atherosclerotic calcifications of the aorta and coronary arteries  No thoracic aortic aneurysm  Small pulmonary embolus suspected in the left lower lobe (image 73 series 601  MEDIASTINUM AND NIDIA:  Unremarkable  CHEST WALL AND LOWER NECK:   Left chest wall pacemaker  ABDOMEN     LIVER/BILIARY TREE: Several new metastatic liver lesions in the medial right lobe and segment 4 that measure up to 1 4 cm  New   Mildly increased prominence of the intrahepatic bile ducts and CBD  GALLBLADDER:  Surgically absent  SPLEEN:  Unremarkable  PANCREAS:  Unremarkable  ADRENAL GLANDS:  New right adrenal metastasis inseparable from right renal urothelial malignancy  See kidney  Mild thickening of the left adrenal gland also concerning for metastasis  KIDNEYS/URETERS: Large infiltrating right renal mass representing progression of previously seen urothelial mass in the collecting system  Mass confluent with bulky retroperitoneal adenopathy encasement of the IVC  Within the IVC in the IVC that extends   from just above the renal veins inferiorly   Iliac veins are distended and may also be thrombosed  Known left lower extremity DVT is suboptimally evaluated on the current study  Tumor extends beyond renal fascia with invasion of the right psoas muscle  Tumor also directly contacts the adjacent liver with superficial metastases along the medial aspect of the right lobe  Tumor involves the right adrenal gland and also appears to invade the second portion of the duodenum which may account for increased   biliary dilatation  Stable right renal cyst  Stable left renal cyst  No left hydronephrosis  STOMACH AND BOWEL: Right renal urothelial tumor invades the posterior wall of the duodenal sweep  No obstruction  Diverticulosis without diverticulitis  APPENDIX:  No findings to suggest appendicitis       ABDOMINOPELVIC CAVITY: Bulky retroperitoneal adenopathy with circumaortic soft tissue at the level of the renal vessels  VESSELS:     Occlusive tumoral and/or bland thrombus within the infrahepatic IVC that extends inferiorly  Suspected thrombosis of the iliac veins  Known thrombosis of the left lower extremity deep venous system is better demonstrated on ultrasound  Right renal vein is thrombosed    There is also thrombosis of the central left renal vein  Adenopathy encases the aorta at the level of the renal vessels as well as the bilateral renal arteries  No aortic aneurysm  Atherosclerotic calcifications  PELVIS     REPRODUCTIVE ORGANS:  Uterus is surgically absent  URINARY BLADDER:  Enhancing intraluminal bladder lesion at the posterior aspect of the bladder measuring 1 6 cm  Smaller lesion along the left inferior bladder  ABDOMINAL WALL/INGUINAL REGIONS:  Unremarkable  OSSEOUS STRUCTURES:  Stable T12 compression fracture with mild chronic bony retropulsion  Radha Cecy mild superior endplate compression fracture at L2  Suspect pathologic fracture as there is subtle focal erosion of the anterior cortex which is inseparable from adjacent retroperitoneal adenopathy  No other definite lytic or blastic lesions but   evaluation is somewhat limited due to osteopenia       Impression:       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 with extensive infiltration of the right kidney and invasion of the adjacent adrenal gland, medial aspect of the liver, duodenum and right iliopsoas muscle  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 extending inferiorly and likely involves the iliac vessels with extension to the left lower extremity deep venous system identified on ultrasound      Suspected small pulmonary embolus in the left lower lobe  No large central PE  Confluent periaortic fanny mass with suspected invasion of the anterior L2 vertebral body where there is a subtle cortical erosion   Metastatic lung nodules   Metastatic lesions along the anterior wall of the heart that may appear to involve the myocardium  New mild compression L2 fracture that is that I suspect is pathologic given cortical erosion at the anterior aspect of the vertebral body  No significant bony retropulsion           ·     Significant Findings / Test Results:   · As above    Incidental Findings:   · None     Test Results Pending at Discharge (will require follow up): · None     Outpatient Tests Requested:  · BMP in 3 days at the nursing facility    Complications:  None    Reason for Admission:  Cancer related pain    Hospital Course:   Teresa Ricardo is a 82 y o  female patient who originally presented to the hospital on 3/30/2022 due to cancer related uncontrolled pain  Palliative Care was on board  Noted to have hypercalcemia and hyponatremia which were treated  Nephrology was on board  Patient is on salt tablets found  Patient also received a couple doses of tolvaptan  Her pain is well controlled on oxycodone currently  Will continue to follow up outpatient with palliative  Patient found to have DVT and was on heparin which was then switched to Eliquis  Patient tolerated liquids well  After discussing with the daughter, Eliquis has been discontinued discharge S the patient is a fall risk  Please see above list of diagnoses and related plan for additional information       Condition at Discharge: fair    Discharge Day Visit / Exam:   Subjective:  I am better today  Vitals: Blood Pressure: 115/57 (04/06/22 0803)  Pulse: 65 (04/06/22 0803)  Temperature: 97 8 °F (36 6 °C) (04/06/22 0803)  Temp Source: Oral (03/31/22 2223)  Respirations: 17 (04/06/22 0803)  Height: 5' 3" (160 cm) (03/30/22 1644)  Weight - Scale: 80 kg (176 lb 5 9 oz) (04/06/22 0600)  SpO2: 93 % (04/06/22 0803)  Exam:   Physical Exam General- Awake, alert and oriented x 3, looks comfortable  HEENT- Normocephalic, atraumatic, oral mucosa- moist  Neck- Supple, No carotid bruit, no JVD  CVS- Normal S1/ S2, Regular rate and rhythm, No murmur, No edema  Respiratory system- B/L clear breath sounds, no wheezing  Abdomen- Soft, Non distended, no tenderness, Bowel sound- present 4 quads  Genitourinary- No suprapubic tenderness, No CVA tenderness  Skin- No new bruise or rash  Musculoskeletal- No gross deformity  Psych- No acute psychosis  CNS- CN II- XII grossly intact, No acute focal neurologic deficit noted      Discussion with Family: Updated  (daughter) via phone  Discharge instructions/Information to patient and family:   See after visit summary for information provided to patient and family  Provisions for Follow-Up Care:  See after visit summary for information related to follow-up care and any pertinent home health orders  Disposition:   Other: Skilled nursing facility    Planned Readmission:  No     Discharge Statement:  I spent 35 minutes discharging the patient  This time was spent on the day of discharge  I had direct contact with the patient on the day of discharge  Greater than 50% of the total time was spent examining patient, answering all patient questions, arranging and discussing plan of care with patient as well as directly providing post-discharge instructions  Additional time then spent on discharge activities  Discharge Medications:  See after visit summary for reconciled discharge medications provided to patient and/or family        **Please Note: This note may have been constructed using a voice recognition system**

## 2022-04-06 NOTE — ASSESSMENT & PLAN NOTE
· Pain control as ordered , tolerating oxycodone  ·  Suspect pathological fracture given underlying malignancy  · Spoke at length with patient's daughter, does not wish for invasive/surgical intervention  She also does not wish for the patient to wear back brace for comfort purposes

## 2022-04-06 NOTE — ASSESSMENT & PLAN NOTE
Lab Results   Component Value Date    HGBA1C 5 6 10/26/2021       Recent Labs     04/05/22  1656 04/05/22 2034 04/06/22  0802 04/06/22  1144   POCGLU 106 128 103 116       Blood Sugar Average: Last 72 hrs:  (P) 971 [id number]   Hold metformin, sugar stable  Cont ISS  If CBG is well controlled onsider d/c of ISS given eventual goals to pursue comfort  Liberalized DM diet

## 2022-04-06 NOTE — CASE MANAGEMENT
Case Management Discharge Planning Note    Patient name Keny [facility]  Location /-61 MRN [id number]  : 1935 Date 2022       Current Admission Date: 3/30/2022  Current Admission Diagnosis:Hyponatremia   Patient Active Problem List    Diagnosis Date Noted    DM II (diabetes mellitus, type II), controlled ([place] [address] ) 2022    Compression fracture of L2 lumbar vertebra ([facility] ) 2022    Pyuria 2022    TILA (acute kidney injury) ([facility] ) 2022    DVT (deep venous thrombosis) ([place] [address] ) 2022    Cancer related pain 2022    Palliative care patient 2022   Milton Hoffmann Person consulting for explanation of examination or test finding 11/10/2021    Hearing loss of left ear due to cerumen impaction 10/26/2021    Status post small bowel resection 2021    Malignant neoplasm of overlapping sites of bladder ([place] [address] ) 2021    Encounter to discuss test results 2021    Cough 2021    Hypercalcemia 10/22/2020    Balance problem 10/22/2020    Medicare annual wellness visit, subsequent 10/18/2019    Memory loss 06/10/2019    Abnormal ultrasound cardiogram 2018    Breast carcinoma, female, right ([facility] ) 2018    S/P hysterectomy 2018    Cardiac pacemaker in situ 2018    Sick sinus syndrome ([place] [address] ) 2018    Bradycardia 2018    Hyponatremia 2018    Hypokalemia 2018    Dizziness 2018    Malignant neoplasm of upper-inner quadrant of left breast in female, estrogen receptor positive ([place] [address] ) 2018    History of breast cancer 2018    Un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diabetes mellitus with hyperglycemia [email or web address] ) 2017    Left breast mass 12/10/2017    Depression 2017    Anxiety 2016    BMI 33 0-33 9,adult 2016    Macular degeneration 2016    Hyperlipidemia 2015    GERD (gastroesophageal reflux disease) 2015    Essential hypertension 2011      LOS (days): 6  Geometric Mean LOS (GMLOS) (days): 3 30  Days to GMLOS:-2 7     OBJECTIVE:  Risk of Unplanned Readmission Score: 11         Current admission status: Inpatient   Preferred Pharmacy:   [facility],[address], [address] [place] [address]  Phone: [phone] Fax: [phone]    Primary Care Provider: Anuja Hutchison DO    Primary Insurance: [facility] REP  Secondary Insurance:     DISCHARGE DETAILS:    Discharge planning discussed with[de-identified] pt cleared  ly received auth  bls for 2p  cm spoke daughter shelley and pt who are both agreeable to transfer  IMM given   ly, RN and MD all updated                                                                                                 Accepting Facility Name, [facility] : [address]  Receiving Facility/Agency Phone Number: [phone]  Facility/Agency Fax Number: [phone]

## 2022-04-06 NOTE — ASSESSMENT & PLAN NOTE
· Controlled,  CT shows progression of disease as well as extensive DVT in IVC/left lower extremity  · Palliative care on board  · Continue pain analgesics regimen as recommended for palliative    Monitor for 24 more hours before increasing pain meds as she continues to complain of pain

## 2022-04-06 NOTE — PLAN OF CARE
Problem: Potential for Falls  Goal: Patient will remain free of falls  Description: INTERVENTIONS:  - Educate patient/family on patient safety including physical limitations  - Instruct patient to call for assistance with activity   - Consult OT/PT to assist with strengthening/mobility   - Keep Call bell within reach  - Keep bed low and locked with side rails adjusted as appropriate  - Keep care items and personal belongings within reach  -  Problem: PAIN - ADULT  Goal: Verbalizes/displays adequate comfort level or baseline comfort level  Description: Interventions:  - Encourage patient to monitor pain and request assistance  - Assess pain using appropriate pain scale  - Administer analgesics based on type and severity of pain and evaluate response  - Implement non-pharmacological measures as appropriate and evaluate response  - Consider cultural and social influences on pain and pain management  - Notify physician/advanced practitioner if interventions unsuccessful or patient reports new pain  Outcome: Progressing     Problem: DISCHARGE PLANNING  Goal: Discharge to home or other facility with appropriate resources  Description: INTERVENTIONS:  - Identify barriers to discharge w/patient and caregiver  - Arrange for needed discharge resources and transportation as appropriate  - Identify discharge learning needs (meds, wound care, etc )  - Arrange for interpretive services to assist at discharge as needed  - Refer to Case Management Department for coordinating discharge planning if the patient needs post-hospital services based on physician/advanced practitioner order or complex needs related to functional status, cognitive ability, or social support system  Outcome: Progressing   - Consider moving patient to room near nurses station  Outcome: Progressing

## 2022-04-07 ENCOUNTER — TRANSITIONAL CARE MANAGEMENT (OUTPATIENT)
Dept: FAMILY MEDICINE CLINIC | Facility: CLINIC | Age: 87
End: 2022-04-07

## 2022-04-14 DIAGNOSIS — E11.65 UNCONTROLLED TYPE 2 DIABETES MELLITUS WITH HYPERGLYCEMIA (HCC): ICD-10-CM

## 2022-04-18 ENCOUNTER — TELEPHONE (OUTPATIENT)
Dept: PALLIATIVE MEDICINE | Facility: CLINIC | Age: 87
End: 2022-04-18

## 2022-04-18 NOTE — TELEPHONE ENCOUNTER
Patient is in [address] states that Mom is unable to ride in car anymore and unable to make it to any appointments

## 2022-04-21 ENCOUNTER — TELEPHONE (OUTPATIENT)
Dept: PALLIATIVE MEDICINE | Facility: CLINIC | Age: 87
End: 2022-04-21

## 2022-04-21 ENCOUNTER — TELEPHONE (OUTPATIENT)
Dept: OTHER | Facility: OTHER | Age: 87
End: 2022-04-21

## 2022-04-21 NOTE — TELEPHONE ENCOUNTER
Daughter Marlene Rojas is requesting a call back from the office to discuss her mother going on hospice  Patient is currently at [facility] and patients insurance coverage will be stopping on 4/22/2022 due to patient not being able to do physical therapy  Daughter would like to discuss alterative options

## 2022-04-21 NOTE — TELEPHONE ENCOUNTER
Patient's daughter called will like a call back from Dr Edin Herron, patient is being discharged from rehab tomorrow  due to health insurance  Patient's daughter  will like patient to be on hospice in this facility and will like an order from the doctor, and a call back to go over this process

## 2022-04-21 NOTE — TELEPHONE ENCOUNTER
Returned call  Supportive listening provided  Patient rapidly declined while at [address], prompting 48 hour notice of discharge  Daughter is scrambling for plan  Liaison from [facility] is meeting with Paulina calabrese to evaluate patient for hospice and determine appropriate LOC  All questions/concerns addressed

## 2022-05-10 NOTE — PROGRESS NOTES
Merlin remote check pacer  No events  Normal battery function  Current Outpatient Medications:     acetaminophen (TYLENOL) 500 mg tablet, Take 1 tablet (500 mg total) by mouth every 6 (six) hours as needed for mild pain Max Daily: 3,000 mg, Disp: , Rfl:     anastrozole (ARIMIDEX) 1 mg tablet, Take 1 mg by mouth daily with lunch , Disp: , Rfl:     Apoaequorin (Prevagen) 10 MG CAPS, Take 10 mg by mouth daily in the early morning , Disp: , Rfl:     atorvastatin (LIPITOR) 40 mg tablet, TAKE ONE TABLET BY MOUTH EVERY DAY AT BEDTIME, Disp: 90 tablet, Rfl: 0    bisacodyl (DULCOLAX) 10 mg suppository, Insert 1 suppository (10 mg total) into the rectum daily as needed for constipation, Disp: 12 suppository, Rfl: 0    Cholecalciferol 25 MCG (1000 UT) tablet, Take 1,000 Units by mouth daily, Disp: , Rfl:     DOCOSAHEXAENOIC ACID PO, Take 2 capsules by mouth daily, Disp: , Rfl:     docusate sodium (COLACE) 100 mg capsule, Take 1 capsule (100 mg total) by mouth 3 (three) times a day for 7 days, Disp: 21 capsule, Rfl: 0    ferrous sulfate 324 (65 Fe) mg, Daily, Disp: 60 tablet, Rfl: 5    metFORMIN (GLUCOPHAGE) 500 mg tablet, TAKE ONE TABLET BY MOUTH ONE TIME DAILY, Disp: 90 tablet, Rfl: 0    naloxone (NARCAN) 4 mg/0 1 mL nasal spray, Administer 1 spray into a nostril   If no response after 2-3 minutes, give another dose in the other nostril using a new spray , Disp: 1 each, Rfl: 1    polyethylene glycol (MIRALAX) 17 g packet, Take 17 g by mouth daily as needed (constipation), Disp: , Rfl: 0    polyethylene glycol-propylene glycol (SYSTANE) 0 4-0 3 %, Apply 4 drops to eye 2 (two) times a day, Disp: , Rfl:     potassium chloride (Klor-Con) 10 mEq tablet, TAKE TWO TABLETS BY MOUTH DAILY, Disp: 180 tablet, Rfl: 0    sodium chloride 1 g tablet, Take 2 tablets (2 g total) by mouth 3 (three) times a day with meals for 10 days, Disp: 60 tablet, Rfl: 0

## 2022-07-16 NOTE — TELEPHONE ENCOUNTER
Reason for Disposition   Urinary catheter care, questions about    Answer Assessment - Initial Assessment Questions  No symptoms  Pt just wanted to know how to empty her drainage bag  Protocols used: URINARY CATHETER SYMPTOMS AND QUESTIONS-ADULT-    Pt was able to figure out how to empty the drainage bag by the time call was made 
Regarding: [place] - Emptying Bag  ----- Message from Freddi Brittle sent at 7/3/2021  9:05 AM EDT -----  "I have a indwelling catheter and I don't know how to empty my bag   There is no one here to show me "
show

## 2022-12-28 NOTE — ED PROCEDURE NOTE
PROCEDURE  CriticalCare Time  Performed by: Ingris Mcbride MD  Authorized by: Ingris Mcbride MD     Critical care provider statement:     Critical care time (minutes):  45    Critical care start time:  8/10/2021 12:00 AM    Critical care end time:  8/10/2021 3:30 AM    Critical care time was exclusive of:  Separately billable procedures and treating other patients    Critical care was necessary to treat or prevent imminent or life-threatening deterioration of the following conditions:  Dehydration (management of acute surgical abdomen, coordination of care)    Critical care was time spent personally by me on the following activities:  Obtaining history from patient or surrogate, development of treatment plan with patient or surrogate, discussions with consultants, discussions with primary provider, evaluation of patient's response to treatment, examination of patient, review of old charts, re-evaluation of patient's condition, ordering and review of radiographic studies, ordering and review of laboratory studies and ordering and performing treatments and interventions         Ingris Mcbride MD  08/10/21 0425
PROCEDURE  ECG 12 Lead Documentation Only    Date/Time: 8/9/2021 11:05 PM  Performed by: Yobani Howard MD  Authorized by: Yobani Howard MD     Indications / Diagnosis:  Abdominal pain, possible admission  ECG reviewed by me, the ED Provider: yes    Patient location:  ED  Interpretation:     Interpretation: non-specific    Rate:     ECG rate:  60    ECG rate assessment: normal    Rhythm:     Rhythm: sinus rhythm    Ectopy:     Ectopy: none    Conduction:     Conduction: abnormal      Abnormal conduction: complete RBBB and 1st degree    ST segments:     ST segments:  Non-specific  T waves:     T waves: non-specific           Yobani Howard MD  08/10/21 0443
Detail Level: Simple
Comment: Labial cyst; assured of benign nature and pt will talk to Dr. Urbano in future if removal is desired
Render Risk Assessment In Note?: no

## (undated) DEVICE — PLUMEPEN PRO 10FT

## (undated) DEVICE — CATH FOLEY 24FR 5ML 2 WAY SILICONE ELASTIMER

## (undated) DEVICE — BAG DECANTER

## (undated) DEVICE — WOUND RETRACTOR AND PROTECTOR: Brand: ALEXIS O WOUND PROTECTOR-RETRACTOR

## (undated) DEVICE — GARMENT,MEDLINE,DVT,INT,CALF,FOAM,MED: Brand: MEDLINE

## (undated) DEVICE — SUCTION SCINTILLANT LIGHT

## (undated) DEVICE — TIBURON LAPAROTOMY DRAPE: Brand: CONVERTORS

## (undated) DEVICE — VIOLET BRAIDED (POLYGLACTIN 910), SYNTHETIC ABSORBABLE SUTURE: Brand: COATED VICRYL

## (undated) DEVICE — POOLE SUCTION INSTRUMENT WITH REMOVABLE SHEATH AND PREATTACHED 6' (1.8 M) CLEAR PLASTIC TUBING: Brand: POOLE

## (undated) DEVICE — GLOVE INDICATOR PI UNDERGLOVE SZ 7 BLUE

## (undated) DEVICE — GLOVE INDICATOR PI UNDERGLOVE SZ 6.5 BLUE

## (undated) DEVICE — PAD GROUNDING ADULT

## (undated) DEVICE — GLOVE SRG BIOGEL 7.5

## (undated) DEVICE — SUT SILK 0 CT-1 30 IN 424H

## (undated) DEVICE — GROUNDING PAD UNIVERSAL SLW

## (undated) DEVICE — SUT VICRYL 3-0 SH 27 IN J416H

## (undated) DEVICE — STERILE SURGICAL LUBRICANT,  TUBE: Brand: SURGILUBE

## (undated) DEVICE — TOWEL SURG XR DETECT GREEN STRL RFD

## (undated) DEVICE — ENSEAL X1 TISSUE SEALER, CURVED JAW, 45 CM SHAFT LENGTH: Brand: ENSEAL

## (undated) DEVICE — BULB SYRINGE,IRRIGATION WITH PROTECTIVE CAP: Brand: DOVER

## (undated) DEVICE — CATH URETERAL 5FR X 70 CM FLEX TIP POLYUR BARD

## (undated) DEVICE — CATH FOLEY 18FR 5ML 2 WAY SILICONE ELASTIMER

## (undated) DEVICE — GLOVE INDICATOR PI UNDERGLOVE SZ 8 BLUE

## (undated) DEVICE — ADHESIVE SKIN CLSR DERMABOND NX

## (undated) DEVICE — CHLORAPREP HI-LITE 26ML ORANGE

## (undated) DEVICE — SPECIMEN CONTAINER STERILE PEEL PACK

## (undated) DEVICE — 4-PORT MANIFOLD: Brand: NEPTUNE 2

## (undated) DEVICE — CHLORHEXIDINE 4PCT 4 OZ

## (undated) DEVICE — PREMIUM DRY TRAY LF: Brand: MEDLINE INDUSTRIES, INC.

## (undated) DEVICE — SPONGE LAP 18 X 18 IN STRL RFD

## (undated) DEVICE — INTENDED FOR TISSUE SEPARATION, AND OTHER PROCEDURES THAT REQUIRE A SHARP SURGICAL BLADE TO PUNCTURE OR CUT.: Brand: BARD-PARKER ® CARBON RIB-BACK BLADES

## (undated) DEVICE — MAJOR LAPAROTOMY PACK: Brand: MEDLINE INDUSTRIES, INC.

## (undated) DEVICE — 3M™ DEFIBRULATOR PADS 2346N: Brand: 3M™

## (undated) DEVICE — Device: Brand: OLYMPUS

## (undated) DEVICE — SUT PDS II 1 CTX-B 36 IN ZB371

## (undated) DEVICE — GLOVE SRG BIOGEL ECLIPSE 7.5

## (undated) DEVICE — 3M™ STERI-DRAPE™ 2 INCISE DRAPE 2050: Brand: STERI-DRAPE™

## (undated) DEVICE — GLOVE INDICATOR PI UNDERGLOVE SZ 7.5 BLUE

## (undated) DEVICE — DRESSING MEPILEX AG BORDER 4 X 12 IN

## (undated) DEVICE — 1840 FOAM BLOCK NEEDLE COUNTER: Brand: DEVON

## (undated) DEVICE — DRAPE C-ARM X-RAY

## (undated) DEVICE — ASTOUND STANDARD SURGICAL GOWN, XL: Brand: CONVERTORS

## (undated) DEVICE — PROXIMATE RELOADABLE LINEAR CUTTER WITH SAFETY LOCK-OUT.  55MM LINEAR CUTTER.: Brand: PROXIMATE

## (undated) DEVICE — MINOR PACK: Brand: MEDLINE INDUSTRIES, INC.

## (undated) DEVICE — PACK TUR

## (undated) DEVICE — PROXIMATE LINEAR CUTTER RELOAD (STNADARD) , BLUE, 55MM: Brand: PROXIMATE

## (undated) DEVICE — 3M™ IOBAN™ 2 ANTIMICROBIAL INCISE DRAPE 6650EZ: Brand: IOBAN™ 2

## (undated) DEVICE — SUT SILK 3-0 SH CR/8 18 IN C013D

## (undated) DEVICE — GLOVE SRG BIOGEL 7

## (undated) DEVICE — SUT VICRYL 0 REEL 54 IN J287G

## (undated) DEVICE — PROXIMATE RELOADABLE LINEAR STAPLER: Brand: PROXIMATE

## (undated) DEVICE — INVIEW CLEAR LEGGINGS: Brand: CONVERTORS

## (undated) DEVICE — UROCATCH BAG

## (undated) DEVICE — GUIDEWIRE STRGHT TIP 0.035 IN  SOLO PLUS

## (undated) DEVICE — SUT VICRYL 4-0 P-3 18 IN J494G